# Patient Record
Sex: FEMALE | Race: BLACK OR AFRICAN AMERICAN | Employment: UNEMPLOYED | URBAN - METROPOLITAN AREA
[De-identification: names, ages, dates, MRNs, and addresses within clinical notes are randomized per-mention and may not be internally consistent; named-entity substitution may affect disease eponyms.]

---

## 2020-05-20 ENCOUNTER — HOSPITAL ENCOUNTER (OUTPATIENT)
Dept: LAB | Age: 40
Discharge: HOME | End: 2020-05-20
Payer: COMMERCIAL

## 2020-05-20 ENCOUNTER — PATIENT OUTREACH (OUTPATIENT)
Dept: CASE MANAGEMENT | Age: 40
End: 2020-05-20

## 2020-05-20 DIAGNOSIS — C90.00 MULTIPLE MYELOMA, REMISSION STATUS UNSPECIFIED (HCC): ICD-10-CM

## 2020-05-20 DIAGNOSIS — C90.00 MULTIPLE MYELOMA, REMISSION STATUS UNSPECIFIED (HCC): Primary | ICD-10-CM

## 2020-05-20 PROBLEM — E66.01 OBESITY, MORBID (HCC): Status: ACTIVE | Noted: 2020-05-20

## 2020-05-20 LAB
ALBUMIN SERPL-MCNC: 3.2 G/DL (ref 3.5–5)
ALBUMIN/GLOB SERPL: 0.8 {RATIO} (ref 1.2–3.5)
ALP SERPL-CCNC: 245 U/L (ref 50–136)
ALT SERPL-CCNC: 39 U/L (ref 12–65)
ANION GAP SERPL CALC-SCNC: 6 MMOL/L (ref 7–16)
AST SERPL-CCNC: 25 U/L (ref 15–37)
BASOPHILS # BLD: 0 K/UL (ref 0–0.2)
BASOPHILS NFR BLD: 0 % (ref 0–2)
BILIRUB SERPL-MCNC: 0.5 MG/DL (ref 0.2–1.1)
BUN SERPL-MCNC: 6 MG/DL (ref 6–23)
CALCIUM SERPL-MCNC: 9.8 MG/DL (ref 8.3–10.4)
CHLORIDE SERPL-SCNC: 108 MMOL/L (ref 98–107)
CO2 SERPL-SCNC: 28 MMOL/L (ref 21–32)
CREAT SERPL-MCNC: 0.7 MG/DL (ref 0.6–1)
DIFFERENTIAL METHOD BLD: ABNORMAL
EOSINOPHIL # BLD: 0 K/UL (ref 0–0.8)
EOSINOPHIL NFR BLD: 0 % (ref 0.5–7.8)
ERYTHROCYTE [DISTWIDTH] IN BLOOD BY AUTOMATED COUNT: 14.1 % (ref 11.9–14.6)
GLOBULIN SER CALC-MCNC: 4 G/DL (ref 2.3–3.5)
GLUCOSE SERPL-MCNC: 112 MG/DL (ref 65–100)
HCT VFR BLD AUTO: 32.8 % (ref 35.8–46.3)
HGB BLD-MCNC: 10.4 G/DL (ref 11.7–15.4)
IMM GRANULOCYTES # BLD AUTO: 0.3 K/UL (ref 0–0.5)
IMM GRANULOCYTES NFR BLD AUTO: 3 % (ref 0–5)
LDH SERPL L TO P-CCNC: 279 U/L (ref 100–190)
LYMPHOCYTES # BLD: 1.4 K/UL (ref 0.5–4.6)
LYMPHOCYTES NFR BLD: 12 % (ref 13–44)
MAGNESIUM SERPL-MCNC: 2.6 MG/DL (ref 1.8–2.4)
MCH RBC QN AUTO: 31.4 PG (ref 26.1–32.9)
MCHC RBC AUTO-ENTMCNC: 31.7 G/DL (ref 31.4–35)
MCV RBC AUTO: 99.1 FL (ref 79.6–97.8)
MONOCYTES # BLD: 1.3 K/UL (ref 0.1–1.3)
MONOCYTES NFR BLD: 11 % (ref 4–12)
NEUTS SEG # BLD: 8.9 K/UL (ref 1.7–8.2)
NEUTS SEG NFR BLD: 74 % (ref 43–78)
NRBC # BLD: 0.02 K/UL (ref 0–0.2)
PHOSPHATE SERPL-MCNC: 1.9 MG/DL (ref 2.5–4.5)
PLATELET # BLD AUTO: 243 K/UL (ref 150–450)
PMV BLD AUTO: 11 FL (ref 9.4–12.3)
POTASSIUM SERPL-SCNC: 3.4 MMOL/L (ref 3.5–5.1)
PROT SERPL-MCNC: 7.2 G/DL (ref 6.3–8.2)
RBC # BLD AUTO: 3.31 M/UL (ref 4.05–5.25)
SODIUM SERPL-SCNC: 142 MMOL/L (ref 136–145)
URATE SERPL-MCNC: 3.5 MG/DL (ref 2.6–6)
WBC # BLD AUTO: 11.9 K/UL (ref 4.3–11.1)

## 2020-05-20 PROCEDURE — 86334 IMMUNOFIX E-PHORESIS SERUM: CPT

## 2020-05-20 PROCEDURE — 84550 ASSAY OF BLOOD/URIC ACID: CPT

## 2020-05-20 PROCEDURE — 84165 PROTEIN E-PHORESIS SERUM: CPT

## 2020-05-20 PROCEDURE — 82232 ASSAY OF BETA-2 PROTEIN: CPT

## 2020-05-20 PROCEDURE — 84100 ASSAY OF PHOSPHORUS: CPT

## 2020-05-20 PROCEDURE — 36415 COLL VENOUS BLD VENIPUNCTURE: CPT

## 2020-05-20 PROCEDURE — 83883 ASSAY NEPHELOMETRY NOT SPEC: CPT

## 2020-05-20 PROCEDURE — 85025 COMPLETE CBC W/AUTO DIFF WBC: CPT

## 2020-05-20 PROCEDURE — 83615 LACTATE (LD) (LDH) ENZYME: CPT

## 2020-05-20 PROCEDURE — 80053 COMPREHEN METABOLIC PANEL: CPT

## 2020-05-20 PROCEDURE — 83735 ASSAY OF MAGNESIUM: CPT

## 2020-05-20 NOTE — PROGRESS NOTES
5/20/20:  New patient consult for multiple myeloma and stem cell transplant. Patient started with back pain earlier in the year. After evaluation, patient was noted to have lytic lesions around T6 and T7. T7 was biopsied and revealed multiple myeloma (Lambda Light chain, IgG). Patient had kyphoplasty to T7 to help with pain. Dr. Luzmaria Lopez discussed transplant overview with the patient. Dr. Luzmaria Lopez recommended completion of 4-6 cycles of RVD with Dr. Dai Morales in [place] and then start work-up for transplant. Financial coordinators notified of patient's insurance status and patient will need to be inpatient for transplant. Patient given ed pamphlet on myeloma and auto transplant. Patient given a verbal overview of transplant as well. Labs to be drawn today.   Return in 2 months

## 2020-05-21 LAB
B2 MICROGLOB SERPL-MCNC: 1.4 MG/L (ref 0.8–2.34)
KAPPA LC FREE SER-MCNC: 7.13 MG/L (ref 3.3–19.4)
KAPPA LC FREE/LAMBDA FREE SER: 0.66 {RATIO} (ref 0.26–1.65)
LAMBDA LC FREE SERPL-MCNC: 10.76 MG/L (ref 5.71–26.3)

## 2020-05-22 LAB
ALBUMIN SERPL ELPH-MCNC: 3.18 G/DL (ref 3.2–5.6)
ALBUMIN/GLOB SERPL: 0.9 {RATIO} (ref 1.2–3.5)
ALPHA1 GLOB SERPL ELPH-MCNC: 0.41 G/DL (ref 0.1–0.4)
ALPHA2 GLOB SERPL ELPH-MCNC: 1.13 G/DL (ref 0.4–1.2)
B-GLOBULIN SERPL QL ELPH: 1.34 G/DL (ref 0.6–1.3)
GAMMA GLOB MFR SERPL ELPH: 0.73 G/DL (ref 0.5–1.6)
IGA SERPL-MCNC: 56 MG/DL (ref 85–499)
IGG SERPL-MCNC: 587 MG/DL (ref 610–1616)
IGM SERPL-MCNC: 40 MG/DL (ref 35–242)
M PROTEIN SERPL ELPH-MCNC: ABNORMAL G/DL
PROT PATTERN SERPL ELPH-IMP: ABNORMAL
PROT PATTERN SPEC IFE-IMP: ABNORMAL
PROT SERPL-MCNC: 6.8 G/DL (ref 6.3–8.2)

## 2020-07-21 ENCOUNTER — PATIENT OUTREACH (OUTPATIENT)
Dept: CASE MANAGEMENT | Age: 40
End: 2020-07-21

## 2020-07-21 ENCOUNTER — HOSPITAL ENCOUNTER (OUTPATIENT)
Dept: LAB | Age: 40
Discharge: HOME | End: 2020-07-21
Payer: COMMERCIAL

## 2020-07-21 DIAGNOSIS — C90.00 MULTIPLE MYELOMA, REMISSION STATUS UNSPECIFIED (HCC): ICD-10-CM

## 2020-07-21 LAB
ALBUMIN SERPL-MCNC: 3.6 G/DL (ref 3.5–5)
ALBUMIN/GLOB SERPL: 0.9 {RATIO} (ref 1.2–3.5)
ALP SERPL-CCNC: 168 U/L (ref 50–136)
ALT SERPL-CCNC: 33 U/L (ref 12–65)
ANION GAP SERPL CALC-SCNC: 4 MMOL/L (ref 7–16)
AST SERPL-CCNC: 20 U/L (ref 15–37)
BASOPHILS # BLD: 0 K/UL (ref 0–0.2)
BASOPHILS NFR BLD: 0 % (ref 0–2)
BILIRUB SERPL-MCNC: 1.1 MG/DL (ref 0.2–1.1)
BUN SERPL-MCNC: 3 MG/DL (ref 6–23)
CALCIUM SERPL-MCNC: 9.3 MG/DL (ref 8.3–10.4)
CHLORIDE SERPL-SCNC: 108 MMOL/L (ref 98–107)
CO2 SERPL-SCNC: 28 MMOL/L (ref 21–32)
CREAT SERPL-MCNC: 0.6 MG/DL (ref 0.6–1)
DIFFERENTIAL METHOD BLD: ABNORMAL
EOSINOPHIL # BLD: 0 K/UL (ref 0–0.8)
EOSINOPHIL NFR BLD: 0 % (ref 0.5–7.8)
ERYTHROCYTE [DISTWIDTH] IN BLOOD BY AUTOMATED COUNT: 14.8 % (ref 11.9–14.6)
GLOBULIN SER CALC-MCNC: 4 G/DL (ref 2.3–3.5)
GLUCOSE SERPL-MCNC: 110 MG/DL (ref 65–100)
HCT VFR BLD AUTO: 39.6 % (ref 35.8–46.3)
HGB BLD-MCNC: 12.6 G/DL (ref 11.7–15.4)
IMM GRANULOCYTES # BLD AUTO: 0 K/UL (ref 0–0.5)
IMM GRANULOCYTES NFR BLD AUTO: 0 % (ref 0–5)
LYMPHOCYTES # BLD: 0.7 K/UL (ref 0.5–4.6)
LYMPHOCYTES NFR BLD: 8 % (ref 13–44)
MAGNESIUM SERPL-MCNC: 2.3 MG/DL (ref 1.8–2.4)
MCH RBC QN AUTO: 28.6 PG (ref 26.1–32.9)
MCHC RBC AUTO-ENTMCNC: 31.8 G/DL (ref 31.4–35)
MCV RBC AUTO: 89.8 FL (ref 79.6–97.8)
MONOCYTES # BLD: 0.1 K/UL (ref 0.1–1.3)
MONOCYTES NFR BLD: 1 % (ref 4–12)
NEUTS SEG # BLD: 7.6 K/UL (ref 1.7–8.2)
NEUTS SEG NFR BLD: 91 % (ref 43–78)
NRBC # BLD: 0 K/UL (ref 0–0.2)
PLATELET # BLD AUTO: 310 K/UL (ref 150–450)
PMV BLD AUTO: 11.3 FL (ref 9.4–12.3)
POTASSIUM SERPL-SCNC: 3.9 MMOL/L (ref 3.5–5.1)
PROT SERPL-MCNC: 7.6 G/DL (ref 6.3–8.2)
RBC # BLD AUTO: 4.41 M/UL (ref 4.05–5.25)
SODIUM SERPL-SCNC: 140 MMOL/L (ref 136–145)
WBC # BLD AUTO: 8.4 K/UL (ref 4.3–11.1)

## 2020-07-21 PROCEDURE — 80053 COMPREHEN METABOLIC PANEL: CPT

## 2020-07-21 PROCEDURE — 85025 COMPLETE CBC W/AUTO DIFF WBC: CPT

## 2020-07-21 PROCEDURE — 83735 ASSAY OF MAGNESIUM: CPT

## 2020-07-21 PROCEDURE — 36415 COLL VENOUS BLD VENIPUNCTURE: CPT

## 2020-07-21 PROCEDURE — 86334 IMMUNOFIX E-PHORESIS SERUM: CPT

## 2020-07-21 PROCEDURE — 84165 PROTEIN E-PHORESIS SERUM: CPT

## 2020-07-21 PROCEDURE — 83883 ASSAY NEPHELOMETRY NOT SPEC: CPT

## 2020-07-22 ENCOUNTER — PATIENT OUTREACH (OUTPATIENT)
Dept: CASE MANAGEMENT | Age: 40
End: 2020-07-22

## 2020-07-22 LAB
ALBUMIN SERPL ELPH-MCNC: 3.47 G/DL (ref 3.2–5.6)
ALBUMIN/GLOB SERPL: 1 {RATIO} (ref 1.2–3.5)
ALPHA1 GLOB SERPL ELPH-MCNC: 0.35 G/DL (ref 0.1–0.4)
ALPHA2 GLOB SERPL ELPH-MCNC: 1.06 G/DL (ref 0.4–1.2)
B-GLOBULIN SERPL QL ELPH: 1.25 G/DL (ref 0.6–1.3)
GAMMA GLOB MFR SERPL ELPH: 0.87 G/DL (ref 0.5–1.6)
IGA SERPL-MCNC: 43 MG/DL (ref 85–499)
IGG SERPL-MCNC: 809 MG/DL (ref 610–1616)
IGM SERPL-MCNC: 40 MG/DL (ref 35–242)
KAPPA LC FREE SER-MCNC: 9.73 MG/L (ref 3.3–19.4)
KAPPA LC FREE/LAMBDA FREE SER: 1 {RATIO} (ref 0.26–1.65)
LAMBDA LC FREE SERPL-MCNC: 9.7 MG/L (ref 5.71–26.3)
M PROTEIN SERPL ELPH-MCNC: ABNORMAL G/DL
PROT PATTERN SERPL ELPH-IMP: ABNORMAL
PROT PATTERN SPEC IFE-IMP: ABNORMAL
PROT SERPL-MCNC: 7 G/DL (ref 6.3–8.2)

## 2020-07-22 NOTE — PROGRESS NOTES
7/21/20:  Patient in for follow-up with Dr. Elliott Haynes. Patient has completed 4 cycles of RVD with Dr. Kirit Salvador in [facility]. She started cycle #5 velcade but has not started revlimid yet. Dr. Elliott Haynes reviewed labs and plan to start pre-studies for mobilization. Dr. Elliott Haynes provided overview of transplant process for the patient as well. This navigator gave patient educational pamphlet on transplant as well. Further education to be provided in the next 2 weeks. Potentially, mobilization to start around 8/14. Patient to have bone marrow biopsy and PET scan as well.

## 2020-07-28 RX ORDER — ACYCLOVIR 400 MG/1
400 TABLET ORAL 2 TIMES DAILY
COMMUNITY
End: 2020-08-13 | Stop reason: ALTCHOICE

## 2020-08-03 ENCOUNTER — PATIENT OUTREACH (OUTPATIENT)
Dept: CASE MANAGEMENT | Age: 40
End: 2020-08-03

## 2020-08-03 ENCOUNTER — HOSPITAL ENCOUNTER (OUTPATIENT)
Dept: LAB | Age: 40
Discharge: HOME | End: 2020-08-03

## 2020-08-03 ENCOUNTER — HOSPITAL ENCOUNTER (OUTPATIENT)
Dept: INFUSION THERAPY | Age: 40
Discharge: HOME | End: 2020-08-03
Payer: COMMERCIAL

## 2020-08-03 ENCOUNTER — HOSPITAL ENCOUNTER (OUTPATIENT)
Dept: CT IMAGING | Age: 40
Discharge: HOME | End: 2020-08-03
Attending: INTERNAL MEDICINE
Payer: COMMERCIAL

## 2020-08-03 VITALS
OXYGEN SATURATION: 96 % | BODY MASS INDEX: 39.01 KG/M2 | WEIGHT: 220.2 LBS | SYSTOLIC BLOOD PRESSURE: 148 MMHG | DIASTOLIC BLOOD PRESSURE: 93 MMHG | TEMPERATURE: 98.3 F | HEART RATE: 87 BPM | RESPIRATION RATE: 16 BRPM

## 2020-08-03 VITALS
RESPIRATION RATE: 16 BRPM | DIASTOLIC BLOOD PRESSURE: 77 MMHG | TEMPERATURE: 98.4 F | HEART RATE: 81 BPM | SYSTOLIC BLOOD PRESSURE: 122 MMHG | OXYGEN SATURATION: 97 %

## 2020-08-03 DIAGNOSIS — C90.00 MULTIPLE MYELOMA, REMISSION STATUS UNSPECIFIED (HCC): ICD-10-CM

## 2020-08-03 DIAGNOSIS — C90.00 MULTIPLE MYELOMA NOT HAVING ACHIEVED REMISSION (HCC): Primary | ICD-10-CM

## 2020-08-03 DIAGNOSIS — Z52.011 AUTOLOGOUS DONOR OF STEM CELLS: Primary | ICD-10-CM

## 2020-08-03 LAB
BASOPHILS # BLD: 0.1 K/UL (ref 0–0.2)
BASOPHILS NFR BLD: 1 % (ref 0–2)
BONE MARROW PREP & W,BMA: NORMAL
DIFFERENTIAL METHOD BLD: ABNORMAL
EOSINOPHIL # BLD: 0 K/UL (ref 0–0.8)
EOSINOPHIL NFR BLD: 0 % (ref 0.5–7.8)
ERYTHROCYTE [DISTWIDTH] IN BLOOD BY AUTOMATED COUNT: 16.6 % (ref 11.9–14.6)
HCT VFR BLD AUTO: 36.2 % (ref 35.8–46.3)
HGB BLD-MCNC: 11.5 G/DL (ref 11.7–15.4)
IMM GRANULOCYTES # BLD AUTO: 0 K/UL (ref 0–0.5)
IMM GRANULOCYTES NFR BLD AUTO: 0 % (ref 0–5)
LYMPHOCYTES # BLD: 1.9 K/UL (ref 0.5–4.6)
LYMPHOCYTES NFR BLD: 18 % (ref 13–44)
MCH RBC QN AUTO: 29.2 PG (ref 26.1–32.9)
MCHC RBC AUTO-ENTMCNC: 31.8 G/DL (ref 31.4–35)
MCV RBC AUTO: 91.9 FL (ref 79.6–97.8)
MONOCYTES # BLD: 0.6 K/UL (ref 0.1–1.3)
MONOCYTES NFR BLD: 5 % (ref 4–12)
NEUTS SEG # BLD: 8 K/UL (ref 1.7–8.2)
NEUTS SEG NFR BLD: 76 % (ref 43–78)
NRBC # BLD: 0 K/UL (ref 0–0.2)
PLATELET # BLD AUTO: 316 K/UL (ref 150–450)
PMV BLD AUTO: 12.3 FL (ref 9.4–12.3)
RBC # BLD AUTO: 3.94 M/UL (ref 4.05–5.2)
WBC # BLD AUTO: 10.6 K/UL (ref 4.3–11.1)

## 2020-08-03 PROCEDURE — 86787 VARICELLA-ZOSTER ANTIBODY: CPT

## 2020-08-03 PROCEDURE — 99152 MOD SED SAME PHYS/QHP 5/>YRS: CPT

## 2020-08-03 PROCEDURE — 88311 DECALCIFY TISSUE: CPT

## 2020-08-03 PROCEDURE — 74011000250 HC RX REV CODE- 250: Performed by: RADIOLOGY

## 2020-08-03 PROCEDURE — 88185 FLOWCYTOMETRY/TC ADD-ON: CPT

## 2020-08-03 PROCEDURE — 86694 HERPES SIMPLEX NES ANTBDY: CPT

## 2020-08-03 PROCEDURE — 85025 COMPLETE CBC W/AUTO DIFF WBC: CPT

## 2020-08-03 PROCEDURE — 86664 EPSTEIN-BARR NUCLEAR ANTIGEN: CPT

## 2020-08-03 PROCEDURE — 88184 FLOWCYTOMETRY/ TC 1 MARKER: CPT

## 2020-08-03 PROCEDURE — 36415 COLL VENOUS BLD VENIPUNCTURE: CPT

## 2020-08-03 PROCEDURE — 99213 OFFICE O/P EST LOW 20 MIN: CPT

## 2020-08-03 PROCEDURE — 88313 SPECIAL STAINS GROUP 2: CPT

## 2020-08-03 PROCEDURE — 74011250636 HC RX REV CODE- 250/636: Performed by: RADIOLOGY

## 2020-08-03 PROCEDURE — 86592 SYPHILIS TEST NON-TREP QUAL: CPT

## 2020-08-03 PROCEDURE — 86790 VIRUS ANTIBODY NOS: CPT

## 2020-08-03 PROCEDURE — 88305 TISSUE EXAM BY PATHOLOGIST: CPT

## 2020-08-03 PROCEDURE — 77030003666 CT BX BONE MARROW AND ASPIRATION

## 2020-08-03 RX ORDER — SODIUM CHLORIDE 9 MG/ML
150 INJECTION, SOLUTION INTRAVENOUS CONTINUOUS
Status: DISCONTINUED | OUTPATIENT
Start: 2020-08-03 | End: 2020-08-04 | Stop reason: HOSPADM

## 2020-08-03 RX ORDER — FENTANYL CITRATE 50 UG/ML
25-100 INJECTION, SOLUTION INTRAMUSCULAR; INTRAVENOUS
Status: DISCONTINUED | OUTPATIENT
Start: 2020-08-03 | End: 2020-08-03 | Stop reason: ALTCHOICE

## 2020-08-03 RX ORDER — LIDOCAINE HYDROCHLORIDE 20 MG/ML
20-200 INJECTION, SOLUTION INFILTRATION; PERINEURAL
Status: DISCONTINUED | OUTPATIENT
Start: 2020-08-03 | End: 2020-08-03 | Stop reason: ALTCHOICE

## 2020-08-03 RX ORDER — SODIUM CHLORIDE 9 MG/ML
25 INJECTION, SOLUTION INTRAVENOUS ONCE
Status: COMPLETED | OUTPATIENT
Start: 2020-08-03 | End: 2020-08-03

## 2020-08-03 RX ORDER — HYDROCODONE BITARTRATE AND ACETAMINOPHEN 7.5; 325 MG/1; MG/1
1 TABLET ORAL
Status: DISCONTINUED | OUTPATIENT
Start: 2020-08-03 | End: 2020-08-07 | Stop reason: HOSPADM

## 2020-08-03 RX ORDER — MIDAZOLAM HYDROCHLORIDE 1 MG/ML
.25-2 INJECTION, SOLUTION INTRAMUSCULAR; INTRAVENOUS
Status: DISCONTINUED | OUTPATIENT
Start: 2020-08-03 | End: 2020-08-03 | Stop reason: ALTCHOICE

## 2020-08-03 RX ORDER — DIPHENHYDRAMINE HYDROCHLORIDE 50 MG/ML
12.5-5 INJECTION, SOLUTION INTRAMUSCULAR; INTRAVENOUS ONCE
Status: COMPLETED | OUTPATIENT
Start: 2020-08-03 | End: 2020-08-03

## 2020-08-03 RX ADMIN — LIDOCAINE HYDROCHLORIDE 200 MG: 20 INJECTION, SOLUTION INFILTRATION; PERINEURAL at 14:51

## 2020-08-03 RX ADMIN — DIPHENHYDRAMINE HYDROCHLORIDE 50 MG: 50 INJECTION, SOLUTION INTRAMUSCULAR; INTRAVENOUS at 14:23

## 2020-08-03 RX ADMIN — FENTANYL CITRATE 50 MCG: 50 INJECTION, SOLUTION INTRAMUSCULAR; INTRAVENOUS at 14:54

## 2020-08-03 RX ADMIN — FENTANYL CITRATE 50 MCG: 50 INJECTION, SOLUTION INTRAMUSCULAR; INTRAVENOUS at 14:40

## 2020-08-03 RX ADMIN — SODIUM CHLORIDE 25 ML/HR: 900 INJECTION, SOLUTION INTRAVENOUS at 14:35

## 2020-08-03 RX ADMIN — MIDAZOLAM 1 MG: 1 INJECTION INTRAMUSCULAR; INTRAVENOUS at 14:40

## 2020-08-03 RX ADMIN — MIDAZOLAM 1 MG: 1 INJECTION INTRAMUSCULAR; INTRAVENOUS at 14:47

## 2020-08-03 RX ADMIN — FENTANYL CITRATE 50 MCG: 50 INJECTION, SOLUTION INTRAMUSCULAR; INTRAVENOUS at 14:47

## 2020-08-03 NOTE — PROCEDURES
Department of Interventional Radiology  [phone]        Interventional Radiology Brief Procedure Note    Patient: Kaur Sears MRN: [id number]  SSN: [id number]    YOB: 1980  Age: 35 y.o. Sex: female      Date of Procedure: 8/3/2020    Pre-Procedure Diagnosis: MM    Post-Procedure Diagnosis: SAME    Procedure(s): Image Guided Biopsy    Brief Description of Procedure: Right iliac bone marrow aspiration and core biopsy    Performed By: Anshul Godoy MD     Assistants: None    Anesthesia:Moderate Sedation    Estimated Blood Loss: Less than 10ml    Specimens:  Pathology    Implants:  None    Findings: Extremelly soft bone. Cortical fracture in the R posterior iliac bone.       Complications: None    Recommendations: 1 hour bedrest.       Follow Up: Dr Yen Diamond    Signed By: Anshul Godoy MD     August 3, 2020

## 2020-08-03 NOTE — PROGRESS NOTES
Pt arrived ambulating accompanied by mother. Apheresis consult completed. 45 minutes spent on education. Verbal and written information was given on process of stem cell collection, including potential side effects from process as well as medicines such as Mozobil and Granix. Instructed pt to take po Claritin for pain prevention. Handouts given on Claritin for pain, Central Care, and hand hygiene. Donor questionnaire was completed Any questions answered yes beyond reading material:no (no/yes). Physician was notified of any yes responses n/a. Pt was tearful during consult. All questions were answered, pt verbalized understanding.

## 2020-08-03 NOTE — DISCHARGE INSTRUCTIONS
[address]  Department of Interventional Radiology  [facility]  [phone] Office  [phone] Fax    BIOPSY DISCHARGE INSTRUCTIONS    General Instructions:     A biopsy is the removal of a small piece of tissue for microscopic examination or testing. Healthy tissue can be obtained for the purpose of tissue-type matching for transplants. Unhealthy tissues are more commonly biopsied to diagnose disease. Lung Biopsy:     A needle lung biopsy is performed when there is a mass discovered in the lung area. The most serious risk is infection, bleeding, and pneumothorax (a collapsed lung). Signs of pneumothorax include shortness of breath, rapid heart rate, and blueness of the skin. If any of these occur, call 911. Liver Biopsy: This test helps detect cancer, infections, and the cause of an enlargement of the liver or elevated liver enzymes. It also helps to diagnose a number of liver diseases. The pain associated with the procedure may be felt in the shoulder. The risks include internal bleeding, pneumothorax, and injury to the surrounding organs. Renal Biopsy: This procedure is sometimes done to evaluate a transplanted kidney. It is also used to evaluate unexplained decrease in kidney function, blood, or protein in the urine. You may see bright red blood in the urine the first 24 hours after the test. If the bleeding lasts longer, you need to call your doctor. There is a risk of infection and bleeding into the muscle, which may cause soreness. Spinal Biopsy: This test is sometimes done in conjunction with other procedures. Your back will be sore, as we are taking a small sample of bone, which is slightly more difficult to sample than tissue. General Biopsy:     A mass can grow in any area of the body, and we are taking a specimen as ordered by your doctor. The risks are the same.  They include bleeding, pain, and infection. Home Care Instructions: You may resume your regular diet and medication regimen. Do not drink alcohol, drive, or make any important legal decisions in the next 24 hours. Do not lift anything heavier than a gallon of milk until the soreness goes away. You may use over the counter acetaminophen or ibuprofen for the soreness. You may apply an ice pack to the affected area for 20-30 minutes at time for the first 24 hours. After that, you may apply a heat pack. Call If: You should call your Physician and/or the Radiology Nurse if you have any questions or concerns about the biopsy site. Call if you should have increased pain, fever, redness, drainage, or bleeding more than a small spot on the bandage. Follow-Up Instructions: Please see your ordering doctor as he/she has requested. To Reach Us: Interventional Radiology General Nurse Discharge    After general anesthesia or intravenous sedation, for 24 hours or while taking prescription Narcotics:  · Limit your activities  · Do not drive and operate hazardous machinery  · Do not make important personal or business decisions  · Do  not drink alcoholic beverages  · If you have not urinated within 8 hours after discharge, please contact your surgeon on call. * Please give a list of your current medications to your Primary Care Provider. * Please update this list whenever your medications are discontinued, doses are     changed, or new medications (including over-the-counter products) are added. * Please carry medication information at all times in case of emergency situations. These are general instructions for a healthy lifestyle:    No smoking/ No tobacco products/ Avoid exposure to second hand smoke  Surgeon General's Warning:  Quitting smoking now greatly reduces serious risk to your health.     Obesity, smoking, and sedentary lifestyle greatly increases your risk for illness  A healthy diet, regular physical exercise & weight monitoring are important for maintaining a healthy lifestyle    You may be retaining fluid if you have a history of heart failure or if you experience any of the following symptoms:  Weight gain of 3 pounds or more overnight or 5 pounds in a week, increased swelling in our hands or feet or shortness of breath while lying flat in bed. Please call your doctor as soon as you notice any of these symptoms; do not wait until your next office visit. Recognize signs and symptoms of STROKE:  F-face looks uneven    A-arms unable to move or move unevenly    S-speech slurred or non-existent    T-time-call 911 as soon as signs and symptoms begin-DO NOT go       Back to bed or wait to see if you get better-TIME IS BRAIN. If you have any questions about your procedure, please call the Interventional Radiology department at [phone]. After business hours (5pm) and weekends, call the answering service at [phone] and ask for the Radiologist on call to be paged. Si tiene Preguntas acerca del procedimiento, por favor llame al departamento de Radiología Intervencional al [phone]. Después de horas de oficina (5 pm) y los fines de [place], llamar al [place] Manjinder Kibmall al [phone] y pregunte por el Radiologo de [facility].        Patient Signature:  Date: 8/3/2020  Discharging Nurse: Eugene Fields

## 2020-08-03 NOTE — PROGRESS NOTES
TRANSFER - OUT REPORT:    Verbal report given to Zain Pinto and [address] [place] [address], RN (name) on Rolly Arroyo  being transferred to IR recovery (unit) for routine progression of care       Report consisted of patients Situation, Background, Assessment and   Recommendations(SBAR). Information from the following report(s) Procedure Summary, Intake/Output and MAR was reviewed with the receiving nurse. Opportunity for questions and clarification was provided. Conscious Sedation:   150 Mcg of Fentanyl administered  2 Mg of Versed administered    Pt tolerated procedure well.      Visit Vitals  /70   Pulse 79   Temp 98.4 °F (36.9 °C)   Resp 12   SpO2 100%   Breastfeeding No     Past Medical History:   Diagnosis Date    Chronic pain      Peripheral IV 08/03/20 Posterior;Right Hand (Active)

## 2020-08-03 NOTE — PROGRESS NOTES
8/3/20:  BMT Education: Navigator met with Ms. Stone Briggs and her mother for BMT education. Patient given BMT education notebook. All sections discussed. Patient encouraged to write down any questions or concerns. She is aware of opportunity to meet again for any final questions. Content gone over verbally and given in writing including drug education sheets. Granix side effects, and administration guidelines discussed. Patient aware to report any left side pain during mobilization for the possibility of spleen enlargement or rupture from Granix. Patient aware splenic rupture is extremely rare. Patient stated understanding. The possible use of Mozobil injections was also discussed. Patient aware that mozobil may  started on Monday evening before collection begins. Patient educated on administration of mozobil and the rationale for use during mobilization process. Patient is aware of being NPO stating midnight on Sunday for tunneled apheresis CVC placement on Monday 8/17 and stated understanding. Apheresis consult completed on 8/3/20 by Renetta Arellano RN. Patient aware of the possibility of collection being Tuesday through Friday. Mobilzation schedule given and gone over. Patient aware tunneled CVC will  be removed after transplant completed. Patient stated understanding. High dose education gone over and written information given on Melphalan. Patient aware of possible side effects from chemo including but not limited oral mucositis, diarrhea, n/v, and hair loss. Patient aware of stem cell reinfusion will occur at least 24 hours after the chemo was completed (the next day). Patient aware to prepare for daily visits at least 3 weeks during high dose and the possibility of a 4th week. Patient aware of the need for a 24 hr caregiver during high dose through recovery. A copy of treatment consents given to patient for review. Patient is aware to review consent and sign prior to high dose therapy being initiated.  Patient aware of the risks involved with BMT including but not limited to infection, bleeding, engraftment failure, and death. Questions were answered and again encouraged patient to read material and write down any questions.  Patient aware of next appointment with Dr. William Norwood on 8/13/20 for pre-study results and mobilization eval.

## 2020-08-04 ENCOUNTER — HOSPITAL ENCOUNTER (OUTPATIENT)
Dept: RESPIRATORY THERAPY | Age: 40
Discharge: HOME | End: 2020-08-04
Attending: INTERNAL MEDICINE
Payer: COMMERCIAL

## 2020-08-04 ENCOUNTER — HOSPITAL ENCOUNTER (OUTPATIENT)
Dept: GENERAL RADIOLOGY | Age: 40
Discharge: HOME | End: 2020-08-04
Attending: INTERNAL MEDICINE
Payer: COMMERCIAL

## 2020-08-04 ENCOUNTER — HOSPITAL ENCOUNTER (OUTPATIENT)
Dept: NON INVASIVE DIAGNOSTICS | Age: 40
Discharge: HOME | End: 2020-08-04
Attending: INTERNAL MEDICINE
Payer: COMMERCIAL

## 2020-08-04 ENCOUNTER — HOSPITAL ENCOUNTER (OUTPATIENT)
Dept: PET IMAGING | Age: 40
Discharge: HOME | End: 2020-08-04
Payer: COMMERCIAL

## 2020-08-04 ENCOUNTER — APPOINTMENT (OUTPATIENT)
Dept: NON INVASIVE DIAGNOSTICS | Age: 40
End: 2020-08-04
Attending: INTERNAL MEDICINE
Payer: COMMERCIAL

## 2020-08-04 DIAGNOSIS — C90.00 MULTIPLE MYELOMA, REMISSION STATUS UNSPECIFIED (HCC): ICD-10-CM

## 2020-08-04 DIAGNOSIS — Z52.011 AUTOLOGOUS DONOR OF STEM CELLS: ICD-10-CM

## 2020-08-04 LAB
ATRIAL RATE: 61 BPM
CALCULATED P AXIS, ECG09: 61 DEGREES
CALCULATED R AXIS, ECG10: -46 DEGREES
CALCULATED T AXIS, ECG11: 73 DEGREES
DIAGNOSIS, 93000: NORMAL
EBV NA IGG SER-ACNC: 580 U/ML (ref 0–17.9)
EBV VCA IGG SER-ACNC: >600 U/ML (ref 0–17.9)
EBV VCA IGM SER-ACNC: <36 U/ML (ref 0–35.9)
HSV1 IGG SER QL: NORMAL
INTERPRETATION, 169995: ABNORMAL
P-R INTERVAL, ECG05: 158 MS
Q-T INTERVAL, ECG07: 448 MS
QRS DURATION, ECG06: 96 MS
QTC CALCULATION (BEZET), ECG08: 450 MS
VENTRICULAR RATE, ECG03: 61 BPM
VZV IGG SER IA-ACNC: 1917 INDEX
VZV IGM SER IA-ACNC: <0.91 INDEX (ref 0–0.9)

## 2020-08-04 PROCEDURE — A9552 F18 FDG: HCPCS

## 2020-08-04 PROCEDURE — 74011636320 HC RX REV CODE- 636/320: Performed by: INTERNAL MEDICINE

## 2020-08-04 PROCEDURE — 93306 TTE W/DOPPLER COMPLETE: CPT

## 2020-08-04 PROCEDURE — 94010 BREATHING CAPACITY TEST: CPT

## 2020-08-04 PROCEDURE — 94726 PLETHYSMOGRAPHY LUNG VOLUMES: CPT

## 2020-08-04 PROCEDURE — 93005 ELECTROCARDIOGRAM TRACING: CPT | Performed by: INTERNAL MEDICINE

## 2020-08-04 PROCEDURE — 71046 X-RAY EXAM CHEST 2 VIEWS: CPT

## 2020-08-04 PROCEDURE — 94729 DIFFUSING CAPACITY: CPT

## 2020-08-04 RX ORDER — SODIUM CHLORIDE 0.9 % (FLUSH) 0.9 %
10 SYRINGE (ML) INJECTION
Status: COMPLETED | OUTPATIENT
Start: 2020-08-04 | End: 2020-08-04

## 2020-08-04 RX ORDER — DULOXETIN HYDROCHLORIDE 30 MG/1
30 CAPSULE, DELAYED RELEASE ORAL
COMMUNITY
End: 2020-11-02 | Stop reason: DRUGHIGH

## 2020-08-04 RX ADMIN — DIATRIZOATE MEGLUMINE AND DIATRIZOATE SODIUM 10 ML: 660; 100 LIQUID ORAL; RECTAL at 11:32

## 2020-08-04 RX ADMIN — Medication 10 ML: at 11:32

## 2020-08-05 LAB — HSV1+2 IGM SER IA-ACNC: <0.91 RATIO (ref 0–0.9)

## 2020-08-05 NOTE — PROCEDURES
[facility]  PROCEDURE NOTE    Name:  Scott Dias  MR#:  [id number]  :  1980  ACCOUNT #:  [de-identified]  DATE OF SERVICE:  2020    COMPLETE PULMONARY FUNCTION TESTS    INTERPRETATION:  The flow volume loop is normal.    Spirometry suggests restriction. Lung volumes reveal mild restriction. The diffusion capacity adjusted for the patient's hemoglobin is substantially reduced.       Kate Alexandra MD      [id number]  D:  2020 17:00  T:  2020 20:29  JOB #:  [id number]

## 2020-08-07 LAB — STEM CELL PANEL, XSTEMT: NORMAL

## 2020-08-12 NOTE — PROGRESS NOTES
Screening for COVID-19 During Preassessment    1) Do you currently have signs or symptoms of a respiratory infection, such as fever, cough, shortness of breath or sore throat?  no    2) In the last 14 days have you had contact with any of the following:   A) Someone with confirmed or presumptive diagnosis of COVID-19? NO    Or B) Someone under investigation for COVID-19? NO    Or  C) Someone who has been diagnosed with COVID-19? NO    3) In the last 14 days have you traveled or has someone in your home traveled to [place], [facility], [place], [place], [place], [place], [place], or [place]?    No

## 2020-08-13 ENCOUNTER — PATIENT OUTREACH (OUTPATIENT)
Dept: CASE MANAGEMENT | Age: 40
End: 2020-08-13

## 2020-08-13 ENCOUNTER — HOSPITAL ENCOUNTER (OUTPATIENT)
Dept: LAB | Age: 40
Discharge: HOME | End: 2020-08-13
Payer: COMMERCIAL

## 2020-08-13 DIAGNOSIS — C90.00 MULTIPLE MYELOMA, REMISSION STATUS UNSPECIFIED (HCC): ICD-10-CM

## 2020-08-13 DIAGNOSIS — Z52.011 AUTOLOGOUS DONOR OF STEM CELLS: ICD-10-CM

## 2020-08-13 DIAGNOSIS — C90.00 MULTIPLE MYELOMA, REMISSION STATUS UNSPECIFIED (HCC): Primary | ICD-10-CM

## 2020-08-13 LAB
ABO + RH BLD: NORMAL
ALBUMIN SERPL-MCNC: 3.1 G/DL (ref 3.5–5)
ALBUMIN/GLOB SERPL: 0.9 {RATIO} (ref 1.2–3.5)
ALP SERPL-CCNC: 116 U/L (ref 50–136)
ALT SERPL-CCNC: 12 U/L (ref 12–65)
ANION GAP SERPL CALC-SCNC: 7 MMOL/L (ref 7–16)
AST SERPL-CCNC: 7 U/L (ref 15–37)
BASOPHILS # BLD: 0 K/UL (ref 0–0.2)
BASOPHILS NFR BLD: 0 % (ref 0–2)
BILIRUB SERPL-MCNC: 0.7 MG/DL (ref 0.2–1.1)
BUN SERPL-MCNC: 4 MG/DL (ref 6–23)
CALCIUM SERPL-MCNC: 9 MG/DL (ref 8.3–10.4)
CHLORIDE SERPL-SCNC: 107 MMOL/L (ref 98–107)
CO2 SERPL-SCNC: 28 MMOL/L (ref 21–32)
CREAT SERPL-MCNC: 0.7 MG/DL (ref 0.6–1)
DIFFERENTIAL METHOD BLD: ABNORMAL
EOSINOPHIL # BLD: 0 K/UL (ref 0–0.8)
EOSINOPHIL NFR BLD: 1 % (ref 0.5–7.8)
ERYTHROCYTE [DISTWIDTH] IN BLOOD BY AUTOMATED COUNT: 15.5 % (ref 11.9–14.6)
GLOBULIN SER CALC-MCNC: 3.6 G/DL (ref 2.3–3.5)
GLUCOSE SERPL-MCNC: 101 MG/DL (ref 65–100)
HCG SERPL QL: NEGATIVE
HCT VFR BLD AUTO: 34.2 % (ref 35.8–46.3)
HGB BLD-MCNC: 10.9 G/DL (ref 11.7–15.4)
IMM GRANULOCYTES # BLD AUTO: 0 K/UL (ref 0–0.5)
IMM GRANULOCYTES NFR BLD AUTO: 0 % (ref 0–5)
LYMPHOCYTES # BLD: 1.9 K/UL (ref 0.5–4.6)
LYMPHOCYTES NFR BLD: 24 % (ref 13–44)
MAGNESIUM SERPL-MCNC: 1.9 MG/DL (ref 1.8–2.4)
MCH RBC QN AUTO: 28.3 PG (ref 26.1–32.9)
MCHC RBC AUTO-ENTMCNC: 31.9 G/DL (ref 31.4–35)
MCV RBC AUTO: 88.8 FL (ref 79.6–97.8)
MONOCYTES # BLD: 0.4 K/UL (ref 0.1–1.3)
MONOCYTES NFR BLD: 6 % (ref 4–12)
NEUTS SEG # BLD: 5.4 K/UL (ref 1.7–8.2)
NEUTS SEG NFR BLD: 69 % (ref 43–78)
NRBC # BLD: 0 K/UL (ref 0–0.2)
PHOSPHATE SERPL-MCNC: 2.6 MG/DL (ref 2.5–4.5)
PLATELET # BLD AUTO: 326 K/UL (ref 150–450)
PMV BLD AUTO: 9.8 FL (ref 9.4–12.3)
POTASSIUM SERPL-SCNC: 2.6 MMOL/L (ref 3.5–5.1)
PROT SERPL-MCNC: 6.7 G/DL (ref 6.3–8.2)
RBC # BLD AUTO: 3.85 M/UL (ref 4.05–5.25)
SODIUM SERPL-SCNC: 142 MMOL/L (ref 136–145)
WBC # BLD AUTO: 7.8 K/UL (ref 4.3–11.1)

## 2020-08-13 PROCEDURE — 85025 COMPLETE CBC W/AUTO DIFF WBC: CPT

## 2020-08-13 PROCEDURE — 86900 BLOOD TYPING SEROLOGIC ABO: CPT

## 2020-08-13 PROCEDURE — 86334 IMMUNOFIX E-PHORESIS SERUM: CPT

## 2020-08-13 PROCEDURE — 80053 COMPREHEN METABOLIC PANEL: CPT

## 2020-08-13 PROCEDURE — 84703 CHORIONIC GONADOTROPIN ASSAY: CPT

## 2020-08-13 PROCEDURE — 83735 ASSAY OF MAGNESIUM: CPT

## 2020-08-13 PROCEDURE — 84165 PROTEIN E-PHORESIS SERUM: CPT

## 2020-08-13 PROCEDURE — 84100 ASSAY OF PHOSPHORUS: CPT

## 2020-08-13 PROCEDURE — 36415 COLL VENOUS BLD VENIPUNCTURE: CPT

## 2020-08-13 PROCEDURE — 83021 HEMOGLOBIN CHROMOTOGRAPHY: CPT

## 2020-08-13 PROCEDURE — 83883 ASSAY NEPHELOMETRY NOT SPEC: CPT

## 2020-08-13 RX ORDER — SODIUM CHLORIDE 9 MG/ML
1000 INJECTION, SOLUTION INTRAVENOUS CONTINUOUS
Status: CANCELLED | OUTPATIENT
Start: 2020-08-20

## 2020-08-13 RX ORDER — GUAIFENESIN 100 MG/5ML
81 LIQUID (ML) ORAL
Status: CANCELLED | OUTPATIENT
Start: 2020-08-21

## 2020-08-13 RX ORDER — LORAZEPAM 2 MG/ML
1 INJECTION INTRAMUSCULAR
Status: CANCELLED | OUTPATIENT
Start: 2020-08-20

## 2020-08-13 RX ORDER — SODIUM CHLORIDE 0.9 % (FLUSH) 0.9 %
10-40 SYRINGE (ML) INJECTION AS NEEDED
Status: CANCELLED | OUTPATIENT
Start: 2020-08-17

## 2020-08-13 RX ORDER — SODIUM CHLORIDE 9 MG/ML
1000 INJECTION, SOLUTION INTRAVENOUS CONTINUOUS
Status: CANCELLED | OUTPATIENT
Start: 2020-08-19

## 2020-08-13 RX ORDER — SODIUM CHLORIDE 0.9 % (FLUSH) 0.9 %
10-40 SYRINGE (ML) INJECTION AS NEEDED
Status: CANCELLED | OUTPATIENT
Start: 2020-08-21

## 2020-08-13 RX ORDER — SODIUM CHLORIDE 0.9 % (FLUSH) 0.9 %
10-40 SYRINGE (ML) INJECTION AS NEEDED
Status: CANCELLED | OUTPATIENT
Start: 2020-08-15

## 2020-08-13 RX ORDER — SODIUM CHLORIDE 0.9 % (FLUSH) 0.9 %
10-40 SYRINGE (ML) INJECTION AS NEEDED
Status: CANCELLED | OUTPATIENT
Start: 2020-08-18

## 2020-08-13 RX ORDER — LORAZEPAM 2 MG/ML
1 INJECTION INTRAMUSCULAR
Status: CANCELLED | OUTPATIENT
Start: 2020-08-21

## 2020-08-13 RX ORDER — SODIUM CHLORIDE 9 MG/ML
1000 INJECTION, SOLUTION INTRAVENOUS CONTINUOUS
Status: CANCELLED | OUTPATIENT
Start: 2020-08-18

## 2020-08-13 RX ORDER — LORAZEPAM 2 MG/ML
1 INJECTION INTRAMUSCULAR
Status: CANCELLED | OUTPATIENT
Start: 2020-08-19

## 2020-08-13 RX ORDER — SODIUM CHLORIDE 0.9 % (FLUSH) 0.9 %
10-40 SYRINGE (ML) INJECTION AS NEEDED
Status: CANCELLED | OUTPATIENT
Start: 2020-08-16

## 2020-08-13 RX ORDER — GUAIFENESIN 100 MG/5ML
81 LIQUID (ML) ORAL
Status: CANCELLED | OUTPATIENT
Start: 2020-08-19

## 2020-08-13 RX ORDER — SODIUM CHLORIDE 0.9 % (FLUSH) 0.9 %
10-40 SYRINGE (ML) INJECTION AS NEEDED
Status: CANCELLED | OUTPATIENT
Start: 2020-08-20

## 2020-08-13 RX ORDER — SODIUM CHLORIDE 0.9 % (FLUSH) 0.9 %
10-40 SYRINGE (ML) INJECTION AS NEEDED
Status: CANCELLED | OUTPATIENT
Start: 2020-08-14

## 2020-08-13 RX ORDER — SODIUM CHLORIDE 9 MG/ML
1000 INJECTION, SOLUTION INTRAVENOUS CONTINUOUS
Status: CANCELLED | OUTPATIENT
Start: 2020-08-21

## 2020-08-13 RX ORDER — POTASSIUM CHLORIDE 14.9 MG/ML
20 INJECTION INTRAVENOUS ONCE
Status: CANCELLED
Start: 2020-08-14

## 2020-08-13 RX ORDER — GUAIFENESIN 100 MG/5ML
81 LIQUID (ML) ORAL
Status: CANCELLED | OUTPATIENT
Start: 2020-08-20

## 2020-08-13 RX ORDER — LORAZEPAM 2 MG/ML
1 INJECTION INTRAMUSCULAR
Status: CANCELLED | OUTPATIENT
Start: 2020-08-18

## 2020-08-13 RX ORDER — SODIUM CHLORIDE 0.9 % (FLUSH) 0.9 %
10-40 SYRINGE (ML) INJECTION AS NEEDED
Status: CANCELLED | OUTPATIENT
Start: 2020-08-19

## 2020-08-13 RX ORDER — GUAIFENESIN 100 MG/5ML
81 LIQUID (ML) ORAL
Status: CANCELLED | OUTPATIENT
Start: 2020-08-18

## 2020-08-13 NOTE — PROGRESS NOTES
8/13/20:  Patient here for mobilization evaluation with Dr. Sabrina Cook. Patient with c/o swollen eye lids and reports eyes are draining yellow pus like liquid. One eye lid improved with hot compress but now opposite eye lid swollen and draining. Dr. Sabrina Cook would like patient to start erythromycin eye drops to both eye lids qid x 5 days. Patient k 2.6 today. She will start potassium 20meq tonight and receive a 20meq bolus in infusion tomorrow. Patient to be referred to pulmonary for reduced DLCO and also to cardiology for tricuspid valve re-gurgitation. Patient to continue with mob tomorrow as planned. Patient will be expanded suitability because of decreased DLCO. Dr. Sabrina Cook to see the patient again prior to high dose evaluation.

## 2020-08-14 ENCOUNTER — HOSPITAL ENCOUNTER (OUTPATIENT)
Dept: INFUSION THERAPY | Age: 40
Discharge: HOME | End: 2020-08-14
Payer: COMMERCIAL

## 2020-08-14 VITALS
TEMPERATURE: 97.3 F | WEIGHT: 221.4 LBS | SYSTOLIC BLOOD PRESSURE: 122 MMHG | RESPIRATION RATE: 18 BRPM | HEART RATE: 94 BPM | OXYGEN SATURATION: 95 % | BODY MASS INDEX: 38.6 KG/M2 | DIASTOLIC BLOOD PRESSURE: 76 MMHG

## 2020-08-14 DIAGNOSIS — C90.00 MULTIPLE MYELOMA NOT HAVING ACHIEVED REMISSION (HCC): Primary | ICD-10-CM

## 2020-08-14 LAB
ALBUMIN SERPL ELPH-MCNC: 3.31 G/DL (ref 3.2–5.6)
ALBUMIN/GLOB SERPL: 1.1 {RATIO}
ALPHA1 GLOB SERPL ELPH-MCNC: 0.27 G/DL (ref 0.1–0.4)
ALPHA2 GLOB SERPL ELPH-MCNC: 0.85 G/DL (ref 0.4–1.2)
B-GLOBULIN SERPL QL ELPH: 1.1 G/DL (ref 0.6–1.3)
DEPRECATED HGB OTHER BLD-IMP: 0 %
GAMMA GLOB MFR SERPL ELPH: 0.78 G/DL (ref 0.5–1.6)
HGB A MFR BLD: 97.8 % (ref 96.4–98.8)
HGB A2 MFR BLD COLUMN CHROM: 2.2 % (ref 1.8–3.2)
HGB C MFR BLD: 0 %
HGB F MFR BLD: 0 % (ref 0–2)
HGB FRACT BLD-IMP: NORMAL
HGB S BLD QL SOLY: NEGATIVE
HGB S MFR BLD: 0 %
IGA SERPL-MCNC: 46 MG/DL (ref 85–499)
IGG SERPL-MCNC: 739 MG/DL (ref 610–1616)
IGM SERPL-MCNC: 31 MG/DL (ref 35–242)
KAPPA LC FREE SER-MCNC: 11.26 MG/L (ref 3.3–19.4)
KAPPA LC FREE/LAMBDA FREE SER: 1.12 {RATIO} (ref 0.26–1.65)
LAMBDA LC FREE SERPL-MCNC: 10.07 MG/L (ref 5.71–26.3)
M PROTEIN SERPL ELPH-MCNC: 0.27 G/DL
POTASSIUM SERPL-SCNC: 3 MMOL/L (ref 3.5–5.1)
PROT PATTERN SERPL ELPH-IMP: ABNORMAL
PROT PATTERN SPEC IFE-IMP: ABNORMAL
PROT SERPL-MCNC: 6.3 G/DL (ref 6.3–8.2)

## 2020-08-14 PROCEDURE — 74011250636 HC RX REV CODE- 250/636: Performed by: NURSE PRACTITIONER

## 2020-08-14 PROCEDURE — 96372 THER/PROPH/DIAG INJ SC/IM: CPT

## 2020-08-14 PROCEDURE — 96375 TX/PRO/DX INJ NEW DRUG ADDON: CPT

## 2020-08-14 PROCEDURE — 84132 ASSAY OF SERUM POTASSIUM: CPT

## 2020-08-14 PROCEDURE — 96366 THER/PROPH/DIAG IV INF ADDON: CPT

## 2020-08-14 PROCEDURE — 99211 OFF/OP EST MAY X REQ PHY/QHP: CPT

## 2020-08-14 PROCEDURE — 74011250636 HC RX REV CODE- 250/636: Performed by: INTERNAL MEDICINE

## 2020-08-14 PROCEDURE — 96365 THER/PROPH/DIAG IV INF INIT: CPT

## 2020-08-14 RX ORDER — ACETAMINOPHEN 325 MG/1
650 TABLET ORAL ONCE
Status: CANCELLED
Start: 2020-08-16

## 2020-08-14 RX ORDER — DIPHENHYDRAMINE HYDROCHLORIDE 50 MG/ML
25 INJECTION, SOLUTION INTRAMUSCULAR; INTRAVENOUS ONCE
Status: CANCELLED
Start: 2020-08-17

## 2020-08-14 RX ORDER — ACETAMINOPHEN 325 MG/1
650 TABLET ORAL ONCE
Status: CANCELLED
Start: 2020-08-15

## 2020-08-14 RX ORDER — DIPHENHYDRAMINE HYDROCHLORIDE 50 MG/ML
50 INJECTION, SOLUTION INTRAMUSCULAR; INTRAVENOUS AS NEEDED
Status: ACTIVE | OUTPATIENT
Start: 2020-08-14 | End: 2020-08-14

## 2020-08-14 RX ORDER — POTASSIUM CHLORIDE 14.9 MG/ML
20 INJECTION INTRAVENOUS ONCE
Status: COMPLETED | OUTPATIENT
Start: 2020-08-14 | End: 2020-08-14

## 2020-08-14 RX ORDER — ACETAMINOPHEN 325 MG/1
650 TABLET ORAL AS NEEDED
Status: ACTIVE | OUTPATIENT
Start: 2020-08-14 | End: 2020-08-14

## 2020-08-14 RX ORDER — DIPHENHYDRAMINE HYDROCHLORIDE 50 MG/ML
25 INJECTION, SOLUTION INTRAMUSCULAR; INTRAVENOUS ONCE
Status: CANCELLED
Start: 2020-08-18

## 2020-08-14 RX ORDER — ACETAMINOPHEN 325 MG/1
650 TABLET ORAL ONCE
Status: CANCELLED
Start: 2020-08-19

## 2020-08-14 RX ORDER — ACETAMINOPHEN 325 MG/1
650 TABLET ORAL ONCE
Status: CANCELLED
Start: 2020-08-20

## 2020-08-14 RX ORDER — SODIUM CHLORIDE 9 MG/ML
25 INJECTION, SOLUTION INTRAVENOUS CONTINUOUS
Status: CANCELLED | OUTPATIENT
Start: 2020-08-17

## 2020-08-14 RX ORDER — DIPHENHYDRAMINE HYDROCHLORIDE 50 MG/ML
25 INJECTION, SOLUTION INTRAMUSCULAR; INTRAVENOUS ONCE
Status: CANCELLED
Start: 2020-08-19

## 2020-08-14 RX ORDER — DIPHENHYDRAMINE HYDROCHLORIDE 50 MG/ML
25 INJECTION, SOLUTION INTRAMUSCULAR; INTRAVENOUS ONCE
Status: CANCELLED
Start: 2020-08-21

## 2020-08-14 RX ORDER — ALBUTEROL SULFATE 0.83 MG/ML
2.5 SOLUTION RESPIRATORY (INHALATION) AS NEEDED
Status: ACTIVE | OUTPATIENT
Start: 2020-08-14 | End: 2020-08-14

## 2020-08-14 RX ORDER — ONDANSETRON 2 MG/ML
8 INJECTION INTRAMUSCULAR; INTRAVENOUS AS NEEDED
Status: ACTIVE | OUTPATIENT
Start: 2020-08-14 | End: 2020-08-14

## 2020-08-14 RX ORDER — SODIUM CHLORIDE 9 MG/ML
25 INJECTION, SOLUTION INTRAVENOUS CONTINUOUS
Status: CANCELLED | OUTPATIENT
Start: 2020-08-16

## 2020-08-14 RX ORDER — DIPHENHYDRAMINE HYDROCHLORIDE 50 MG/ML
25 INJECTION, SOLUTION INTRAMUSCULAR; INTRAVENOUS ONCE
Status: CANCELLED
Start: 2020-08-15

## 2020-08-14 RX ORDER — DIPHENHYDRAMINE HYDROCHLORIDE 50 MG/ML
25 INJECTION, SOLUTION INTRAMUSCULAR; INTRAVENOUS ONCE
Status: CANCELLED
Start: 2020-08-16

## 2020-08-14 RX ORDER — ACETAMINOPHEN 325 MG/1
650 TABLET ORAL ONCE
Status: CANCELLED
Start: 2020-08-18

## 2020-08-14 RX ORDER — ACETAMINOPHEN 325 MG/1
650 TABLET ORAL ONCE
Status: CANCELLED
Start: 2020-08-17

## 2020-08-14 RX ORDER — DIPHENHYDRAMINE HYDROCHLORIDE 50 MG/ML
25 INJECTION, SOLUTION INTRAMUSCULAR; INTRAVENOUS ONCE
Status: CANCELLED
Start: 2020-08-20

## 2020-08-14 RX ORDER — SODIUM CHLORIDE 9 MG/ML
25 INJECTION, SOLUTION INTRAVENOUS CONTINUOUS
Status: CANCELLED | OUTPATIENT
Start: 2020-08-15

## 2020-08-14 RX ORDER — EPINEPHRINE 1 MG/ML
0.3 INJECTION, SOLUTION, CONCENTRATE INTRAVENOUS AS NEEDED
Status: ACTIVE | OUTPATIENT
Start: 2020-08-14 | End: 2020-08-14

## 2020-08-14 RX ORDER — HYDROCORTISONE SODIUM SUCCINATE 100 MG/2ML
100 INJECTION, POWDER, FOR SOLUTION INTRAMUSCULAR; INTRAVENOUS AS NEEDED
Status: ACTIVE | OUTPATIENT
Start: 2020-08-14 | End: 2020-08-14

## 2020-08-14 RX ORDER — ACETAMINOPHEN 325 MG/1
650 TABLET ORAL ONCE
Status: CANCELLED
Start: 2020-08-21

## 2020-08-14 RX ADMIN — FILGRASTIM-SNDZ 1080 MCG: 480 INJECTION, SOLUTION INTRAVENOUS; SUBCUTANEOUS at 09:38

## 2020-08-14 RX ADMIN — HYDROCORTISONE SODIUM SUCCINATE 100 MG: 100 INJECTION, POWDER, FOR SOLUTION INTRAMUSCULAR; INTRAVENOUS at 10:19

## 2020-08-14 RX ADMIN — MEPERIDINE HYDROCHLORIDE 25 MG: 25 INJECTION INTRAMUSCULAR; INTRAVENOUS; SUBCUTANEOUS at 10:29

## 2020-08-14 RX ADMIN — DIPHENHYDRAMINE HYDROCHLORIDE 50 MG: 50 INJECTION, SOLUTION INTRAMUSCULAR; INTRAVENOUS at 10:18

## 2020-08-14 RX ADMIN — POTASSIUM CHLORIDE 20 MEQ: 14.9 INJECTION, SOLUTION INTRAVENOUS at 09:18

## 2020-08-14 RX ADMIN — SODIUM CHLORIDE 500 ML: 900 INJECTION, SOLUTION INTRAVENOUS at 09:15

## 2020-08-14 NOTE — PROGRESS NOTES
Mobilization Day 1. Labs needed at next visit see support plan. Next Appointment scheduled 8/15 at 0900. WBC/ANC= 7.8/5. 4  Apheresis catheter placement scheduled for  8/17 @ 1100. New orders received benadryl 50mg, solumedrol 100mg, demerol 25mg. Redraw potassium level at end of infusion. Lab draw from IV. Issues; 30 min. After 1st Zarxio inj. Pt C/O 10 out of 10 pain to neck, back  and chest and feeling SOB. Potassium infusion stopped. VS stable. Pt given benadryl, solumedrol and demerol with good results. Pt completed potassium infusion, redrawn done. Pt instructed to take potassium 20PO BID and decadron 6mgPO BID start tonight. Pt arrived ambulatory with her mother. After the above resolved pt discharged to apartment via [facility] with her mother.

## 2020-08-15 ENCOUNTER — HOSPITAL ENCOUNTER (OUTPATIENT)
Dept: INFUSION THERAPY | Age: 40
Discharge: HOME | End: 2020-08-15
Payer: COMMERCIAL

## 2020-08-15 VITALS
DIASTOLIC BLOOD PRESSURE: 89 MMHG | HEART RATE: 102 BPM | RESPIRATION RATE: 18 BRPM | SYSTOLIC BLOOD PRESSURE: 145 MMHG | TEMPERATURE: 98.4 F | OXYGEN SATURATION: 98 %

## 2020-08-15 DIAGNOSIS — C90.00 MULTIPLE MYELOMA NOT HAVING ACHIEVED REMISSION (HCC): Primary | ICD-10-CM

## 2020-08-15 LAB
ANION GAP SERPL CALC-SCNC: 8 MMOL/L (ref 7–16)
BUN SERPL-MCNC: 4 MG/DL (ref 6–23)
CALCIUM SERPL-MCNC: 9.1 MG/DL (ref 8.3–10.4)
CHLORIDE SERPL-SCNC: 108 MMOL/L (ref 98–107)
CO2 SERPL-SCNC: 26 MMOL/L (ref 21–32)
CREAT SERPL-MCNC: 0.8 MG/DL (ref 0.6–1)
DIFFERENTIAL METHOD BLD: ABNORMAL
ERYTHROCYTE [DISTWIDTH] IN BLOOD BY AUTOMATED COUNT: 15.9 % (ref 11.9–14.6)
GLUCOSE SERPL-MCNC: 169 MG/DL (ref 65–100)
HCT VFR BLD AUTO: 34.3 % (ref 35.8–46.3)
HGB BLD-MCNC: 11.1 G/DL (ref 11.7–15.4)
LYMPHOCYTES # BLD: 1.4 K/UL (ref 0.5–4.6)
LYMPHOCYTES NFR BLD MANUAL: 3 % (ref 16–44)
MAGNESIUM SERPL-MCNC: 1.8 MG/DL (ref 1.8–2.4)
MCH RBC QN AUTO: 29.2 PG (ref 26.1–32.9)
MCHC RBC AUTO-ENTMCNC: 32.4 G/DL (ref 31.4–35)
MCV RBC AUTO: 90.3 FL (ref 79.6–97.8)
MONOCYTES # BLD: 1.4 K/UL (ref 0.1–1.3)
MONOCYTES NFR BLD MANUAL: 3 % (ref 3–9)
NEUTS BAND NFR BLD MANUAL: 8 % (ref 0–6)
NEUTS SEG # BLD: 43 K/UL (ref 1.7–8.2)
NEUTS SEG NFR BLD MANUAL: 86 % (ref 47–75)
NRBC # BLD: 0 K/UL (ref 0–0.2)
PLATELET # BLD AUTO: 305 K/UL (ref 150–450)
PLATELET COMMENTS,PCOM: ADEQUATE
PMV BLD AUTO: 11 FL (ref 9.4–12.3)
POTASSIUM SERPL-SCNC: 3.1 MMOL/L (ref 3.5–5.1)
RBC # BLD AUTO: 3.8 M/UL (ref 4.05–5.25)
RBC MORPH BLD: ABNORMAL
SODIUM SERPL-SCNC: 142 MMOL/L (ref 136–145)
WBC # BLD AUTO: 45.8 K/UL (ref 4.3–11.1)
WBC MORPH BLD: ABNORMAL

## 2020-08-15 PROCEDURE — 80048 BASIC METABOLIC PNL TOTAL CA: CPT

## 2020-08-15 PROCEDURE — 96372 THER/PROPH/DIAG INJ SC/IM: CPT

## 2020-08-15 PROCEDURE — 74011250637 HC RX REV CODE- 250/637: Performed by: INTERNAL MEDICINE

## 2020-08-15 PROCEDURE — 85025 COMPLETE CBC W/AUTO DIFF WBC: CPT

## 2020-08-15 PROCEDURE — 96374 THER/PROPH/DIAG INJ IV PUSH: CPT

## 2020-08-15 PROCEDURE — 74011250636 HC RX REV CODE- 250/636: Performed by: INTERNAL MEDICINE

## 2020-08-15 PROCEDURE — 83735 ASSAY OF MAGNESIUM: CPT

## 2020-08-15 RX ORDER — SODIUM CHLORIDE 0.9 % (FLUSH) 0.9 %
10-40 SYRINGE (ML) INJECTION AS NEEDED
Status: ACTIVE | OUTPATIENT
Start: 2020-08-15 | End: 2020-08-15

## 2020-08-15 RX ORDER — POTASSIUM CHLORIDE 750 MG/1
20 TABLET, EXTENDED RELEASE ORAL
Status: COMPLETED | OUTPATIENT
Start: 2020-08-15 | End: 2020-08-15

## 2020-08-15 RX ORDER — ACETAMINOPHEN 325 MG/1
650 TABLET ORAL ONCE
Status: COMPLETED | OUTPATIENT
Start: 2020-08-15 | End: 2020-08-15

## 2020-08-15 RX ORDER — DIPHENHYDRAMINE HYDROCHLORIDE 50 MG/ML
25 INJECTION, SOLUTION INTRAMUSCULAR; INTRAVENOUS ONCE
Status: COMPLETED | OUTPATIENT
Start: 2020-08-15 | End: 2020-08-15

## 2020-08-15 RX ORDER — SODIUM CHLORIDE 9 MG/ML
25 INJECTION, SOLUTION INTRAVENOUS CONTINUOUS
Status: ACTIVE | OUTPATIENT
Start: 2020-08-15 | End: 2020-08-15

## 2020-08-15 RX ADMIN — POTASSIUM CHLORIDE 20 MEQ: 750 TABLET, EXTENDED RELEASE ORAL at 10:04

## 2020-08-15 RX ADMIN — TBO-FILGRASTIM 1080 MCG: 480 INJECTION, SOLUTION SUBCUTANEOUS at 10:05

## 2020-08-15 RX ADMIN — DIPHENHYDRAMINE HYDROCHLORIDE 25 MG: 50 INJECTION, SOLUTION INTRAMUSCULAR; INTRAVENOUS at 09:24

## 2020-08-15 RX ADMIN — ACETAMINOPHEN 650 MG: 325 TABLET, FILM COATED ORAL at 09:24

## 2020-08-15 NOTE — PROGRESS NOTES
Mobilization Day 2    Labs needed at next visit see support plan. Next Appointment scheduled 8/16 at 0730  WBC/ANC= 45.8/43.0  Apheresis catheter placement scheduled for  8/17 @ 1100. Labs drawn via PIV and reviewed. Patient premedicated with IV Benadryl + PO Tylenol per order. Granix administered 30 minutes after premeds. Patient observed for 45 minutes without signs or symptoms of reaction. PIV removed. 20 KCL PO given. Patient discharged ambulatory accompanied by mother.     Eva Pena RN

## 2020-08-16 ENCOUNTER — HOSPITAL ENCOUNTER (OUTPATIENT)
Dept: INFUSION THERAPY | Age: 40
Discharge: HOME | End: 2020-08-16
Payer: COMMERCIAL

## 2020-08-16 VITALS
TEMPERATURE: 98.1 F | RESPIRATION RATE: 18 BRPM | DIASTOLIC BLOOD PRESSURE: 80 MMHG | BODY MASS INDEX: 39.51 KG/M2 | WEIGHT: 226.6 LBS | HEART RATE: 104 BPM | OXYGEN SATURATION: 95 % | SYSTOLIC BLOOD PRESSURE: 128 MMHG

## 2020-08-16 DIAGNOSIS — C90.00 MULTIPLE MYELOMA NOT HAVING ACHIEVED REMISSION (HCC): Primary | ICD-10-CM

## 2020-08-16 LAB
ANION GAP SERPL CALC-SCNC: 8 MMOL/L (ref 7–16)
APTT PPP: 23.6 SEC (ref 24.3–35.4)
BUN SERPL-MCNC: 6 MG/DL (ref 6–23)
CALCIUM SERPL-MCNC: 9.3 MG/DL (ref 8.3–10.4)
CHLORIDE SERPL-SCNC: 109 MMOL/L (ref 98–107)
CO2 SERPL-SCNC: 26 MMOL/L (ref 21–32)
CREAT SERPL-MCNC: 0.9 MG/DL (ref 0.6–1)
DIFFERENTIAL METHOD BLD: ABNORMAL
ERYTHROCYTE [DISTWIDTH] IN BLOOD BY AUTOMATED COUNT: 15.9 % (ref 11.9–14.6)
GLUCOSE SERPL-MCNC: 135 MG/DL (ref 65–100)
HCT VFR BLD AUTO: 34.7 % (ref 35.8–46.3)
HGB BLD-MCNC: 11.1 G/DL (ref 11.7–15.4)
INR PPP: 1.1
LYMPHOCYTES # BLD: 3.5 K/UL (ref 0.5–4.6)
LYMPHOCYTES NFR BLD MANUAL: 6 % (ref 16–44)
MAGNESIUM SERPL-MCNC: 1.6 MG/DL (ref 1.8–2.4)
MCH RBC QN AUTO: 28.8 PG (ref 26.1–32.9)
MCHC RBC AUTO-ENTMCNC: 32 G/DL (ref 31.4–35)
MCV RBC AUTO: 90.1 FL (ref 79.6–97.8)
METAMYELOCYTES NFR BLD MANUAL: 1 %
MONOCYTES # BLD: 4.1 K/UL (ref 0.1–1.3)
MONOCYTES NFR BLD MANUAL: 7 % (ref 3–9)
NEUTS BAND NFR BLD MANUAL: 4 % (ref 0–6)
NEUTS SEG # BLD: 51.4 K/UL (ref 1.7–8.2)
NEUTS SEG NFR BLD MANUAL: 82 % (ref 47–75)
NRBC # BLD: 0 K/UL (ref 0–0.2)
PLATELET # BLD AUTO: 276 K/UL (ref 150–450)
PLATELET COMMENTS,PCOM: ADEQUATE
PMV BLD AUTO: 9.8 FL (ref 9.4–12.3)
POTASSIUM SERPL-SCNC: 3 MMOL/L (ref 3.5–5.1)
PROTHROMBIN TIME: 15 SEC (ref 12–14.7)
RBC # BLD AUTO: 3.85 M/UL (ref 4.05–5.25)
RBC MORPH BLD: ABNORMAL
SODIUM SERPL-SCNC: 143 MMOL/L (ref 136–145)
WBC # BLD AUTO: 59 K/UL (ref 4.3–11.1)
WBC MORPH BLD: ABNORMAL

## 2020-08-16 PROCEDURE — 85730 THROMBOPLASTIN TIME PARTIAL: CPT

## 2020-08-16 PROCEDURE — 74011250636 HC RX REV CODE- 250/636: Performed by: INTERNAL MEDICINE

## 2020-08-16 PROCEDURE — 74011250636 HC RX REV CODE- 250/636: Performed by: NURSE PRACTITIONER

## 2020-08-16 PROCEDURE — 80048 BASIC METABOLIC PNL TOTAL CA: CPT

## 2020-08-16 PROCEDURE — 96372 THER/PROPH/DIAG INJ SC/IM: CPT

## 2020-08-16 PROCEDURE — 85610 PROTHROMBIN TIME: CPT

## 2020-08-16 PROCEDURE — 83735 ASSAY OF MAGNESIUM: CPT

## 2020-08-16 PROCEDURE — 85025 COMPLETE CBC W/AUTO DIFF WBC: CPT

## 2020-08-16 PROCEDURE — 96365 THER/PROPH/DIAG IV INF INIT: CPT

## 2020-08-16 PROCEDURE — 96366 THER/PROPH/DIAG IV INF ADDON: CPT

## 2020-08-16 PROCEDURE — 74011250637 HC RX REV CODE- 250/637: Performed by: INTERNAL MEDICINE

## 2020-08-16 PROCEDURE — 96375 TX/PRO/DX INJ NEW DRUG ADDON: CPT

## 2020-08-16 RX ORDER — SODIUM CHLORIDE 9 MG/ML
25 INJECTION, SOLUTION INTRAVENOUS CONTINUOUS
Status: ACTIVE | OUTPATIENT
Start: 2020-08-16 | End: 2020-08-16

## 2020-08-16 RX ORDER — SODIUM CHLORIDE 0.9 % (FLUSH) 0.9 %
10-40 SYRINGE (ML) INJECTION AS NEEDED
Status: ACTIVE | OUTPATIENT
Start: 2020-08-16 | End: 2020-08-16

## 2020-08-16 RX ORDER — DIPHENHYDRAMINE HYDROCHLORIDE 50 MG/ML
25 INJECTION, SOLUTION INTRAMUSCULAR; INTRAVENOUS ONCE
Status: COMPLETED | OUTPATIENT
Start: 2020-08-16 | End: 2020-08-16

## 2020-08-16 RX ORDER — ACETAMINOPHEN 325 MG/1
650 TABLET ORAL ONCE
Status: COMPLETED | OUTPATIENT
Start: 2020-08-16 | End: 2020-08-16

## 2020-08-16 RX ADMIN — DIPHENHYDRAMINE HYDROCHLORIDE 25 MG: 50 INJECTION, SOLUTION INTRAMUSCULAR; INTRAVENOUS at 07:49

## 2020-08-16 RX ADMIN — SODIUM CHLORIDE 25 ML/HR: 900 INJECTION, SOLUTION INTRAVENOUS at 07:44

## 2020-08-16 RX ADMIN — POTASSIUM CHLORIDE: 2 INJECTION, SOLUTION, CONCENTRATE INTRAVENOUS at 08:54

## 2020-08-16 RX ADMIN — ACETAMINOPHEN 650 MG: 325 TABLET, FILM COATED ORAL at 07:49

## 2020-08-16 RX ADMIN — TBO-FILGRASTIM 1080 MCG: 480 INJECTION, SOLUTION SUBCUTANEOUS at 08:34

## 2020-08-16 NOTE — PROGRESS NOTES
Mobilization Day #3. Labs needed at next visit see wally. Next Appointment scheduled 8/17/18 @ 0800. WBC/ANC= 59.0/51.4  Apheresis catheter placement scheduled for 8/17/20. New orders received 1 liter NS with 40 mEq KCL and 2 Gm Mg+ over 4 hours. Issues- patient was premedicated 30 minutes prior to Granix injection  Patient arrived ambulatory to infusion center. Assessment completed, labs drawn and reviewed. Premedications given 30 minutes prior to Granix injection. Patient reminded NPO after MN and no ASA/ Ibuprofen- verbalizes understanding. Patient received 1liter NS with 40 mEq KCl and 2 Gm Mg+ over 4 hours for K - 3.0 and Mg+ 1.6  PIV removed intact, site clear. Discharged ambulatory in stable condition.

## 2020-08-16 NOTE — PROGRESS NOTES
Problem: Knowledge Deficit  Goal: *Verbalizes understanding of procedures and medications  Outcome: Progressing Towards Goal

## 2020-08-17 ENCOUNTER — HOSPITAL ENCOUNTER (OUTPATIENT)
Dept: INFUSION THERAPY | Age: 40
Discharge: HOME | End: 2020-08-17
Payer: COMMERCIAL

## 2020-08-17 ENCOUNTER — HOSPITAL ENCOUNTER (OUTPATIENT)
Dept: INTERVENTIONAL RADIOLOGY/VASCULAR | Age: 40
Discharge: HOME | End: 2020-08-17
Attending: INTERNAL MEDICINE
Payer: COMMERCIAL

## 2020-08-17 VITALS
WEIGHT: 232.4 LBS | RESPIRATION RATE: 18 BRPM | DIASTOLIC BLOOD PRESSURE: 99 MMHG | SYSTOLIC BLOOD PRESSURE: 145 MMHG | TEMPERATURE: 97.3 F | OXYGEN SATURATION: 96 % | BODY MASS INDEX: 40.52 KG/M2 | HEART RATE: 86 BPM

## 2020-08-17 VITALS
OXYGEN SATURATION: 96 % | WEIGHT: 232 LBS | BODY MASS INDEX: 39.61 KG/M2 | HEIGHT: 64 IN | TEMPERATURE: 98 F | RESPIRATION RATE: 16 BRPM | HEART RATE: 63 BPM | SYSTOLIC BLOOD PRESSURE: 136 MMHG | DIASTOLIC BLOOD PRESSURE: 72 MMHG

## 2020-08-17 DIAGNOSIS — C90.00 MULTIPLE MYELOMA NOT HAVING ACHIEVED REMISSION (HCC): Primary | ICD-10-CM

## 2020-08-17 DIAGNOSIS — Z52.011 AUTOLOGOUS DONOR OF STEM CELLS: ICD-10-CM

## 2020-08-17 LAB
ALBUMIN SERPL-MCNC: 3 G/DL (ref 3.5–5)
ALBUMIN/GLOB SERPL: 0.9 {RATIO} (ref 1.2–3.5)
ALP SERPL-CCNC: 389 U/L (ref 50–136)
ALT SERPL-CCNC: 17 U/L (ref 12–65)
ANION GAP SERPL CALC-SCNC: 6 MMOL/L (ref 7–16)
AST SERPL-CCNC: 14 U/L (ref 15–37)
BASOPHILS # BLD: 0 K/UL (ref 0–0.2)
BASOPHILS NFR BLD: 0 % (ref 0–2)
BILIRUB SERPL-MCNC: 0.5 MG/DL (ref 0.2–1.1)
BUN SERPL-MCNC: 5 MG/DL (ref 6–23)
CALCIUM SERPL-MCNC: 8.7 MG/DL (ref 8.3–10.4)
CD34,XCD34T: NORMAL
CHLORIDE SERPL-SCNC: 111 MMOL/L (ref 98–107)
CO2 SERPL-SCNC: 26 MMOL/L (ref 21–32)
CREAT SERPL-MCNC: 0.8 MG/DL (ref 0.6–1)
DIFFERENTIAL METHOD BLD: ABNORMAL
EOSINOPHIL # BLD: 0 K/UL (ref 0–0.8)
EOSINOPHIL NFR BLD: 0 % (ref 0.5–7.8)
ERYTHROCYTE [DISTWIDTH] IN BLOOD BY AUTOMATED COUNT: 16.5 % (ref 11.9–14.6)
GLOBULIN SER CALC-MCNC: 3.3 G/DL (ref 2.3–3.5)
GLUCOSE SERPL-MCNC: 75 MG/DL (ref 65–100)
HCT VFR BLD AUTO: 34.5 % (ref 35.8–46.3)
HGB BLD-MCNC: 10.7 G/DL (ref 11.7–15.4)
IMM GRANULOCYTES # BLD AUTO: 5.4 K/UL (ref 0–0.5)
IMM GRANULOCYTES NFR BLD AUTO: 8 % (ref 0–5)
LYMPHOCYTES # BLD: 4.7 K/UL (ref 0.5–4.6)
LYMPHOCYTES NFR BLD: 7 % (ref 13–44)
MAGNESIUM SERPL-MCNC: 2.2 MG/DL (ref 1.8–2.4)
MCH RBC QN AUTO: 28.8 PG (ref 26.1–32.9)
MCHC RBC AUTO-ENTMCNC: 31 G/DL (ref 31.4–35)
MCV RBC AUTO: 92.7 FL (ref 79.6–97.8)
MONOCYTES # BLD: 2.7 K/UL (ref 0.1–1.3)
MONOCYTES NFR BLD: 4 % (ref 4–12)
NEUTS SEG # BLD: 54.2 K/UL (ref 1.7–8.2)
NEUTS SEG NFR BLD: 81 % (ref 43–78)
NRBC # BLD: 0.05 K/UL (ref 0–0.2)
PHOSPHATE SERPL-MCNC: 2.8 MG/DL (ref 2.5–4.5)
PLATELET # BLD AUTO: 248 K/UL (ref 150–450)
PLATELET COMMENTS,PCOM: ADEQUATE
PMV BLD AUTO: 10.2 FL (ref 9.4–12.3)
POTASSIUM SERPL-SCNC: 3.2 MMOL/L (ref 3.5–5.1)
PROT SERPL-MCNC: 6.3 G/DL (ref 6.3–8.2)
RBC # BLD AUTO: 3.72 M/UL (ref 4.05–5.25)
RBC MORPH BLD: ABNORMAL
RBC MORPH BLD: ABNORMAL
SODIUM SERPL-SCNC: 143 MMOL/L (ref 136–145)
WBC # BLD AUTO: 67 K/UL (ref 4.3–11.1)
WBC MORPH BLD: ABNORMAL

## 2020-08-17 PROCEDURE — 74011250636 HC RX REV CODE- 250/636: Performed by: INTERNAL MEDICINE

## 2020-08-17 PROCEDURE — 74011250637 HC RX REV CODE- 250/637: Performed by: INTERNAL MEDICINE

## 2020-08-17 PROCEDURE — 77030003028 HC SUT VCRL J&J -A

## 2020-08-17 PROCEDURE — C1894 INTRO/SHEATH, NON-LASER: HCPCS

## 2020-08-17 PROCEDURE — C1750 CATH, HEMODIALYSIS,LONG-TERM: HCPCS

## 2020-08-17 PROCEDURE — 96374 THER/PROPH/DIAG INJ IV PUSH: CPT

## 2020-08-17 PROCEDURE — 86367 STEM CELLS TOTAL COUNT: CPT

## 2020-08-17 PROCEDURE — 77030002916 HC SUT ETHLN J&J -A

## 2020-08-17 PROCEDURE — 77030018719 HC DRSG PTCH ANTIMIC J&J -A

## 2020-08-17 PROCEDURE — 80053 COMPREHEN METABOLIC PANEL: CPT

## 2020-08-17 PROCEDURE — 85025 COMPLETE CBC W/AUTO DIFF WBC: CPT

## 2020-08-17 PROCEDURE — 74011000250 HC RX REV CODE- 250: Performed by: RADIOLOGY

## 2020-08-17 PROCEDURE — 36558 INSERT TUNNELED CV CATH: CPT

## 2020-08-17 PROCEDURE — 83735 ASSAY OF MAGNESIUM: CPT

## 2020-08-17 PROCEDURE — 77030010507 HC ADH SKN DERMBND J&J -B

## 2020-08-17 PROCEDURE — C1769 GUIDE WIRE: HCPCS

## 2020-08-17 PROCEDURE — 84100 ASSAY OF PHOSPHORUS: CPT

## 2020-08-17 PROCEDURE — 96372 THER/PROPH/DIAG INJ SC/IM: CPT

## 2020-08-17 PROCEDURE — 74011250636 HC RX REV CODE- 250/636: Performed by: RADIOLOGY

## 2020-08-17 RX ORDER — ACETAMINOPHEN 325 MG/1
650 TABLET ORAL ONCE
Status: COMPLETED | OUTPATIENT
Start: 2020-08-17 | End: 2020-08-17

## 2020-08-17 RX ORDER — DIPHENHYDRAMINE HYDROCHLORIDE 50 MG/ML
25 INJECTION, SOLUTION INTRAMUSCULAR; INTRAVENOUS ONCE
Status: COMPLETED | OUTPATIENT
Start: 2020-08-17 | End: 2020-08-17

## 2020-08-17 RX ORDER — SODIUM CHLORIDE 9 MG/ML
25 INJECTION, SOLUTION INTRAVENOUS CONTINUOUS
Status: ACTIVE | OUTPATIENT
Start: 2020-08-17 | End: 2020-08-17

## 2020-08-17 RX ORDER — FENTANYL CITRATE 50 UG/ML
25-50 INJECTION, SOLUTION INTRAMUSCULAR; INTRAVENOUS
Status: DISCONTINUED | OUTPATIENT
Start: 2020-08-17 | End: 2020-08-21 | Stop reason: HOSPADM

## 2020-08-17 RX ORDER — MIDAZOLAM HYDROCHLORIDE 1 MG/ML
.5-2 INJECTION, SOLUTION INTRAMUSCULAR; INTRAVENOUS
Status: DISCONTINUED | OUTPATIENT
Start: 2020-08-17 | End: 2020-08-21 | Stop reason: HOSPADM

## 2020-08-17 RX ORDER — LIDOCAINE HYDROCHLORIDE 20 MG/ML
2-10 INJECTION, SOLUTION INFILTRATION; PERINEURAL ONCE
Status: COMPLETED | OUTPATIENT
Start: 2020-08-17 | End: 2020-08-17

## 2020-08-17 RX ORDER — LIDOCAINE HYDROCHLORIDE AND EPINEPHRINE 10; 10 MG/ML; UG/ML
2-100 INJECTION, SOLUTION INFILTRATION; PERINEURAL ONCE
Status: COMPLETED | OUTPATIENT
Start: 2020-08-17 | End: 2020-08-17

## 2020-08-17 RX ORDER — HEPARIN SODIUM 1000 [USP'U]/ML
10000 INJECTION, SOLUTION INTRAVENOUS; SUBCUTANEOUS ONCE
Status: COMPLETED | OUTPATIENT
Start: 2020-08-17 | End: 2020-08-17

## 2020-08-17 RX ADMIN — LIDOCAINE HYDROCHLORIDE,EPINEPHRINE BITARTRATE 200 MG: 10; .01 INJECTION, SOLUTION INFILTRATION; PERINEURAL at 12:00

## 2020-08-17 RX ADMIN — MIDAZOLAM 1 MG: 1 INJECTION INTRAMUSCULAR; INTRAVENOUS at 11:32

## 2020-08-17 RX ADMIN — MIDAZOLAM 1 MG: 1 INJECTION INTRAMUSCULAR; INTRAVENOUS at 11:17

## 2020-08-17 RX ADMIN — FENTANYL CITRATE 50 MCG: 50 INJECTION INTRAMUSCULAR; INTRAVENOUS at 11:17

## 2020-08-17 RX ADMIN — FENTANYL CITRATE 50 MCG: 50 INJECTION INTRAMUSCULAR; INTRAVENOUS at 11:27

## 2020-08-17 RX ADMIN — HEPARIN SODIUM 3200 UNITS: 1000 INJECTION, SOLUTION INTRAVENOUS; SUBCUTANEOUS at 11:00

## 2020-08-17 RX ADMIN — TBO-FILGRASTIM 1080 MCG: 480 INJECTION, SOLUTION SUBCUTANEOUS at 09:33

## 2020-08-17 RX ADMIN — LIDOCAINE HYDROCHLORIDE 100 MG: 20 INJECTION, SOLUTION INFILTRATION; PERINEURAL at 11:00

## 2020-08-17 RX ADMIN — SODIUM CHLORIDE 25 ML/HR: 9 INJECTION, SOLUTION INTRAVENOUS at 08:55

## 2020-08-17 RX ADMIN — ACETAMINOPHEN 650 MG: 325 TABLET, FILM COATED ORAL at 09:00

## 2020-08-17 RX ADMIN — FENTANYL CITRATE 50 MCG: 50 INJECTION INTRAMUSCULAR; INTRAVENOUS at 11:32

## 2020-08-17 RX ADMIN — DIPHENHYDRAMINE HYDROCHLORIDE 25 MG: 50 INJECTION, SOLUTION INTRAMUSCULAR; INTRAVENOUS at 09:00

## 2020-08-17 RX ADMIN — FENTANYL CITRATE 50 MCG: 50 INJECTION INTRAMUSCULAR; INTRAVENOUS at 11:20

## 2020-08-17 RX ADMIN — MIDAZOLAM 1 MG: 1 INJECTION INTRAMUSCULAR; INTRAVENOUS at 11:20

## 2020-08-17 RX ADMIN — MIDAZOLAM 1 MG: 1 INJECTION INTRAMUSCULAR; INTRAVENOUS at 11:27

## 2020-08-17 NOTE — PROGRESS NOTES
The patient was counseled at length about the risks of fina Covid-19 during their perioperative period and any recovery window from their procedure. The patient was made aware that fina Covid-19  may worsen their prognosis for recovering from their procedure and lend to a higher morbidity and/or mortality risk. All material risks, benefits, and reasonable alternatives including postponing the procedure were discussed. The patient does  wish to proceed with the procedure at this time.

## 2020-08-17 NOTE — PROGRESS NOTES
Recovery period without difficulty. Pt alert and oriented and denies pain. Dressing is clean, dry, and intact. Reviewed discharge instructions with patient and mom, both verbalized understanding. [facility]. Vital signs and Nadia score completed.

## 2020-08-17 NOTE — PROGRESS NOTES
Mobilization Day #4. Labs needed at next visit see wally. Next Appointment scheduled 8/17/18 @ 1800. WBC/ANC= 67/54.2  Apheresis catheter placement scheduled for 8/17/20. New orders received, none  Issues- patient was premedicated 30 minutes prior to Granix injection  Patient arrived ambulatory to infusion center. Assessment completed, labs drawn and reviewed. Premedications given 30 minutes prior to Granix injection. Discharged ambulatory in stable condition.

## 2020-08-17 NOTE — PROCEDURES
Department of Interventional Radiology  [phone]        Interventional Radiology Brief Procedure Note    Patient: Kaur Sears MRN: [id number]  SSN: [id number]    YOB: 1980  Age: 35 y.o.   Sex: female      Date of Procedure: 8/17/2020    Pre-Procedure Diagnosis: stem cell donor    Post-Procedure Diagnosis: SAME    Procedure(s): Tunneled Central Venous Catheter    Brief Description of Procedure: as above    Performed By: Yg Huff MD     Assistants: None    Anesthesia:Moderate Sedation    Estimated Blood Loss: Less than 10ml    Specimens:  None    Implants:  Tunnelled Apheresis Catheter    Findings: patent right IJ    Complications: None    Recommendations: ok to use     Follow Up: as needed    Signed By: Yg Huff MD     August 17, 2020

## 2020-08-17 NOTE — DISCHARGE INSTRUCTIONS
[address]  Department of Interventional Radiology  [facility]  [phone] Office  [phone] Fax    Tunneled or Non Tunneled Catheter Discharge Instructions    General Information:   A catheter, either tunneled (permanent) or non-tunneled (temporary) catheter was inserted into your neck today for the purpose of Cancer treatment (apheresis) or dialysis. Your catheter will be used for the length of your apheresis, or until you get a fistula placed in surgery for dialysis. After this time, your catheter may be removed. You may return to our department for the catheter removal.  A non-tunneled catheter will exit at the neck. There will be three ports, two of which will be used for dialysis or apheresis. The one smaller port in the middle can be used like a regular IV. It ideally is used only for about two weeks. A tunneled catheter will exit lower down on the chest wall, and can be used for a longer period of time. There is no IV port on these. The tunneled catheter is used only for dialysis. In case of emergencies only can drugs be given through these catheters and only with written permission from your doctor. Home Care Instructions: You can resume your regular diet. Do not drink alcohol, drive, or make any important legal decisions in the next 24 hours. Watch the site carefully for signs of infection, like fever, drainage, redness, and/or swelling. Your catheter should be \"packed\" with heparin after each use or at least once a week if it is not being used. This \"packing\" should only be done by nurses experienced with caring for this type of catheter. You may shower in 24 hours, but you need to cover the catheter with plastic wrap and tape to keep it dry while you are showering. Keep the site clean, dry, and protected. Do not immerse yourself in water like in the case of tub baths or swimming as long as you have the catheter. Call If:   You should call your Physician and/or the Radiology Nurse if you have any signs of infection, shortness of breath, or if the dressing should come off or become moist.  You will be instructed on where to go for a new dressing. You should not have to change the dressings yourself, as that will be done by the nurses who access the catheter. Follow-Up Instructions:  Please see your ordering doctor as he/she has requested. To Reach Us: If you have any questions about your procedure, please call the Interventional Radiology department at [phone]. After business hours (5pm) and weekends, call the answering service at [phone] and ask for the Radiologist on call to be paged. Si tiene Preguntas acerca del procedimiento, por favor llame al departamento de Radiología Intervencional al [phone]. Después de horas de oficina (5 pm) y los fines de [place], llamar al [place] al [phone] y pregunte por el Radiologo de [facility]. Interventional Radiology General Nurse Discharge    After general anesthesia or intravenous sedation, for 24 hours or while taking prescription Narcotics:  · Limit your activities  · Do not drive and operate hazardous machinery  · Do not make important personal or business decisions  · Do  not drink alcoholic beverages  · If you have not urinated within 8 hours after discharge, please contact your surgeon on call. * Please give a list of your current medications to your Primary Care Provider. * Please update this list whenever your medications are discontinued, doses are     changed, or new medications (including over-the-counter products) are added. * Please carry medication information at all times in case of emergency situations.     These are general instructions for a healthy lifestyle:    No smoking/ No tobacco products/ Avoid exposure to second hand smoke  Surgeon General's Warning:  Quitting smoking now greatly reduces serious risk to your health. Obesity, smoking, and sedentary lifestyle greatly increases your risk for illness  A healthy diet, regular physical exercise & weight monitoring are important for maintaining a healthy lifestyle    You may be retaining fluid if you have a history of heart failure or if you experience any of the following symptoms:  Weight gain of 3 pounds or more overnight or 5 pounds in a week, increased swelling in our hands or feet or shortness of breath while lying flat in bed. Please call your doctor as soon as you notice any of these symptoms; do not wait until your next office visit. Recognize signs and symptoms of STROKE:  F-face looks uneven    A-arms unable to move or move unevenly    S-speech slurred or non-existent    T-time-call 911 as soon as signs and symptoms begin-DO NOT go       Back to bed or wait to see if you get better-TIME IS BRAIN.       Patient Signature:  Date: 8/17/2020  Discharging Nurse: Lianna Doyle

## 2020-08-17 NOTE — H&P
Department of Interventional Radiology  [phone]    History and Physical    Patient:  Shaila Sparks MRN:  [id number]  SSN:  [id number]    YOB: 1980  Age:  35 y.o. Sex:  female      Primary Care Provider:  Sanjeev Zelaya NP  Referring Physician:  Dara Dc MD    Subjective:     Chief Complaint: multiple myeloma    History of the Present Illness: The patient is a 35 y.o. female who presents for placement of tunneled apheresis catheter. NPO. Past Medical History:   Diagnosis Date    Cancer [facility])     multiple myeloma    Chronic pain      Past Surgical History:   Procedure Laterality Date    HX BACK SURGERY  04/2020    HX TUBAL LIGATION  08/2007        Review of Systems:    Pertinent items are noted in the History of Present Illness. Prior to Admission medications    Medication Sig Start Date End Date Taking? Authorizing Provider   potassium chloride (K-DUR, KLOR-CON) 20 mEq tablet Take 1 Tab by mouth daily. 8/14/20  Yes Dara Dc MD   dexAMETHasone (DECADRON) 6 mg tablet Take 1 Tab by mouth two (2) times a day. Pt to take 6 mg in the am and 6 mg in the pm. Start tonight. 8/14/20  Yes Dara Dc MD   erythromycin (ILOTYCIN) ophthalmic ointment Apply 1 cm to both eye lids 4 times a day for 5 days 8/13/20  Yes Dara Dc MD   DULoxetine (Cymbalta) 30 mg capsule Take 30 mg by mouth nightly. Yes Provider, Historical   HYDROmorphone (Dilaudid) 2 mg tablet Take  by mouth every four (4) hours as needed for Pain. Yes Provider, Historical   morphine sulfate (MORPHINE, BULK,) by Does Not Apply route. Yes Provider, Historical   acetaminophen (Tylenol Extra Strength) 500 mg tablet Take  by mouth every six (6) hours as needed for Pain.    Yes Provider, Historical        No Known Allergies    Family History   Problem Relation Age of Onset    Hypertension Mother    Elliott Noon Arthritis-osteo Mother     Diabetes Father     No Known Problems Sister     No Known Problems Brother  Diabetes Sister      Social History     Tobacco Use    Smoking status: Former Smoker     Packs/day: 0.50     Years: 26.00     Pack years: 13.00     Last attempt to quit: 2020     Years since quittin.3    Smokeless tobacco: Never Used   Substance Use Topics    Alcohol use: Not Currently        Objective:       Physical Examination:    Vitals:    20 1041   BP: 141/78   Pulse: 63   Resp: 16   Temp: 98 °F (36.7 °C)   SpO2: 98%   Weight: 105.2 kg (232 lb)   Height: 5' 3.5\" (1.613 m)       Pain Assessment  Pain Intensity 1: 5 (20 1039)  Pain Location 1: Hip     Patient Stated Pain Goal: 5      HEART: regular rate and rhythm  LUNG: clear to auscultation bilaterally  ABDOMEN: normal findings: soft, non-tender  EXTREMITIES: normal strength, tone, and muscle mass    Laboratory:     Lab Results   Component Value Date/Time    Sodium 143 2020 08:55 AM    Sodium 143 2020 07:45 AM    Potassium 3.2 (L) 2020 08:55 AM    Potassium 3.0 (L) 2020 07:45 AM    Chloride 111 (H) 2020 08:55 AM    Chloride 109 (H) 2020 07:45 AM    CO2 26 2020 08:55 AM    CO2 26 2020 07:45 AM    Anion gap 6 (L) 2020 08:55 AM    Anion gap 8 2020 07:45 AM    Glucose 75 2020 08:55 AM    Glucose 135 (H) 2020 07:45 AM    BUN 5 (L) 2020 08:55 AM    BUN 6 2020 07:45 AM    Creatinine 0.80 2020 08:55 AM    Creatinine 0.90 2020 07:45 AM    GFR est AA >60 2020 08:55 AM    GFR est AA >60 2020 07:45 AM    GFR est non-AA >60 2020 08:55 AM    GFR est non-AA >60 2020 07:45 AM    Calcium 8.7 2020 08:55 AM    Calcium 9.3 2020 07:45 AM    Magnesium 2.2 2020 08:55 AM    Magnesium 1.6 (L) 2020 07:45 AM    Phosphorus 2.8 2020 08:55 AM    Phosphorus 2.6 2020 01:53 PM    Albumin 3.0 (L) 2020 08:55 AM    Albumin 3.1 (L) 2020 01:53 PM    Protein, total 6.3 2020 08:55 AM    Protein, total 6.3 08/13/2020 01:53 PM    Protein, total 6.7 08/13/2020 01:53 PM    Globulin 3.3 08/17/2020 08:55 AM    Globulin 3.6 (H) 08/13/2020 01:53 PM    A-G Ratio 0.9 (L) 08/17/2020 08:55 AM    A-G Ratio 1.1 08/13/2020 01:53 PM    A-G Ratio 0.9 (L) 08/13/2020 01:53 PM    ALT (SGPT) 17 08/17/2020 08:55 AM    ALT (SGPT) 12 08/13/2020 01:53 PM     Lab Results   Component Value Date/Time    WBC 67.0 (HH) 08/17/2020 08:55 AM    WBC 59.0 (HH) 08/16/2020 07:45 AM    HGB 10.7 (L) 08/17/2020 08:55 AM    HGB 11.1 (L) 08/16/2020 07:45 AM    HCT 34.5 (L) 08/17/2020 08:55 AM    HCT 34.7 (L) 08/16/2020 07:45 AM    PLATELET 420 85/01/4648 08:55 AM    PLATELET 726 64/88/5534 07:45 AM     Lab Results   Component Value Date/Time    aPTT 23.6 (L) 08/16/2020 07:45 AM    Prothrombin time 15.0 (H) 08/16/2020 07:45 AM    INR 1.1 08/16/2020 07:45 AM       Assessment:     Multiple myeloma        Plan:     Planned Procedure: Tunneled pheresis catheter placement    Risks, benefits, and alternatives reviewed with patient and she agrees to proceed with the procedure.       Signed By: Kasia Rayo PA-C     August 17, 2020

## 2020-08-17 NOTE — ROUTINE PROCESS
Peripheral CD 34 count 40  Predictive CD 34 @ 20 liters is 2.2 x 106/kg  Predictive CD 34 @ 30 liters is 3.3 x 106/kg  Results received from [facility] Rachel Carlisle at 1140  Mozobil required, NO  Corrected numbers called at 12:30 Wilian Quinn @ blood connection.

## 2020-08-18 ENCOUNTER — HOSPITAL ENCOUNTER (OUTPATIENT)
Dept: INFUSION THERAPY | Age: 40
Discharge: HOME | End: 2020-08-18
Payer: COMMERCIAL

## 2020-08-18 VITALS
TEMPERATURE: 98.1 F | OXYGEN SATURATION: 99 % | RESPIRATION RATE: 18 BRPM | HEART RATE: 68 BPM | DIASTOLIC BLOOD PRESSURE: 86 MMHG | SYSTOLIC BLOOD PRESSURE: 141 MMHG

## 2020-08-18 DIAGNOSIS — C90.00 MULTIPLE MYELOMA NOT HAVING ACHIEVED REMISSION (HCC): Primary | ICD-10-CM

## 2020-08-18 LAB
ABO + RH BLD: NORMAL
ANION GAP SERPL CALC-SCNC: 9 MMOL/L (ref 7–16)
BASOPHILS # BLD: 0 K/UL (ref 0–0.2)
BASOPHILS # BLD: 0 K/UL (ref 0–0.2)
BASOPHILS NFR BLD: 0 % (ref 0–2)
BASOPHILS NFR BLD: 0 % (ref 0–2)
BUN SERPL-MCNC: 8 MG/DL (ref 6–23)
CALCIUM SERPL-MCNC: 8.6 MG/DL (ref 8.3–10.4)
CHLORIDE SERPL-SCNC: 110 MMOL/L (ref 98–107)
CO2 SERPL-SCNC: 25 MMOL/L (ref 21–32)
CREAT SERPL-MCNC: 0.8 MG/DL (ref 0.6–1)
DIFFERENTIAL METHOD BLD: ABNORMAL
DIFFERENTIAL METHOD BLD: ABNORMAL
EOSINOPHIL # BLD: 0 K/UL (ref 0–0.8)
EOSINOPHIL # BLD: 0 K/UL (ref 0–0.8)
EOSINOPHIL NFR BLD: 0 % (ref 0.5–7.8)
EOSINOPHIL NFR BLD: 0 % (ref 0.5–7.8)
ERYTHROCYTE [DISTWIDTH] IN BLOOD BY AUTOMATED COUNT: 16 % (ref 11.9–14.6)
ERYTHROCYTE [DISTWIDTH] IN BLOOD BY AUTOMATED COUNT: 16.2 % (ref 11.9–14.6)
GLUCOSE SERPL-MCNC: 127 MG/DL (ref 65–100)
HCT VFR BLD AUTO: 33.1 % (ref 35.8–46.3)
HCT VFR BLD AUTO: 33.9 % (ref 35.8–46.3)
HGB BLD-MCNC: 10.6 G/DL (ref 11.7–15.4)
HGB BLD-MCNC: 10.8 G/DL (ref 11.7–15.4)
IMM GRANULOCYTES # BLD AUTO: 12.9 K/UL (ref 0–0.5)
IMM GRANULOCYTES # BLD AUTO: 9.9 K/UL (ref 0–0.5)
IMM GRANULOCYTES NFR BLD AUTO: 14 % (ref 0–5)
IMM GRANULOCYTES NFR BLD AUTO: 18 % (ref 0–5)
LYMPHOCYTES # BLD: 1.4 K/UL (ref 0.5–4.6)
LYMPHOCYTES # BLD: 2.2 K/UL (ref 0.5–4.6)
LYMPHOCYTES NFR BLD: 2 % (ref 13–44)
LYMPHOCYTES NFR BLD: 3 % (ref 13–44)
MAGNESIUM SERPL-MCNC: 2 MG/DL (ref 1.8–2.4)
MCH RBC QN AUTO: 28.8 PG (ref 26.1–32.9)
MCH RBC QN AUTO: 28.9 PG (ref 26.1–32.9)
MCHC RBC AUTO-ENTMCNC: 31.9 G/DL (ref 31.4–35)
MCHC RBC AUTO-ENTMCNC: 32 G/DL (ref 31.4–35)
MCV RBC AUTO: 89.9 FL (ref 79.6–97.8)
MCV RBC AUTO: 90.6 FL (ref 79.6–97.8)
MONOCYTES # BLD: 2.1 K/UL (ref 0.1–1.3)
MONOCYTES # BLD: 2.9 K/UL (ref 0.1–1.3)
MONOCYTES NFR BLD: 3 % (ref 4–12)
MONOCYTES NFR BLD: 4 % (ref 4–12)
NEUTS SEG # BLD: 53.8 K/UL (ref 1.7–8.2)
NEUTS SEG # BLD: 57.2 K/UL (ref 1.7–8.2)
NEUTS SEG NFR BLD: 75 % (ref 43–78)
NEUTS SEG NFR BLD: 81 % (ref 43–78)
NRBC # BLD: 0.05 K/UL (ref 0–0.2)
NRBC # BLD: 0.09 K/UL (ref 0–0.2)
PLATELET # BLD AUTO: 106 K/UL (ref 150–450)
PLATELET # BLD AUTO: 227 K/UL (ref 150–450)
PLATELET COMMENTS,PCOM: ADEQUATE
PLATELET COMMENTS,PCOM: SLIGHT
PMV BLD AUTO: 10.5 FL (ref 9.4–12.3)
PMV BLD AUTO: 9.4 FL (ref 9.4–12.3)
POTASSIUM SERPL-SCNC: 3.4 MMOL/L (ref 3.5–5.1)
RBC # BLD AUTO: 3.68 M/UL (ref 4.05–5.25)
RBC # BLD AUTO: 3.74 M/UL (ref 4.05–5.25)
RBC MORPH BLD: ABNORMAL
SODIUM SERPL-SCNC: 144 MMOL/L (ref 136–145)
WBC # BLD AUTO: 70.6 K/UL (ref 4.3–11.1)
WBC # BLD AUTO: 71.8 K/UL (ref 4.3–11.1)
WBC MORPH BLD: ABNORMAL
WBC MORPH BLD: ABNORMAL

## 2020-08-18 PROCEDURE — 80048 BASIC METABOLIC PNL TOTAL CA: CPT

## 2020-08-18 PROCEDURE — 74011000258 HC RX REV CODE- 258: Performed by: INTERNAL MEDICINE

## 2020-08-18 PROCEDURE — 74011250636 HC RX REV CODE- 250/636: Performed by: INTERNAL MEDICINE

## 2020-08-18 PROCEDURE — 83735 ASSAY OF MAGNESIUM: CPT

## 2020-08-18 PROCEDURE — 38206 HARVEST AUTO STEM CELLS: CPT

## 2020-08-18 PROCEDURE — 96365 THER/PROPH/DIAG IV INF INIT: CPT

## 2020-08-18 PROCEDURE — 74011250637 HC RX REV CODE- 250/637: Performed by: INTERNAL MEDICINE

## 2020-08-18 PROCEDURE — 74011250636 HC RX REV CODE- 250/636: Performed by: NURSE PRACTITIONER

## 2020-08-18 PROCEDURE — 96375 TX/PRO/DX INJ NEW DRUG ADDON: CPT

## 2020-08-18 PROCEDURE — 96366 THER/PROPH/DIAG IV INF ADDON: CPT

## 2020-08-18 PROCEDURE — 85025 COMPLETE CBC W/AUTO DIFF WBC: CPT

## 2020-08-18 PROCEDURE — 86900 BLOOD TYPING SEROLOGIC ABO: CPT

## 2020-08-18 PROCEDURE — 86367 STEM CELLS TOTAL COUNT: CPT

## 2020-08-18 RX ORDER — SODIUM CHLORIDE 0.9 % (FLUSH) 0.9 %
10-40 SYRINGE (ML) INJECTION AS NEEDED
Status: ACTIVE | OUTPATIENT
Start: 2020-08-18 | End: 2020-08-18

## 2020-08-18 RX ORDER — MORPHINE SULFATE 60 MG/1
60 TABLET, FILM COATED, EXTENDED RELEASE ORAL EVERY 12 HOURS
Status: ON HOLD | COMMUNITY
End: 2020-10-06 | Stop reason: SDUPTHER

## 2020-08-18 RX ORDER — ACETAMINOPHEN 325 MG/1
650 TABLET ORAL ONCE
Status: COMPLETED | OUTPATIENT
Start: 2020-08-18 | End: 2020-08-18

## 2020-08-18 RX ORDER — MORPHINE SULFATE 2 MG/ML
1 INJECTION, SOLUTION INTRAMUSCULAR; INTRAVENOUS ONCE
Status: DISCONTINUED | OUTPATIENT
Start: 2020-08-18 | End: 2020-08-18

## 2020-08-18 RX ORDER — SODIUM CHLORIDE 9 MG/ML
1000 INJECTION, SOLUTION INTRAVENOUS CONTINUOUS
Status: ACTIVE | OUTPATIENT
Start: 2020-08-18 | End: 2020-08-18

## 2020-08-18 RX ORDER — DIPHENHYDRAMINE HYDROCHLORIDE 50 MG/ML
25 INJECTION, SOLUTION INTRAMUSCULAR; INTRAVENOUS ONCE
Status: COMPLETED | OUTPATIENT
Start: 2020-08-18 | End: 2020-08-18

## 2020-08-18 RX ORDER — LORAZEPAM 2 MG/ML
1 INJECTION INTRAMUSCULAR
Status: DISPENSED | OUTPATIENT
Start: 2020-08-18 | End: 2020-08-18

## 2020-08-18 RX ORDER — POTASSIUM CHLORIDE 29.8 MG/ML
40 INJECTION INTRAVENOUS ONCE
Status: COMPLETED | OUTPATIENT
Start: 2020-08-18 | End: 2020-08-18

## 2020-08-18 RX ORDER — GUAIFENESIN 100 MG/5ML
81 LIQUID (ML) ORAL
Status: DISPENSED | OUTPATIENT
Start: 2020-08-18 | End: 2020-08-18

## 2020-08-18 RX ORDER — MORPHINE SULFATE 2 MG/ML
2 INJECTION, SOLUTION INTRAMUSCULAR; INTRAVENOUS ONCE
Status: COMPLETED | OUTPATIENT
Start: 2020-08-18 | End: 2020-08-18

## 2020-08-18 RX ADMIN — Medication 20 ML: at 07:50

## 2020-08-18 RX ADMIN — CALCIUM GLUCONATE 6 G: 98 INJECTION, SOLUTION INTRAVENOUS at 09:50

## 2020-08-18 RX ADMIN — ACETAMINOPHEN 650 MG: 325 TABLET, FILM COATED ORAL at 07:49

## 2020-08-18 RX ADMIN — ASPIRIN 81 MG 81 MG: 81 TABLET ORAL at 10:10

## 2020-08-18 RX ADMIN — DIPHENHYDRAMINE HYDROCHLORIDE 25 MG: 50 INJECTION, SOLUTION INTRAMUSCULAR; INTRAVENOUS at 07:49

## 2020-08-18 RX ADMIN — Medication 20 ML: at 09:42

## 2020-08-18 RX ADMIN — TBO-FILGRASTIM 1080 MCG: 480 INJECTION, SOLUTION SUBCUTANEOUS at 08:37

## 2020-08-18 RX ADMIN — CALCIUM GLUCONATE: 98 INJECTION, SOLUTION INTRAVENOUS at 16:05

## 2020-08-18 RX ADMIN — MORPHINE SULFATE 2 MG: 2 INJECTION, SOLUTION INTRAMUSCULAR; INTRAVENOUS at 10:52

## 2020-08-18 RX ADMIN — POTASSIUM CHLORIDE 40 MEQ: 400 INJECTION, SOLUTION INTRAVENOUS at 09:49

## 2020-08-18 RX ADMIN — Medication 20 ML: at 07:21

## 2020-08-18 RX ADMIN — SODIUM CHLORIDE 1000 ML: 900 INJECTION, SOLUTION INTRAVENOUS at 09:50

## 2020-08-18 RX ADMIN — Medication 20 ML: at 16:20

## 2020-08-18 RX ADMIN — LORAZEPAM 1 MG: 2 INJECTION INTRAMUSCULAR; INTRAVENOUS at 13:41

## 2020-08-18 NOTE — PROGRESS NOTES
Apheresis Day: 1  Dx: Multiple Myeloma  Pre-collection/mobilization form was reviewed prior to initiating collection. Granix was given per policy. Mozobil used: no (yes/no)  Type of catheter (temp or tunneled): Tunneled  Calcium 6 g used during procedure. CD34: 20  Predictive for 20 liters: 1.2 x 106/kg    30 liters: 1.8 x 106/kg  Pre WBC 71.8 (critical value does not fall outside of expected limits as documented in the medical protocol)  Pre-hgb: 10.8  /  Plt: 227  Post WBC 70.6  Post-hgb: 10.6  /  Plt: 106  Blood/electrolyte replacements: 40MEQ Potassium  Whole blood processed: 20830  Collection results received at 1812. Patient collected 2.02 x 106/kg for cumulative 2.02 x 106/kg. Collection goal: 6x 106/kg   Hematocrit in collection bag 3.0  Pt seen by: Betty Brown NP  Issues: Medicated with Morphine for C/O back pain post Granix with relief noted.  Clumping noted and AC ratio adjusted and 81mg Aspirin given   Next procedure date: 8/19/20  Patient lightheaded after procedure and medications, discharged via [facility]  Patient notified of todays Collections results by Hollie Ortiz RN       Target CD34+/Kg 6           Kg recipient 105.4   CD34+ pre-count 20   WB liters to process 87.4

## 2020-08-19 ENCOUNTER — HOSPITAL ENCOUNTER (OUTPATIENT)
Dept: INFUSION THERAPY | Age: 40
Discharge: HOME | End: 2020-08-19
Payer: COMMERCIAL

## 2020-08-19 VITALS
OXYGEN SATURATION: 95 % | WEIGHT: 234.6 LBS | HEART RATE: 62 BPM | RESPIRATION RATE: 18 BRPM | SYSTOLIC BLOOD PRESSURE: 134 MMHG | DIASTOLIC BLOOD PRESSURE: 66 MMHG | BODY MASS INDEX: 40.91 KG/M2 | TEMPERATURE: 99.1 F

## 2020-08-19 DIAGNOSIS — C90.00 MULTIPLE MYELOMA NOT HAVING ACHIEVED REMISSION (HCC): ICD-10-CM

## 2020-08-19 DIAGNOSIS — C90.00 MULTIPLE MYELOMA, REMISSION STATUS UNSPECIFIED (HCC): ICD-10-CM

## 2020-08-19 DIAGNOSIS — M89.8X9 BONE PAIN DUE TO G-CSF: ICD-10-CM

## 2020-08-19 DIAGNOSIS — C90.00 MULTIPLE MYELOMA NOT HAVING ACHIEVED REMISSION (HCC): Primary | ICD-10-CM

## 2020-08-19 DIAGNOSIS — R53.83 FATIGUE DUE TO TREATMENT: ICD-10-CM

## 2020-08-19 LAB
ALBUMIN SERPL-MCNC: 2.8 G/DL (ref 3.5–5)
ALBUMIN/GLOB SERPL: 1 {RATIO} (ref 1.2–3.5)
ALP SERPL-CCNC: 208 U/L (ref 50–136)
ALT SERPL-CCNC: 52 U/L (ref 12–65)
ANION GAP SERPL CALC-SCNC: 5 MMOL/L (ref 7–16)
AST SERPL-CCNC: 70 U/L (ref 15–37)
BASOPHILS # BLD: 0 K/UL (ref 0–0.2)
BASOPHILS # BLD: 0 K/UL (ref 0–0.2)
BASOPHILS NFR BLD: 0 % (ref 0–2)
BASOPHILS NFR BLD: 0 % (ref 0–2)
BILIRUB SERPL-MCNC: 0.3 MG/DL (ref 0.2–1.1)
BUN SERPL-MCNC: 10 MG/DL (ref 6–23)
CALCIUM SERPL-MCNC: 8.5 MG/DL (ref 8.3–10.4)
CD34,XCD34T: NORMAL
CD34,XCD34T: NORMAL
CHLORIDE SERPL-SCNC: 105 MMOL/L (ref 98–107)
CO2 SERPL-SCNC: 33 MMOL/L (ref 21–32)
CREAT SERPL-MCNC: 0.8 MG/DL (ref 0.6–1)
DIFFERENTIAL METHOD BLD: ABNORMAL
DIFFERENTIAL METHOD BLD: ABNORMAL
EOSINOPHIL # BLD: 0 K/UL (ref 0–0.8)
EOSINOPHIL # BLD: 0 K/UL (ref 0–0.8)
EOSINOPHIL NFR BLD: 0 % (ref 0.5–7.8)
EOSINOPHIL NFR BLD: 0 % (ref 0.5–7.8)
ERYTHROCYTE [DISTWIDTH] IN BLOOD BY AUTOMATED COUNT: 15.9 % (ref 11.9–14.6)
ERYTHROCYTE [DISTWIDTH] IN BLOOD BY AUTOMATED COUNT: 16 % (ref 11.9–14.6)
GLOBULIN SER CALC-MCNC: 2.8 G/DL (ref 2.3–3.5)
GLUCOSE SERPL-MCNC: 89 MG/DL (ref 65–100)
HCT VFR BLD AUTO: 31.3 % (ref 35.8–46.3)
HCT VFR BLD AUTO: 32.4 % (ref 35.8–46.3)
HGB BLD-MCNC: 10.1 G/DL (ref 11.7–15.4)
HGB BLD-MCNC: 10.3 G/DL (ref 11.7–15.4)
IMM GRANULOCYTES # BLD AUTO: 4.2 K/UL (ref 0–0.5)
IMM GRANULOCYTES # BLD AUTO: 4.4 K/UL (ref 0–0.5)
IMM GRANULOCYTES NFR BLD AUTO: 7 % (ref 0–5)
IMM GRANULOCYTES NFR BLD AUTO: 7 % (ref 0–5)
LYMPHOCYTES # BLD: 1.3 K/UL (ref 0.5–4.6)
LYMPHOCYTES # BLD: 3 K/UL (ref 0.5–4.6)
LYMPHOCYTES NFR BLD: 2 % (ref 13–44)
LYMPHOCYTES NFR BLD: 5 % (ref 13–44)
MAGNESIUM SERPL-MCNC: 1.8 MG/DL (ref 1.8–2.4)
MCH RBC QN AUTO: 28.9 PG (ref 26.1–32.9)
MCH RBC QN AUTO: 28.9 PG (ref 26.1–32.9)
MCHC RBC AUTO-ENTMCNC: 31.8 G/DL (ref 31.4–35)
MCHC RBC AUTO-ENTMCNC: 32.3 G/DL (ref 31.4–35)
MCV RBC AUTO: 89.7 FL (ref 79.6–97.8)
MCV RBC AUTO: 90.8 FL (ref 79.6–97.8)
MONOCYTES # BLD: 1.3 K/UL (ref 0.1–1.3)
MONOCYTES # BLD: 3 K/UL (ref 0.1–1.3)
MONOCYTES NFR BLD: 2 % (ref 4–12)
MONOCYTES NFR BLD: 5 % (ref 4–12)
NEUTS SEG # BLD: 49.6 K/UL (ref 1.7–8.2)
NEUTS SEG # BLD: 55.6 K/UL (ref 1.7–8.2)
NEUTS SEG NFR BLD: 83 % (ref 43–78)
NEUTS SEG NFR BLD: 89 % (ref 43–78)
NRBC # BLD: 0.06 K/UL (ref 0–0.2)
NRBC # BLD: 0.17 K/UL (ref 0–0.2)
PHOSPHATE SERPL-MCNC: 2.8 MG/DL (ref 2.5–4.5)
PLATELET # BLD AUTO: 41 K/UL (ref 150–450)
PLATELET # BLD AUTO: 98 K/UL (ref 150–450)
PLATELET COMMENTS,PCOM: ABNORMAL
PLATELET COMMENTS,PCOM: SLIGHT
PMV BLD AUTO: 9 FL (ref 9.4–12.3)
PMV BLD AUTO: 9.5 FL (ref 9.4–12.3)
POTASSIUM SERPL-SCNC: 3.1 MMOL/L (ref 3.5–5.1)
POTASSIUM SERPL-SCNC: 3.3 MMOL/L (ref 3.5–5.1)
PROT SERPL-MCNC: 5.6 G/DL (ref 6.3–8.2)
RBC # BLD AUTO: 3.49 M/UL (ref 4.05–5.25)
RBC # BLD AUTO: 3.57 M/UL (ref 4.05–5.25)
RBC MORPH BLD: ABNORMAL
SODIUM SERPL-SCNC: 143 MMOL/L (ref 136–145)
WBC # BLD AUTO: 59.8 K/UL (ref 4.3–11.1)
WBC # BLD AUTO: 62.6 K/UL (ref 4.3–11.1)
WBC MORPH BLD: ABNORMAL
WBC MORPH BLD: ABNORMAL

## 2020-08-19 PROCEDURE — 74011000258 HC RX REV CODE- 258: Performed by: INTERNAL MEDICINE

## 2020-08-19 PROCEDURE — 84100 ASSAY OF PHOSPHORUS: CPT

## 2020-08-19 PROCEDURE — 96375 TX/PRO/DX INJ NEW DRUG ADDON: CPT

## 2020-08-19 PROCEDURE — 83735 ASSAY OF MAGNESIUM: CPT

## 2020-08-19 PROCEDURE — 74011250636 HC RX REV CODE- 250/636: Performed by: INTERNAL MEDICINE

## 2020-08-19 PROCEDURE — 99214 OFFICE O/P EST MOD 30 MIN: CPT | Performed by: NURSE PRACTITIONER

## 2020-08-19 PROCEDURE — 80053 COMPREHEN METABOLIC PANEL: CPT

## 2020-08-19 PROCEDURE — 96365 THER/PROPH/DIAG IV INF INIT: CPT

## 2020-08-19 PROCEDURE — 84132 ASSAY OF SERUM POTASSIUM: CPT

## 2020-08-19 PROCEDURE — 74011250637 HC RX REV CODE- 250/637: Performed by: INTERNAL MEDICINE

## 2020-08-19 PROCEDURE — 96366 THER/PROPH/DIAG IV INF ADDON: CPT

## 2020-08-19 PROCEDURE — 38206 HARVEST AUTO STEM CELLS: CPT

## 2020-08-19 PROCEDURE — 86367 STEM CELLS TOTAL COUNT: CPT

## 2020-08-19 PROCEDURE — 85025 COMPLETE CBC W/AUTO DIFF WBC: CPT

## 2020-08-19 PROCEDURE — 96372 THER/PROPH/DIAG INJ SC/IM: CPT

## 2020-08-19 RX ORDER — ACETAMINOPHEN 325 MG/1
650 TABLET ORAL AS NEEDED
Status: DISCONTINUED | OUTPATIENT
Start: 2020-08-19 | End: 2020-08-20 | Stop reason: HOSPADM

## 2020-08-19 RX ORDER — LORAZEPAM 2 MG/ML
1 INJECTION INTRAMUSCULAR
Status: DISPENSED | OUTPATIENT
Start: 2020-08-19 | End: 2020-08-19

## 2020-08-19 RX ORDER — ACYCLOVIR 400 MG/1
400 TABLET ORAL 2 TIMES DAILY
COMMUNITY
End: 2020-10-19 | Stop reason: SDUPTHER

## 2020-08-19 RX ORDER — GUAIFENESIN 100 MG/5ML
81 LIQUID (ML) ORAL
Status: DISPENSED | OUTPATIENT
Start: 2020-08-19 | End: 2020-08-19

## 2020-08-19 RX ORDER — POTASSIUM CHLORIDE 29.8 MG/ML
40 INJECTION INTRAVENOUS ONCE
Status: COMPLETED | OUTPATIENT
Start: 2020-08-19 | End: 2020-08-19

## 2020-08-19 RX ORDER — ONDANSETRON 2 MG/ML
8 INJECTION INTRAMUSCULAR; INTRAVENOUS AS NEEDED
Status: DISCONTINUED | OUTPATIENT
Start: 2020-08-19 | End: 2020-08-20 | Stop reason: HOSPADM

## 2020-08-19 RX ORDER — ACETAMINOPHEN 325 MG/1
650 TABLET ORAL ONCE
Status: COMPLETED | OUTPATIENT
Start: 2020-08-19 | End: 2020-08-19

## 2020-08-19 RX ORDER — EPINEPHRINE 1 MG/ML
0.3 INJECTION, SOLUTION, CONCENTRATE INTRAVENOUS AS NEEDED
Status: DISCONTINUED | OUTPATIENT
Start: 2020-08-19 | End: 2020-08-20 | Stop reason: HOSPADM

## 2020-08-19 RX ORDER — MORPHINE SULFATE 2 MG/ML
2 INJECTION, SOLUTION INTRAMUSCULAR; INTRAVENOUS
Status: DISCONTINUED | OUTPATIENT
Start: 2020-08-19 | End: 2020-08-20 | Stop reason: HOSPADM

## 2020-08-19 RX ORDER — POTASSIUM CHLORIDE 14.9 MG/ML
20 INJECTION INTRAVENOUS ONCE
Status: COMPLETED | OUTPATIENT
Start: 2020-08-19 | End: 2020-08-19

## 2020-08-19 RX ORDER — SODIUM CHLORIDE 9 MG/ML
1000 INJECTION, SOLUTION INTRAVENOUS CONTINUOUS
Status: ACTIVE | OUTPATIENT
Start: 2020-08-19 | End: 2020-08-19

## 2020-08-19 RX ORDER — ALBUTEROL SULFATE 0.83 MG/ML
2.5 SOLUTION RESPIRATORY (INHALATION) AS NEEDED
Status: DISCONTINUED | OUTPATIENT
Start: 2020-08-19 | End: 2020-08-20 | Stop reason: HOSPADM

## 2020-08-19 RX ORDER — SODIUM CHLORIDE 0.9 % (FLUSH) 0.9 %
10-40 SYRINGE (ML) INJECTION AS NEEDED
Status: ACTIVE | OUTPATIENT
Start: 2020-08-19 | End: 2020-08-19

## 2020-08-19 RX ORDER — HYDROCORTISONE SODIUM SUCCINATE 100 MG/2ML
100 INJECTION, POWDER, FOR SOLUTION INTRAMUSCULAR; INTRAVENOUS AS NEEDED
Status: DISCONTINUED | OUTPATIENT
Start: 2020-08-19 | End: 2020-08-20 | Stop reason: HOSPADM

## 2020-08-19 RX ORDER — DIPHENHYDRAMINE HYDROCHLORIDE 50 MG/ML
50 INJECTION, SOLUTION INTRAMUSCULAR; INTRAVENOUS AS NEEDED
Status: DISCONTINUED | OUTPATIENT
Start: 2020-08-19 | End: 2020-08-20 | Stop reason: HOSPADM

## 2020-08-19 RX ORDER — DIPHENHYDRAMINE HYDROCHLORIDE 50 MG/ML
25 INJECTION, SOLUTION INTRAMUSCULAR; INTRAVENOUS ONCE
Status: COMPLETED | OUTPATIENT
Start: 2020-08-19 | End: 2020-08-19

## 2020-08-19 RX ADMIN — Medication 30 ML: at 07:30

## 2020-08-19 RX ADMIN — TBO-FILGRASTIM 1080 MCG: 480 INJECTION, SOLUTION SUBCUTANEOUS at 08:14

## 2020-08-19 RX ADMIN — CALCIUM GLUCONATE 6 G: 98 INJECTION, SOLUTION INTRAVENOUS at 09:19

## 2020-08-19 RX ADMIN — PLERIXAFOR 24 MG: 24 SOLUTION SUBCUTANEOUS at 18:10

## 2020-08-19 RX ADMIN — POTASSIUM CHLORIDE 20 MEQ: 14.9 INJECTION, SOLUTION INTRAVENOUS at 15:08

## 2020-08-19 RX ADMIN — POTASSIUM CHLORIDE 40 MEQ: 400 INJECTION, SOLUTION INTRAVENOUS at 09:25

## 2020-08-19 RX ADMIN — ACETAMINOPHEN 650 MG: 325 TABLET, FILM COATED ORAL at 07:31

## 2020-08-19 RX ADMIN — LORAZEPAM 1 MG: 2 INJECTION INTRAMUSCULAR; INTRAVENOUS at 12:03

## 2020-08-19 RX ADMIN — DIPHENHYDRAMINE HYDROCHLORIDE 25 MG: 50 INJECTION, SOLUTION INTRAMUSCULAR; INTRAVENOUS at 07:31

## 2020-08-19 RX ADMIN — SODIUM CHLORIDE 1000 ML: 900 INJECTION, SOLUTION INTRAVENOUS at 09:19

## 2020-08-19 RX ADMIN — CALCIUM GLUCONATE: 98 INJECTION, SOLUTION INTRAVENOUS at 14:27

## 2020-08-19 NOTE — PROGRESS NOTES
Apheresis Day: 2  Dx: Multiple Myeloma  Pre-collection/mobilization form was reviewed prior to initiating collection. Granix was given per policy. Mozobil used: no (yes/no)  Type of catheter (temp or tunneled): tunneled  Calcium 6 g used during procedure. CD34: 21  Predictive for 20 liters: 1.18 x 106/kg    30 liters: 1.77 x 106/kg  Pre WBC 59.8  (critical value does not fall outside of expected limits as documented in the medical protocol)  Pre-hgb: 10.1  /  Plt: 98  Post WBC 62.6  Post-hgb: 10.3  /  Plt: 41  Blood/electrolyte replacements: KCl 40 meq IV for K+ 3.1  Whole blood processed: 91801  Collection results received. Patient collected 1.43 x 106/kg for cumulative 3.45 x 106/kg.   Collection goal: 6x 106/kg   Hematocrit in collection bag 2  Pt seen by: Casandra Doyle NP  Issues: pt with generalized tingling, Calcium rate increased  Next procedure date: 8/20      Target CD34+/Kg 4           Kg recipient 106   CD34+ pre-count 21   WB liters to process 56.1

## 2020-08-19 NOTE — PROGRESS NOTES
Arrived to the [facility]. Mozobil completed. Patient tolerated well. Any issues or concerns during appointment: none. Patient aware of next infusion appointment on 8/20 @ 0715  Discharged ambulatory.     Albertina Fernandes RN

## 2020-08-19 NOTE — PROGRESS NOTES
Data Source: Patient, [facility] record. 8/19/2020    3:34 PM    Araceli Guillen [id number]    35 y.o. Patient Encounter: [address] Visit    Heme Diagnosis:  MM  Heme History (Copied from prior):   35 female, , smoker, history of hypertension, more recent diagnosis of IgG lambda multiple myeloma, under care of Dr. Davidson Doctor, now kindly referred to us for consideration of autologous stem cell transplant. Hematologic history is as follows:    - 03/20: Seen in ED with back pain. Imaging included CTA chest showing T7 compression fracture possibly pathologic, along with subtle abnormal lytic lesions of the spine, ribs, sternum and scapula. Referred to [facility] oncology. - 4/3/2020 T7 lesion biopsy confirming lambda light chain restricted plasma cell dyscrasia. Lab work revealed IgG 3000, lambda LC 6.5 with kappa lambda ratio of 0.02, SPEP with TORIBIO showing M spike of 2.6 g per DL. Beta-2 microglobulin 2.8, albumin 3.8, slightly elevated calcium at 10.8, mild anemia with hemoglobin in at 10 range. PET scan 4/16/2020 showed diffuse bone metastasis. Bone marrow biopsy 4/17/2020 showing 30-50% cellularity with lambda light chain restricted plasma cell neoplasm (invoving 40%). Cytogenetics complex (hyperdiploid karyotype) with +3, +5, 7, 9, 10, 15, 18, 19 and 21 as well as t(10;21). FISH revealing gain of chromosome 9, 15 (standard risk)   - 4/9/2020 kyphoplasty T7 for pain. - 4/21/2020: Started cycle 1 with Velcade/Dex with plans to add Revlimid once available through mail. Now presents for follow-up. Appears to be tolerating therapy well. Remains on prophylactic medications including acyclovir, as well as low-dose rivaroxaban at 10 mg daily (as patient on Revlimid.). Patient is felt to have ISS stage I disease with high risk cytogenetics.  We discussed various options moving forward including continuation of RVD, maintenance therapy, allogeneic (briefly), as well as autologous stem cell transplant. Patient appears interested in autologous stem cell transplant, and would be a good candidate once in good remission. Interval History:  08/19/2020: She is here today for day 2 of collection of her stem cells in preparation for upcoming ASCT - status post mobilization. She collected 2.02 x 106/kg for cumulative 2.02 x 106/kg. Collection goal: 6x 106/kg. She has tolerated injections well overall. She has come aching all over but this is tolerable. No nausea or vomiting. No diarrhea. No fevers, chills or other infectious symptoms. Fatigued but manageable. NCCN Distress Score:  3 most recent PHQ Screens 8/13/2020   PHQ Not Done -   Little interest or pleasure in doing things Not at all   Feeling down, depressed, irritable, or hopeless Not at all   Total Score PHQ 2 0         REVIEW OF SYSTEMS:  As mentioned above, all other systems were reviewed in full and are negative. Past Medical History:   Diagnosis Date    Cancer [facility])     multiple myeloma    Chronic pain        Past Surgical History:   Procedure Laterality Date    HX BACK SURGERY  04/2020    HX TUBAL LIGATION  08/2007    IR INSERT TUNL CVC W/O PORT OVER 5 YR  8/17/2020       Current Outpatient Medications   Medication Sig Dispense Refill    morphine CR (MS CONTIN) 60 mg CR tablet Take 60 mg by mouth every twelve (12) hours.  potassium chloride (K-DUR, KLOR-CON) 20 mEq tablet Take 1 Tab by mouth daily. 30 Tab 2    dexAMETHasone (DECADRON) 6 mg tablet Take 1 Tab by mouth two (2) times a day. Pt to take 6 mg in the am and 6 mg in the pm. Start tonight. 12 Tab 0    erythromycin (ILOTYCIN) ophthalmic ointment Apply 1 cm to both eye lids 4 times a day for 5 days 1 Tube 1    DULoxetine (Cymbalta) 30 mg capsule Take 30 mg by mouth nightly.  HYDROmorphone (Dilaudid) 2 mg tablet Take  by mouth every four (4) hours as needed for Pain.  morphine sulfate (MORPHINE, BULK,) by Does Not Apply route.       acetaminophen (Tylenol Extra Strength) 500 mg tablet Take  by mouth every six (6) hours as needed for Pain.        Current Facility-Administered Medications   Medication Dose Route Frequency Provider Last Rate Last Dose    0.9% sodium chloride infusion 1,000 mL  1,000 mL IntraVENous CONTINUOUS Nohemi Sibley MD 25 mL/hr at 2019 1,000 mL at 20    calcium gluconate 6 g in 0.9% sodium chloride 250 mL  6 g IntraVENous Santo Cabrera MD 55.8 mL/hr at 20 6 g at 20    potassium chloride 40 mEq IVPB  40 mEq IntraVENous Santo Cabrera MD 25 mL/hr at 20 09 40 mEq at 20 3377     Facility-Administered Medications Ordered in Other Encounters   Medication Dose Route Frequency Provider Last Rate Last Dose    midazolam (VERSED) injection 0.5-2 mg  0.5-2 mg IntraVENous Multiple Isabelle Calle MD   1 mg at 20 1132    fentaNYL citrate (PF) injection 25-50 mcg  25-50 mcg IntraVENous Multiple Isabelle Calle MD   50 mcg at 20 1132       Social History     Socioeconomic History    Marital status: SINGLE     Spouse name: Not on file    Number of children: Not on file    Years of education: Not on file    Highest education level: Not on file   Tobacco Use    Smoking status: Former Smoker     Packs/day: 0.50     Years: 26.00     Pack years: 13.00     Last attempt to quit: 2020     Years since quittin.3    Smokeless tobacco: Never Used   Substance and Sexual Activity    Alcohol use: Not Currently    Drug use: Never    Sexual activity: Not Currently     Partners: Male     Birth control/protection: None   Other Topics Concern       Family History   Problem Relation Age of Onset    Hypertension Mother     Arthritis-osteo Mother     Diabetes Father     No Known Problems Sister     No Known Problems Brother     Diabetes Sister        Allergies   Allergen Reactions    Zarxio [Filgrastim-Sndz] Shortness of Breath     \"pounding heart\" per pt PHYSICAL EXAMINATION:  General Appearance: Healthy appearing patient in no acute distress. Vitals reviewed. Visit Vitals  /74 (BP 1 Location: Left arm, BP Patient Position: At rest)   Pulse (!) 46   Temp 98.3 °F (36.8 °C)   Resp 18   Wt 234 lb 9.6 oz (106.4 kg)   SpO2 96%   BMI 40.91 kg/m²     HEENT: No oral or pharyngeal masses, ulceration or thrush noted, no sinus tenderness. Neck is supple with no thyromegaly or JVD noted. Lungs/Thorax: Clear to auscultation, no accessory muscles of respiration being used. Heart: Regular rate and rhythm, normal S1, S2, no appreciable murmurs, rubs, gallops. Abdomen: Soft, nontender, bowel sounds present, no appreciable hepatosplenomegaly, no palpable masses. Extremeties: Good pulses bilaterally, no peripheral edema. Skin: Normal skin tone with no rash, petechiae, ecchymosis noted. Musculoskeletal: No pain on palpation over bony prominence, no edema, no evidence of gout, no joint or bony deformity. Neurologic: Grossly intact. LABS/IMAGING:    Lab Results   Component Value Date/Time    WBC 59.8 (HH) 08/19/2020 07:18 AM    HGB 10.1 (L) 08/19/2020 07:18 AM    HCT 31.3 (L) 08/19/2020 07:18 AM    PLATELET 98 (L) 93/28/4479 07:18 AM    MCV 89.7 08/19/2020 07:18 AM       Lab Results   Component Value Date/Time    Sodium 143 08/19/2020 07:18 AM    Potassium 3.1 (L) 08/19/2020 07:18 AM    Chloride 105 08/19/2020 07:18 AM    CO2 33 (H) 08/19/2020 07:18 AM    Anion gap 5 (L) 08/19/2020 07:18 AM    Glucose 89 08/19/2020 07:18 AM    BUN 10 08/19/2020 07:18 AM    Creatinine 0.80 08/19/2020 07:18 AM    GFR est AA >60 08/19/2020 07:18 AM    GFR est non-AA >60 08/19/2020 07:18 AM    Calcium 8.5 08/19/2020 07:18 AM    Alk.  phosphatase 208 (H) 08/19/2020 07:18 AM    Protein, total 5.6 (L) 08/19/2020 07:18 AM    Albumin 2.8 (L) 08/19/2020 07:18 AM    Globulin 2.8 08/19/2020 07:18 AM    A-G Ratio 1.0 (L) 08/19/2020 07:18 AM    ALT (SGPT) 52 08/19/2020 07:18 AM Above results reviewed with patient. ASSESSMENT:  35 female, , smoker, history of hypertension, more recent diagnosis of IgG lambda multiple myeloma, under care of Dr. Susan Mancilla, now kindly referred to us for consideration of autologous stem cell transplant. Hematologic history is as follows:    - 03/20: Seen in ED with back pain. Imaging included CTA chest showing T7 compression fracture possibly pathologic, along with subtle abnormal lytic lesions of the spine, ribs, sternum and scapula. Referred to [facility] oncology. - 4/3/2020 T7 lesion biopsy confirming lambda light chain restricted plasma cell dyscrasia. Lab work revealed IgG 3000, lambda , SPEP with TORIBIO showing M spike of 2.6 g per DL. Beta-2 microglobulin 2.8, albumin 3.8, slightly elevated calcium at 10.8, mild anemia with hemoglobin in at 10 range. PET scan 4/16/2020 showed diffuse bone metastasis. Bone marrow biopsy 4/17/2020 showing 30-50% cellularity with lambda light chain restricted plasma cell neoplasm (invoving 40%). Cytogenetics complex (hyperdiploid karyotype) with +3, +5, 7, 9, 10, 15, 18, 19 and 21 as well as t(10;21). FISH revealing gain of chromosome 9, 15 (standard risk)   - 4/9/2020 kyphoplasty T7 for pain. - 4/21/2020: Started cycle 1 with Velcade/Dex with plans to add Revlimid once available through mail. Now presents for follow-up. Appears to be tolerating therapy well. Remains on prophylactic medications including acyclovir, as well as low-dose rivaroxaban at 10 mg daily (as patient on Revlimid.). Patient is felt to have ISS stage I disease with high risk cytogenetics. We discussed various options moving forward including continuation of RVD, maintenance therapy, allogeneic (briefly), as well as autologous stem cell transplant. Patient appears interested in autologous stem cell transplant, and would be a good candidate once in good remission.   Will work-up and manage as below:    7/21/2020: Reports doing well. Recently completed cycle 4 RVD. Labs thereafter with [facility] now normal range at 17 (previously as high as 645). Discussed lab results consistent with remission, will look into mobilization and collection in the next few weeks. Will reach out to primary team Dr. Susan Mancilla, for now continue Velcade/Dex. Navigation following will start pre-mold studies, as well as PET scan, skeletal survey and BM biopsy. Various risks, and side effects of high dose chemo w/ BEAM/ASCT discussed in detail including nausea, vomiting, diarrhea, life threatening infections, bleeding, anemia, end organ damage, sepsis, and potential death. Also discussed graft failure risk and mortality from procedure ( <5%). Delayed long term effects such as MDS, leukemia and secondary malignancies also discussed. Patient willing to proceed. 8/13/2020: Last bortezomib/Dex was on 7/24/2020. Some neuropathy soles of her feet with walking today. Denies any falls. Recent labs reviewed: LC normal, S TORIBIO CR, PET scan without evidence of significant active disease. BM biopsy with 30 to 40% cellularity without evidence of residual disease, flow cytometry negative, cytogenetics normal, off note FISH -ve. Premorbid studies reviewed: Infectious panel unremarkable, PFTs with low DLCO at 48%, will get pulmonology clearance prior to SCT. 2D echo with mild to moderate tricuspid regurg: May benefit from seeing cardiology down the line also. Notes some left eye conjunctivitis, last week her right eye had same: Will treat with erythromycin eye ointment, and refer to ophthalmology. Okay to proceed with mobilization, she will get tunneled catheter. Goal will be CD34 plus at 6 mil/kg. RTC prior to SCT.    08/18/2020: She is here today for collection of her stem cells in preparation for upcoming ASCT - status post mobilization. She has tolerated injections well overall.  She has had some bilateral hip pain and sternal pain that was tolerable. Minimal nausea. No diarrhea. No fevers, chills or other infectious symptoms. Labs reviewed. 08/19/2020: She is here today for day 2 of collection of her stem cells in preparation for upcoming ASCT - status post mobilization. She collected 2.02 x 106/kg for cumulative 2.02 x 106/kg. Collection goal: 6x 106/kg. She has tolerated injections well overall. She has come aching all over but this is tolerable. No nausea or vomiting. No diarrhea. No fevers, chills or other infectious symptoms. Fatigued but manageable. Labs reviewed. 1. IgG lambda multiple myeloma, ISS stage I, standard risk disease, now on RVD. PLAN:  - As above. S/p RVD x 4. Labs w [facility]. For now continue Bortezomib/Dex alone. Will start pre-mold studies in next few weeks including new baseline PET scan, skeletal survey and BM biopsy.   - Conditioning regimen will be Melphalan 200 mg/m2.   - Mobilization with granix/Mozobil. Goal CD34+ will be 6 mil/kg. Proceed with collection day 2.   - Bone directed therapy and prophylactic medications per primary hematology service. - Nausea: Zofran PRN. - Claritin for G-CSF related bone pain. - Encouraged frequent activity throughout the day and rest as needed to combat fatigue.   - Continue good oral nutrition and hydration.   - Call with any fevers, uncontrolled side effects from treatment or any other worrisome/concerning symptoms.        RTC daily until collected goal.     Thank you Dr Rosalie Kearns for allowing us to paticipate in care of this pleasant patient. Please call w/ any quesions    Total time 25 min 50% in direct consultation about the patient's diagnosis and management. All questions answered.       Santina Primrose, NP  [address] Hematology Oncology  [address] [address]  Office : [phone]  Fax : [phone]

## 2020-08-20 ENCOUNTER — HOSPITAL ENCOUNTER (OUTPATIENT)
Dept: INFUSION THERAPY | Age: 40
Discharge: HOME | End: 2020-08-20
Payer: COMMERCIAL

## 2020-08-20 VITALS
BODY MASS INDEX: 42.47 KG/M2 | TEMPERATURE: 98.3 F | DIASTOLIC BLOOD PRESSURE: 59 MMHG | HEART RATE: 58 BPM | WEIGHT: 243.6 LBS | SYSTOLIC BLOOD PRESSURE: 111 MMHG | OXYGEN SATURATION: 99 % | RESPIRATION RATE: 18 BRPM

## 2020-08-20 DIAGNOSIS — C90.00 MULTIPLE MYELOMA NOT HAVING ACHIEVED REMISSION (HCC): Primary | ICD-10-CM

## 2020-08-20 LAB
ANION GAP SERPL CALC-SCNC: 6 MMOL/L (ref 7–16)
BASOPHILS # BLD: 0 K/UL (ref 0–0.2)
BASOPHILS # BLD: 0 K/UL (ref 0–0.2)
BASOPHILS NFR BLD: 0 % (ref 0–2)
BASOPHILS NFR BLD: 0 % (ref 0–2)
BUN SERPL-MCNC: 10 MG/DL (ref 6–23)
CALCIUM SERPL-MCNC: 8.7 MG/DL (ref 8.3–10.4)
CD34,XCD34T: NORMAL
CHLORIDE SERPL-SCNC: 103 MMOL/L (ref 98–107)
CO2 SERPL-SCNC: 33 MMOL/L (ref 21–32)
CREAT SERPL-MCNC: 0.7 MG/DL (ref 0.6–1)
DIFFERENTIAL METHOD BLD: ABNORMAL
DIFFERENTIAL METHOD BLD: ABNORMAL
EOSINOPHIL # BLD: 0 K/UL (ref 0–0.8)
EOSINOPHIL # BLD: 0 K/UL (ref 0–0.8)
EOSINOPHIL NFR BLD: 0 % (ref 0.5–7.8)
EOSINOPHIL NFR BLD: 0 % (ref 0.5–7.8)
ERYTHROCYTE [DISTWIDTH] IN BLOOD BY AUTOMATED COUNT: 16.1 % (ref 11.9–14.6)
ERYTHROCYTE [DISTWIDTH] IN BLOOD BY AUTOMATED COUNT: 16.2 % (ref 11.9–14.6)
FLOW CYTOMETRY, FBTC1: NORMAL
GLUCOSE SERPL-MCNC: 114 MG/DL (ref 65–100)
HCT VFR BLD AUTO: 30.9 % (ref 35.8–46.3)
HCT VFR BLD AUTO: 32.2 % (ref 35.8–46.3)
HGB BLD-MCNC: 10 G/DL (ref 11.7–15.4)
HGB BLD-MCNC: 9.8 G/DL (ref 11.7–15.4)
IMM GRANULOCYTES # BLD AUTO: 6.1 K/UL (ref 0–0.5)
IMM GRANULOCYTES # BLD AUTO: 8.7 K/UL (ref 0–0.5)
IMM GRANULOCYTES NFR BLD AUTO: 10 % (ref 0–5)
IMM GRANULOCYTES NFR BLD AUTO: 8 % (ref 0–5)
LYMPHOCYTES # BLD: 1.5 K/UL (ref 0.5–4.6)
LYMPHOCYTES # BLD: 3.5 K/UL (ref 0.5–4.6)
LYMPHOCYTES NFR BLD: 2 % (ref 13–44)
LYMPHOCYTES NFR BLD: 4 % (ref 13–44)
MCH RBC QN AUTO: 28.2 PG (ref 26.1–32.9)
MCH RBC QN AUTO: 28.7 PG (ref 26.1–32.9)
MCHC RBC AUTO-ENTMCNC: 31.1 G/DL (ref 31.4–35)
MCHC RBC AUTO-ENTMCNC: 31.7 G/DL (ref 31.4–35)
MCV RBC AUTO: 90.4 FL (ref 79.6–97.8)
MCV RBC AUTO: 90.7 FL (ref 79.6–97.8)
MONOCYTES # BLD: 2.3 K/UL (ref 0.1–1.3)
MONOCYTES # BLD: 3.5 K/UL (ref 0.1–1.3)
MONOCYTES NFR BLD: 3 % (ref 4–12)
MONOCYTES NFR BLD: 4 % (ref 4–12)
NEUTS SEG # BLD: 66.1 K/UL (ref 1.7–8.2)
NEUTS SEG # BLD: 71.5 K/UL (ref 1.7–8.2)
NEUTS SEG NFR BLD: 82 % (ref 43–78)
NEUTS SEG NFR BLD: 87 % (ref 43–78)
NRBC # BLD: 0.03 K/UL (ref 0–0.2)
NRBC # BLD: 0.04 K/UL (ref 0–0.2)
PLATELET # BLD AUTO: 22 K/UL (ref 150–450)
PLATELET # BLD AUTO: 43 K/UL (ref 150–450)
PLATELET COMMENTS,PCOM: ABNORMAL
PLATELET COMMENTS,PCOM: ABNORMAL
PMV BLD AUTO: 8.9 FL (ref 9.4–12.3)
PMV BLD AUTO: 9 FL (ref 9.4–12.3)
POTASSIUM SERPL-SCNC: 3.8 MMOL/L (ref 3.5–5.1)
RBC # BLD AUTO: 3.42 M/UL (ref 4.05–5.25)
RBC # BLD AUTO: 3.55 M/UL (ref 4.05–5.25)
RBC MORPH BLD: ABNORMAL
SODIUM SERPL-SCNC: 142 MMOL/L (ref 136–145)
SPECIMEN SOURCE: NORMAL
TEST ORDERED:: NORMAL
WBC # BLD AUTO: 76 K/UL (ref 4.3–11.1)
WBC # BLD AUTO: 87.2 K/UL (ref 4.3–11.1)
WBC MORPH BLD: ABNORMAL
WBC MORPH BLD: ABNORMAL

## 2020-08-20 PROCEDURE — 86367 STEM CELLS TOTAL COUNT: CPT

## 2020-08-20 PROCEDURE — 38206 HARVEST AUTO STEM CELLS: CPT

## 2020-08-20 PROCEDURE — 74011250636 HC RX REV CODE- 250/636: Performed by: INTERNAL MEDICINE

## 2020-08-20 PROCEDURE — 99214 OFFICE O/P EST MOD 30 MIN: CPT | Performed by: NURSE PRACTITIONER

## 2020-08-20 PROCEDURE — 96375 TX/PRO/DX INJ NEW DRUG ADDON: CPT

## 2020-08-20 PROCEDURE — 74011250636 HC RX REV CODE- 250/636: Performed by: NURSE PRACTITIONER

## 2020-08-20 PROCEDURE — 80048 BASIC METABOLIC PNL TOTAL CA: CPT

## 2020-08-20 PROCEDURE — 96374 THER/PROPH/DIAG INJ IV PUSH: CPT

## 2020-08-20 PROCEDURE — 74011250637 HC RX REV CODE- 250/637: Performed by: INTERNAL MEDICINE

## 2020-08-20 PROCEDURE — 85025 COMPLETE CBC W/AUTO DIFF WBC: CPT

## 2020-08-20 RX ORDER — FUROSEMIDE 10 MG/ML
20 INJECTION INTRAMUSCULAR; INTRAVENOUS ONCE
Status: COMPLETED | OUTPATIENT
Start: 2020-08-20 | End: 2020-08-20

## 2020-08-20 RX ORDER — SODIUM CHLORIDE 9 MG/ML
1000 INJECTION, SOLUTION INTRAVENOUS CONTINUOUS
Status: ACTIVE | OUTPATIENT
Start: 2020-08-20 | End: 2020-08-20

## 2020-08-20 RX ORDER — DIPHENHYDRAMINE HYDROCHLORIDE 50 MG/ML
25 INJECTION, SOLUTION INTRAMUSCULAR; INTRAVENOUS ONCE
Status: COMPLETED | OUTPATIENT
Start: 2020-08-20 | End: 2020-08-20

## 2020-08-20 RX ORDER — ACETAMINOPHEN 325 MG/1
650 TABLET ORAL ONCE
Status: COMPLETED | OUTPATIENT
Start: 2020-08-20 | End: 2020-08-20

## 2020-08-20 RX ADMIN — SODIUM CHLORIDE 1000 ML: 900 INJECTION, SOLUTION INTRAVENOUS at 09:28

## 2020-08-20 RX ADMIN — CALCIUM GLUCONATE 6 G: 98 INJECTION, SOLUTION INTRAVENOUS at 09:19

## 2020-08-20 RX ADMIN — FUROSEMIDE 20 MG: 10 INJECTION, SOLUTION INTRAMUSCULAR; INTRAVENOUS at 08:05

## 2020-08-20 RX ADMIN — ACETAMINOPHEN 650 MG: 325 TABLET, FILM COATED ORAL at 07:47

## 2020-08-20 RX ADMIN — TBO-FILGRASTIM 1080 MCG: 480 INJECTION, SOLUTION SUBCUTANEOUS at 08:16

## 2020-08-20 RX ADMIN — DIPHENHYDRAMINE HYDROCHLORIDE 25 MG: 50 INJECTION, SOLUTION INTRAMUSCULAR; INTRAVENOUS at 07:47

## 2020-08-20 NOTE — PROGRESS NOTES
Data Source: Patient, [facility] record. 8/20/2020    8:00 AM    Stevie Garcia [id number]    35 y.o. Patient Encounter: [address] Visit    Heme Diagnosis:  MM  Heme History (Copied from prior):   35 female, , smoker, history of hypertension, more recent diagnosis of IgG lambda multiple myeloma, under care of Dr. Cate Nickerson, now kindly referred to us for consideration of autologous stem cell transplant. Hematologic history is as follows:    - 03/20: Seen in ED with back pain. Imaging included CTA chest showing T7 compression fracture possibly pathologic, along with subtle abnormal lytic lesions of the spine, ribs, sternum and scapula. Referred to [facility] oncology. - 4/3/2020 T7 lesion biopsy confirming lambda light chain restricted plasma cell dyscrasia. Lab work revealed IgG 3000, lambda LC 6.5 with kappa lambda ratio of 0.02, SPEP with TORIBIO showing M spike of 2.6 g per DL. Beta-2 microglobulin 2.8, albumin 3.8, slightly elevated calcium at 10.8, mild anemia with hemoglobin in at 10 range. PET scan 4/16/2020 showed diffuse bone metastasis. Bone marrow biopsy 4/17/2020 showing 30-50% cellularity with lambda light chain restricted plasma cell neoplasm (invoving 40%). Cytogenetics complex (hyperdiploid karyotype) with +3, +5, 7, 9, 10, 15, 18, 19 and 21 as well as t(10;21). FISH revealing gain of chromosome 9, 15 (standard risk)   - 4/9/2020 kyphoplasty T7 for pain. - 4/21/2020: Started cycle 1 with Velcade/Dex with plans to add Revlimid once available through mail. Now presents for follow-up. Appears to be tolerating therapy well. Remains on prophylactic medications including acyclovir, as well as low-dose rivaroxaban at 10 mg daily (as patient on Revlimid.). Patient is felt to have ISS stage I disease with high risk cytogenetics.  We discussed various options moving forward including continuation of RVD, maintenance therapy, allogeneic (briefly), as well as autologous stem cell transplant. Patient appears interested in autologous stem cell transplant, and would be a good candidate once in good remission. Interval History:  8/20/2020:  She is here today for day 3 of collection of her stem cells in preparation for her upcoming ASCT. She had Mozobil yesterday. Yesterday, she collected 1.43 x 106/kg for cumulative 3.45 x 106/kg. She has been feeling well overall. She has mild pains here and there but overall tolerable. Her energy level is low but managing. Her appetite has been pretty good. She denies GI or bowel complaints. She denies shortness of breath. She denies any fevers, chills, or other infectious symptoms. NCCN Distress Score:  3 most recent PHQ Screens 8/13/2020   PHQ Not Done -   Little interest or pleasure in doing things Not at all   Feeling down, depressed, irritable, or hopeless Not at all   Total Score PHQ 2 0         REVIEW OF SYSTEMS:  As mentioned above, all other systems were reviewed in full and are negative. Past Medical History:   Diagnosis Date    Cancer [facility])     multiple myeloma    Chronic pain        Past Surgical History:   Procedure Laterality Date    HX BACK SURGERY  04/2020    HX TUBAL LIGATION  08/2007    IR INSERT TUNL CVC W/O PORT OVER 5 YR  8/17/2020       Current Outpatient Medications   Medication Sig Dispense Refill    acyclovir (ZOVIRAX) 400 mg tablet Take 400 mg by mouth two (2) times a day.  morphine CR (MS CONTIN) 60 mg CR tablet Take 60 mg by mouth every twelve (12) hours.  potassium chloride (K-DUR, KLOR-CON) 20 mEq tablet Take 1 Tab by mouth daily. 30 Tab 2    dexAMETHasone (DECADRON) 6 mg tablet Take 1 Tab by mouth two (2) times a day. Pt to take 6 mg in the am and 6 mg in the pm. Start tonight. 12 Tab 0    erythromycin (ILOTYCIN) ophthalmic ointment Apply 1 cm to both eye lids 4 times a day for 5 days 1 Tube 1    DULoxetine (Cymbalta) 30 mg capsule Take 30 mg by mouth nightly.  HYDROmorphone (Dilaudid) 2 mg tablet Take  by mouth every four (4) hours as needed for Pain.  acetaminophen (Tylenol Extra Strength) 500 mg tablet Take  by mouth every six (6) hours as needed for Pain.        Current Facility-Administered Medications   Medication Dose Route Frequency Provider Last Rate Last Dose    0.9% sodium chloride infusion 1,000 mL  1,000 mL IntraVENous CONTINUOUS Drea Bradshaw MD        calcium gluconate 6 g in 0.9% sodium chloride 250 mL  6 g IntraVENous ONCE Drea Bradshaw MD        tbo-filgrastim [facility]) 1,080 mcg  1,080 mcg SubCUTAneous ONCE Drea Bradshaw MD         Facility-Administered Medications Ordered in Other Encounters   Medication Dose Route Frequency Provider Last Rate Last Dose    morphine injection 2 mg  2 mg IntraVENous ONCE PRN Bernice Chavis NP        midazolam (VERSED) injection 0.5-2 mg  0.5-2 mg IntraVENous Multiple Christine Carpenter MD   1 mg at 20 1132    fentaNYL citrate (PF) injection 25-50 mcg  25-50 mcg IntraVENous Multiple Christine Carpenter MD   50 mcg at 20 1132       Social History     Socioeconomic History    Marital status: SINGLE     Spouse name: Not on file    Number of children: Not on file    Years of education: Not on file    Highest education level: Not on file   Tobacco Use    Smoking status: Former Smoker     Packs/day: 0.50     Years: 26.00     Pack years: 13.00     Last attempt to quit: 2020     Years since quittin.3    Smokeless tobacco: Never Used   Substance and Sexual Activity    Alcohol use: Not Currently    Drug use: Never    Sexual activity: Not Currently     Partners: Male     Birth control/protection: None   Other Topics Concern       Family History   Problem Relation Age of Onset    Hypertension Mother     Arthritis-osteo Mother     Diabetes Father     No Known Problems Sister     No Known Problems Brother     Diabetes Sister        Allergies   Allergen Reactions    Zarxio [Filgrastim-Sndz] Shortness of Breath     \"pounding heart\" per pt       PHYSICAL EXAMINATION:  General Appearance: Healthy appearing patient in no acute distress. Vitals reviewed. Visit Vitals  /77 (BP 1 Location: Right arm, BP Patient Position: Sitting)   Pulse 64   Temp 99.1 °F (37.3 °C)   Resp 18   Wt 243 lb 9.6 oz (110.5 kg)   SpO2 94%   BMI 42.47 kg/m²     HEENT: No oral or pharyngeal masses, ulceration or thrush noted, no sinus tenderness. Neck is supple with no thyromegaly or JVD noted. Lungs/Thorax: Clear to auscultation, no accessory muscles of respiration being used. Heart: Regular rate and rhythm, normal S1, S2, no appreciable murmurs, rubs, gallops. Abdomen: Soft, nontender, bowel sounds present, no appreciable hepatosplenomegaly, no palpable masses. Extremeties: Good pulses bilaterally, +trace BLE edema. Skin: Normal skin tone with no rash, petechiae, ecchymosis noted. Musculoskeletal: No pain on palpation over bony prominence, no evidence of gout, no joint or bony deformity. Neurologic: Grossly intact. LABS/IMAGING:    Lab Results   Component Value Date/Time    WBC 87.2 (HH) 08/20/2020 07:39 AM    HGB 10.0 (L) 08/20/2020 07:39 AM    HCT 32.2 (L) 08/20/2020 07:39 AM    PLATELET 43 (L) 16/81/3347 07:39 AM    MCV 90.7 08/20/2020 07:39 AM       Lab Results   Component Value Date/Time    Sodium 142 08/20/2020 07:39 AM    Potassium 3.8 08/20/2020 07:39 AM    Chloride 103 08/20/2020 07:39 AM    CO2 33 (H) 08/20/2020 07:39 AM    Anion gap 6 (L) 08/20/2020 07:39 AM    Glucose 114 (H) 08/20/2020 07:39 AM    BUN 10 08/20/2020 07:39 AM    Creatinine 0.70 08/20/2020 07:39 AM    GFR est AA >60 08/20/2020 07:39 AM    GFR est non-AA >60 08/20/2020 07:39 AM    Calcium 8.7 08/20/2020 07:39 AM    Alk.  phosphatase 208 (H) 08/19/2020 07:18 AM    Protein, total 5.6 (L) 08/19/2020 07:18 AM    Albumin 2.8 (L) 08/19/2020 07:18 AM    Globulin 2.8 08/19/2020 07:18 AM    A-G Ratio 1.0 (L) 08/19/2020 07:18 AM    ALT (SGPT) 52 08/19/2020 07:18 AM             Above results reviewed with patient. ASSESSMENT:  35 female, , smoker, history of hypertension, more recent diagnosis of IgG lambda multiple myeloma, under care of Dr. Taylor Crews, now kindly referred to us for consideration of autologous stem cell transplant. Hematologic history is as follows:    - 03/20: Seen in ED with back pain. Imaging included CTA chest showing T7 compression fracture possibly pathologic, along with subtle abnormal lytic lesions of the spine, ribs, sternum and scapula. Referred to [facility] oncology. - 4/3/2020 T7 lesion biopsy confirming lambda light chain restricted plasma cell dyscrasia. Lab work revealed IgG 3000, lambda , SPEP with TORIBIO showing M spike of 2.6 g per DL. Beta-2 microglobulin 2.8, albumin 3.8, slightly elevated calcium at 10.8, mild anemia with hemoglobin in at 10 range. PET scan 4/16/2020 showed diffuse bone metastasis. Bone marrow biopsy 4/17/2020 showing 30-50% cellularity with lambda light chain restricted plasma cell neoplasm (invoving 40%). Cytogenetics complex (hyperdiploid karyotype) with +3, +5, 7, 9, 10, 15, 18, 19 and 21 as well as t(10;21). FISH revealing gain of chromosome 9, 15 (standard risk)   - 4/9/2020 kyphoplasty T7 for pain. - 4/21/2020: Started cycle 1 with Velcade/Dex with plans to add Revlimid once available through mail. Now presents for follow-up. Appears to be tolerating therapy well. Remains on prophylactic medications including acyclovir, as well as low-dose rivaroxaban at 10 mg daily (as patient on Revlimid.). Patient is felt to have ISS stage I disease with high risk cytogenetics. We discussed various options moving forward including continuation of RVD, maintenance therapy, allogeneic (briefly), as well as autologous stem cell transplant. Patient appears interested in autologous stem cell transplant, and would be a good candidate once in good remission.   Will work-up and manage as below:    7/21/2020: Reports doing well. Recently completed cycle 4 RVD. Labs thereafter with [facility] now normal range at 17 (previously as high as 645). Discussed lab results consistent with remission, will look into mobilization and collection in the next few weeks. Will reach out to primary team Dr. Mee Simpson, for now continue Velcade/Dex. Navigation following will start pre-mold studies, as well as PET scan, skeletal survey and BM biopsy. Various risks, and side effects of high dose chemo w/ BEAM/ASCT discussed in detail including nausea, vomiting, diarrhea, life threatening infections, bleeding, anemia, end organ damage, sepsis, and potential death. Also discussed graft failure risk and mortality from procedure ( <5%). Delayed long term effects such as MDS, leukemia and secondary malignancies also discussed. Patient willing to proceed. 8/13/2020: Last bortezomib/Dex was on 7/24/2020. Some neuropathy soles of her feet with walking today. Denies any falls. Recent labs reviewed: LC normal, S TORIBIO CR, PET scan without evidence of significant active disease. BM biopsy with 30 to 40% cellularity without evidence of residual disease, flow cytometry negative, cytogenetics normal, off note FISH -ve. Premorbid studies reviewed: Infectious panel unremarkable, PFTs with low DLCO at 48%, will get pulmonology clearance prior to SCT. 2D echo with mild to moderate tricuspid regurg: May benefit from seeing cardiology down the line also. Notes some left eye conjunctivitis, last week her right eye had same: Will treat with erythromycin eye ointment, and refer to ophthalmology. Okay to proceed with mobilization, she will get tunneled catheter. Goal will be CD34 plus at 6 mil/kg. RTC prior to SCT.    08/18/2020: She is here today for collection of her stem cells in preparation for upcoming ASCT - status post mobilization. She has tolerated injections well overall.  She has had some bilateral hip pain and sternal pain that was tolerable. Minimal nausea. No diarrhea. No fevers, chills or other infectious symptoms. Labs reviewed. 08/19/2020: She is here today for day 2 of collection of her stem cells in preparation for upcoming ASCT - status post mobilization. She collected 2.02 x 106/kg for cumulative 2.02 x 106/kg. Collection goal: 6x 106/kg. She has tolerated injections well overall. She has come aching all over but this is tolerable. No nausea or vomiting. No diarrhea. No fevers, chills or other infectious symptoms. Fatigued but manageable. Labs reviewed. 8/20/2020:  She is here today for day 3 of collection of her stem cells in preparation for her upcoming ASCT. She had Mozobil yesterday. Yesterday, she collected 1.43 x 106/kg for cumulative 3.45 x 106/kg. She has been feeling well overall. She has mild pains here and there but overall tolerable. Her energy level is low but managing. Her appetite has been pretty good. She denies GI or bowel complaints. She denies shortness of breath. She denies any fevers, chills, or other infectious symptoms. Continue with D3 of collection today. She will receive 20 mg of Lasix x 1 today for weight gain and trace BL edema. Labs reviewed. 1. IgG lambda multiple myeloma, ISS stage I, standard risk disease, now on RVD. PLAN:  - As above. S/p RVD x 4. Labs w [facility]. For now continue Bortezomib/Dex alone. Will start pre-mold studies in next few weeks including new baseline PET scan, skeletal survey and BM biopsy.   - Conditioning regimen will be Melphalan 200 mg/m2.   - Mobilization with granix/Mozobil. Goal CD34+ will be 6 mil/kg. Proceed with collection day 2.   - Bone directed therapy and prophylactic medications per primary hematology service. - Nausea: Zofran PRN. - Claritin for G-CSF related bone pain. - Encouraged frequent activity throughout the day and rest as needed to combat fatigue.   - Continue good oral nutrition and hydration. - Call with any fevers, uncontrolled side effects from treatment or any other worrisome/concerning symptoms. RTC daily until collected goal.     Thank you Dr Sakshi Milner for allowing us to paticipate in care of this pleasant patient. Please call w/ any quesions    Total time 25 min 50% in direct consultation about the patient's diagnosis and management. All questions answered.       Segundo Staton NP  [address] Hematology Oncology  [address] [place] [address]  Office : [phone]  Fax : [phone]

## 2020-08-20 NOTE — PROGRESS NOTES
Apheresis Day: 3  Dx: Myeloma  Pre-collection/mobilization form was reviewed prior to initiating collection. Granix was given per policy. Mozobil used: yes (yes/no)  Type of catheter (temp or tunneled): tunneled  Calcium 6 g used during procedure. CD34: 50  Predictive for 20 liters: 2.8 x 106/kg    30 liters: 4.2 x 106/kg  Pre WBC 87.2  (critical value does not fall outside of expected limits as documented in the medical protocol)  Pre-hgb: 10  /  Plt: 43  Post WBC 76  Post-hgb: 9.8  /  Plt: 22  Blood/electrolyte replacements: none  Whole blood processed: 20,000 ml  Collection results received at 1645. Patient collected 3.63 x 106/kg for cumulative 7.08 x 106/kg.   Collection goal: 6x 106/kg   Hematocrit in collection bag 2  Pt seen by: Ashtyn Reddy NP  Issues: pt with 10 kg wt gain since 8/14, lasix given per order from NP        Target CD34+/Kg 2.55           Kg recipient 110   CD34+ pre-count 50   WB liters to process 16.7

## 2020-08-21 ENCOUNTER — HOSPITAL ENCOUNTER (OUTPATIENT)
Dept: INFUSION THERAPY | Age: 40
End: 2020-08-21

## 2020-08-29 ENCOUNTER — HOSPITAL ENCOUNTER (OUTPATIENT)
Dept: INFUSION THERAPY | Age: 40
Discharge: HOME | End: 2020-08-29
Payer: COMMERCIAL

## 2020-08-29 VITALS
SYSTOLIC BLOOD PRESSURE: 124 MMHG | TEMPERATURE: 97.2 F | HEART RATE: 94 BPM | DIASTOLIC BLOOD PRESSURE: 90 MMHG | RESPIRATION RATE: 18 BRPM | OXYGEN SATURATION: 99 %

## 2020-08-29 DIAGNOSIS — C90.00 MULTIPLE MYELOMA, REMISSION STATUS UNSPECIFIED (HCC): ICD-10-CM

## 2020-08-29 LAB
ANION GAP SERPL CALC-SCNC: 4 MMOL/L (ref 7–16)
BUN SERPL-MCNC: 10 MG/DL (ref 6–23)
CALCIUM SERPL-MCNC: 9.2 MG/DL (ref 8.3–10.4)
CHLORIDE SERPL-SCNC: 108 MMOL/L (ref 98–107)
CO2 SERPL-SCNC: 27 MMOL/L (ref 21–32)
CREAT SERPL-MCNC: 0.6 MG/DL (ref 0.6–1)
GLUCOSE SERPL-MCNC: 81 MG/DL (ref 65–100)
POTASSIUM SERPL-SCNC: 4 MMOL/L (ref 3.5–5.1)
SODIUM SERPL-SCNC: 139 MMOL/L (ref 136–145)

## 2020-08-29 PROCEDURE — 80048 BASIC METABOLIC PNL TOTAL CA: CPT

## 2020-08-29 PROCEDURE — 36591 DRAW BLOOD OFF VENOUS DEVICE: CPT

## 2020-08-29 RX ORDER — SODIUM CHLORIDE 0.9 % (FLUSH) 0.9 %
10 SYRINGE (ML) INJECTION AS NEEDED
Status: DISCONTINUED | OUTPATIENT
Start: 2020-08-29 | End: 2020-08-31 | Stop reason: HOSPADM

## 2020-08-29 RX ADMIN — Medication 10 ML: at 10:14

## 2020-08-29 RX ADMIN — Medication 10 ML: at 10:16

## 2020-08-29 RX ADMIN — Medication 10 ML: at 10:15

## 2020-08-29 NOTE — PROGRESS NOTES
Arrived to the [facility]. BMP drawn, apheresis catheter dressing change completed. Provided education on site maintenance, signs/symptoms of infection. Patient instructed to report any side affects to ordering provider. Patient tolerated well. Any issues or concerns during appointment: none. Patient to follow up with MD regarding next infusion appointment. Discharged ambulatory to home.

## 2020-09-04 ENCOUNTER — HOSPITAL ENCOUNTER (OUTPATIENT)
Dept: INFUSION THERAPY | Age: 40
Discharge: HOME | End: 2020-09-04
Payer: COMMERCIAL

## 2020-09-04 VITALS
HEART RATE: 86 BPM | DIASTOLIC BLOOD PRESSURE: 99 MMHG | TEMPERATURE: 97.9 F | RESPIRATION RATE: 16 BRPM | SYSTOLIC BLOOD PRESSURE: 146 MMHG | OXYGEN SATURATION: 99 %

## 2020-09-04 DIAGNOSIS — C90.00 MULTIPLE MYELOMA NOT HAVING ACHIEVED REMISSION (HCC): ICD-10-CM

## 2020-09-04 DIAGNOSIS — C90.00 MULTIPLE MYELOMA, REMISSION STATUS UNSPECIFIED (HCC): ICD-10-CM

## 2020-09-04 PROCEDURE — 96523 IRRIG DRUG DELIVERY DEVICE: CPT

## 2020-09-04 RX ORDER — SODIUM CHLORIDE 0.9 % (FLUSH) 0.9 %
10 SYRINGE (ML) INJECTION AS NEEDED
Status: DISCONTINUED | OUTPATIENT
Start: 2020-09-04 | End: 2020-09-06 | Stop reason: HOSPADM

## 2020-09-04 RX ADMIN — Medication 10 ML: at 17:04

## 2020-09-04 RX ADMIN — Medication 10 ML: at 17:05

## 2020-09-04 NOTE — PROGRESS NOTES
Pt arrived to OP infusion center ambulatory. DL apheresis cath dressing and tego caps changed per policy. Suture removed per order. Pt given 24 hour urine jug and verbalized understanding to start Monday. Pt will return to clinic on 9/8 to see Dr. Isabel Martin for HD eval.  Discharged to home. No distress noted, no complaints.

## 2020-09-08 ENCOUNTER — PATIENT OUTREACH (OUTPATIENT)
Dept: CASE MANAGEMENT | Age: 40
End: 2020-09-08

## 2020-09-08 ENCOUNTER — HOSPITAL ENCOUNTER (OUTPATIENT)
Dept: LAB | Age: 40
Discharge: HOME | End: 2020-09-08
Payer: COMMERCIAL

## 2020-09-08 DIAGNOSIS — C90.00 MULTIPLE MYELOMA, REMISSION STATUS UNSPECIFIED (HCC): ICD-10-CM

## 2020-09-08 LAB
ALBUMIN SERPL-MCNC: 3 G/DL (ref 3.5–5)
ALBUMIN/GLOB SERPL: 0.8 {RATIO} (ref 1.2–3.5)
ALP SERPL-CCNC: 116 U/L (ref 50–136)
ALT SERPL-CCNC: 12 U/L (ref 12–65)
ANION GAP SERPL CALC-SCNC: 2 MMOL/L (ref 7–16)
APPEARANCE UR: ABNORMAL
AST SERPL-CCNC: 7 U/L (ref 15–37)
B2 MICROGLOB SERPL-MCNC: 1.9 MG/L (ref 0.8–2.34)
BACTERIA URNS QL MICRO: ABNORMAL /HPF
BASOPHILS # BLD: 0 K/UL (ref 0–0.2)
BASOPHILS NFR BLD: 0 % (ref 0–2)
BILIRUB SERPL-MCNC: 0.6 MG/DL (ref 0.2–1.1)
BILIRUB UR QL: NEGATIVE
BUN SERPL-MCNC: 4 MG/DL (ref 6–23)
CALCIUM SERPL-MCNC: 9 MG/DL (ref 8.3–10.4)
CASTS URNS QL MICRO: 0 /LPF
CHLORIDE SERPL-SCNC: 110 MMOL/L (ref 98–107)
CO2 SERPL-SCNC: 31 MMOL/L (ref 21–32)
COLOR UR: YELLOW
CREAT SERPL-MCNC: 0.7 MG/DL (ref 0.6–1)
CRYSTALS URNS QL MICRO: 0 /LPF
DIFFERENTIAL METHOD BLD: ABNORMAL
EOSINOPHIL # BLD: 0.1 K/UL (ref 0–0.8)
EOSINOPHIL NFR BLD: 2 % (ref 0.5–7.8)
EPI CELLS #/AREA URNS HPF: ABNORMAL /HPF
ERYTHROCYTE [DISTWIDTH] IN BLOOD BY AUTOMATED COUNT: 15.6 % (ref 11.9–14.6)
FLUAV AG NPH QL IA: NEGATIVE
FLUBV AG NPH QL IA: NEGATIVE
GGT SERPL-CCNC: 19 U/L (ref 5–55)
GLOBULIN SER CALC-MCNC: 3.6 G/DL (ref 2.3–3.5)
GLUCOSE SERPL-MCNC: 87 MG/DL (ref 65–100)
GLUCOSE UR STRIP.AUTO-MCNC: NEGATIVE MG/DL
HCG SERPL QL: NEGATIVE
HCT VFR BLD AUTO: 31.8 % (ref 35.8–46.3)
HGB BLD-MCNC: 10 G/DL (ref 11.7–15.4)
HGB UR QL STRIP: ABNORMAL
IMM GRANULOCYTES # BLD AUTO: 0 K/UL (ref 0–0.5)
IMM GRANULOCYTES NFR BLD AUTO: 0 % (ref 0–5)
KETONES UR QL STRIP.AUTO: NEGATIVE MG/DL
LDH SERPL L TO P-CCNC: 218 U/L (ref 100–190)
LEUKOCYTE ESTERASE UR QL STRIP.AUTO: ABNORMAL
LYMPHOCYTES # BLD: 0.8 K/UL (ref 0.5–4.6)
LYMPHOCYTES NFR BLD: 13 % (ref 13–44)
MAGNESIUM SERPL-MCNC: 1.8 MG/DL (ref 1.8–2.4)
MCH RBC QN AUTO: 28.7 PG (ref 26.1–32.9)
MCHC RBC AUTO-ENTMCNC: 31.4 G/DL (ref 31.4–35)
MCV RBC AUTO: 91.1 FL (ref 79.6–97.8)
MONOCYTES # BLD: 0.4 K/UL (ref 0.1–1.3)
MONOCYTES NFR BLD: 7 % (ref 4–12)
MUCOUS THREADS URNS QL MICRO: 0 /LPF
NEUTS SEG # BLD: 4.6 K/UL (ref 1.7–8.2)
NEUTS SEG NFR BLD: 78 % (ref 43–78)
NITRITE UR QL STRIP.AUTO: NEGATIVE
NRBC # BLD: 0 K/UL (ref 0–0.2)
PH UR STRIP: 5.5 [PH] (ref 5–9)
PHOSPHATE SERPL-MCNC: 3.1 MG/DL (ref 2.5–4.5)
PLATELET # BLD AUTO: 179 K/UL (ref 150–450)
PMV BLD AUTO: 9 FL (ref 9.4–12.3)
POTASSIUM SERPL-SCNC: 3.4 MMOL/L (ref 3.5–5.1)
PROT SERPL-MCNC: 6.6 G/DL (ref 6.3–8.2)
PROT UR STRIP-MCNC: NEGATIVE MG/DL
RBC # BLD AUTO: 3.49 M/UL (ref 4.05–5.25)
RBC #/AREA URNS HPF: ABNORMAL /HPF
SODIUM SERPL-SCNC: 143 MMOL/L (ref 136–145)
SP GR UR REFRACTOMETRY: 1.02 (ref 1–1.02)
SPECIMEN SOURCE: NORMAL
UROBILINOGEN UR QL STRIP.AUTO: 0.2 EU/DL (ref 0.2–1)
WBC # BLD AUTO: 5.8 K/UL (ref 4.3–11.1)
WBC URNS QL MICRO: ABNORMAL /HPF

## 2020-09-08 PROCEDURE — 81003 URINALYSIS AUTO W/O SCOPE: CPT

## 2020-09-08 PROCEDURE — 81015 MICROSCOPIC EXAM OF URINE: CPT

## 2020-09-08 PROCEDURE — 87635 SARS-COV-2 COVID-19 AMP PRB: CPT

## 2020-09-08 PROCEDURE — 80053 COMPREHEN METABOLIC PANEL: CPT

## 2020-09-08 PROCEDURE — 82977 ASSAY OF GGT: CPT

## 2020-09-08 PROCEDURE — 83735 ASSAY OF MAGNESIUM: CPT

## 2020-09-08 PROCEDURE — 84703 CHORIONIC GONADOTROPIN ASSAY: CPT

## 2020-09-08 PROCEDURE — 87804 INFLUENZA ASSAY W/OPTIC: CPT

## 2020-09-08 PROCEDURE — 83615 LACTATE (LD) (LDH) ENZYME: CPT

## 2020-09-08 PROCEDURE — 84156 ASSAY OF PROTEIN URINE: CPT

## 2020-09-08 PROCEDURE — 82232 ASSAY OF BETA-2 PROTEIN: CPT

## 2020-09-08 PROCEDURE — 36415 COLL VENOUS BLD VENIPUNCTURE: CPT

## 2020-09-08 PROCEDURE — 84100 ASSAY OF PHOSPHORUS: CPT

## 2020-09-08 PROCEDURE — 86335 IMMUNFIX E-PHORSIS/URINE/CSF: CPT

## 2020-09-08 PROCEDURE — 85025 COMPLETE CBC W/AUTO DIFF WBC: CPT

## 2020-09-08 NOTE — PROGRESS NOTES
9/8/20 - Patient here for follow up with Dr. Sanz Fail and High Dose Eval.  Patient noted to have a UTI so transplant will be delayed a week. Patient will also see opthamology urgently for possible infection noted to eye lids. Patient will start Cipro 500 mg BID for UTI while we await cultures. Patient will return next week for possible admission for transplant.

## 2020-09-09 LAB
COVID-19 RAPID TEST, COVR: NOT DETECTED
SARS COV-2, XPGCVT: NEGATIVE
SOURCE, COVRS: NORMAL

## 2020-09-10 LAB
ALBUMIN UR ELPH-MCNC: 4.5 MG/DL
ALBUMIN/GLOB SERPL: 0.4 {RATIO}
ALPHA1 GLOB 24H UR ELPH-MCNC: 0.6 MG/DL
ALPHA2 GLOB SERPL ELPH-MCNC: 2.6 MG/DL
B-GLOBULIN UR QL ELPH: 5.2 MG/DL
COLLECT DURATION TIME UR: 24 HR
GAMMA GLOB MFR UR ELPH: 2.1 MG/DL
M PROTEIN UR-MCNC: NORMAL MG/DL
PROT 24H UR-MRATE: 90 MG/24HR
PROT PATTERN SPEC IFE-IMP: NORMAL
PROT PATTERN UR ELPH-IMP: NORMAL
PROT UR-MCNC: 15 MG/DL
SPECIMEN VOL ?TM UR: 600 ML

## 2020-09-15 ENCOUNTER — HOSPITAL ENCOUNTER (OUTPATIENT)
Dept: LAB | Age: 40
Discharge: HOME | End: 2020-09-15
Payer: COMMERCIAL

## 2020-09-15 DIAGNOSIS — C90.00 MULTIPLE MYELOMA, REMISSION STATUS UNSPECIFIED (HCC): ICD-10-CM

## 2020-09-15 LAB
ALBUMIN SERPL-MCNC: 3.1 G/DL (ref 3.5–5)
ALBUMIN/GLOB SERPL: 0.9 {RATIO} (ref 1.2–3.5)
ALP SERPL-CCNC: 125 U/L (ref 50–136)
ALT SERPL-CCNC: 11 U/L (ref 12–65)
ANION GAP SERPL CALC-SCNC: 6 MMOL/L (ref 7–16)
APPEARANCE UR: ABNORMAL
AST SERPL-CCNC: 11 U/L (ref 15–37)
BACTERIA URNS QL MICRO: ABNORMAL /HPF
BASOPHILS # BLD: 0 K/UL (ref 0–0.2)
BASOPHILS NFR BLD: 0 % (ref 0–2)
BILIRUB SERPL-MCNC: 0.6 MG/DL (ref 0.2–1.1)
BILIRUB UR QL: NEGATIVE
BUN SERPL-MCNC: 4 MG/DL (ref 6–23)
CALCIUM SERPL-MCNC: 8.9 MG/DL (ref 8.3–10.4)
CASTS URNS QL MICRO: 0 /LPF
CHLORIDE SERPL-SCNC: 108 MMOL/L (ref 98–107)
CO2 SERPL-SCNC: 29 MMOL/L (ref 21–32)
COLOR UR: YELLOW
CREAT SERPL-MCNC: 0.6 MG/DL (ref 0.6–1)
CRYSTALS URNS QL MICRO: 0 /LPF
DIFFERENTIAL METHOD BLD: ABNORMAL
EOSINOPHIL # BLD: 0.1 K/UL (ref 0–0.8)
EOSINOPHIL NFR BLD: 1 % (ref 0.5–7.8)
EPI CELLS #/AREA URNS HPF: ABNORMAL /HPF
ERYTHROCYTE [DISTWIDTH] IN BLOOD BY AUTOMATED COUNT: 15.9 % (ref 11.9–14.6)
GLOBULIN SER CALC-MCNC: 3.6 G/DL (ref 2.3–3.5)
GLUCOSE SERPL-MCNC: 89 MG/DL (ref 65–100)
GLUCOSE UR STRIP.AUTO-MCNC: NEGATIVE MG/DL
HCT VFR BLD AUTO: 32.3 % (ref 35.8–46.3)
HGB BLD-MCNC: 10.2 G/DL (ref 11.7–15.4)
HGB UR QL STRIP: ABNORMAL
IMM GRANULOCYTES # BLD AUTO: 0 K/UL (ref 0–0.5)
IMM GRANULOCYTES NFR BLD AUTO: 0 % (ref 0–5)
KETONES UR QL STRIP.AUTO: NEGATIVE MG/DL
LEUKOCYTE ESTERASE UR QL STRIP.AUTO: ABNORMAL
LYMPHOCYTES # BLD: 0.8 K/UL (ref 0.5–4.6)
LYMPHOCYTES NFR BLD: 10 % (ref 13–44)
MAGNESIUM SERPL-MCNC: 2 MG/DL (ref 1.8–2.4)
MCH RBC QN AUTO: 28.4 PG (ref 26.1–32.9)
MCHC RBC AUTO-ENTMCNC: 31.6 G/DL (ref 31.4–35)
MCV RBC AUTO: 90 FL (ref 79.6–97.8)
MONOCYTES # BLD: 0.4 K/UL (ref 0.1–1.3)
MONOCYTES NFR BLD: 5 % (ref 4–12)
MUCOUS THREADS URNS QL MICRO: ABNORMAL /LPF
NEUTS SEG # BLD: 6.7 K/UL (ref 1.7–8.2)
NEUTS SEG NFR BLD: 84 % (ref 43–78)
NITRITE UR QL STRIP.AUTO: NEGATIVE
NRBC # BLD: 0 K/UL (ref 0–0.2)
PH UR STRIP: 5.5 [PH] (ref 5–9)
PLATELET # BLD AUTO: 213 K/UL (ref 150–450)
PMV BLD AUTO: 9 FL (ref 9.4–12.3)
POTASSIUM SERPL-SCNC: 3 MMOL/L (ref 3.5–5.1)
PROT SERPL-MCNC: 6.7 G/DL (ref 6.3–8.2)
PROT UR STRIP-MCNC: NEGATIVE MG/DL
RBC # BLD AUTO: 3.59 M/UL (ref 4.05–5.25)
RBC #/AREA URNS HPF: ABNORMAL /HPF
SODIUM SERPL-SCNC: 143 MMOL/L (ref 136–145)
SP GR UR REFRACTOMETRY: 1.02 (ref 1–1.02)
UROBILINOGEN UR QL STRIP.AUTO: 0.2 EU/DL (ref 0.2–1)
WBC # BLD AUTO: 8 K/UL (ref 4.3–11.1)
WBC URNS QL MICRO: ABNORMAL /HPF

## 2020-09-15 PROCEDURE — 85025 COMPLETE CBC W/AUTO DIFF WBC: CPT

## 2020-09-15 PROCEDURE — 83883 ASSAY NEPHELOMETRY NOT SPEC: CPT

## 2020-09-15 PROCEDURE — 81003 URINALYSIS AUTO W/O SCOPE: CPT

## 2020-09-15 PROCEDURE — 81015 MICROSCOPIC EXAM OF URINE: CPT

## 2020-09-15 PROCEDURE — 36415 COLL VENOUS BLD VENIPUNCTURE: CPT

## 2020-09-15 PROCEDURE — 80053 COMPREHEN METABOLIC PANEL: CPT

## 2020-09-15 PROCEDURE — 84165 PROTEIN E-PHORESIS SERUM: CPT

## 2020-09-15 PROCEDURE — 87086 URINE CULTURE/COLONY COUNT: CPT

## 2020-09-15 PROCEDURE — 83735 ASSAY OF MAGNESIUM: CPT

## 2020-09-15 PROCEDURE — 86334 IMMUNOFIX E-PHORESIS SERUM: CPT

## 2020-09-16 LAB
KAPPA LC FREE SER-MCNC: 12.37 MG/L (ref 3.3–19.4)
KAPPA LC FREE/LAMBDA FREE SER: 1.05 {RATIO} (ref 0.26–1.65)
LAMBDA LC FREE SERPL-MCNC: 11.74 MG/L (ref 5.71–26.3)

## 2020-09-17 LAB
ALBUMIN SERPL ELPH-MCNC: 3.3 G/DL (ref 3.2–5.6)
ALBUMIN/GLOB SERPL: 1.1 {RATIO}
ALPHA1 GLOB SERPL ELPH-MCNC: 0.29 G/DL (ref 0.1–0.4)
ALPHA2 GLOB SERPL ELPH-MCNC: 0.83 G/DL (ref 0.4–1.2)
B-GLOBULIN SERPL QL ELPH: 1.01 G/DL (ref 0.6–1.3)
GAMMA GLOB MFR SERPL ELPH: 0.87 G/DL (ref 0.5–1.6)
IGA SERPL-MCNC: 68 MG/DL (ref 85–499)
IGG SERPL-MCNC: 800 MG/DL (ref 610–1616)
IGM SERPL-MCNC: 38 MG/DL (ref 35–242)
M PROTEIN SERPL ELPH-MCNC: ABNORMAL G/DL
PROT PATTERN SERPL ELPH-IMP: ABNORMAL
PROT PATTERN SPEC IFE-IMP: ABNORMAL
PROT SERPL-MCNC: 6.3 G/DL (ref 6.3–8.2)

## 2020-09-18 LAB
BACTERIA SPEC CULT: NORMAL
BACTERIA SPEC CULT: NORMAL
SERVICE CMNT-IMP: NORMAL

## 2020-09-21 ENCOUNTER — HOSPITAL ENCOUNTER (OUTPATIENT)
Dept: LAB | Age: 40
Discharge: HOME | DRG: 003 | End: 2020-09-21
Payer: MEDICAID

## 2020-09-21 ENCOUNTER — PATIENT OUTREACH (OUTPATIENT)
Dept: CASE MANAGEMENT | Age: 40
End: 2020-09-21

## 2020-09-21 ENCOUNTER — HOSPITAL ENCOUNTER (INPATIENT)
Age: 40
LOS: 15 days | Discharge: HOME | DRG: 003 | End: 2020-10-06
Attending: INTERNAL MEDICINE | Admitting: INTERNAL MEDICINE
Payer: MEDICAID

## 2020-09-21 DIAGNOSIS — R79.89 ELEVATED LFTS: ICD-10-CM

## 2020-09-21 DIAGNOSIS — C90.00 MULTIPLE MYELOMA, REMISSION STATUS UNSPECIFIED (HCC): Primary | ICD-10-CM

## 2020-09-21 DIAGNOSIS — G89.3 CANCER ASSOCIATED PAIN: ICD-10-CM

## 2020-09-21 DIAGNOSIS — Z94.81 AUTOLOGOUS BONE MARROW TRANSPLANTATION STATUS (HCC): ICD-10-CM

## 2020-09-21 DIAGNOSIS — Z76.82 STEM CELL TRANSPLANT CANDIDATE: ICD-10-CM

## 2020-09-21 DIAGNOSIS — C90.00 MULTIPLE MYELOMA, REMISSION STATUS UNSPECIFIED (HCC): ICD-10-CM

## 2020-09-21 DIAGNOSIS — D84.9 IMMUNOCOMPROMISED (HCC): ICD-10-CM

## 2020-09-21 DIAGNOSIS — C90.00 MULTIPLE MYELOMA NOT HAVING ACHIEVED REMISSION (HCC): ICD-10-CM

## 2020-09-21 DIAGNOSIS — A04.72 C. DIFFICILE COLITIS: ICD-10-CM

## 2020-09-21 LAB
ALBUMIN SERPL-MCNC: 3.2 G/DL (ref 3.5–5)
ALBUMIN/GLOB SERPL: 0.9 {RATIO} (ref 1.2–3.5)
ALP SERPL-CCNC: 154 U/L (ref 50–136)
ALT SERPL-CCNC: 15 U/L (ref 12–65)
ANION GAP SERPL CALC-SCNC: 6 MMOL/L (ref 7–16)
APPEARANCE UR: CLEAR
AST SERPL-CCNC: 16 U/L (ref 15–37)
BACTERIA URNS QL MICRO: ABNORMAL /HPF
BASOPHILS # BLD: 0 K/UL (ref 0–0.2)
BASOPHILS NFR BLD: 0 % (ref 0–2)
BILIRUB SERPL-MCNC: 0.6 MG/DL (ref 0.2–1.1)
BILIRUB UR QL: NEGATIVE
BUN SERPL-MCNC: 4 MG/DL (ref 6–23)
CALCIUM SERPL-MCNC: 9.3 MG/DL (ref 8.3–10.4)
CASTS URNS QL MICRO: 0 /LPF
CHLORIDE SERPL-SCNC: 105 MMOL/L (ref 98–107)
CO2 SERPL-SCNC: 30 MMOL/L (ref 21–32)
COLOR UR: YELLOW
CREAT SERPL-MCNC: 0.6 MG/DL (ref 0.6–1)
CRYSTALS URNS QL MICRO: 0 /LPF
DIFFERENTIAL METHOD BLD: ABNORMAL
EOSINOPHIL # BLD: 0 K/UL (ref 0–0.8)
EOSINOPHIL NFR BLD: 0 % (ref 0.5–7.8)
EPI CELLS #/AREA URNS HPF: ABNORMAL /HPF
ERYTHROCYTE [DISTWIDTH] IN BLOOD BY AUTOMATED COUNT: 15.7 % (ref 11.9–14.6)
FLUAV AG NPH QL IA: NEGATIVE
FLUBV AG NPH QL IA: NEGATIVE
GLOBULIN SER CALC-MCNC: 3.7 G/DL (ref 2.3–3.5)
GLUCOSE SERPL-MCNC: 92 MG/DL (ref 65–100)
GLUCOSE UR STRIP.AUTO-MCNC: NEGATIVE MG/DL
HCG SERPL QL: NEGATIVE
HCT VFR BLD AUTO: 34 % (ref 35.8–46.3)
HGB BLD-MCNC: 10.7 G/DL (ref 11.7–15.4)
HGB UR QL STRIP: NEGATIVE
IMM GRANULOCYTES # BLD AUTO: 0 K/UL (ref 0–0.5)
IMM GRANULOCYTES NFR BLD AUTO: 0 % (ref 0–5)
KETONES UR QL STRIP.AUTO: NEGATIVE MG/DL
LEUKOCYTE ESTERASE UR QL STRIP.AUTO: ABNORMAL
LYMPHOCYTES # BLD: 0.8 K/UL (ref 0.5–4.6)
LYMPHOCYTES NFR BLD: 11 % (ref 13–44)
MAGNESIUM SERPL-MCNC: 1.7 MG/DL (ref 1.8–2.4)
MCH RBC QN AUTO: 28.8 PG (ref 26.1–32.9)
MCHC RBC AUTO-ENTMCNC: 31.5 G/DL (ref 31.4–35)
MCV RBC AUTO: 91.4 FL (ref 79.6–97.8)
MONOCYTES # BLD: 0.3 K/UL (ref 0.1–1.3)
MONOCYTES NFR BLD: 5 % (ref 4–12)
MUCOUS THREADS URNS QL MICRO: 0 /LPF
NEUTS SEG # BLD: 5.8 K/UL (ref 1.7–8.2)
NEUTS SEG NFR BLD: 83 % (ref 43–78)
NITRITE UR QL STRIP.AUTO: NEGATIVE
NRBC # BLD: 0 K/UL (ref 0–0.2)
PH UR STRIP: 6 [PH] (ref 5–9)
PLATELET # BLD AUTO: 251 K/UL (ref 150–450)
PMV BLD AUTO: 9.3 FL (ref 9.4–12.3)
POTASSIUM SERPL-SCNC: 3 MMOL/L (ref 3.5–5.1)
PROT SERPL-MCNC: 6.9 G/DL (ref 6.3–8.2)
PROT UR STRIP-MCNC: NEGATIVE MG/DL
RBC # BLD AUTO: 3.72 M/UL (ref 4.05–5.25)
RBC #/AREA URNS HPF: ABNORMAL /HPF
SODIUM SERPL-SCNC: 141 MMOL/L (ref 136–145)
SP GR UR REFRACTOMETRY: 1.02 (ref 1–1.02)
SPECIMEN SOURCE: NORMAL
UA: UC IF INDICATED,UAUC: ABNORMAL
UROBILINOGEN UR QL STRIP.AUTO: 0.2 EU/DL (ref 0.2–1)
WBC # BLD AUTO: 7 K/UL (ref 4.3–11.1)
WBC URNS QL MICRO: ABNORMAL /HPF

## 2020-09-21 PROCEDURE — 87086 URINE CULTURE/COLONY COUNT: CPT

## 2020-09-21 PROCEDURE — 83735 ASSAY OF MAGNESIUM: CPT

## 2020-09-21 PROCEDURE — 36415 COLL VENOUS BLD VENIPUNCTURE: CPT

## 2020-09-21 PROCEDURE — 84703 CHORIONIC GONADOTROPIN ASSAY: CPT

## 2020-09-21 PROCEDURE — 74011250637 HC RX REV CODE- 250/637: Performed by: NURSE PRACTITIONER

## 2020-09-21 PROCEDURE — 85025 COMPLETE CBC W/AUTO DIFF WBC: CPT

## 2020-09-21 PROCEDURE — 87804 INFLUENZA ASSAY W/OPTIC: CPT

## 2020-09-21 PROCEDURE — 65270000029 HC RM PRIVATE

## 2020-09-21 PROCEDURE — 80053 COMPREHEN METABOLIC PANEL: CPT

## 2020-09-21 PROCEDURE — 2709999900 HC NON-CHARGEABLE SUPPLY

## 2020-09-21 PROCEDURE — 81001 URINALYSIS AUTO W/SCOPE: CPT

## 2020-09-21 PROCEDURE — 87635 SARS-COV-2 COVID-19 AMP PRB: CPT

## 2020-09-21 PROCEDURE — 77030018836 HC SOL IRR NACL ICUM -A

## 2020-09-21 PROCEDURE — 99223 1ST HOSP IP/OBS HIGH 75: CPT | Performed by: INTERNAL MEDICINE

## 2020-09-21 PROCEDURE — 74011250636 HC RX REV CODE- 250/636: Performed by: NURSE PRACTITIONER

## 2020-09-21 RX ORDER — SODIUM CHLORIDE 0.9 % (FLUSH) 0.9 %
10 SYRINGE (ML) INJECTION AS NEEDED
Status: DISCONTINUED | OUTPATIENT
Start: 2020-09-21 | End: 2020-10-06 | Stop reason: HOSPADM

## 2020-09-21 RX ORDER — ACETAMINOPHEN 500 MG
500 TABLET ORAL
Status: DISCONTINUED | OUTPATIENT
Start: 2020-09-21 | End: 2020-10-06 | Stop reason: HOSPADM

## 2020-09-21 RX ORDER — SODIUM CHLORIDE 9 MG/ML
75 INJECTION, SOLUTION INTRAVENOUS CONTINUOUS
Status: DISCONTINUED | OUTPATIENT
Start: 2020-09-21 | End: 2020-10-06 | Stop reason: HOSPADM

## 2020-09-21 RX ORDER — ENOXAPARIN SODIUM 100 MG/ML
40 INJECTION SUBCUTANEOUS EVERY 12 HOURS
Status: DISCONTINUED | OUTPATIENT
Start: 2020-09-21 | End: 2020-10-06 | Stop reason: HOSPADM

## 2020-09-21 RX ORDER — MORPHINE SULFATE 30 MG/1
60 TABLET, FILM COATED, EXTENDED RELEASE ORAL EVERY 12 HOURS
Status: DISCONTINUED | OUTPATIENT
Start: 2020-09-21 | End: 2020-10-06 | Stop reason: HOSPADM

## 2020-09-21 RX ORDER — ACYCLOVIR 800 MG/1
400 TABLET ORAL 2 TIMES DAILY
Status: DISCONTINUED | OUTPATIENT
Start: 2020-09-21 | End: 2020-10-06 | Stop reason: HOSPADM

## 2020-09-21 RX ORDER — DULOXETIN HYDROCHLORIDE 30 MG/1
30 CAPSULE, DELAYED RELEASE ORAL
Status: DISCONTINUED | OUTPATIENT
Start: 2020-09-21 | End: 2020-10-06 | Stop reason: HOSPADM

## 2020-09-21 RX ORDER — HYDROMORPHONE HYDROCHLORIDE 2 MG/1
2 TABLET ORAL
Status: DISCONTINUED | OUTPATIENT
Start: 2020-09-21 | End: 2020-10-06 | Stop reason: HOSPADM

## 2020-09-21 RX ADMIN — DULOXETINE 30 MG: 30 CAPSULE, DELAYED RELEASE ORAL at 21:01

## 2020-09-21 RX ADMIN — ACYCLOVIR 400 MG: 800 TABLET ORAL at 12:43

## 2020-09-21 RX ADMIN — Medication 10 ML: at 21:03

## 2020-09-21 RX ADMIN — HYDROMORPHONE HYDROCHLORIDE 2 MG: 2 TABLET ORAL at 12:42

## 2020-09-21 RX ADMIN — ACYCLOVIR 400 MG: 800 TABLET ORAL at 18:58

## 2020-09-21 RX ADMIN — ENOXAPARIN SODIUM 40 MG: 40 INJECTION SUBCUTANEOUS at 21:00

## 2020-09-21 RX ADMIN — MORPHINE SULFATE 60 MG: 30 TABLET, FILM COATED, EXTENDED RELEASE ORAL at 21:00

## 2020-09-21 RX ADMIN — SODIUM CHLORIDE 75 ML/HR: 900 INJECTION, SOLUTION INTRAVENOUS at 14:05

## 2020-09-21 NOTE — H&P
Inpatient Hematology / Oncology History and Physical    Reason for Asmission:  Multiple myeloma ([facility] [F77.85]; Stem cell transplant candidate [Z76.82]    History of Present Illness:  Ms. Marina Sullivan is a 38 y.o. female admitted on 9/21/2020 with a primary diagnosis of There were no encounter diagnoses. .      35 female, , smoker, history of hypertension, upper eyelids stye (felt noninfectious by optho), more recent diagnosis of IgG lambda multiple myeloma, under care of Dr. Silverio Witt, now kindly referred to us for consideration of autologous stem cell transplant. She is s/p RVD x4 w SIFE CR. Seen today in office 9/21/2020: Cultures from last urine specimen with mixed skin zully indicated of of contamination. Repeat UA unremarkable. Completely asymptomatic today. Finishes nitrofurantoin course today. Denies any fevers or chills, dysuria or hematuria. Extensive ROS unremarkable. Recent labs including myeloma proteins unremarkable. [facility] staying low. No M spike however TORIBIO with IgG lambda band identified. Patient should be able to proceed with HDT/ACT as inpatient. She will be hospitalized through count recovery. Review of Systems:  As mentioned above. All other systems reviewed in full and are unremarkable.        Allergies   Allergen Reactions    Zarxio [Filgrastim-Sndz] Shortness of Breath     \"pounding heart\" per pt     Past Medical History:   Diagnosis Date    Cancer ([facility]     multiple myeloma    Chronic pain      Past Surgical History:   Procedure Laterality Date    HX BACK SURGERY  04/2020    HX TUBAL LIGATION  08/2007    IR INSERT TUNL CVC W/O PORT OVER 5 YR  8/17/2020     Family History   Problem Relation Age of Onset    Hypertension Mother     Arthritis-osteo Mother     Diabetes Father     No Known Problems Sister     No Known Problems Brother     Diabetes Sister      Social History     Socioeconomic History    Marital status: SINGLE     Spouse name: Not on file    Number of children: Not on file    Years of education: Not on file    Highest education level: Not on file   Occupational History    Not on file   Social Needs    Financial resource strain: Not on file    Food insecurity     Worry: Not on file     Inability: Not on file    Transportation needs     Medical: Not on file     Non-medical: Not on file   Tobacco Use    Smoking status: Former Smoker     Packs/day: 0.50     Years: 26.00     Pack years: 13.00     Last attempt to quit: 2020     Years since quittin.4    Smokeless tobacco: Never Used   Substance and Sexual Activity    Alcohol use: Not Currently    Drug use: Never    Sexual activity: Not Currently     Partners: Male     Birth control/protection: None   Lifestyle    Physical activity     Days per week: Not on file     Minutes per session: Not on file    Stress: Not on file   Relationships    Social connections     Talks on phone: Not on file     Gets together: Not on file     Attends Nondenominational service: Not on file     Active member of club or organization: Not on file     Attends meetings of clubs or organizations: Not on file     Relationship status: Not on file    Intimate partner violence     Fear of current or ex partner: Not on file     Emotionally abused: Not on file     Physically abused: Not on file     Forced sexual activity: Not on file   Other Topics Concern     Service Not Asked    Blood Transfusions Not Asked    Caffeine Concern Not Asked    Occupational Exposure Not Asked   Roberto Vinicius Hazards Not Asked    Sleep Concern Not Asked    Stress Concern Not Asked    Weight Concern Not Asked    Special Diet Not Asked    Back Care Not Asked    Exercise Not Asked    Bike Helmet Not Asked    [address] Not Asked    Self-Exams Not Asked   Social History Narrative    Not on file     Current Facility-Administered Medications   Medication Dose Route Frequency Provider Last Rate Last Dose    acetaminophen (TYLENOL) tablet 500 mg  500 mg Oral Q6H PRN Mary Fabian, NP        acyclovir (ZOVIRAX) tablet 400 mg  400 mg Oral BID Evalaxmi Beeing, NP   400 mg at 20 1243    DULoxetine (CYMBALTA) capsule 30 mg  30 mg Oral QHS Mary Fabian, NP        HYDROmorphone (DILAUDID) tablet 2 mg  2 mg Oral Q4H PRN Evalaxmi Beeing, NP   2 mg at 20 1242    morphine CR (MS CONTIN) tablet 60 mg  60 mg Oral Q12H Mary Fabian, NP        0.9% sodium chloride infusion  75 mL/hr IntraVENous CONTINUOUS Mary Beeing, NP 75 mL/hr at 20 1405 75 mL/hr at 20 1405    enoxaparin (LOVENOX) injection 40 mg  40 mg SubCUTAneous Q12H Mary Fabian, NP           OBJECTIVE:  Patient Vitals for the past 8 hrs:   BP Temp Pulse Resp SpO2 Height Weight   20 1204      5' 2.99\" (1.6 m) 227 lb 9.6 oz (103.2 kg)   20 1202 (!) 142/93 98.2 °F (36.8 °C) 85 18 100 %       Temp (24hrs), Av.2 °F (36.8 °C), Min:98.2 °F (36.8 °C), Max:98.2 °F (36.8 °C)     0701 -  1900  In: 240 [P.O.:240]  Out: 200 [Urine:200]    Physical Exam:  Constitutional: Well developed, well nourished female in no acute distress, sitting comfortably in the hospital bed. HEENT: Normocephalic and atraumatic. Oropharynx is clear, mucous membranes are moist.  Pupils are equal, round, and reactive to light. Extraocular muscles are intact. Sclerae anicteric. Neck supple without JVD. No thyromegaly present. Lymph node   No palpable submandibular, cervical, supraclavicular, axillary or inguinal lymph nodes. Skin Warm and dry. No bruising and no rash noted. No erythema. No pallor. Respiratory Lungs are clear to auscultation bilaterally without wheezes, rales or rhonchi, normal air exchange without accessory muscle use. CVS Normal rate, regular rhythm and normal S1 and S2. No murmurs, gallops, or rubs. Abdomen Soft, nontender and nondistended, normoactive bowel sounds. No palpable mass. No hepatosplenomegaly.    Neuro Grossly nonfocal with no obvious sensory or motor deficits. MSK Normal range of motion in general.  No edema and no tenderness. Psych Appropriate mood and affect. Labs:    Recent Results (from the past 24 hour(s))   HCG QL SERUM    Collection Time: 09/21/20  8:35 AM   Result Value Ref Range    HCG, Ql. Negative NEG     CBC WITH AUTOMATED DIFF    Collection Time: 09/21/20  8:35 AM   Result Value Ref Range    WBC 7.0 4.3 - 11.1 K/uL    RBC 3.72 (L) 4.05 - 5.25 M/uL    HGB 10.7 (L) 11.7 - 15.4 g/dL    HCT 34.0 (L) 35.8 - 46.3 %    MCV 91.4 79.6 - 97.8 FL    MCH 28.8 26.1 - 32.9 PG    MCHC 31.5 31.4 - 35.0 g/dL    RDW 15.7 (H) 11.9 - 14.6 %    PLATELET 360 114 - 301 K/uL    MPV 9.3 (L) 9.4 - 12.3 FL    ABSOLUTE NRBC 0.00 0.0 - 0.2 K/uL    DF AUTOMATED      NEUTROPHILS 83 (H) 43 - 78 %    LYMPHOCYTES 11 (L) 13 - 44 %    MONOCYTES 5 4.0 - 12.0 %    EOSINOPHILS 0 (L) 0.5 - 7.8 %    BASOPHILS 0 0.0 - 2.0 %    IMMATURE GRANULOCYTES 0 0.0 - 5.0 %    ABS. NEUTROPHILS 5.8 1.7 - 8.2 K/UL    ABS. LYMPHOCYTES 0.8 0.5 - 4.6 K/UL    ABS. MONOCYTES 0.3 0.1 - 1.3 K/UL    ABS. EOSINOPHILS 0.0 0.0 - 0.8 K/UL    ABS. BASOPHILS 0.0 0.0 - 0.2 K/UL    ABS. IMM. GRANS. 0.0 0.0 - 0.5 K/UL   METABOLIC PANEL, COMPREHENSIVE    Collection Time: 09/21/20  8:35 AM   Result Value Ref Range    Sodium 141 136 - 145 mmol/L    Potassium 3.0 (L) 3.5 - 5.1 mmol/L    Chloride 105 98 - 107 mmol/L    CO2 30 21 - 32 mmol/L    Anion gap 6 (L) 7 - 16 mmol/L    Glucose 92 65 - 100 mg/dL    BUN 4 (L) 6 - 23 MG/DL    Creatinine 0.60 0.6 - 1.0 MG/DL    GFR est AA >60 >60 ml/min/1.73m2    GFR est non-AA >60 >60 ml/min/1.73m2    Calcium 9.3 8.3 - 10.4 MG/DL    Bilirubin, total 0.6 0.2 - 1.1 MG/DL    ALT (SGPT) 15 12 - 65 U/L    AST (SGOT) 16 15 - 37 U/L    Alk.  phosphatase 154 (H) 50 - 136 U/L    Protein, total 6.9 6.3 - 8.2 g/dL    Albumin 3.2 (L) 3.5 - 5.0 g/dL    Globulin 3.7 (H) 2.3 - 3.5 g/dL    A-G Ratio 0.9 (L) 1.2 - 3.5     MAGNESIUM    Collection Time: 09/21/20  8:35 AM   Result Value Ref Range    Magnesium 1.7 (L) 1.8 - 2.4 mg/dL   URINALYSIS W/ REFLEX CULTURE    Collection Time: 09/21/20  8:36 AM    Specimen: Urine   Result Value Ref Range    Color YELLOW      Appearance CLEAR      Specific gravity 1.020 1.001 - 1.023      pH (UA) 6.0 5.0 - 9.0      Protein Negative NEG mg/dL    Glucose Negative NEG mg/dL    Ketone Negative NEG mg/dL    Bilirubin Negative NEG      Blood Negative NEG      Urobilinogen 0.2 0.2 - 1.0 EU/dL    Nitrites Negative NEG      Leukocyte Esterase SMALL (A) NEG      WBC 5-10 0 /hpf    RBC 0-3 0 /hpf    Bacteria TRACE 0 /hpf    UA:UC IF INDICATED CULTURE NOT INDICATED BY UA RESULT      Epithelial cells 0-3 0 /hpf    Casts 0 0 /lpf    Crystals, urine 0 0 /LPF    Mucus 0 0 /lpf   INFLUENZA A & B AG (RAPID TEST)    Collection Time: 09/21/20  9:35 AM   Result Value Ref Range    Influenza A Ag Negative NEG      Influenza B Ag Negative NEG      Source Nasopharyngeal     SARS-COV-2    Collection Time: 09/21/20  9:35 AM   Result Value Ref Range    Specimen source Nasopharyngeal      COVID-19 rapid test Not detected NOTD      SARS CoV-2 PENDING        Imaging:  No images are attached to the encounter. ASSESSMENT:  Problem List  Date Reviewed: 8/21/2020          Codes Class Noted    Stem cell transplant candidate ICD-10-CM: Z76.82  ICD-9-CM: V49.83  9/21/2020        Multiple myeloma ([facility] ICD-10-CM: C90.00  ICD-9-CM: 203.00  8/13/2020        Multiple myeloma not having achieved remission [facility]) ICD-10-CM: C90.00  ICD-9-CM: 203.00  7/21/2020        Obesity, morbid ([facility] ICD-10-CM: E66.01  ICD-9-CM: 278.01  5/20/2020            35 female, , smoker, history of hypertension, upper eyelids stye (felt noninfectious by optho), more recent diagnosis of IgG lambda multiple myeloma, under care of Dr. Darin Edwards, now kindly referred to us for consideration of autologous stem cell transplant. She is s/p RVD x4 w SIFE CR.  Seen today in office 9/21/2020: Cultures from last urine specimen with mixed skin zully indicated of of contamination. Repeat UA unremarkable. Completely asymptomatic today. Finishes nitrofurantoin course today. Denies any fevers or chills, dysuria or hematuria. Extensive ROS unremarkable. Recent labs including myeloma proteins unremarkable. [facility] staying low. No M spike however TORIBIO with IgG lambda band identified. Patient should be able to proceed with HDT/ACT as inpatient. She will be hospitalized through count recovery. 1. IgG lambda multiple myeloma, ISS stage I, standard risk disease, now on RVD. PLAN:  - As above. Admit to hematology service. Gentle hydration. [id number]/ASCT starting tomorrow. Conditioning regimen will be Melphalan 200 mg/m2. Anti-emetics and anti-diarrheals as needed. - D+1 Abx prophylaxis w Acyclovir, Diflucan, Levaquin and Augmentin  - Granix daily starting D+6.  - Mobilization was w/ granix/Mozobil. Collected CD34+ at 7.08 mil/kg. Will get half back  - S/p RVD x 4. Labs w SIFE CR. Then Bortezomib/Dex alone. - Bone directed therapy and prophylactic medications per primary hematology service.     F/u in office within week of discharge. Lab studies and imaging studies (CT) were personally reviewed. Pertinent old records were reviewed. Thank you for allowing us to participate in the care of Ms. Luis Whitley.               Keo Morrell MD  [facility]  [id number] [place] [address]  Office : [phone]  Fax : [phone]

## 2020-09-21 NOTE — PROGRESS NOTES
9/21/20 - Patient here for follow up and HD Eval.  Urine clear today so we will proceed with HD chemo and receive her cells.      -We plan to start your high dose chemotherapy tomorrow 9/22. You will get 1 day(s) of chemo and your transplant will be on the following day. -You will begin taking your oral antibiotics on Day +1 after transplant.  (levaquin, augmentin, diflucan and acyclovir)   You will begin getting Granix injections on Day +6.    -Make sure you are doing mouth care (rinsing at least 4-6 times a day as well as brushing with soft bristle brush at least twice a day). It is also very important to make sure you take a shower or bath every single day.

## 2020-09-21 NOTE — PROGRESS NOTES
09/21/20 1203   Dual Skin Pressure Injury Assessment   Dual Skin Pressure Injury Assessment WDL   Second Care Provider (Based on [address] [place] [address])   Sheila Skaggs RN)

## 2020-09-21 NOTE — PATIENT INSTRUCTIONS
-We plan to start your high dose chemotherapy tomorrow. You will get 1 day(s) of chemo and your transplant will be on the following day. -You will come daily for labs, IV Fluids, etc daily until your white blood cells and platelets engraft. You will begin getting Granix injections on Day +6.   
-Make sure you are doing mouth care (rinsing at least 4-6 times a day as well as brushing with soft bristle brush at least twice a day). It is also very important to make sure you take a shower or bath every single day. Your Chemotherapy Plan Diagnosis: Multiple Myeloma Goal of Therapy: __ Palliative      _X_Curative Name of Chemotherapy medications: Melphalan Number of Cycles Planned: 1                  Length of Cycle:  1 day Chemotherapy plan is subject to change at your provider's discretion A copy of this plan has been discussed and given to you by PHAM Coronado

## 2020-09-22 LAB
ALBUMIN SERPL-MCNC: 2.6 G/DL (ref 3.5–5)
ALBUMIN/GLOB SERPL: 0.8 {RATIO} (ref 1.2–3.5)
ALP SERPL-CCNC: 129 U/L (ref 50–130)
ALT SERPL-CCNC: 18 U/L (ref 12–65)
ANION GAP SERPL CALC-SCNC: 3 MMOL/L (ref 7–16)
APPEARANCE UR: ABNORMAL
AST SERPL-CCNC: 33 U/L (ref 15–37)
BACTERIA URNS QL MICRO: 0 /HPF
BASOPHILS # BLD: 0 K/UL (ref 0–0.2)
BASOPHILS NFR BLD: 0 % (ref 0–2)
BILIRUB SERPL-MCNC: 0.4 MG/DL (ref 0.2–1.1)
BILIRUB UR QL: NEGATIVE
BUN SERPL-MCNC: 5 MG/DL (ref 6–23)
CALCIUM SERPL-MCNC: 8.6 MG/DL (ref 8.3–10.4)
CASTS URNS QL MICRO: ABNORMAL /LPF
CHLORIDE SERPL-SCNC: 108 MMOL/L (ref 98–107)
CO2 SERPL-SCNC: 32 MMOL/L (ref 21–32)
COLOR UR: YELLOW
COVID-19 RAPID TEST, COVR: NOT DETECTED
CREAT SERPL-MCNC: 0.47 MG/DL (ref 0.6–1)
DIFFERENTIAL METHOD BLD: ABNORMAL
EOSINOPHIL # BLD: 0 K/UL (ref 0–0.8)
EOSINOPHIL NFR BLD: 1 % (ref 0.5–7.8)
EPI CELLS #/AREA URNS HPF: ABNORMAL /HPF
ERYTHROCYTE [DISTWIDTH] IN BLOOD BY AUTOMATED COUNT: 16 % (ref 11.9–14.6)
GLOBULIN SER CALC-MCNC: 3.1 G/DL (ref 2.3–3.5)
GLUCOSE SERPL-MCNC: 84 MG/DL (ref 65–100)
GLUCOSE UR STRIP.AUTO-MCNC: NEGATIVE MG/DL
HCT VFR BLD AUTO: 28 % (ref 35.8–46.3)
HGB BLD-MCNC: 8.9 G/DL (ref 11.7–15.4)
HGB UR QL STRIP: NEGATIVE
IMM GRANULOCYTES # BLD AUTO: 0 K/UL (ref 0–0.5)
IMM GRANULOCYTES NFR BLD AUTO: 0 % (ref 0–5)
KETONES UR QL STRIP.AUTO: NEGATIVE MG/DL
LEUKOCYTE ESTERASE UR QL STRIP.AUTO: ABNORMAL
LYMPHOCYTES # BLD: 0.9 K/UL (ref 0.5–4.6)
LYMPHOCYTES NFR BLD: 18 % (ref 13–44)
MAGNESIUM SERPL-MCNC: 2 MG/DL (ref 1.8–2.4)
MCH RBC QN AUTO: 29.1 PG (ref 26.1–32.9)
MCHC RBC AUTO-ENTMCNC: 31.8 G/DL (ref 31.4–35)
MCV RBC AUTO: 91.5 FL (ref 79.6–97.8)
MONOCYTES # BLD: 0.4 K/UL (ref 0.1–1.3)
MONOCYTES NFR BLD: 7 % (ref 4–12)
NEUTS SEG # BLD: 4 K/UL (ref 1.7–8.2)
NEUTS SEG NFR BLD: 74 % (ref 43–78)
NITRITE UR QL STRIP.AUTO: NEGATIVE
NRBC # BLD: 0 K/UL (ref 0–0.2)
PH UR STRIP: 6 [PH] (ref 5–9)
PLATELET # BLD AUTO: 215 K/UL (ref 150–450)
PMV BLD AUTO: 9.6 FL (ref 9.4–12.3)
POTASSIUM SERPL-SCNC: 3.6 MMOL/L (ref 3.5–5.1)
PROT SERPL-MCNC: 5.7 G/DL (ref 6.3–8.2)
PROT UR STRIP-MCNC: NEGATIVE MG/DL
RBC # BLD AUTO: 3.06 M/UL (ref 4.05–5.2)
RBC #/AREA URNS HPF: ABNORMAL /HPF
SARS COV-2, XPGCVT: NEGATIVE
SODIUM SERPL-SCNC: 143 MMOL/L (ref 136–145)
SOURCE, COVRS: NORMAL
SP GR UR REFRACTOMETRY: 1.01 (ref 1–1.02)
UROBILINOGEN UR QL STRIP.AUTO: 0.2 EU/DL (ref 0.2–1)
WBC # BLD AUTO: 5.4 K/UL (ref 4.3–11.1)
WBC URNS QL MICRO: ABNORMAL /HPF

## 2020-09-22 PROCEDURE — 85025 COMPLETE CBC W/AUTO DIFF WBC: CPT

## 2020-09-22 PROCEDURE — 74011000250 HC RX REV CODE- 250: Performed by: INTERNAL MEDICINE

## 2020-09-22 PROCEDURE — 74011250637 HC RX REV CODE- 250/637: Performed by: NURSE PRACTITIONER

## 2020-09-22 PROCEDURE — 65270000029 HC RM PRIVATE

## 2020-09-22 PROCEDURE — 83735 ASSAY OF MAGNESIUM: CPT

## 2020-09-22 PROCEDURE — 36591 DRAW BLOOD OFF VENOUS DEVICE: CPT

## 2020-09-22 PROCEDURE — 74011250636 HC RX REV CODE- 250/636: Performed by: INTERNAL MEDICINE

## 2020-09-22 PROCEDURE — 74011250636 HC RX REV CODE- 250/636: Performed by: NURSE PRACTITIONER

## 2020-09-22 PROCEDURE — 3E04305 INTRODUCTION OF OTHER ANTINEOPLASTIC INTO CENTRAL VEIN, PERCUTANEOUS APPROACH: ICD-10-PCS | Performed by: INTERNAL MEDICINE

## 2020-09-22 PROCEDURE — 80053 COMPREHEN METABOLIC PANEL: CPT

## 2020-09-22 PROCEDURE — 74011000258 HC RX REV CODE- 258: Performed by: INTERNAL MEDICINE

## 2020-09-22 PROCEDURE — 2709999900 HC NON-CHARGEABLE SUPPLY

## 2020-09-22 PROCEDURE — 81001 URINALYSIS AUTO W/SCOPE: CPT

## 2020-09-22 PROCEDURE — 99232 SBSQ HOSP IP/OBS MODERATE 35: CPT | Performed by: INTERNAL MEDICINE

## 2020-09-22 RX ORDER — ALBUTEROL SULFATE 0.83 MG/ML
2.5 SOLUTION RESPIRATORY (INHALATION) AS NEEDED
Status: CANCELLED
Start: 2020-09-22

## 2020-09-22 RX ORDER — ONDANSETRON 2 MG/ML
8 INJECTION INTRAMUSCULAR; INTRAVENOUS
Status: DISCONTINUED | OUTPATIENT
Start: 2020-09-22 | End: 2020-10-06 | Stop reason: HOSPADM

## 2020-09-22 RX ORDER — HYDROCORTISONE SODIUM SUCCINATE 100 MG/2ML
100 INJECTION, POWDER, FOR SOLUTION INTRAMUSCULAR; INTRAVENOUS AS NEEDED
Status: ACTIVE | OUTPATIENT
Start: 2020-09-22 | End: 2020-09-22

## 2020-09-22 RX ORDER — SODIUM CHLORIDE 0.9 % (FLUSH) 0.9 %
10 SYRINGE (ML) INJECTION AS NEEDED
Status: CANCELLED | OUTPATIENT
Start: 2020-09-22

## 2020-09-22 RX ORDER — DIPHENHYDRAMINE HYDROCHLORIDE 50 MG/ML
50 INJECTION, SOLUTION INTRAMUSCULAR; INTRAVENOUS AS NEEDED
Status: CANCELLED
Start: 2020-09-22

## 2020-09-22 RX ORDER — DIPHENHYDRAMINE HYDROCHLORIDE 50 MG/ML
25 INJECTION, SOLUTION INTRAMUSCULAR; INTRAVENOUS AS NEEDED
Status: ACTIVE | OUTPATIENT
Start: 2020-09-22 | End: 2020-09-22

## 2020-09-22 RX ORDER — DEXAMETHASONE SODIUM PHOSPHATE 100 MG/10ML
10 INJECTION INTRAMUSCULAR; INTRAVENOUS ONCE
Status: COMPLETED | OUTPATIENT
Start: 2020-09-22 | End: 2020-09-22

## 2020-09-22 RX ORDER — HEPARIN 100 UNIT/ML
300-500 SYRINGE INTRAVENOUS AS NEEDED
Status: CANCELLED
Start: 2020-09-22

## 2020-09-22 RX ORDER — SODIUM CHLORIDE 9 MG/ML
25 INJECTION, SOLUTION INTRAVENOUS CONTINUOUS
Status: CANCELLED | OUTPATIENT
Start: 2020-09-22

## 2020-09-22 RX ORDER — DEXTROSE MONOHYDRATE AND SODIUM CHLORIDE 5; .45 G/100ML; G/100ML
1000 INJECTION, SOLUTION INTRAVENOUS
Status: CANCELLED | OUTPATIENT
Start: 2020-09-22

## 2020-09-22 RX ORDER — EPINEPHRINE 1 MG/ML
0.3 INJECTION, SOLUTION, CONCENTRATE INTRAVENOUS AS NEEDED
Status: ACTIVE | OUTPATIENT
Start: 2020-09-22 | End: 2020-09-22

## 2020-09-22 RX ORDER — DEXTROSE, SODIUM CHLORIDE, AND POTASSIUM CHLORIDE 5; .45; .15 G/100ML; G/100ML; G/100ML
1000 INJECTION INTRAVENOUS
Status: CANCELLED | OUTPATIENT
Start: 2020-09-22

## 2020-09-22 RX ORDER — ACETAMINOPHEN 325 MG/1
650 TABLET ORAL AS NEEDED
Status: CANCELLED
Start: 2020-09-22

## 2020-09-22 RX ORDER — ONDANSETRON 2 MG/ML
8 INJECTION INTRAMUSCULAR; INTRAVENOUS AS NEEDED
Status: ACTIVE | OUTPATIENT
Start: 2020-09-22 | End: 2020-09-23

## 2020-09-22 RX ORDER — GABAPENTIN 100 MG/1
200 CAPSULE ORAL 3 TIMES DAILY
Status: DISCONTINUED | OUTPATIENT
Start: 2020-09-22 | End: 2020-09-25

## 2020-09-22 RX ORDER — ONDANSETRON 2 MG/ML
8 INJECTION INTRAMUSCULAR; INTRAVENOUS ONCE
Status: COMPLETED | OUTPATIENT
Start: 2020-09-22 | End: 2020-09-22

## 2020-09-22 RX ORDER — SODIUM CHLORIDE 9 MG/ML
10 INJECTION INTRAMUSCULAR; INTRAVENOUS; SUBCUTANEOUS AS NEEDED
Status: CANCELLED | OUTPATIENT
Start: 2020-09-22

## 2020-09-22 RX ORDER — LORAZEPAM 1 MG/1
1 TABLET ORAL
Status: DISCONTINUED | OUTPATIENT
Start: 2020-09-22 | End: 2020-10-06 | Stop reason: HOSPADM

## 2020-09-22 RX ADMIN — DEXAMETHASONE SODIUM PHOSPHATE 10 MG: 10 INJECTION INTRAMUSCULAR; INTRAVENOUS at 10:02

## 2020-09-22 RX ADMIN — ENOXAPARIN SODIUM 40 MG: 40 INJECTION SUBCUTANEOUS at 21:00

## 2020-09-22 RX ADMIN — SODIUM CHLORIDE 75 ML/HR: 900 INJECTION, SOLUTION INTRAVENOUS at 19:50

## 2020-09-22 RX ADMIN — PROCHLORPERAZINE EDISYLATE 10 MG: 5 INJECTION INTRAMUSCULAR; INTRAVENOUS at 15:26

## 2020-09-22 RX ADMIN — GABAPENTIN 200 MG: 100 CAPSULE ORAL at 21:00

## 2020-09-22 RX ADMIN — GABAPENTIN 200 MG: 100 CAPSULE ORAL at 15:26

## 2020-09-22 RX ADMIN — MORPHINE SULFATE 60 MG: 30 TABLET, FILM COATED, EXTENDED RELEASE ORAL at 09:17

## 2020-09-22 RX ADMIN — MELPHALAN HYDROCHLORIDE 169 MG: KIT at 10:34

## 2020-09-22 RX ADMIN — HYDROMORPHONE HYDROCHLORIDE 2 MG: 2 TABLET ORAL at 01:46

## 2020-09-22 RX ADMIN — ACYCLOVIR 400 MG: 800 TABLET ORAL at 09:19

## 2020-09-22 RX ADMIN — SODIUM CHLORIDE 75 ML/HR: 900 INJECTION, SOLUTION INTRAVENOUS at 04:48

## 2020-09-22 RX ADMIN — GABAPENTIN 200 MG: 100 CAPSULE ORAL at 10:01

## 2020-09-22 RX ADMIN — ACYCLOVIR 400 MG: 800 TABLET ORAL at 17:32

## 2020-09-22 RX ADMIN — DULOXETINE 30 MG: 30 CAPSULE, DELAYED RELEASE ORAL at 21:00

## 2020-09-22 RX ADMIN — HYDROMORPHONE HYDROCHLORIDE 2 MG: 2 TABLET ORAL at 17:37

## 2020-09-22 RX ADMIN — ENOXAPARIN SODIUM 40 MG: 40 INJECTION SUBCUTANEOUS at 09:18

## 2020-09-22 RX ADMIN — ONDANSETRON 8 MG: 2 INJECTION INTRAMUSCULAR; INTRAVENOUS at 10:02

## 2020-09-22 RX ADMIN — Medication 10 ML: at 21:00

## 2020-09-22 RX ADMIN — MORPHINE SULFATE 60 MG: 30 TABLET, FILM COATED, EXTENDED RELEASE ORAL at 21:00

## 2020-09-22 RX ADMIN — SODIUM CHLORIDE 150 MG: 900 INJECTION, SOLUTION INTRAVENOUS at 09:37

## 2020-09-22 NOTE — PROGRESS NOTES
Comprehensive Nutrition Assessment    Type and Reason for Visit: Initial, Consult(Dr. Burrell)    Nutrition Recommendations/Plan: Follow pt in next 2 weeks following ASCT scheduled for tomorrow and if po intake declines will consider interventions. Nutrition Assessment:  Per oncology note, pt with a medical history of \"smoker, history of hypertension, upper eyelids stye (felt noninfectious by optho), more recent diagnosis of IgG lambda multiple myeloma, under care of Dr. Prateek Ho, now kindly referred to us for consideration of autologous stem cell transplant. She is s/p RVD x4 w SIFE CR. Seen today in office 9/21/2020: Cultures from last urine specimen with mixed skin zully indicated of of contamination. Repeat UA unremarkable. Completely asymptomatic today. Finishes nitrofurantoin course today. Denies any fevers or chills, dysuria or hematuria. Extensive ROS unremarkable. Recent labs including myeloma proteins unremarkable. [address] [facility] staying low. No M spike however TORIBIO with IgG lambda band identified. Patient should be able to proceed with HDT/ACT as inpatient. She will be hospitalized through count recovery. \" Pt received Melphalan today and willproceed with HDT/ACT tomorrow. Spoke with Miguel Lopez RN regarding plan to assess pt at baseline and follow pt for any developement of side effects that impact po intake. Spoke with pt who reports she is \"doing good\" with meals presently and notes grits and eggs for breakfast. Ate all the eggs and nearly all of rest of tray. Discussed with pt that RD is avaialble if pt develops any symptoms that affect her meal intake following treatment. Advised of supplement options.  RD to add a supplement to tray and encouraged pt may drink if she cannot consume all of a meal.  Pertinent Medications: NS @ 75ml/hr; hydrocortisone, and  Decadron (completed)    Estimated Daily Nutrient Needs:  Energy (kcal):  8463-2834  Protein (g):  63-78(1.2-1.5)         Wounds:    None       Current Nutrition Therapies:   REGULAR DIET  ENSURE HIGH PROTEIN   Breakfast tray    Anthropometric Measures:  · Height:  5' 2.99\" (160 cm)  · Current Body Wt:  103.7 kg (228 lb 9.6 oz)    · Ideal Body Wt:  115 lbs:  198.8 %     · BMI Category:  Obese class 3 (BMI 40.0 or greater)       Nutrition Diagnosis:   · Increased nutrient needs related to potential side effects of stem cell transplant as evidenced by other increased kcal and protein needs recommended following treatment. Nutrition Interventions:   Food and/or Nutrient Delivery: Continue current diet, Start oral nutrition supplement  Coordination of Nutrition Care: Spoke with Cris NATH regarding plan for baseline assessment and plans to follow pt. Goals:  Pt will consume 75% or greater of estimated nutritonal needs in 7 days. Nutrition Monitoring and Evaluation:   Food/Nutrient Intake Outcomes: Food and nutrient intake, Supplement intake    Discharge Planning:     Too soon to determine     Electronically signed by Tiffany Smith, [address] [place] [address] on 9/22/2020 at 2:40 PM    Contact: [phone]

## 2020-09-22 NOTE — PROGRESS NOTES
Chemo      Patient getting Melphalan at current. Pre-medicaions of Emend, Decadron and Zofran given prior. Blood return checked, line with great blood return and flushed without resistance. Chemo checked at bedside with Jaquelyn Mcardle. Patient started cryo with ice and is now eating ice cream. Chemo completed at 1100. Patient tolerated well with out any signs or symptoms of reaction.

## 2020-09-22 NOTE — PROGRESS NOTES
[facility] Hematology & Oncology        Inpatient Hematology / Oncology Progress Note      Admission Date: 2020 12:02 PM  Reason for Admission/Hospital Course: Multiple myeloma ([facility] [C90.00]  Stem cell transplant candidate [Z76.82]      24 Hour Events:  Melphalan today  Labs stable  Feeling well  Neuropathy hands and feet-restart robbie      ROS:  Constitutional:  negative for fever, chills, weakness, malaise, fatigue. CV: negative for chest pain, palpitations, edema. Respiratory: negative for dyspnea, cough, wheezing. GI: negative for nausea, abdominal pain, diarrhea. 10 point review of systems is otherwise negative with the exception of the elements mentioned above in the HPI. Allergies   Allergen Reactions    Zarxio [Filgrastim-Sndz] Shortness of Breath     \"pounding heart\" per pt       OBJECTIVE:  Patient Vitals for the past 8 hrs:   BP Temp Pulse Resp SpO2   20 0757 (!) 141/94       20 0756 (!) 147/103 98.1 °F (36.7 °C) 74 16 100 %   20 0313 125/87 98.3 °F (36.8 °C) 83 16 100 %     Temp (24hrs), Av.1 °F (36.7 °C), Min:97.6 °F (36.4 °C), Max:98.3 °F (36.8 °C)    No intake/output data recorded. Physical Exam:  Constitutional: Well developed, well nourished female in no acute distress, sitting comfortably in the hospital bed. HEENT: Normocephalic and atraumatic. Oropharynx is clear, mucous membranes are moist.  Pupils are equal, round, and reactive to light. Extraocular muscles are intact. Sclerae anicteric. Skin Warm and dry. No bruising and no rash noted. No erythema. No pallor. Respiratory Lungs are clear to auscultation bilaterally without wheezes, rales or rhonchi, normal air exchange without accessory muscle use. CVS Normal rate, regular rhythm and normal S1 and S2. No murmurs, gallops, or rubs. Abdomen Soft, nontender and nondistended, normoactive bowel sounds. No palpable mass. No hepatosplenomegaly.    Neuro Grossly nonfocal with no obvious sensory or motor deficits. Neuropathy feet and hands   MSK Normal range of motion in general.  No edema and no tenderness. Psych Appropriate mood and affect.         Labs:      Recent Labs     09/22/20  0240 09/21/20  0835   WBC 5.4 7.0   RBC 3.06* 3.72*   HGB 8.9* 10.7*   HCT 28.0* 34.0*   MCV 91.5 91.4   MCH 29.1 28.8   MCHC 31.8 31.5   RDW 16.0* 15.7*    251   GRANS 74 83*   LYMPH 18 11*   MONOS 7 5   EOS 1 0*   BASOS 0 0   IG 0 0   DF AUTOMATED AUTOMATED   ANEU 4.0 5.8   ABL 0.9 0.8   ABM 0.4 0.3   PRABHJOT 0.0 0.0   ABB 0.0 0.0   AIG 0.0 0.0        Recent Labs     09/22/20  0240 09/21/20  0835    141   K 3.6 3.0*   * 105   CO2 32 30   AGAP 3* 6*   GLU 84 92   BUN 5* 4*   CREA 0.47* 0.60   GFRAA >60 >60   GFRNA >60 >60   CA 8.6 9.3    154*   TP 5.7* 6.9   ALB 2.6* 3.2*   GLOB 3.1 3.7*   AGRAT 0.8* 0.9*   MG 2.0 1.7*         Imaging:    Medications:  Current Facility-Administered Medications   Medication Dose Route Frequency    fosaprepitant (EMEND) 150 mg in 0.9% sodium chloride 150 mL IVPB  150 mg IntraVENous ONCE    ondansetron (ZOFRAN) injection 8 mg  8 mg IntraVENous ONCE    dexamethasone (DECADRON) 10 mg/mL injection 10 mg  10 mg IntraVENous ONCE    diphenhydrAMINE (BENADRYL) injection 25 mg  25 mg IntraVENous PRN    famotidine (PF) (PEPCID) 20 mg in 0.9% sodium chloride 10 mL injection  20 mg IntraVENous PRN    meperidine (DEMEROL) injection 25 mg  25 mg IntraVENous PRN    EPINEPHrine HCl (PF) (ADRENALIN) 1 mg/mL (1 mL) injection 0.3 mg  0.3 mg SubCUTAneous PRN    hydrocortisone Sod Succ (PF) (SOLU-CORTEF) injection 100 mg  100 mg IntraVENous PRN    melphalan HCl (ALKERAN) 338 mg in 0.9% sodium chloride 100 mL chemo infusion  200 mg/m2 (Treatment Plan Adjusted) IntraVENous ONCE    acetaminophen (TYLENOL) tablet 500 mg  500 mg Oral Q6H PRN    acyclovir (ZOVIRAX) tablet 400 mg  400 mg Oral BID    DULoxetine (CYMBALTA) capsule 30 mg  30 mg Oral QHS    HYDROmorphone (DILAUDID) tablet 2 mg  2 mg Oral Q4H PRN    morphine CR (MS CONTIN) tablet 60 mg  60 mg Oral Q12H    0.9% sodium chloride infusion  75 mL/hr IntraVENous CONTINUOUS    enoxaparin (LOVENOX) injection 40 mg  40 mg SubCUTAneous Q12H    central line flush (saline) syringe 10 mL  10 mL InterCATHeter PRN         ASSESSMENT:    Problem List  Date Reviewed: 8/21/2020          Codes Class Noted    Stem cell transplant candidate ICD-10-CM: Z76.82  ICD-9-CM: V49.83  9/21/2020        Multiple myeloma ([facility] ICD-10-CM: C90.00  ICD-9-CM: 203.00  8/13/2020        Multiple myeloma not having achieved remission ([facility] ICD-10-CM: C90.00  ICD-9-CM: 203.00  7/21/2020        Obesity, morbid ([facility] ICD-10-CM: E66.01  ICD-9-CM: 278.01  5/20/2020            Ms. Luis Whitley is a 38 y.o. female admitted on 9/21/2020 with a primary diagnosis of There were no encounter diagnoses. .       35 female, , smoker, history of hypertension, upper eyelids stye (felt noninfectious by optho), more recent diagnosis of IgG lambda multiple myeloma, under care of Dr. Maria Elena Ba, now kindly referred to us for consideration of autologous stem cell transplant. She is s/p RVD x4 w SIFE CR. Seen today in office 9/21/2020: Cultures from last urine specimen with mixed skin zully indicated of of contamination. Repeat UA unremarkable. Completely asymptomatic today. Finishes nitrofurantoin course today. Denies any fevers or chills, dysuria or hematuria. Extensive ROS unremarkable. Recent labs including myeloma proteins unremarkable. [facility] staying low. No M spike however TORIBIO with IgG lambda band identified. Patient should be able to proceed with HDT/ACT as inpatient. She will be hospitalized through count recovery.       PLAN:  -Multiple myeloma   As above. Admit to hematology service. Gentle hydration. ARb701/ASCT starting tomorrow. Conditioning regimen will be Melphalan 200 mg/m2. Anti-emetics and anti-diarrheals as needed.    - D+1 Abx prophylaxis w Acyclovir, Diflucan, Levaquin and Augmentin  - Granix daily starting D+6.  - Mobilization was w/ granix/Mozobil. Collected CD34+ at 7.08 mil/kg. Will get half back  - S/p RVD x 4. Labs w SIFE CR. Then Bortezomib/Dex alone. - Bone directed therapy and prophylactic medications per primary hematology service. 9/22 Melphalan today    Peripheral neuropathy hands and feet  9/22 Restart robbie    Shannan SOPs  Supportive care      Goals and plan of care reviewed with the patient. All questions answered to the best of our ability.             Flaco Garcia NP   [facility] Hematology & Oncology  [address] [address]  Office : [phone]  Fax : [phone]

## 2020-09-22 NOTE — PROGRESS NOTES
Diagnosis: Multiple Myeloma  Transplant Date: will be given 9/23  Day: -1  Chemo regimen used: will get Melphalan 9/22. Labs not resulted that need follow-up: covid test  BMT assessments and toxicities completed. 9/21  WBC/ANC= 5.4/4.0  Prophylactic medications : will start D+1  Granix start on D+6  Toxicities greater than 2 : none  Patient seen by MD/NP while inpatient & until engraftment. New orders received: none  Issues: neuropathy pain      END OF SHIFT NOTE:    Intake/Output  09/21 1901 - 09/22 0700  In: Savanna Hopping [P.O.:1100; I.V.:755]  Out: 1100 [Urine:1100]   Voiding: YES  Catheter: NO  Drain:              Stool:  0 occurrences. Emesis:  0 occurrences. VITAL SIGNS  Patient Vitals for the past 12 hrs:   Temp Pulse Resp BP SpO2   09/22/20 0313 98.3 °F (36.8 °C) 83 16 125/87 100 %   09/21/20 2305 98.3 °F (36.8 °C) 92 16 (!) 134/96 100 %   09/21/20 1913 97.6 °F (36.4 °C) 86 17 137/77 100 %       Pain Assessment  Pain 1  Pain Scale 1: Numeric (0 - 10) (09/22/20 0245)  Pain Intensity 1: 0 (09/22/20 0245)  Patient Stated Pain Goal: 0 (09/22/20 0245)  Pain Reassessment 1: Yes (09/22/20 0245)  Pain Onset 1: pta (09/22/20 0146)  Pain Location 1: Leg;Back (09/22/20 0146)  Pain Orientation 1: Left;Right; Lower (09/22/20 0146)  Pain Description 1: Aching; Sharp (09/22/20 0146)  Pain Intervention(s) 1: Medication (see MAR); Environmental changes (09/22/20 0146)    Ambulating  Yes    Additional Information:   No new complaints;plan for Melphalan today;teachings reinforced. Shift report given to oncoming nurse Yissel Peoples at the bedside.     Cody Blandon RN

## 2020-09-22 NOTE — PROGRESS NOTES
Transplant Date 9/23/2020  Auto High dose date 9/22/2020  Issues requiring new orders Nausea new order for Compazine, Zofran, Ativan  Oral care performed twice  Shift report given to  Hugo Plummer

## 2020-09-22 NOTE — PROGRESS NOTES
Problem: Falls - Risk of  Goal: *Absence of Falls  Description: Document Nicolle Sawyer Fall Risk and appropriate interventions in the flowsheet.   Outcome: Progressing Towards Goal  Note: Fall Risk Interventions:            Medication Interventions: Teach patient to arise slowly, Patient to call before getting OOB                   Problem: Patient Education: Go to Patient Education Activity  Goal: Patient/Family Education  Outcome: Progressing Towards Goal     Problem: Pain  Goal: *Control of Pain  Outcome: Progressing Towards Goal     Problem: Patient Education: Go to Patient Education Activity  Goal: Patient/Family Education  Outcome: Progressing Towards Goal

## 2020-09-22 NOTE — PROGRESS NOTES
Care Management Interventions  Transition of Care Consult (CM Consult): Discharge Planning  Current Support Network: Own Home  Confirm Follow Up Transport: Family  The Patient and/or Patient Representative was Provided with a Choice of Provider and Agrees with the Discharge Plan?: Yes  Discharge Location  Discharge Placement: Home      Chart reviewed. Pt with dx of MM is here for stem cell transplant. Per oncology notes pt has started initial treatment & will be here till count recovery after the transplant. SW will continue to follow.

## 2020-09-23 LAB
ALBUMIN SERPL-MCNC: 2.8 G/DL (ref 3.5–5)
ALBUMIN/GLOB SERPL: 0.8 {RATIO} (ref 1.2–3.5)
ALP SERPL-CCNC: 202 U/L (ref 50–136)
ALT SERPL-CCNC: 75 U/L (ref 12–65)
ANION GAP SERPL CALC-SCNC: 5 MMOL/L (ref 7–16)
AST SERPL-CCNC: 102 U/L (ref 15–37)
BACTERIA SPEC CULT: NORMAL
BASOPHILS # BLD: 0 K/UL (ref 0–0.2)
BASOPHILS NFR BLD: 0 % (ref 0–2)
BILIRUB SERPL-MCNC: 0.6 MG/DL (ref 0.2–1.1)
BUN SERPL-MCNC: 4 MG/DL (ref 6–23)
CALCIUM SERPL-MCNC: 8.5 MG/DL (ref 8.3–10.4)
CHLORIDE SERPL-SCNC: 108 MMOL/L (ref 98–107)
CO2 SERPL-SCNC: 28 MMOL/L (ref 21–32)
CREAT SERPL-MCNC: 0.51 MG/DL (ref 0.6–1)
DIFFERENTIAL METHOD BLD: ABNORMAL
EOSINOPHIL # BLD: 0 K/UL (ref 0–0.8)
EOSINOPHIL NFR BLD: 0 % (ref 0.5–7.8)
ERYTHROCYTE [DISTWIDTH] IN BLOOD BY AUTOMATED COUNT: 15.8 % (ref 11.9–14.6)
GGT SERPL-CCNC: 49 U/L (ref 5–55)
GLOBULIN SER CALC-MCNC: 3.6 G/DL (ref 2.3–3.5)
GLUCOSE SERPL-MCNC: 122 MG/DL (ref 65–100)
HCT VFR BLD AUTO: 30.7 % (ref 35.8–46.3)
HGB BLD-MCNC: 9.4 G/DL (ref 11.7–15.4)
IMM GRANULOCYTES # BLD AUTO: 0 K/UL (ref 0–0.5)
IMM GRANULOCYTES NFR BLD AUTO: 1 % (ref 0–5)
LDH SERPL L TO P-CCNC: 346 U/L (ref 100–190)
LYMPHOCYTES # BLD: 0.4 K/UL (ref 0.5–4.6)
LYMPHOCYTES NFR BLD: 6 % (ref 13–44)
MAGNESIUM SERPL-MCNC: 1.8 MG/DL (ref 1.8–2.4)
MCH RBC QN AUTO: 28.1 PG (ref 26.1–32.9)
MCHC RBC AUTO-ENTMCNC: 30.6 G/DL (ref 31.4–35)
MCV RBC AUTO: 91.6 FL (ref 79.6–97.8)
MONOCYTES # BLD: 0.1 K/UL (ref 0.1–1.3)
MONOCYTES NFR BLD: 2 % (ref 4–12)
NEUTS SEG # BLD: 5.1 K/UL (ref 1.7–8.2)
NEUTS SEG NFR BLD: 91 % (ref 43–78)
NRBC # BLD: 0 K/UL (ref 0–0.2)
PLATELET # BLD AUTO: 248 K/UL (ref 150–450)
PMV BLD AUTO: 10 FL (ref 9.4–12.3)
POTASSIUM SERPL-SCNC: 4 MMOL/L (ref 3.5–5.1)
PROT SERPL-MCNC: 6.4 G/DL (ref 6.3–8.2)
RBC # BLD AUTO: 3.35 M/UL (ref 4.05–5.2)
SERVICE CMNT-IMP: NORMAL
SODIUM SERPL-SCNC: 141 MMOL/L (ref 136–145)
WBC # BLD AUTO: 5.6 K/UL (ref 4.3–11.1)

## 2020-09-23 PROCEDURE — 74011250636 HC RX REV CODE- 250/636: Performed by: INTERNAL MEDICINE

## 2020-09-23 PROCEDURE — 65270000029 HC RM PRIVATE

## 2020-09-23 PROCEDURE — 36591 DRAW BLOOD OFF VENOUS DEVICE: CPT

## 2020-09-23 PROCEDURE — 74011250637 HC RX REV CODE- 250/637: Performed by: INTERNAL MEDICINE

## 2020-09-23 PROCEDURE — 82977 ASSAY OF GGT: CPT

## 2020-09-23 PROCEDURE — 74011250636 HC RX REV CODE- 250/636: Performed by: NURSE PRACTITIONER

## 2020-09-23 PROCEDURE — 83735 ASSAY OF MAGNESIUM: CPT

## 2020-09-23 PROCEDURE — 74011000250 HC RX REV CODE- 250: Performed by: INTERNAL MEDICINE

## 2020-09-23 PROCEDURE — 99232 SBSQ HOSP IP/OBS MODERATE 35: CPT | Performed by: INTERNAL MEDICINE

## 2020-09-23 PROCEDURE — 74011250637 HC RX REV CODE- 250/637: Performed by: NURSE PRACTITIONER

## 2020-09-23 PROCEDURE — 30233Y0 TRANSFUSION OF AUTOLOGOUS HEMATOPOIETIC STEM CELLS INTO PERIPHERAL VEIN, PERCUTANEOUS APPROACH: ICD-10-PCS | Performed by: INTERNAL MEDICINE

## 2020-09-23 PROCEDURE — 2709999900 HC NON-CHARGEABLE SUPPLY

## 2020-09-23 PROCEDURE — 38241 TRANSPLT AUTOL HCT/DONOR: CPT

## 2020-09-23 PROCEDURE — 85025 COMPLETE CBC W/AUTO DIFF WBC: CPT

## 2020-09-23 PROCEDURE — 80053 COMPREHEN METABOLIC PANEL: CPT

## 2020-09-23 PROCEDURE — 83615 LACTATE (LD) (LDH) ENZYME: CPT

## 2020-09-23 RX ORDER — ONDANSETRON 2 MG/ML
8 INJECTION INTRAMUSCULAR; INTRAVENOUS
Status: ACTIVE | OUTPATIENT
Start: 2020-09-23 | End: 2020-09-24

## 2020-09-23 RX ORDER — LORAZEPAM 2 MG/ML
0.5 INJECTION INTRAMUSCULAR ONCE
Status: COMPLETED | OUTPATIENT
Start: 2020-09-23 | End: 2020-09-23

## 2020-09-23 RX ORDER — DIPHENHYDRAMINE HYDROCHLORIDE 50 MG/ML
25 INJECTION, SOLUTION INTRAMUSCULAR; INTRAVENOUS ONCE
Status: COMPLETED | OUTPATIENT
Start: 2020-09-23 | End: 2020-09-23

## 2020-09-23 RX ORDER — ACETAMINOPHEN 325 MG/1
650 TABLET ORAL ONCE
Status: COMPLETED | OUTPATIENT
Start: 2020-09-23 | End: 2020-09-23

## 2020-09-23 RX ADMIN — PROCHLORPERAZINE EDISYLATE 10 MG: 5 INJECTION INTRAMUSCULAR; INTRAVENOUS at 21:04

## 2020-09-23 RX ADMIN — GABAPENTIN 200 MG: 100 CAPSULE ORAL at 17:34

## 2020-09-23 RX ADMIN — ACYCLOVIR 400 MG: 800 TABLET ORAL at 08:13

## 2020-09-23 RX ADMIN — LORAZEPAM 0.5 MG: 2 INJECTION INTRAMUSCULAR; INTRAVENOUS at 11:10

## 2020-09-23 RX ADMIN — ACYCLOVIR 400 MG: 800 TABLET ORAL at 17:34

## 2020-09-23 RX ADMIN — ACETAMINOPHEN 650 MG: 325 TABLET, FILM COATED ORAL at 11:10

## 2020-09-23 RX ADMIN — DULOXETINE 30 MG: 30 CAPSULE, DELAYED RELEASE ORAL at 20:56

## 2020-09-23 RX ADMIN — DIPHENHYDRAMINE HYDROCHLORIDE 25 MG: 50 INJECTION, SOLUTION INTRAMUSCULAR; INTRAVENOUS at 11:10

## 2020-09-23 RX ADMIN — ENOXAPARIN SODIUM 40 MG: 40 INJECTION SUBCUTANEOUS at 08:14

## 2020-09-23 RX ADMIN — GABAPENTIN 200 MG: 100 CAPSULE ORAL at 08:13

## 2020-09-23 RX ADMIN — MORPHINE SULFATE 60 MG: 30 TABLET, FILM COATED, EXTENDED RELEASE ORAL at 20:56

## 2020-09-23 RX ADMIN — MORPHINE SULFATE 60 MG: 30 TABLET, FILM COATED, EXTENDED RELEASE ORAL at 08:13

## 2020-09-23 RX ADMIN — GABAPENTIN 200 MG: 100 CAPSULE ORAL at 20:56

## 2020-09-23 RX ADMIN — ENOXAPARIN SODIUM 40 MG: 40 INJECTION SUBCUTANEOUS at 20:56

## 2020-09-23 NOTE — PROGRESS NOTES
[facility] Hematology & Oncology        Inpatient Hematology / Oncology Progress Note      Admission Date: 2020 12:02 PM  Reason for Admission/Hospital Course: Multiple myeloma ([facility] [address] [C90.00]  Stem cell transplant candidate [Z76.82]      24 Hour Events:  ASCT Day 0  Had some nausea with melphalan  VSS and labs stable      ROS:  Constitutional:  negative for fever, chills, weakness, malaise, fatigue. CV: negative for chest pain, palpitations, edema. Respiratory: negative for dyspnea, cough, wheezing. GI: negative for nausea, abdominal pain, diarrhea. 10 point review of systems is otherwise negative with the exception of the elements mentioned above in the HPI. Allergies   Allergen Reactions    Zarxio [Filgrastim-Sndz] Shortness of Breath     \"pounding heart\" per pt       OBJECTIVE:  Patient Vitals for the past 8 hrs:   BP Temp Pulse Resp SpO2   20 0815 134/88 97.5 °F (36.4 °C) 81 16 100 %   20 0535 127/85       20 0227 (!) 155/99 98.1 °F (36.7 °C) 97 16 96 %     Temp (24hrs), Av.7 °F (36.5 °C), Min:97 °F (36.1 °C), Max:98.1 °F (36.7 °C)    No intake/output data recorded. Physical Exam:  Constitutional: Well developed, well nourished female in no acute distress, sitting comfortably in the hospital bed. HEENT: Normocephalic and atraumatic. Oropharynx is clear, mucous membranes are moist.  Pupils are equal, round, and reactive to light. Extraocular muscles are intact. Sclerae anicteric. Skin Warm and dry. No bruising and no rash noted. No erythema. No pallor. Respiratory Lungs are clear to auscultation bilaterally without wheezes, rales or rhonchi, normal air exchange without accessory muscle use. CVS Normal rate, regular rhythm and normal S1 and S2. No murmurs, gallops, or rubs. Abdomen Soft, nontender and nondistended, normoactive bowel sounds. No palpable mass. No hepatosplenomegaly.    Neuro Grossly nonfocal with no obvious sensory or motor deficits. Neuropathy feet and hands   MSK Normal range of motion in general.  No edema and no tenderness. Psych Appropriate mood and affect.         Labs:      Recent Labs     09/23/20 0215 09/22/20  0240 09/21/20  0835   WBC 5.6 5.4 7.0   RBC 3.35* 3.06* 3.72*   HGB 9.4* 8.9* 10.7*   HCT 30.7* 28.0* 34.0*   MCV 91.6 91.5 91.4   MCH 28.1 29.1 28.8   MCHC 30.6* 31.8 31.5   RDW 15.8* 16.0* 15.7*    215 251   GRANS 91* 74 83*   LYMPH 6* 18 11*   MONOS 2* 7 5   EOS 0* 1 0*   BASOS 0 0 0   IG 1 0 0   DF AUTOMATED AUTOMATED AUTOMATED   ANEU 5.1 4.0 5.8   ABL 0.4* 0.9 0.8   ABM 0.1 0.4 0.3   PRABHJOT 0.0 0.0 0.0   ABB 0.0 0.0 0.0   AIG 0.0 0.0 0.0        Recent Labs     09/23/20 0215 09/22/20  0240 09/21/20  0835    143 141   K 4.0 3.6 3.0*   * 108* 105   CO2 28 32 30   AGAP 5* 3* 6*   * 84 92   BUN 4* 5* 4*   CREA 0.51* 0.47* 0.60   GFRAA >60 >60 >60   GFRNA >60 >60 >60   CA 8.5 8.6 9.3   * 129 154*   TP 6.4 5.7* 6.9   ALB 2.8* 2.6* 3.2*   GLOB 3.6* 3.1 3.7*   AGRAT 0.8* 0.8* 0.9*   MG 1.8 2.0 1.7*         Imaging:    Medications:  Current Facility-Administered Medications   Medication Dose Route Frequency    gabapentin (NEURONTIN) capsule 200 mg  200 mg Oral TID    LORazepam (ATIVAN) tablet 1 mg  1 mg Oral Q8H PRN    ondansetron (ZOFRAN) injection 8 mg  8 mg IntraVENous Q8H PRN    prochlorperazine (COMPAZINE) with saline injection 10 mg  10 mg IntraVENous Q4H PRN    acetaminophen (TYLENOL) tablet 500 mg  500 mg Oral Q6H PRN    acyclovir (ZOVIRAX) tablet 400 mg  400 mg Oral BID    DULoxetine (CYMBALTA) capsule 30 mg  30 mg Oral QHS    HYDROmorphone (DILAUDID) tablet 2 mg  2 mg Oral Q4H PRN    morphine CR (MS CONTIN) tablet 60 mg  60 mg Oral Q12H    0.9% sodium chloride infusion  75 mL/hr IntraVENous CONTINUOUS    enoxaparin (LOVENOX) injection 40 mg  40 mg SubCUTAneous Q12H    central line flush (saline) syringe 10 mL  10 mL InterCATHeter PRN         ASSESSMENT:    Problem List Date Reviewed: 8/21/2020          Codes Class Noted    Stem cell transplant candidate ICD-10-CM: Z76.82  ICD-9-CM: V49.83  9/21/2020        Multiple myeloma ([facility] ICD-10-CM: C90.00  ICD-9-CM: 203.00  8/13/2020        Multiple myeloma not having achieved remission ([facility] ICD-10-CM: C90.00  ICD-9-CM: 203.00  7/21/2020        Obesity, morbid ([facility] ICD-10-CM: E66.01  ICD-9-CM: 278.01  5/20/2020            Ms. Raghu Fernández is a 38 y.o. female admitted on 9/21/2020 with a primary diagnosis of There were no encounter diagnoses. .       35 female, , smoker, history of hypertension, upper eyelids stye (felt noninfectious by optho), more recent diagnosis of IgG lambda multiple myeloma, under care of Dr. Jose Peter, now kindly referred to us for consideration of autologous stem cell transplant. She is s/p RVD x4 w SIFE CR. Seen today in office 9/21/2020: Cultures from last urine specimen with mixed skin zully indicated of of contamination. Repeat UA unremarkable. Completely asymptomatic today. Finishes nitrofurantoin course today. Denies any fevers or chills, dysuria or hematuria. Extensive ROS unremarkable. Recent labs including myeloma proteins unremarkable. [address] [facility] staying low. No M spike however TORIBIO with IgG lambda band identified. Patient should be able to proceed with HDT/ACT as inpatient. She will be hospitalized through count recovery.       PLAN:  -Multiple myeloma   As above. Admit to hematology service. Gentle hydration. MAa095/ASCT starting tomorrow. Conditioning regimen will be Melphalan 200 mg/m2. Anti-emetics and anti-diarrheals as needed. - D+1 Abx prophylaxis w Acyclovir, Diflucan, Levaquin and Augmentin  - Granix daily starting D+6.  - Mobilization was w/ granix/Mozobil. Collected CD34+ at 7.08 mil/kg. Will get half back  - S/p RVD x 4. Labs w SIFE CR. Then Bortezomib/Dex alone. - Bone directed therapy and prophylactic medications per primary hematology service.     9/22 Melphalan today  9/23 ASCT Day 0    Peripheral neuropathy hands and feet  9/22 Restart robbie    Shannan SOPs  Supportive care      Goals and plan of care reviewed with the patient. All questions answered to the best of our ability.             Leobardo Medellin NP   [facility] Hematology & Oncology  [address] [place] [address]  Office : [phone]  Fax : [phone]

## 2020-09-23 NOTE — PROGRESS NOTES
Problem: Falls - Risk of  Goal: *Absence of Falls  Description: Document Carmine Mullins Fall Risk and appropriate interventions in the flowsheet.   Outcome: Progressing Towards Goal  Note: Fall Risk Interventions:            Medication Interventions: Teach patient to arise slowly         History of Falls Interventions: Room close to nurse's station         Problem: Patient Education: Go to Patient Education Activity  Goal: Patient/Family Education  Outcome: Resolved/Met     Problem: Pain  Goal: *Control of Pain  Outcome: Progressing Towards Goal     Problem: Patient Education: Go to Patient Education Activity  Goal: Patient/Family Education  Outcome: Resolved/Met     Problem: Chemotherapy, Day 1  Goal: Off Pathway (Use only if patient is Off Pathway)  Outcome: Resolved/Met  Goal: Activity/Safety  Outcome: Resolved/Met  Goal: Consults, if ordered  Outcome: Resolved/Met  Goal: Diagnostic Test/Procedures  Outcome: Resolved/Met  Goal: Nutrition/Diet  Outcome: Resolved/Met  Goal: Discharge Planning  Outcome: Resolved/Met  Goal: Medications  Outcome: Resolved/Met  Goal: Respiratory  Outcome: Resolved/Met  Goal: Treatments/Interventions/Procedures  Outcome: Resolved/Met  Goal: Psychosocial  Outcome: Resolved/Met  Goal: *Optimal pain control at patient's stated goal  Outcome: Resolved/Met  Goal: *Hemodynamically stable  Outcome: Resolved/Met  Goal: *Adequate oxygenation  Outcome: Resolved/Met  Goal: *Chemotherapy regimen is initiated  Outcome: Resolved/Met  Goal: *Patient and family verbalize understanding of plan of care  Outcome: Resolved/Met     Problem: Nausea/Vomiting (Adult)  Goal: *Absence of nausea/vomiting  Outcome: Progressing Towards Goal     Problem: Patient Education: Go to Patient Education Activity  Goal: Patient/Family Education  Outcome: Resolved/Met

## 2020-09-23 NOTE — PROGRESS NOTES
Diagnosis: Multiple Myeloma  Transplant Date: will be given 9/23  Day: 0  Chemo regimen used:  Melphalan 9/22. Labs not resulted that need follow-up: none  BMT assessments and toxicities completed. 9/22  WBC/ANC= 55.6/5.1  Prophylactic medications : will start D+1  Granix start on D+6  Toxicities greater than 2 : none  Patient seen by MD/NP while inpatient & until engraftment. New orders received: none  Issues: none      END OF SHIFT NOTE:    Intake/Output  09/22 1901 - 09/23 0700  In: 2288 [P. O.:600; I.V.:1688]  Out: 1500 [Urine:1500]   Voiding: YES  Catheter: NO  Drain:              Stool:  0 occurrences. Emesis:  0 occurrences. VITAL SIGNS  Patient Vitals for the past 12 hrs:   Temp Pulse Resp BP SpO2   09/23/20 0227 98.1 °F (36.7 °C) 97 16 (!) 155/99 96 %   09/23/20 0005 97.6 °F (36.4 °C) 90 15 118/81 99 %   09/22/20 1959 97.9 °F (36.6 °C) 87 17 125/85 100 %       Pain Assessment  Pain 1  Pain Scale 1: Numeric (0 - 10) (09/23/20 0135)  Pain Intensity 1: 0 (09/23/20 0135)  Patient Stated Pain Goal: 0 (09/23/20 0135)  Pain Reassessment 1: Yes (09/22/20 0245)  Pain Onset 1: pta (09/22/20 0146)  Pain Location 1: Back (09/22/20 1742)  Pain Orientation 1: Lower (09/22/20 1742)  Pain Description 1: Aching (09/22/20 1742)  Pain Intervention(s) 1: Medication (see MAR) (09/22/20 1742)    Ambulating  Yes    Additional Information:   Stated nausea resolved;slept fairly well overnight. Compliant w/ mouth care. Neuropathy pain better controlled w/ Neurontin. For stem cell tx today. Pt made aware of POC. Shift report given to oncoming nursePemaRN  at the bedside.     Josie Hernandez RN

## 2020-09-24 LAB
ALBUMIN SERPL-MCNC: 2.7 G/DL (ref 3.5–5)
ALBUMIN/GLOB SERPL: 0.8 {RATIO} (ref 1.2–3.5)
ALP SERPL-CCNC: 166 U/L (ref 50–136)
ALT SERPL-CCNC: 42 U/L (ref 12–65)
ANION GAP SERPL CALC-SCNC: 5 MMOL/L (ref 7–16)
AST SERPL-CCNC: 37 U/L (ref 15–37)
BASOPHILS # BLD: 0 K/UL (ref 0–0.2)
BASOPHILS NFR BLD: 0 % (ref 0–2)
BILIRUB SERPL-MCNC: 0.6 MG/DL (ref 0.2–1.1)
BUN SERPL-MCNC: 6 MG/DL (ref 6–23)
CALCIUM SERPL-MCNC: 8 MG/DL (ref 8.3–10.4)
CHLORIDE SERPL-SCNC: 110 MMOL/L (ref 98–107)
CO2 SERPL-SCNC: 30 MMOL/L (ref 21–32)
CREAT SERPL-MCNC: 0.54 MG/DL (ref 0.6–1)
DIFFERENTIAL METHOD BLD: ABNORMAL
EOSINOPHIL # BLD: 0 K/UL (ref 0–0.8)
EOSINOPHIL NFR BLD: 0 % (ref 0.5–7.8)
ERYTHROCYTE [DISTWIDTH] IN BLOOD BY AUTOMATED COUNT: 16.3 % (ref 11.9–14.6)
GLOBULIN SER CALC-MCNC: 3.4 G/DL (ref 2.3–3.5)
GLUCOSE SERPL-MCNC: 98 MG/DL (ref 65–100)
HCT VFR BLD AUTO: 28.1 % (ref 35.8–46.3)
HGB BLD-MCNC: 8.9 G/DL (ref 11.7–15.4)
IMM GRANULOCYTES # BLD AUTO: 0 K/UL (ref 0–0.5)
IMM GRANULOCYTES NFR BLD AUTO: 0 % (ref 0–5)
LYMPHOCYTES # BLD: 0.4 K/UL (ref 0.5–4.6)
LYMPHOCYTES NFR BLD: 5 % (ref 13–44)
MAGNESIUM SERPL-MCNC: 2 MG/DL (ref 1.8–2.4)
MCH RBC QN AUTO: 29.2 PG (ref 26.1–32.9)
MCHC RBC AUTO-ENTMCNC: 31.7 G/DL (ref 31.4–35)
MCV RBC AUTO: 92.1 FL (ref 79.6–97.8)
MONOCYTES # BLD: 0.2 K/UL (ref 0.1–1.3)
MONOCYTES NFR BLD: 2 % (ref 4–12)
NEUTS SEG # BLD: 8.8 K/UL (ref 1.7–8.2)
NEUTS SEG NFR BLD: 93 % (ref 43–78)
NRBC # BLD: 0 K/UL (ref 0–0.2)
PLATELET # BLD AUTO: 221 K/UL (ref 150–450)
PMV BLD AUTO: 9.7 FL (ref 9.4–12.3)
POTASSIUM SERPL-SCNC: 3.6 MMOL/L (ref 3.5–5.1)
PROT SERPL-MCNC: 6.1 G/DL (ref 6.3–8.2)
RBC # BLD AUTO: 3.05 M/UL (ref 4.05–5.2)
SODIUM SERPL-SCNC: 145 MMOL/L (ref 136–145)
WBC # BLD AUTO: 9.5 K/UL (ref 4.3–11.1)

## 2020-09-24 PROCEDURE — 74011250636 HC RX REV CODE- 250/636: Performed by: INTERNAL MEDICINE

## 2020-09-24 PROCEDURE — 65270000029 HC RM PRIVATE

## 2020-09-24 PROCEDURE — 83735 ASSAY OF MAGNESIUM: CPT

## 2020-09-24 PROCEDURE — 74011250636 HC RX REV CODE- 250/636: Performed by: NURSE PRACTITIONER

## 2020-09-24 PROCEDURE — 80053 COMPREHEN METABOLIC PANEL: CPT

## 2020-09-24 PROCEDURE — 74011250637 HC RX REV CODE- 250/637: Performed by: NURSE PRACTITIONER

## 2020-09-24 PROCEDURE — 2709999900 HC NON-CHARGEABLE SUPPLY

## 2020-09-24 PROCEDURE — 99231 SBSQ HOSP IP/OBS SF/LOW 25: CPT | Performed by: INTERNAL MEDICINE

## 2020-09-24 PROCEDURE — 85025 COMPLETE CBC W/AUTO DIFF WBC: CPT

## 2020-09-24 PROCEDURE — 74011250637 HC RX REV CODE- 250/637: Performed by: INTERNAL MEDICINE

## 2020-09-24 RX ADMIN — DULOXETINE 30 MG: 30 CAPSULE, DELAYED RELEASE ORAL at 20:25

## 2020-09-24 RX ADMIN — ENOXAPARIN SODIUM 40 MG: 40 INJECTION SUBCUTANEOUS at 08:41

## 2020-09-24 RX ADMIN — GABAPENTIN 200 MG: 100 CAPSULE ORAL at 20:25

## 2020-09-24 RX ADMIN — GABAPENTIN 200 MG: 100 CAPSULE ORAL at 15:38

## 2020-09-24 RX ADMIN — ACYCLOVIR 400 MG: 800 TABLET ORAL at 18:03

## 2020-09-24 RX ADMIN — MORPHINE SULFATE 60 MG: 30 TABLET, FILM COATED, EXTENDED RELEASE ORAL at 08:39

## 2020-09-24 RX ADMIN — SODIUM CHLORIDE 75 ML/HR: 900 INJECTION, SOLUTION INTRAVENOUS at 15:45

## 2020-09-24 RX ADMIN — LORAZEPAM 1 MG: 1 TABLET ORAL at 18:09

## 2020-09-24 RX ADMIN — HYDROMORPHONE HYDROCHLORIDE 2 MG: 2 TABLET ORAL at 08:40

## 2020-09-24 RX ADMIN — MORPHINE SULFATE 60 MG: 30 TABLET, FILM COATED, EXTENDED RELEASE ORAL at 20:25

## 2020-09-24 RX ADMIN — ENOXAPARIN SODIUM 40 MG: 40 INJECTION SUBCUTANEOUS at 20:26

## 2020-09-24 RX ADMIN — GABAPENTIN 200 MG: 100 CAPSULE ORAL at 08:39

## 2020-09-24 RX ADMIN — ACYCLOVIR 400 MG: 800 TABLET ORAL at 08:40

## 2020-09-24 NOTE — PROGRESS NOTES
Diagnosis: Multiple Myeloma  Transplant Date: 9/23  Day: D+1  Chemo regimen used:  Melphalan 9/22. Labs not resulted that need follow-up: none  BMT assessments and toxicities completed. 9/23  WBC/ANC=9.5/8.8  Prophylactic medications : will start D+1  Granix start on D+6  Toxicities greater than 2 : none  Patient seen by MD/NP while inpatient & until engraftment. New orders received: none  Issues: nausea no vomiting, compazine given once, pt reports relief    Bedside report will be given to oncoming RN.

## 2020-09-24 NOTE — PROGRESS NOTES
[facility] Hematology & Oncology        Inpatient Hematology / Oncology Progress Note      Admission Date: 2020 12:02 PM  Reason for Admission/Hospital Course: Multiple myeloma ([facility] [address] [C90.00]  Stem cell transplant candidate [Z76.82]      24 Hour Events:  ASCT Day +1  VSS and labs stable  In good spirits      ROS:  Constitutional:  negative for fever, chills, weakness, malaise, fatigue. CV: negative for chest pain, palpitations, edema. Respiratory: negative for dyspnea, cough, wheezing. GI: negative for nausea, abdominal pain, diarrhea. 10 point review of systems is otherwise negative with the exception of the elements mentioned above in the HPI. Allergies   Allergen Reactions    Zarxio [Filgrastim-Sndz] Shortness of Breath     \"pounding heart\" per pt       OBJECTIVE:  Patient Vitals for the past 8 hrs:   BP Temp Pulse Resp SpO2   20 0848 133/88 97.7 °F (36.5 °C) (!) 108 16 100 %   20 0842     100 %   20 0353 (!) 148/94 98.1 °F (36.7 °C) (!) 102 17 97 %     Temp (24hrs), Av °F (36.7 °C), Min:97.7 °F (36.5 °C), Max:98.4 °F (36.9 °C)    No intake/output data recorded. Physical Exam:  Constitutional: Well developed, well nourished female in no acute distress, sitting comfortably in the hospital bed. HEENT: Normocephalic and atraumatic. Oropharynx is clear, mucous membranes are moist.  Pupils are equal, round, and reactive to light. Extraocular muscles are intact. Sclerae anicteric. Skin Warm and dry. No bruising and no rash noted. No erythema. No pallor. Respiratory Lungs are clear to auscultation bilaterally without wheezes, rales or rhonchi, normal air exchange without accessory muscle use. CVS Normal rate, regular rhythm and normal S1 and S2. No murmurs, gallops, or rubs. Abdomen Soft, nontender and nondistended, normoactive bowel sounds. No palpable mass. No hepatosplenomegaly. Neuro Grossly nonfocal with no obvious sensory or motor deficits. Neuropathy feet and hands   MSK Normal range of motion in general.  No edema and no tenderness. Psych Appropriate mood and affect.         Labs:      Recent Labs     09/24/20  0404 09/23/20  0215 09/22/20  0240   WBC 9.5 5.6 5.4   RBC 3.05* 3.35* 3.06*   HGB 8.9* 9.4* 8.9*   HCT 28.1* 30.7* 28.0*   MCV 92.1 91.6 91.5   MCH 29.2 28.1 29.1   MCHC 31.7 30.6* 31.8   RDW 16.3* 15.8* 16.0*    248 215   GRANS 93* 91* 74   LYMPH 5* 6* 18   MONOS 2* 2* 7   EOS 0* 0* 1   BASOS 0 0 0   IG 0 1 0   DF AUTOMATED AUTOMATED AUTOMATED   ANEU 8.8* 5.1 4.0   ABL 0.4* 0.4* 0.9   ABM 0.2 0.1 0.4   PRABHJOT 0.0 0.0 0.0   ABB 0.0 0.0 0.0   AIG 0.0 0.0 0.0        Recent Labs     09/24/20  0404 09/23/20  0215 09/22/20  0240    141 143   K 3.6 4.0 3.6   * 108* 108*   CO2 30 28 32   AGAP 5* 5* 3*   GLU 98 122* 84   BUN 6 4* 5*   CREA 0.54* 0.51* 0.47*   GFRAA >60 >60 >60   GFRNA >60 >60 >60   CA 8.0* 8.5 8.6   * 202* 129   TP 6.1* 6.4 5.7*   ALB 2.7* 2.8* 2.6*   GLOB 3.4 3.6* 3.1   AGRAT 0.8* 0.8* 0.8*   MG 2.0 1.8 2.0         Imaging:    Medications:  Current Facility-Administered Medications   Medication Dose Route Frequency    ondansetron (ZOFRAN) injection 8 mg  8 mg IntraVENous ONCE PRN    gabapentin (NEURONTIN) capsule 200 mg  200 mg Oral TID    LORazepam (ATIVAN) tablet 1 mg  1 mg Oral Q8H PRN    ondansetron (ZOFRAN) injection 8 mg  8 mg IntraVENous Q8H PRN    prochlorperazine (COMPAZINE) with saline injection 10 mg  10 mg IntraVENous Q4H PRN    acetaminophen (TYLENOL) tablet 500 mg  500 mg Oral Q6H PRN    acyclovir (ZOVIRAX) tablet 400 mg  400 mg Oral BID    DULoxetine (CYMBALTA) capsule 30 mg  30 mg Oral QHS    HYDROmorphone (DILAUDID) tablet 2 mg  2 mg Oral Q4H PRN    morphine CR (MS CONTIN) tablet 60 mg  60 mg Oral Q12H    0.9% sodium chloride infusion  75 mL/hr IntraVENous CONTINUOUS    enoxaparin (LOVENOX) injection 40 mg  40 mg SubCUTAneous Q12H    central line flush (saline) syringe 10 mL  10 mL InterCATHeter PRN         ASSESSMENT:    Problem List  Date Reviewed: 8/21/2020          Codes Class Noted    Stem cell transplant candidate ICD-10-CM: Z76.82  ICD-9-CM: V49.83  9/21/2020        Multiple myeloma ([facility] ICD-10-CM: C90.00  ICD-9-CM: 203.00  8/13/2020        Multiple myeloma not having achieved remission ([facility] ICD-10-CM: C90.00  ICD-9-CM: 203.00  7/21/2020        Obesity, morbid ([facility] ICD-10-CM: E66.01  ICD-9-CM: 278.01  5/20/2020            Ms. Darrin Sidhu is a 38 y.o. female admitted on 9/21/2020 with a primary diagnosis of There were no encounter diagnoses. .       35 female, , smoker, history of hypertension, upper eyelids stye (felt noninfectious by optho), more recent diagnosis of IgG lambda multiple myeloma, under care of Dr. Reid Shane, now kindly referred to us for consideration of autologous stem cell transplant. She is s/p RVD x4 w SIFE CR. Seen today in office 9/21/2020: Cultures from last urine specimen with mixed skin zully indicated of of contamination. Repeat UA unremarkable. Completely asymptomatic today. Finishes nitrofurantoin course today. Denies any fevers or chills, dysuria or hematuria. Extensive ROS unremarkable. Recent labs including myeloma proteins unremarkable. [address] [facility] staying low. No M spike however TORIBIO with IgG lambda band identified. Patient should be able to proceed with HDT/ACT as inpatient. She will be hospitalized through count recovery.       PLAN:  -Multiple myeloma   As above. Admit to hematology service. Gentle hydration. QTj111/ASCT starting tomorrow. Conditioning regimen will be Melphalan 200 mg/m2. Anti-emetics and anti-diarrheals as needed. - D+1 Abx prophylaxis w Acyclovir, Diflucan, Levaquin and Augmentin  - Granix daily starting D+6.  - Mobilization was w/ granix/Mozobil. Collected CD34+ at 7.08 mil/kg. Will get half back  - S/p RVD x 4. Labs w SIFE CR. Then Bortezomib/Dex alone.    - Bone directed therapy and prophylactic medications per primary hematology service. 9/22 Melphalan today  9/23 ASCT Day 0    Peripheral neuropathy hands and feet  9/22 Restart robbie    Shannan SOPs  Supportive care      Goals and plan of care reviewed with the patient. All questions answered to the best of our ability.             [facility]  [address] [address]  Office : [phone]  Fax : [phone]

## 2020-09-24 NOTE — PROGRESS NOTES
Problem: Falls - Risk of  Goal: *Absence of Falls  Description: Document Tacos Going Fall Risk and appropriate interventions in the flowsheet.   Outcome: Progressing Towards Goal  Note: Fall Risk Interventions:            Medication Interventions: Evaluate medications/consider consulting pharmacy, Teach patient to arise slowly         History of Falls Interventions: Consult care management for discharge planning, Evaluate medications/consider consulting pharmacy         Problem: Pain  Goal: *Control of Pain  Outcome: Progressing Towards Goal     Problem: Nausea/Vomiting (Adult)  Goal: *Absence of nausea/vomiting  Outcome: Progressing Towards Goal

## 2020-09-24 NOTE — PROGRESS NOTES
Problem: Falls - Risk of  Goal: *Absence of Falls  Description: Document Yessi Rm Fall Risk and appropriate interventions in the flowsheet.   Outcome: Progressing Towards Goal  Note: Fall Risk Interventions:            Medication Interventions: Evaluate medications/consider consulting pharmacy, Teach patient to arise slowly         History of Falls Interventions: Consult care management for discharge planning, Evaluate medications/consider consulting pharmacy

## 2020-09-24 NOTE — PROGRESS NOTES
Transplant Date 9/23/2020  Auto High dose date 9/22/2020  Labs not resulted that need follow-up : none    BMT assessments and toxicities completed this shift   WBC/ANC= 9.5/8.8    Toxicities greater than :2 none   Patient seen by MD/NP daily until engraftment. Issues requiring new orders: none this shift  Oral care performed three times this shift  PRN pain medication given once thus far.      Shift report will be given to Samuel Meeks

## 2020-09-25 LAB
ALBUMIN SERPL-MCNC: 2.8 G/DL (ref 3.5–5)
ALBUMIN/GLOB SERPL: 0.8 {RATIO} (ref 1.2–3.5)
ALP SERPL-CCNC: 207 U/L (ref 50–136)
ALT SERPL-CCNC: 166 U/L (ref 12–65)
ANION GAP SERPL CALC-SCNC: 6 MMOL/L (ref 7–16)
AST SERPL-CCNC: 414 U/L (ref 15–37)
BASOPHILS # BLD: 0 K/UL (ref 0–0.2)
BASOPHILS NFR BLD: 0 % (ref 0–2)
BILIRUB SERPL-MCNC: 1.1 MG/DL (ref 0.2–1.1)
BUN SERPL-MCNC: 4 MG/DL (ref 6–23)
CALCIUM SERPL-MCNC: 8.1 MG/DL (ref 8.3–10.4)
CHLORIDE SERPL-SCNC: 108 MMOL/L (ref 98–107)
CO2 SERPL-SCNC: 30 MMOL/L (ref 21–32)
CREAT SERPL-MCNC: 0.48 MG/DL (ref 0.6–1)
DIFFERENTIAL METHOD BLD: ABNORMAL
EOSINOPHIL # BLD: 0 K/UL (ref 0–0.8)
EOSINOPHIL NFR BLD: 0 % (ref 0.5–7.8)
ERYTHROCYTE [DISTWIDTH] IN BLOOD BY AUTOMATED COUNT: 16.2 % (ref 11.9–14.6)
GGT SERPL-CCNC: 155 U/L (ref 5–55)
GLOBULIN SER CALC-MCNC: 3.3 G/DL (ref 2.3–3.5)
GLUCOSE SERPL-MCNC: 84 MG/DL (ref 65–100)
HCT VFR BLD AUTO: 28.3 % (ref 35.8–46.3)
HGB BLD-MCNC: 8.9 G/DL (ref 11.7–15.4)
IMM GRANULOCYTES # BLD AUTO: 0 K/UL (ref 0–0.5)
IMM GRANULOCYTES NFR BLD AUTO: 0 % (ref 0–5)
LDH SERPL L TO P-CCNC: 708 U/L (ref 100–190)
LYMPHOCYTES # BLD: 0.2 K/UL (ref 0.5–4.6)
LYMPHOCYTES NFR BLD: 4 % (ref 13–44)
MAGNESIUM SERPL-MCNC: 2 MG/DL (ref 1.8–2.4)
MCH RBC QN AUTO: 28.9 PG (ref 26.1–32.9)
MCHC RBC AUTO-ENTMCNC: 31.4 G/DL (ref 31.4–35)
MCV RBC AUTO: 91.9 FL (ref 79.6–97.8)
MONOCYTES # BLD: 0.1 K/UL (ref 0.1–1.3)
MONOCYTES NFR BLD: 1 % (ref 4–12)
NEUTS SEG # BLD: 4.8 K/UL (ref 1.7–8.2)
NEUTS SEG NFR BLD: 95 % (ref 43–78)
NRBC # BLD: 0 K/UL (ref 0–0.2)
PLATELET # BLD AUTO: 207 K/UL (ref 150–450)
PLATELET COMMENTS,PCOM: ADEQUATE
PMV BLD AUTO: 10.4 FL (ref 9.4–12.3)
POTASSIUM SERPL-SCNC: 3.1 MMOL/L (ref 3.5–5.1)
PROT SERPL-MCNC: 6.1 G/DL (ref 6.3–8.2)
RBC # BLD AUTO: 3.08 M/UL (ref 4.05–5.2)
RBC MORPH BLD: ABNORMAL
SODIUM SERPL-SCNC: 144 MMOL/L (ref 136–145)
WBC # BLD AUTO: 5.1 K/UL (ref 4.3–11.1)
WBC MORPH BLD: ABNORMAL

## 2020-09-25 PROCEDURE — 65270000029 HC RM PRIVATE

## 2020-09-25 PROCEDURE — 74011250637 HC RX REV CODE- 250/637: Performed by: NURSE PRACTITIONER

## 2020-09-25 PROCEDURE — 85025 COMPLETE CBC W/AUTO DIFF WBC: CPT

## 2020-09-25 PROCEDURE — 82977 ASSAY OF GGT: CPT

## 2020-09-25 PROCEDURE — 2709999900 HC NON-CHARGEABLE SUPPLY

## 2020-09-25 PROCEDURE — 99232 SBSQ HOSP IP/OBS MODERATE 35: CPT | Performed by: INTERNAL MEDICINE

## 2020-09-25 PROCEDURE — 80053 COMPREHEN METABOLIC PANEL: CPT

## 2020-09-25 PROCEDURE — 83735 ASSAY OF MAGNESIUM: CPT

## 2020-09-25 PROCEDURE — 74011000250 HC RX REV CODE- 250: Performed by: NURSE PRACTITIONER

## 2020-09-25 PROCEDURE — 83615 LACTATE (LD) (LDH) ENZYME: CPT

## 2020-09-25 PROCEDURE — 74011250636 HC RX REV CODE- 250/636: Performed by: NURSE PRACTITIONER

## 2020-09-25 PROCEDURE — 74011250636 HC RX REV CODE- 250/636: Performed by: INTERNAL MEDICINE

## 2020-09-25 RX ORDER — GABAPENTIN 100 MG/1
100 CAPSULE ORAL 3 TIMES DAILY
Status: DISCONTINUED | OUTPATIENT
Start: 2020-09-25 | End: 2020-10-06 | Stop reason: HOSPADM

## 2020-09-25 RX ORDER — POTASSIUM CHLORIDE 29.8 MG/ML
40 INJECTION INTRAVENOUS ONCE
Status: COMPLETED | OUTPATIENT
Start: 2020-09-25 | End: 2020-09-25

## 2020-09-25 RX ORDER — LOPERAMIDE HYDROCHLORIDE 2 MG/1
2 CAPSULE ORAL
Status: DISCONTINUED | OUTPATIENT
Start: 2020-09-25 | End: 2020-10-06 | Stop reason: HOSPADM

## 2020-09-25 RX ADMIN — GABAPENTIN 100 MG: 100 CAPSULE ORAL at 18:52

## 2020-09-25 RX ADMIN — MORPHINE SULFATE 60 MG: 30 TABLET, FILM COATED, EXTENDED RELEASE ORAL at 08:47

## 2020-09-25 RX ADMIN — NYSTATIN 5 ML: 100000 SUSPENSION ORAL at 22:36

## 2020-09-25 RX ADMIN — SODIUM CHLORIDE 75 ML/HR: 900 INJECTION, SOLUTION INTRAVENOUS at 23:02

## 2020-09-25 RX ADMIN — GABAPENTIN 100 MG: 100 CAPSULE ORAL at 20:04

## 2020-09-25 RX ADMIN — GABAPENTIN 200 MG: 100 CAPSULE ORAL at 08:47

## 2020-09-25 RX ADMIN — ACYCLOVIR 400 MG: 800 TABLET ORAL at 18:52

## 2020-09-25 RX ADMIN — ONDANSETRON 8 MG: 2 INJECTION INTRAMUSCULAR; INTRAVENOUS at 03:10

## 2020-09-25 RX ADMIN — NYSTATIN 5 ML: 100000 SUSPENSION ORAL at 18:52

## 2020-09-25 RX ADMIN — HYDROMORPHONE HYDROCHLORIDE 2 MG: 2 TABLET ORAL at 11:05

## 2020-09-25 RX ADMIN — ENOXAPARIN SODIUM 40 MG: 40 INJECTION SUBCUTANEOUS at 20:04

## 2020-09-25 RX ADMIN — NYSTATIN 5 ML: 100000 SUSPENSION ORAL at 11:07

## 2020-09-25 RX ADMIN — POTASSIUM CHLORIDE 40 MEQ: 400 INJECTION, SOLUTION INTRAVENOUS at 05:39

## 2020-09-25 RX ADMIN — LOPERAMIDE HYDROCHLORIDE 2 MG: 2 CAPSULE ORAL at 12:01

## 2020-09-25 RX ADMIN — ENOXAPARIN SODIUM 40 MG: 40 INJECTION SUBCUTANEOUS at 08:47

## 2020-09-25 RX ADMIN — MORPHINE SULFATE 60 MG: 30 TABLET, FILM COATED, EXTENDED RELEASE ORAL at 20:03

## 2020-09-25 RX ADMIN — DULOXETINE 30 MG: 30 CAPSULE, DELAYED RELEASE ORAL at 20:04

## 2020-09-25 RX ADMIN — ACYCLOVIR 400 MG: 800 TABLET ORAL at 08:47

## 2020-09-25 NOTE — PROGRESS NOTES
Bedside report received from Maria D Alcaraz RN. Pt resting quietly. No visible s/sx of distress. Will continue to monitor.

## 2020-09-25 NOTE — PROGRESS NOTES
Problem: Falls - Risk of  Goal: *Absence of Falls  Description: Document Connie Marcelo Fall Risk and appropriate interventions in the flowsheet.   Outcome: Progressing Towards Goal  Note: Fall Risk Interventions:            Medication Interventions: Teach patient to arise slowly, Patient to call before getting OOB         History of Falls Interventions: Consult care management for discharge planning, Evaluate medications/consider consulting pharmacy

## 2020-09-25 NOTE — PROGRESS NOTES
[facility] Hematology & Oncology        Inpatient Hematology / Oncology Progress Note      Admission Date: 2020 12:02 PM  Reason for Admission/Hospital Course: Multiple myeloma ([facility] [address] [C90.00]  Stem cell transplant candidate [Z76.82]      24 Hour Events:  ASCT Day +2  VSS and labs stable  Complains of dizziness and hand tremors-decrease dose of robbie  Also having some nausea  LFTs increased-monitor    ROS:  Constitutional:  negative for fever, chills, weakness, malaise, fatigue. CV: negative for chest pain, palpitations, edema. Respiratory: negative for dyspnea, cough, wheezing. GI: negative for abdominal pain, diarrhea. +intermittent nausea    10 point review of systems is otherwise negative with the exception of the elements mentioned above in the HPI. Allergies   Allergen Reactions    Zarxio [Filgrastim-Sndz] Shortness of Breath     \"pounding heart\" per pt       OBJECTIVE:  Patient Vitals for the past 8 hrs:   BP Temp Pulse Resp SpO2   20 0825 (!) 142/104 97.5 °F (36.4 °C) 100 18 100 %   20 0254 (!) 150/97 98.2 °F (36.8 °C) (!) 115 18 99 %     Temp (24hrs), Av.1 °F (36.7 °C), Min:97.5 °F (36.4 °C), Max:98.3 °F (36.8 °C)     0701 -  1900  In: -   Out: 1000 [Urine:1000]    Physical Exam:  Constitutional: Well developed, well nourished female in no acute distress, sitting comfortably in the hospital bed. HEENT: Normocephalic and atraumatic. Oropharynx is clear, mucous membranes are moist.  Pupils are equal, round, and reactive to light. Extraocular muscles are intact. Sclerae anicteric. Skin Warm and dry. No bruising and no rash noted. No erythema. No pallor. Respiratory Lungs are clear to auscultation bilaterally without wheezes, rales or rhonchi, normal air exchange without accessory muscle use. CVS Normal rate, regular rhythm and normal S1 and S2. No murmurs, gallops, or rubs. Abdomen Soft, nontender and nondistended, normoactive bowel sounds.   No palpable mass. No hepatosplenomegaly. Neuro Grossly nonfocal with no obvious sensory or motor deficits. Neuropathy feet and hands improved. Hand tremors/dizziness   MSK Normal range of motion in general.  No edema and no tenderness. Psych Appropriate mood and affect.         Labs:      Recent Labs     09/25/20 0310 09/24/20  0404 09/23/20  0215   WBC 5.1 9.5 5.6   RBC 3.08* 3.05* 3.35*   HGB 8.9* 8.9* 9.4*   HCT 28.3* 28.1* 30.7*   MCV 91.9 92.1 91.6   MCH 28.9 29.2 28.1   MCHC 31.4 31.7 30.6*   RDW 16.2* 16.3* 15.8*    221 248   GRANS 95* 93* 91*   LYMPH 4* 5* 6*   MONOS 1* 2* 2*   EOS 0* 0* 0*   BASOS 0 0 0   IG 0 0 1   DF AUTOMATED AUTOMATED AUTOMATED   ANEU 4.8 8.8* 5.1   ABL 0.2* 0.4* 0.4*   ABM 0.1 0.2 0.1   PRABHJOT 0.0 0.0 0.0   ABB 0.0 0.0 0.0   AIG 0.0 0.0 0.0        Recent Labs     09/25/20 0310 09/24/20 0404 09/23/20  0215    145 141   K 3.1* 3.6 4.0   * 110* 108*   CO2 30 30 28   AGAP 6* 5* 5*   GLU 84 98 122*   BUN 4* 6 4*   CREA 0.48* 0.54* 0.51*   GFRAA >60 >60 >60   GFRNA >60 >60 >60   CA 8.1* 8.0* 8.5   * 166* 202*   TP 6.1* 6.1* 6.4   ALB 2.8* 2.7* 2.8*   GLOB 3.3 3.4 3.6*   AGRAT 0.8* 0.8* 0.8*   MG 2.0 2.0 1.8         Imaging:    Medications:  Current Facility-Administered Medications   Medication Dose Route Frequency    potassium chloride 40 mEq IVPB  40 mEq IntraVENous ONCE    gabapentin (NEURONTIN) capsule 100 mg  100 mg Oral TID    LORazepam (ATIVAN) tablet 1 mg  1 mg Oral Q8H PRN    ondansetron (ZOFRAN) injection 8 mg  8 mg IntraVENous Q8H PRN    prochlorperazine (COMPAZINE) with saline injection 10 mg  10 mg IntraVENous Q4H PRN    acetaminophen (TYLENOL) tablet 500 mg  500 mg Oral Q6H PRN    acyclovir (ZOVIRAX) tablet 400 mg  400 mg Oral BID    DULoxetine (CYMBALTA) capsule 30 mg  30 mg Oral QHS    HYDROmorphone (DILAUDID) tablet 2 mg  2 mg Oral Q4H PRN    morphine CR (MS CONTIN) tablet 60 mg  60 mg Oral Q12H    0.9% sodium chloride infusion  75 mL/hr IntraVENous CONTINUOUS    enoxaparin (LOVENOX) injection 40 mg  40 mg SubCUTAneous Q12H    central line flush (saline) syringe 10 mL  10 mL InterCATHeter PRN         ASSESSMENT:    Problem List  Date Reviewed: 8/21/2020          Codes Class Noted    Stem cell transplant candidate ICD-10-CM: Z76.82  ICD-9-CM: V49.83  9/21/2020        Multiple myeloma ([facility] [address] ICD-10-CM: C90.00  ICD-9-CM: 203.00  8/13/2020        Multiple myeloma not having achieved remission ([facility] [address] ICD-10-CM: C90.00  ICD-9-CM: 203.00  7/21/2020        Obesity, morbid ([facility] [address] ICD-10-CM: E66.01  ICD-9-CM: 278.01  5/20/2020            Ms. Kelley Brown is a 38 y.o. female admitted on 9/21/2020 with a primary diagnosis of There were no encounter diagnoses. .       35 female, , smoker, history of hypertension, upper eyelids stye (felt noninfectious by optho), more recent diagnosis of IgG lambda multiple myeloma, under care of Dr. Tavo Noe, now kindly referred to us for consideration of autologous stem cell transplant. She is s/p RVD x4 w JENN RYEES. Seen today in office 9/21/2020: Cultures from last urine specimen with mixed skin zully indicated of of contamination. Repeat UA unremarkable. Completely asymptomatic today. Finishes nitrofurantoin course today. Denies any fevers or chills, dysuria or hematuria. Extensive ROS unremarkable. Recent labs including myeloma proteins unremarkable. [facility] staying low. No M spike however TORIBIO with IgG lambda band identified. Patient should be able to proceed with HDT/ACT as inpatient. She will be hospitalized through count recovery.       PLAN:  -Multiple myeloma   As above. Admit to hematology service. Gentle hydration. UEb717/ASCT starting tomorrow. Conditioning regimen will be Melphalan 200 mg/m2. Anti-emetics and anti-diarrheals as needed. - D+1 Abx prophylaxis w Acyclovir, Diflucan, Levaquin and Augmentin  - Granix daily starting D+6.  - Mobilization was w/ granix/Mozobil. Collected CD34+ at 7.08 mil/kg. Will get half back  - S/p RVD x 4. Labs w SIFE CR. Then Bortezomib/Dex alone. - Bone directed therapy and prophylactic medications per primary hematology service. 9/22 Melphalan today  9/23 ASCT Day 0    Peripheral neuropathy hands and feet  9/22 Restart robbie  9/25 having some dizziness and hand tremors. Decrease to 100 mg tid    Nausea  -antiemetics    Elevated LFTs  9/25 monitor overnight. Consider US if continues to increase. Shannan SOPs  Supportive care    Goals and plan of care reviewed with the patient. All questions answered to the best of our ability.             Rolf Best NP   [facility] Hematology & Oncology  [id number] [place] [address] Avenue  Office : [phone]  Fax : [phone]

## 2020-09-25 NOTE — PROGRESS NOTES
Diagnosis: MM  Transplant Date: 9/23/20  Day: (+/-) +3. Chemo regimen used: Melphalan  Labs needed at next visit: [address] not resulted that need follow-up: IP. Next Appointment scheduled: IP.  BMT assessments and toxicities completed. WBC/ANC= 5.1/4.8. Prophylactic medications started none and stopped none. Granix to start on D+6. Toxicities greater than 2 :AST. Patient seen by MD/NP while IP or until engraftment. New orders received: magic mouthwash, immodium. Issues:diarrhea, mild esophagitis, nausea     09/25/20 0830   Toxicity Grading Criteria   Hemorrhage (GI) 0   Infection 0   Fever in the Absence of Neutropenia (ANC greater than or equal to 1.0 0   Febrile Neutropenia (ANC less than 1.0) 0   Rigors/Chills 0   Mucositis Oral 0   Esophagitis 1   Dry Mouth 0   Dysphagia 1   Nausea 1   Vomiting 0   Diarrhea 1   Constipation 0   Bilirubin increased 0   VOD- Weight Gain 0   ALT (SGPT) 2   Alkaline Phosphatase increased 1   AST (SGOT) 3   GGT increased 2   Weight Loss 0   Weight Gain 0   Dehydration 0   Urine Protein- Proteinuria Not able to assess   Urine Blood - Hematuria Not able to assess   Creatinine increased 0   Bladder Spasms 0   Cystitis non-infective   (ANDI)   Urinary Frequency 0   Urinary Retention 0   Cardiac Arrhythmia Not able to assess   Conduction Abnormality/AV Heart Block   (ANDI)   Palpitations 0   Prolonged QTc    (ANDI)   Supraventricular and Isaias Arrhythmia   (ANDI)   Vasovagal Episode 0   Ventricular Arrhythmia   (ANDI)   Hypotension 0   Hypertension 1   Neuropathy / Sensory Baseline; History of   Headache 0   Vision 0   Dizziness 0   Syncope 0   Anxiety 0   Agitation 0   Depression 0   Fatigue 1   Insomnia 0   Pain Baseline; History of   Dyspnea 0   Cough 0   Hiccups 0   Alopecia 0   Nail Changes 0   Allergic Reaction 0   Rash 0   Urticaria (Hives, Welts, Wheals) 0     END OF SHIFT NOTE:    Intake/Output  09/25 0701 - 09/25 1900  In: -   Out: 1600 [Urine:1600]   Voiding: YES  Catheter: NO  Drain:              Stool:  4 occurrences. Stool Assessment  Stool Color: Brown (09/25/20 1108)  Stool Appearance: Loose (09/25/20 1108)  Stool Amount: Medium (09/25/20 1108)  Stool Source/Status: Rectum (09/25/20 1108)    Emesis:  0 occurrences. VITAL SIGNS  Patient Vitals for the past 12 hrs:   Temp Pulse Resp BP SpO2   09/25/20 1606 98.7 °F (37.1 °C) 100 18 (!) 142/97 97 %   09/25/20 1145 98.5 °F (36.9 °C) 98 18 (!) 140/90 98 %   09/25/20 0825 97.5 °F (36.4 °C) 100 18 (!) 142/104 100 %       Pain Assessment  Pain 1  Pain Scale 1: Numeric (0 - 10) (09/25/20 1410)  Pain Intensity 1: 0 (09/25/20 1410)  Patient Stated Pain Goal: 0 (09/25/20 1410)  Pain Reassessment 1: Patient resting w/respiratory rate greater than 10 (09/25/20 1145)  Pain Onset 1: pta (09/22/20 0146)  Pain Location 1: Leg (09/25/20 1106)  Pain Orientation 1: Lower (09/25/20 1106)  Pain Description 1: Aching (09/25/20 1106)  Pain Intervention(s) 1: Medication (see MAR) (09/25/20 1106)    Ambulating  Yes    Additional Information: imodium started for diarrhea, magic mouthwash started for mild esophagitis    Shift report given to oncoming nurse at the bedside.     Raz Talley

## 2020-09-25 NOTE — PROGRESS NOTES
Diagnosis: Multiple Myeloma  Transplant Date: 9/23  Day: D+2  Chemo regimen used: [facility] 9/22. Labs not resulted that need follow-up: none  BMT assessments and toxicities completed. 9/23  WBC/ANC=5.1/4.8  Prophylactic medications : will start D+1  Granix start on D+6  Toxicities greater than 2 : none  Patient seen by MD/NP while inpatient & until engraftment. New orders received: K+ 3.1- 40 meq IV bolus infusing  Issues: nausea this morning no emesis, IV zofran given, cold wash cloth placed over pt's eyes for comfort, resting quietly, no visible s/sx of distress. Pt did express concerns that nausea seems to start after drinking water over ice  (not bottled). Pt advised to ask for bottled water only     Bedside report will be given to oncoming RN.

## 2020-09-25 NOTE — PROGRESS NOTES
Problem: Falls - Risk of  Goal: *Absence of Falls  Description: Document Michelle Tolentinoasin Fall Risk and appropriate interventions in the flowsheet.   Outcome: Progressing Towards Goal  Note: Fall Risk Interventions:            Medication Interventions: Teach patient to arise slowly, Patient to call before getting OOB         History of Falls Interventions: Consult care management for discharge planning, Evaluate medications/consider consulting pharmacy         Problem: Pain  Goal: *Control of Pain  Outcome: Progressing Towards Goal     Problem: Nausea/Vomiting (Adult)  Goal: *Absence of nausea/vomiting  Outcome: Progressing Towards Goal

## 2020-09-25 NOTE — PROGRESS NOTES
1910 Bedside report received from [place], [address] [place] [address]. Pt resting quietly, NAD.     0109 Report given to PHAM Mott. Pt resting quietly. VSS, no fevers.

## 2020-09-26 LAB
ALBUMIN SERPL-MCNC: 2.4 G/DL (ref 3.5–5)
ALBUMIN/GLOB SERPL: 0.8 {RATIO} (ref 1.2–3.5)
ALP SERPL-CCNC: 276 U/L (ref 50–136)
ALT SERPL-CCNC: 176 U/L (ref 12–65)
ANION GAP SERPL CALC-SCNC: 4 MMOL/L (ref 7–16)
AST SERPL-CCNC: 170 U/L (ref 15–37)
BASOPHILS # BLD: 0 K/UL (ref 0–0.2)
BASOPHILS NFR BLD: 0 % (ref 0–2)
BILIRUB SERPL-MCNC: 0.9 MG/DL (ref 0.2–1.1)
BUN SERPL-MCNC: 3 MG/DL (ref 6–23)
CALCIUM SERPL-MCNC: 7.9 MG/DL (ref 8.3–10.4)
CHLORIDE SERPL-SCNC: 108 MMOL/L (ref 98–107)
CO2 SERPL-SCNC: 32 MMOL/L (ref 21–32)
CREAT SERPL-MCNC: 0.42 MG/DL (ref 0.6–1)
DIFFERENTIAL METHOD BLD: ABNORMAL
EOSINOPHIL # BLD: 0 K/UL (ref 0–0.8)
EOSINOPHIL NFR BLD: 0 % (ref 0.5–7.8)
ERYTHROCYTE [DISTWIDTH] IN BLOOD BY AUTOMATED COUNT: 15.8 % (ref 11.9–14.6)
GLOBULIN SER CALC-MCNC: 3.1 G/DL (ref 2.3–3.5)
GLUCOSE SERPL-MCNC: 81 MG/DL (ref 65–100)
HCT VFR BLD AUTO: 25.8 % (ref 35.8–46.3)
HGB BLD-MCNC: 8 G/DL (ref 11.7–15.4)
IMM GRANULOCYTES # BLD AUTO: 0 K/UL (ref 0–0.5)
IMM GRANULOCYTES NFR BLD AUTO: 0 % (ref 0–5)
LYMPHOCYTES # BLD: 0.1 K/UL (ref 0.5–4.6)
LYMPHOCYTES NFR BLD: 4 % (ref 13–44)
MAGNESIUM SERPL-MCNC: 2 MG/DL (ref 1.8–2.4)
MCH RBC QN AUTO: 28.7 PG (ref 26.1–32.9)
MCHC RBC AUTO-ENTMCNC: 31 G/DL (ref 31.4–35)
MCV RBC AUTO: 92.5 FL (ref 79.6–97.8)
MONOCYTES # BLD: 0 K/UL (ref 0.1–1.3)
MONOCYTES NFR BLD: 1 % (ref 4–12)
NEUTS SEG # BLD: 2.8 K/UL (ref 1.7–8.2)
NEUTS SEG NFR BLD: 94 % (ref 43–78)
NRBC # BLD: 0 K/UL (ref 0–0.2)
PLATELET # BLD AUTO: 178 K/UL (ref 150–450)
PMV BLD AUTO: 10.1 FL (ref 9.4–12.3)
POTASSIUM SERPL-SCNC: 3.4 MMOL/L (ref 3.5–5.1)
PROT SERPL-MCNC: 5.5 G/DL (ref 6.3–8.2)
RBC # BLD AUTO: 2.79 M/UL (ref 4.05–5.2)
SODIUM SERPL-SCNC: 144 MMOL/L (ref 136–145)
WBC # BLD AUTO: 3 K/UL (ref 4.3–11.1)

## 2020-09-26 PROCEDURE — 99232 SBSQ HOSP IP/OBS MODERATE 35: CPT | Performed by: INTERNAL MEDICINE

## 2020-09-26 PROCEDURE — 65270000029 HC RM PRIVATE

## 2020-09-26 PROCEDURE — 74011250636 HC RX REV CODE- 250/636: Performed by: NURSE PRACTITIONER

## 2020-09-26 PROCEDURE — 74011250637 HC RX REV CODE- 250/637: Performed by: NURSE PRACTITIONER

## 2020-09-26 PROCEDURE — 2709999900 HC NON-CHARGEABLE SUPPLY

## 2020-09-26 PROCEDURE — 80053 COMPREHEN METABOLIC PANEL: CPT

## 2020-09-26 PROCEDURE — 83735 ASSAY OF MAGNESIUM: CPT

## 2020-09-26 PROCEDURE — 74011250636 HC RX REV CODE- 250/636: Performed by: INTERNAL MEDICINE

## 2020-09-26 PROCEDURE — 74011000250 HC RX REV CODE- 250: Performed by: NURSE PRACTITIONER

## 2020-09-26 PROCEDURE — 85025 COMPLETE CBC W/AUTO DIFF WBC: CPT

## 2020-09-26 RX ORDER — LEVOFLOXACIN 500 MG/1
500 TABLET, FILM COATED ORAL EVERY 24 HOURS
Status: DISCONTINUED | OUTPATIENT
Start: 2020-09-26 | End: 2020-10-06 | Stop reason: HOSPADM

## 2020-09-26 RX ORDER — FLUCONAZOLE 100 MG/1
400 TABLET ORAL DAILY
Status: DISCONTINUED | OUTPATIENT
Start: 2020-09-26 | End: 2020-10-06 | Stop reason: HOSPADM

## 2020-09-26 RX ORDER — AMOXICILLIN AND CLAVULANATE POTASSIUM 875; 125 MG/1; MG/1
1 TABLET, FILM COATED ORAL 2 TIMES DAILY WITH MEALS
Status: DISCONTINUED | OUTPATIENT
Start: 2020-09-26 | End: 2020-09-28

## 2020-09-26 RX ADMIN — LOPERAMIDE HYDROCHLORIDE 2 MG: 2 CAPSULE ORAL at 19:33

## 2020-09-26 RX ADMIN — MORPHINE SULFATE 60 MG: 30 TABLET, FILM COATED, EXTENDED RELEASE ORAL at 21:27

## 2020-09-26 RX ADMIN — ACYCLOVIR 400 MG: 800 TABLET ORAL at 18:03

## 2020-09-26 RX ADMIN — SODIUM CHLORIDE 75 ML/HR: 900 INJECTION, SOLUTION INTRAVENOUS at 21:28

## 2020-09-26 RX ADMIN — Medication 10 ML: at 21:27

## 2020-09-26 RX ADMIN — ACYCLOVIR 400 MG: 800 TABLET ORAL at 08:33

## 2020-09-26 RX ADMIN — GABAPENTIN 100 MG: 100 CAPSULE ORAL at 08:33

## 2020-09-26 RX ADMIN — ENOXAPARIN SODIUM 40 MG: 40 INJECTION SUBCUTANEOUS at 21:28

## 2020-09-26 RX ADMIN — NYSTATIN 5 ML: 100000 SUSPENSION ORAL at 16:40

## 2020-09-26 RX ADMIN — FLUCONAZOLE 400 MG: 100 TABLET ORAL at 09:57

## 2020-09-26 RX ADMIN — GABAPENTIN 100 MG: 100 CAPSULE ORAL at 16:40

## 2020-09-26 RX ADMIN — DULOXETINE 30 MG: 30 CAPSULE, DELAYED RELEASE ORAL at 21:27

## 2020-09-26 RX ADMIN — ONDANSETRON 8 MG: 2 INJECTION INTRAMUSCULAR; INTRAVENOUS at 13:18

## 2020-09-26 RX ADMIN — ENOXAPARIN SODIUM 40 MG: 40 INJECTION SUBCUTANEOUS at 08:34

## 2020-09-26 RX ADMIN — NYSTATIN 5 ML: 100000 SUSPENSION ORAL at 08:34

## 2020-09-26 RX ADMIN — AMOXICILLIN AND CLAVULANATE POTASSIUM 1 TABLET: 875; 125 TABLET, FILM COATED ORAL at 16:40

## 2020-09-26 RX ADMIN — GABAPENTIN 100 MG: 100 CAPSULE ORAL at 21:27

## 2020-09-26 RX ADMIN — LEVOFLOXACIN 500 MG: 500 TABLET, FILM COATED ORAL at 09:57

## 2020-09-26 RX ADMIN — AMOXICILLIN AND CLAVULANATE POTASSIUM 1 TABLET: 875; 125 TABLET, FILM COATED ORAL at 09:57

## 2020-09-26 RX ADMIN — SODIUM CHLORIDE 75 ML/HR: 900 INJECTION, SOLUTION INTRAVENOUS at 13:18

## 2020-09-26 RX ADMIN — MORPHINE SULFATE 60 MG: 30 TABLET, FILM COATED, EXTENDED RELEASE ORAL at 08:33

## 2020-09-26 NOTE — PROGRESS NOTES
Report received from [place]. Pt resting quietly in bed. No needs at present. Notified Lore Barron NP that pt does not have anti-infective prophylaxis ordered.

## 2020-09-26 NOTE — PROGRESS NOTES
Problem: Falls - Risk of  Goal: *Absence of Falls  Description: Document Kanchan Liu Fall Risk and appropriate interventions in the flowsheet.   Outcome: Progressing Towards Goal  Note: Fall Risk Interventions:            Medication Interventions: Evaluate medications/consider consulting pharmacy, Teach patient to arise slowly         History of Falls Interventions: Consult care management for discharge planning, Evaluate medications/consider consulting pharmacy

## 2020-09-26 NOTE — PROGRESS NOTES
[facility] Hematology & Oncology        Inpatient Hematology / Oncology Progress Note      Admission Date: 2020 12:02 PM  Reason for Admission/Hospital Course: Multiple myeloma ([address] [C90.00]  Stem cell transplant candidate [Z76.82]      24 Hour Events:  ASCT Day +3  VSS and labs stable, afebrile  Nausea improved  LFTs improved    ROS:  Constitutional:  negative for fever, chills, weakness, malaise, fatigue. CV: negative for chest pain, palpitations, edema. Respiratory: negative for dyspnea, cough, wheezing. GI: negative for abdominal pain, diarrhea. +intermittent nausea    10 point review of systems is otherwise negative with the exception of the elements mentioned above in the HPI. Allergies   Allergen Reactions    Zarxio [Filgrastim-Sndz] Shortness of Breath     \"pounding heart\" per pt       OBJECTIVE:  Patient Vitals for the past 8 hrs:   BP Temp Pulse Resp SpO2 Weight   20 0847      229 lb 3.2 oz (104 kg)   20 0758 121/71 98.3 °F (36.8 °C) 71 16 98 %    20 0312 135/79 98.4 °F (36.9 °C) 97 16 97 %      Temp (24hrs), Av.5 °F (36.9 °C), Min:98.3 °F (36.8 °C), Max:98.7 °F (37.1 °C)     0701 -  1900  In: 5978 [P.O.:360; I.V.:791]  Out: 1200 [Urine:900]    Physical Exam:  Constitutional: Well developed, well nourished female in no acute distress, sitting comfortably in the hospital bed. HEENT: Normocephalic and atraumatic. Oropharynx is clear, mucous membranes are moist.  Pupils are equal, round, and reactive to light. Extraocular muscles are intact. Sclerae anicteric. Skin Warm and dry. No bruising and no rash noted. No erythema. No pallor. Breast - nipple retraction on right; no rashes/lesions/masses/skin changes   Respiratory Lungs are clear to auscultation bilaterally without wheezes, rales or rhonchi, normal air exchange without accessory muscle use. CVS Normal rate, regular rhythm and normal S1 and S2. No murmurs, gallops, or rubs.    Abdomen Soft, nontender and nondistended, normoactive bowel sounds. No palpable mass. No hepatosplenomegaly. Neuro Grossly nonfocal with no obvious sensory or motor deficits. Neuropathy feet and hands improved. Hand tremors/dizziness   MSK +1 BLE edema. Normal range of motion in general.  No edema and no tenderness. Psych Appropriate mood and affect.         Labs:      Recent Labs     09/26/20 0332 09/25/20  0310 09/24/20  0404   WBC 3.0* 5.1 9.5   RBC 2.79* 3.08* 3.05*   HGB 8.0* 8.9* 8.9*   HCT 25.8* 28.3* 28.1*   MCV 92.5 91.9 92.1   MCH 28.7 28.9 29.2   MCHC 31.0* 31.4 31.7   RDW 15.8* 16.2* 16.3*    207 221   GRANS 94* 95* 93*   LYMPH 4* 4* 5*   MONOS 1* 1* 2*   EOS 0* 0* 0*   BASOS 0 0 0   IG 0 0 0   DF AUTOMATED AUTOMATED AUTOMATED   ANEU 2.8 4.8 8.8*   ABL 0.1* 0.2* 0.4*   ABM 0.0* 0.1 0.2   PRABHOJT 0.0 0.0 0.0   ABB 0.0 0.0 0.0   AIG 0.0 0.0 0.0        Recent Labs     09/26/20 0332 09/25/20 0310 09/24/20  0404    144 145   K 3.4* 3.1* 3.6   * 108* 110*   CO2 32 30 30   AGAP 4* 6* 5*   GLU 81 84 98   BUN 3* 4* 6   CREA 0.42* 0.48* 0.54*   GFRAA >60 >60 >60   GFRNA >60 >60 >60   CA 7.9* 8.1* 8.0*   * 207* 166*   TP 5.5* 6.1* 6.1*   ALB 2.4* 2.8* 2.7*   GLOB 3.1 3.3 3.4   AGRAT 0.8* 0.8* 0.8*   MG 2.0 2.0 2.0         Imaging:    Medications:  Current Facility-Administered Medications   Medication Dose Route Frequency    fluconazole (DIFLUCAN) tablet 400 mg  400 mg Oral DAILY    levoFLOXacin (LEVAQUIN) tablet 500 mg  500 mg Oral Q24H    amoxicillin-clavulanate (AUGMENTIN) 875-125 mg per tablet 1 Tab  1 Tab Oral BID WITH MEALS    gabapentin (NEURONTIN) capsule 100 mg  100 mg Oral TID    magic mouthwash (VEENA) oral suspension 5 mL  5 mL Oral AC&HS    loperamide (IMODIUM) capsule 2 mg  2 mg Oral Q4H PRN    LORazepam (ATIVAN) tablet 1 mg  1 mg Oral Q8H PRN    ondansetron (ZOFRAN) injection 8 mg  8 mg IntraVENous Q8H PRN    prochlorperazine (COMPAZINE) with saline injection 10 mg  10 mg IntraVENous Q4H PRN    acetaminophen (TYLENOL) tablet 500 mg  500 mg Oral Q6H PRN    acyclovir (ZOVIRAX) tablet 400 mg  400 mg Oral BID    DULoxetine (CYMBALTA) capsule 30 mg  30 mg Oral QHS    HYDROmorphone (DILAUDID) tablet 2 mg  2 mg Oral Q4H PRN    morphine CR (MS CONTIN) tablet 60 mg  60 mg Oral Q12H    0.9% sodium chloride infusion  75 mL/hr IntraVENous CONTINUOUS    enoxaparin (LOVENOX) injection 40 mg  40 mg SubCUTAneous Q12H    central line flush (saline) syringe 10 mL  10 mL InterCATHeter PRN         ASSESSMENT:    Problem List  Date Reviewed: 8/21/2020          Codes Class Noted    Stem cell transplant candidate ICD-10-CM: Z76.82  ICD-9-CM: V49.83  9/21/2020        Multiple myeloma ([facility] ICD-10-CM: C90.00  ICD-9-CM: 203.00  8/13/2020        Multiple myeloma not having achieved remission ([facility] ICD-10-CM: C90.00  ICD-9-CM: 203.00  7/21/2020        Obesity, morbid ([facility] ICD-10-CM: E66.01  ICD-9-CM: 278.01  5/20/2020            Ms. Ro Celeste is a 38 y.o. female admitted on 9/21/2020 with a primary diagnosis of There were no encounter diagnoses. .       35 female, , smoker, history of hypertension, upper eyelids stye (felt noninfectious by optho), more recent diagnosis of IgG lambda multiple myeloma, under care of Dr. Jeet Frank, now kindly referred to us for consideration of autologous stem cell transplant. She is s/p RVD x4 w SIFE CR. Seen today in office 9/21/2020: Cultures from last urine specimen with mixed skin zully indicated of of contamination. Repeat UA unremarkable. Completely asymptomatic today. Finishes nitrofurantoin course today. Denies any fevers or chills, dysuria or hematuria. Extensive ROS unremarkable. Recent labs including myeloma proteins unremarkable. [address] staying low. No M spike however TORIBIO with IgG lambda band identified. Patient should be able to proceed with HDT/ACT as inpatient.   She will be hospitalized through count recovery.       PLAN:  -Multiple myeloma As above. Admit to hematology service. Gentle hydration. [id number]/ASCT starting tomorrow. Conditioning regimen will be Melphalan 200 mg/m2. Anti-emetics and anti-diarrheals as needed. - D+1 Abx prophylaxis w Acyclovir, Diflucan, Levaquin and Augmentin  - Granix daily starting D+6.  - Mobilization was w/ granix/Mozobil. Collected CD34+ at 7.08 mil/kg. Will get half back  - S/p RVD x 4. Labs w SIFE CR. Then Bortezomib/Dex alone. - Bone directed therapy and prophylactic medications per primary hematology service. 9/22 Melphalan today  9/23 ASCT Day 0  9/26 D+3. Doing well. Labs and counts are stable. WBC 3.0, Hgb 8.0, plts 178k. Peripheral neuropathy hands and feet  9/22 Restart robbie  9/25 having some dizziness and hand tremors. Decrease to 100 mg tid  9/26 Continued neuropathy in BLE but stable. No complaints of dizziness/tremors today. Nausea  -antiemetics  9/26 Improving with medication    Elevated LFTs  9/25 monitor overnight. Consider US if continues to increase. 9/26 LFTs improved. Continue to monitor. , , Alkph 276. Breast tenderness  9/26 No abnormality on exam with the exception of right nipple retraction which developed overnight. Will continue to monitor. Shannan SOPs  Supportive care    Goals and plan of care reviewed with the patient. All questions answered to the best of our ability.             Starla Helton [facility] Hematology & Oncology  [address] [address]  Office : [phone]  Fax : [phone]

## 2020-09-26 NOTE — PROGRESS NOTES
Diagnosis: Multiple Myeloma  Transplant Date: 9/23  Day: D+3  Chemo regimen used: [facility] 9/22. Labs not resulted that need follow-up: none  BMT assessments and toxicities completed. 9/23  WBC/ANC+3.0/2.8  Prophylactic medications : will start D+1  Granix start on D+6  Toxicities greater than 2 : none  Patient seen by MD/NP while inpatient & until engraftment. New orders received: none  Issues:   nausea continues but better;diarrhea cont but pt refusing antidiarrheal expressing concern that HTN, nausea, dizziness, and diarrhea r/t meds    2030  Pt c/o breast tenderness and fullness, upon assessing inverted nipples bilaterally noted, pt reports new since SCT. Pt reassured we will continue to monitor and this will be addressed in the a.m.    0350 pt now c/o shobha breast soreness       Bedside report will be given to oncoming RN.

## 2020-09-26 NOTE — PROGRESS NOTES
Diagnosis: MM Transplant Date: 9/23/20 Day: (+/-) +3. Chemo regimen used: Melphalan Labs needed at next visit: Chaz not resulted that need follow-up: IP. Next Appointment scheduled: IP. BMT assessments and toxicities completed. WBC/ANC= 5.1/4.8. Prophylactic medications started none and stopped none. Granix to start on D+6. Toxicities greater than 2 :AST. Patient seen by MD/NP while IP or until engraftment. New orders received: magic mouthwash, immodium. Issues:diarrhea, mild esophagitis, nausea

## 2020-09-27 LAB
ALBUMIN SERPL-MCNC: 2.4 G/DL (ref 3.5–5)
ALBUMIN/GLOB SERPL: 0.7 {RATIO} (ref 1.2–3.5)
ALP SERPL-CCNC: 252 U/L (ref 50–136)
ALT SERPL-CCNC: 109 U/L (ref 12–65)
ANION GAP SERPL CALC-SCNC: 4 MMOL/L (ref 7–16)
AST SERPL-CCNC: 56 U/L (ref 15–37)
BASOPHILS # BLD: 0 K/UL (ref 0–0.2)
BASOPHILS NFR BLD: 0 % (ref 0–2)
BILIRUB SERPL-MCNC: 0.4 MG/DL (ref 0.2–1.1)
BUN SERPL-MCNC: 2 MG/DL (ref 6–23)
CALCIUM SERPL-MCNC: 8.3 MG/DL (ref 8.3–10.4)
CHLORIDE SERPL-SCNC: 107 MMOL/L (ref 98–107)
CO2 SERPL-SCNC: 30 MMOL/L (ref 21–32)
CREAT SERPL-MCNC: 0.42 MG/DL (ref 0.6–1)
DIFFERENTIAL METHOD BLD: ABNORMAL
EOSINOPHIL # BLD: 0 K/UL (ref 0–0.8)
EOSINOPHIL NFR BLD: 1 % (ref 0.5–7.8)
ERYTHROCYTE [DISTWIDTH] IN BLOOD BY AUTOMATED COUNT: 15.3 % (ref 11.9–14.6)
GLOBULIN SER CALC-MCNC: 3.3 G/DL (ref 2.3–3.5)
GLUCOSE SERPL-MCNC: 83 MG/DL (ref 65–100)
HCT VFR BLD AUTO: 25.7 % (ref 35.8–46.3)
HGB BLD-MCNC: 8.2 G/DL (ref 11.7–15.4)
IMM GRANULOCYTES # BLD AUTO: 0 K/UL (ref 0–0.5)
IMM GRANULOCYTES NFR BLD AUTO: 0 % (ref 0–5)
LYMPHOCYTES # BLD: 0.1 K/UL (ref 0.5–4.6)
LYMPHOCYTES NFR BLD: 6 % (ref 13–44)
MAGNESIUM SERPL-MCNC: 2 MG/DL (ref 1.8–2.4)
MCH RBC QN AUTO: 29.1 PG (ref 26.1–32.9)
MCHC RBC AUTO-ENTMCNC: 31.9 G/DL (ref 31.4–35)
MCV RBC AUTO: 91.1 FL (ref 79.6–97.8)
MONOCYTES # BLD: 0 K/UL (ref 0.1–1.3)
MONOCYTES NFR BLD: 0 % (ref 4–12)
NEUTS SEG # BLD: 2.1 K/UL (ref 1.7–8.2)
NEUTS SEG NFR BLD: 92 % (ref 43–78)
NRBC # BLD: 0 K/UL (ref 0–0.2)
PLATELET # BLD AUTO: 172 K/UL (ref 150–450)
PMV BLD AUTO: 10.2 FL (ref 9.4–12.3)
POTASSIUM SERPL-SCNC: 3.6 MMOL/L (ref 3.5–5.1)
PROT SERPL-MCNC: 5.7 G/DL (ref 6.3–8.2)
RBC # BLD AUTO: 2.82 M/UL (ref 4.05–5.2)
SODIUM SERPL-SCNC: 141 MMOL/L (ref 136–145)
WBC # BLD AUTO: 2.2 K/UL (ref 4.3–11.1)

## 2020-09-27 PROCEDURE — 65270000029 HC RM PRIVATE

## 2020-09-27 PROCEDURE — 80053 COMPREHEN METABOLIC PANEL: CPT

## 2020-09-27 PROCEDURE — 36591 DRAW BLOOD OFF VENOUS DEVICE: CPT

## 2020-09-27 PROCEDURE — 99232 SBSQ HOSP IP/OBS MODERATE 35: CPT | Performed by: INTERNAL MEDICINE

## 2020-09-27 PROCEDURE — 74011250637 HC RX REV CODE- 250/637: Performed by: NURSE PRACTITIONER

## 2020-09-27 PROCEDURE — 74011250636 HC RX REV CODE- 250/636: Performed by: NURSE PRACTITIONER

## 2020-09-27 PROCEDURE — 85025 COMPLETE CBC W/AUTO DIFF WBC: CPT

## 2020-09-27 PROCEDURE — 0107U C DIFF TOX AG DETCJ IA STOOL: CPT

## 2020-09-27 PROCEDURE — 83735 ASSAY OF MAGNESIUM: CPT

## 2020-09-27 PROCEDURE — 74011250636 HC RX REV CODE- 250/636: Performed by: INTERNAL MEDICINE

## 2020-09-27 RX ADMIN — MORPHINE SULFATE 60 MG: 30 TABLET, FILM COATED, EXTENDED RELEASE ORAL at 20:52

## 2020-09-27 RX ADMIN — GABAPENTIN 100 MG: 100 CAPSULE ORAL at 16:28

## 2020-09-27 RX ADMIN — LEVOFLOXACIN 500 MG: 500 TABLET, FILM COATED ORAL at 09:22

## 2020-09-27 RX ADMIN — DULOXETINE 30 MG: 30 CAPSULE, DELAYED RELEASE ORAL at 20:54

## 2020-09-27 RX ADMIN — MORPHINE SULFATE 60 MG: 30 TABLET, FILM COATED, EXTENDED RELEASE ORAL at 07:50

## 2020-09-27 RX ADMIN — ACYCLOVIR 400 MG: 800 TABLET ORAL at 07:50

## 2020-09-27 RX ADMIN — ONDANSETRON 8 MG: 2 INJECTION INTRAMUSCULAR; INTRAVENOUS at 14:21

## 2020-09-27 RX ADMIN — GABAPENTIN 100 MG: 100 CAPSULE ORAL at 07:50

## 2020-09-27 RX ADMIN — FLUCONAZOLE 400 MG: 100 TABLET ORAL at 09:22

## 2020-09-27 RX ADMIN — ENOXAPARIN SODIUM 40 MG: 40 INJECTION SUBCUTANEOUS at 20:52

## 2020-09-27 RX ADMIN — ACYCLOVIR 400 MG: 800 TABLET ORAL at 16:28

## 2020-09-27 RX ADMIN — GABAPENTIN 100 MG: 100 CAPSULE ORAL at 20:54

## 2020-09-27 RX ADMIN — AMOXICILLIN AND CLAVULANATE POTASSIUM 1 TABLET: 875; 125 TABLET, FILM COATED ORAL at 07:49

## 2020-09-27 RX ADMIN — ENOXAPARIN SODIUM 40 MG: 40 INJECTION SUBCUTANEOUS at 07:50

## 2020-09-27 RX ADMIN — SODIUM CHLORIDE 75 ML/HR: 900 INJECTION, SOLUTION INTRAVENOUS at 12:00

## 2020-09-27 RX ADMIN — AMOXICILLIN AND CLAVULANATE POTASSIUM 1 TABLET: 875; 125 TABLET, FILM COATED ORAL at 16:28

## 2020-09-27 NOTE — PROGRESS NOTES
Chart screened by SW for new discharge needs, none identified at this time. SW continuing to follow. Please see most recent note from oncology. \"I personally evaluated the patient with Griselda Dove N.P.,  and agree with the assessment, findings and plan as documented. Appears well, heart regular without murmur, lungs clear, abdomen benign. AutoSCT D+4. Nausea improved, some diarrhea now, Imodium PRN if CDT negative or cannot be run due to specimen consistency. Breast pain improved. Mild increase in LFTs improved. CBC declining as expected, WBC 2.2 ANC 2.1, platelets normal, Hgb low but stable. Continue transplant SOPs, inpatient care.  \"    Leah De Leon, [address] [facility]    [address]    * [email or web address]    ( [phone]

## 2020-09-27 NOTE — PROGRESS NOTES
Diagnosis: Multiple Myeloma  Transplant Date: 9/23/20  Day: +4  Chemo regimen used:  Melphalan 9/22. Labs not resulted that need follow-up: none  BMT assessments and toxicities completed. 9/27/20  WBC/ANC= 2.2/2.1   Prophylactic medications : acyclovir, diflucan, levaquin, augmentin  Granix start on D+6  Toxicities greater than 2 : none  Patient seen by MD/NP while inpatient & until engraftment. New orders received: send stool to r/o c diff  Issues: loose BM's, nausea, poor appetite  Pt had 1 large loose BM this shift. Still need to collect stool to send for c diff testing. Zofran given x 1. Pt eating 25% or less of meals. Mouth care completed x 3. END OF SHIFT NOTE:    Intake/Output    Voiding: YES  Catheter: NO  Drain:              Stool:  1 occurrences. Stool Assessment  Stool Color: Brown (09/27/20 0818)  Stool Appearance: Loose (09/27/20 0818)  Stool Amount: Medium (09/27/20 0818)  Stool Source/Status: Rectum (09/26/20 2256)    Emesis:  1 occurrences. VITAL SIGNS  Patient Vitals for the past 12 hrs:   Temp Pulse Resp BP SpO2   09/27/20 1503 97.8 °F (36.6 °C) 77 18 (!) 146/90 100 %   09/27/20 1145 98.3 °F (36.8 °C) 70 18 (!) 146/91 100 %   09/27/20 0730 98.4 °F (36.9 °C) 75 18 133/79 99 %       Pain Assessment  Pain 1  Pain Scale 1: Numeric (0 - 10) (09/27/20 0728)  Pain Intensity 1: 0 (09/27/20 0728)  Patient Stated Pain Goal: 0 (09/27/20 0728)  Pain Reassessment 1: Yes (09/26/20 1500)  Pain Onset 1: pta (09/22/20 0146)  Pain Location 1: Leg (09/25/20 1106)  Pain Orientation 1: Lower (09/25/20 1106)  Pain Description 1: Aching (09/25/20 1106)  Pain Intervention(s) 1: Medication (see MAR) (09/25/20 1106)    Ambulating  Yes        Shift report given to oncoming nurse at the bedside.     Shine Streeter RN

## 2020-09-27 NOTE — PROGRESS NOTES
Diagnosis: Multiple Myeloma  Transplant Date: 9/23/20  Day: +4  Chemo regimen used:  Melphalan 9/22. Labs not resulted that need follow-up: none  BMT assessments and toxicities completed. 9/26/20  WBC/ANC= 2.2/2.1  Prophylactic medications : just got started D+3  Granix start on D+6  Toxicities greater than 2 : none  Patient seen by MD/NP while inpatient & until engraftment. New orders received: none  Issues: diarrhea      END OF SHIFT NOTE:    Intake/Output  09/26 1901 - 09/27 0700  In: 1136 [P.O.:480; I.V.:882]  Out: 2950 [Urine:2550]   Voiding: YES  Catheter: NO  Drain:              Stool:  2 occurrences. Stool Assessment  Stool Color: Brown (09/26/20 2256)  Stool Appearance: Loose (09/26/20 2256)  Stool Amount: Medium (09/26/20 1925)  Stool Source/Status: Rectum (09/26/20 2256)    Emesis:  0 occurrences. VITAL SIGNS  Patient Vitals for the past 12 hrs:   Temp Pulse Resp BP SpO2   09/27/20 0255 98.3 °F (36.8 °C) 77 18 137/80 98 %   09/26/20 2256 98.1 °F (36.7 °C) 79 16 136/79 98 %   09/26/20 1925 98.1 °F (36.7 °C) 71 16 125/71 98 %       Pain Assessment  Pain 1  Pain Scale 1: Numeric (0 - 10) (09/27/20 0255)  Pain Intensity 1: 0 (09/27/20 0255)  Patient Stated Pain Goal: 0 (09/27/20 0255)  Pain Reassessment 1: Yes (09/26/20 1500)  Pain Onset 1: pta (09/22/20 0146)  Pain Location 1: Leg (09/25/20 1106)  Pain Orientation 1: Lower (09/25/20 1106)  Pain Description 1: Aching (09/25/20 1106)  Pain Intervention(s) 1: Medication (see MAR) (09/25/20 1106)    Ambulating  Yes    Additional Information:   Afebrile;no bleeding. x2 loose BM; prn Immodium given. Mouth care instructed;refused MMW but compliant w/ saline rinses. Shift report given to oncoming nurse PHAM Garcia at the bedside.     Bashir Plaza RN

## 2020-09-27 NOTE — PROGRESS NOTES
[facility] Hematology & Oncology        Inpatient Hematology / Oncology Progress Note      Admission Date: 2020 12:02 PM  Reason for Admission/Hospital Course: Multiple myeloma ([address] [C90.00]  Stem cell transplant candidate [Z76.82]      24 Hour Events:  ASCT Day +4  VSS and labs stable, afebrile  Nausea improved, mild diarrhea  LFTs improved    ROS:  Constitutional:  negative for fever, chills, weakness, malaise, fatigue. CV: negative for chest pain, palpitations, edema. Respiratory: negative for dyspnea, cough, wheezing. GI: negative for abdominal pain, diarrhea. +intermittent nausea    10 point review of systems is otherwise negative with the exception of the elements mentioned above in the HPI. Allergies   Allergen Reactions    Zarxio [Filgrastim-Sndz] Shortness of Breath     \"pounding heart\" per pt       OBJECTIVE:  Patient Vitals for the past 8 hrs:   BP Temp Pulse Resp SpO2   20 0730 133/79 98.4 °F (36.9 °C) 75 18 99 %   20 0255 137/80 98.3 °F (36.8 °C) 77 18 98 %     Temp (24hrs), Av.2 °F (36.8 °C), Min:97.7 °F (36.5 °C), Max:98.4 °F (36.9 °C)     0701 -  1900  In: -   Out: 750 [Urine:500]    Physical Exam:  Constitutional: Well developed, well nourished female in no acute distress, sitting comfortably in the hospital bed. HEENT: Normocephalic and atraumatic. Oropharynx is clear, mucous membranes are moist.  Pupils are equal, round, and reactive to light. Extraocular muscles are intact. Sclerae anicteric. Skin Warm and dry. No bruising and no rash noted. No erythema. No pallor. Breast - nipple retraction on right; no rashes/lesions/masses/skin changes (not examined today)   Respiratory Lungs are clear to auscultation bilaterally without wheezes, rales or rhonchi, normal air exchange without accessory muscle use. CVS Normal rate, regular rhythm and normal S1 and S2. No murmurs, gallops, or rubs.    Abdomen Soft, nontender and nondistended, normoactive bowel sounds. No palpable mass. No hepatosplenomegaly. Neuro Grossly nonfocal with no obvious sensory or motor deficits. Neuropathy feet and hands improved. Hand tremors/dizziness   MSK +1 BLE edema. Normal range of motion in general.  No edema and no tenderness. Psych Appropriate mood and affect.         Labs:      Recent Labs     09/27/20 0247 09/26/20 0332 09/25/20  0310   WBC 2.2* 3.0* 5.1   RBC 2.82* 2.79* 3.08*   HGB 8.2* 8.0* 8.9*   HCT 25.7* 25.8* 28.3*   MCV 91.1 92.5 91.9   MCH 29.1 28.7 28.9   MCHC 31.9 31.0* 31.4   RDW 15.3* 15.8* 16.2*    178 207   GRANS 92* 94* 95*   LYMPH 6* 4* 4*   MONOS 0* 1* 1*   EOS 1 0* 0*   BASOS 0 0 0   IG 0 0 0   DF AUTOMATED AUTOMATED AUTOMATED   ANEU 2.1 2.8 4.8   ABL 0.1* 0.1* 0.2*   ABM 0.0* 0.0* 0.1   PRABHJOT 0.0 0.0 0.0   ABB 0.0 0.0 0.0   AIG 0.0 0.0 0.0        Recent Labs     09/27/20 0247 09/26/20 0332 09/25/20 0310    144 144   K 3.6 3.4* 3.1*    108* 108*   CO2 30 32 30   AGAP 4* 4* 6*   GLU 83 81 84   BUN 2* 3* 4*   CREA 0.42* 0.42* 0.48*   GFRAA >60 >60 >60   GFRNA >60 >60 >60   CA 8.3 7.9* 8.1*   * 276* 207*   TP 5.7* 5.5* 6.1*   ALB 2.4* 2.4* 2.8*   GLOB 3.3 3.1 3.3   AGRAT 0.7* 0.8* 0.8*   MG 2.0 2.0 2.0         Imaging:    Medications:  Current Facility-Administered Medications   Medication Dose Route Frequency    fluconazole (DIFLUCAN) tablet 400 mg  400 mg Oral DAILY    levoFLOXacin (LEVAQUIN) tablet 500 mg  500 mg Oral Q24H    amoxicillin-clavulanate (AUGMENTIN) 875-125 mg per tablet 1 Tab  1 Tab Oral BID WITH MEALS    gabapentin (NEURONTIN) capsule 100 mg  100 mg Oral TID    magic mouthwash (VEENA) oral suspension 5 mL  5 mL Oral AC&HS    loperamide (IMODIUM) capsule 2 mg  2 mg Oral Q4H PRN    LORazepam (ATIVAN) tablet 1 mg  1 mg Oral Q8H PRN    ondansetron (ZOFRAN) injection 8 mg  8 mg IntraVENous Q8H PRN    prochlorperazine (COMPAZINE) with saline injection 10 mg  10 mg IntraVENous Q4H PRN    acetaminophen (TYLENOL) tablet 500 mg  500 mg Oral Q6H PRN    acyclovir (ZOVIRAX) tablet 400 mg  400 mg Oral BID    DULoxetine (CYMBALTA) capsule 30 mg  30 mg Oral QHS    HYDROmorphone (DILAUDID) tablet 2 mg  2 mg Oral Q4H PRN    morphine CR (MS CONTIN) tablet 60 mg  60 mg Oral Q12H    0.9% sodium chloride infusion  75 mL/hr IntraVENous CONTINUOUS    enoxaparin (LOVENOX) injection 40 mg  40 mg SubCUTAneous Q12H    central line flush (saline) syringe 10 mL  10 mL InterCATHeter PRN         ASSESSMENT:    Problem List  Date Reviewed: 8/21/2020          Codes Class Noted    Stem cell transplant candidate ICD-10-CM: Z76.82  ICD-9-CM: V49.83  9/21/2020        Multiple myeloma ([facility] ICD-10-CM: C90.00  ICD-9-CM: 203.00  8/13/2020        Multiple myeloma not having achieved remission ([facility] ICD-10-CM: C90.00  ICD-9-CM: 203.00  7/21/2020        Obesity, morbid ([facility] ICD-10-CM: E66.01  ICD-9-CM: 278.01  5/20/2020            Ms. Gemma Ochoa is a 38 y.o. female admitted on 9/21/2020 with a primary diagnosis of There were no encounter diagnoses. .       35 female, , smoker, history of hypertension, upper eyelids stye (felt noninfectious by optho), more recent diagnosis of IgG lambda multiple myeloma, under care of Dr. Kamila Hyde, now kindly referred to us for consideration of autologous stem cell transplant. She is s/p RVD x4 w SIFE CR. Seen today in office 9/21/2020: Cultures from last urine specimen with mixed skin zully indicated of of contamination. Repeat UA unremarkable. Completely asymptomatic today. Finishes nitrofurantoin course today. Denies any fevers or chills, dysuria or hematuria. Extensive ROS unremarkable. Recent labs including myeloma proteins unremarkable. [facility] staying low. No M spike however TORIBIO with IgG lambda band identified. Patient should be able to proceed with HDT/ACT as inpatient. She will be hospitalized through count recovery.       PLAN:  -Multiple myeloma   As above.  Admit to hematology service. Gentle hydration. OBr381/ASCT starting tomorrow. Conditioning regimen will be Melphalan 200 mg/m2. Anti-emetics and anti-diarrheals as needed. - D+1 Abx prophylaxis w Acyclovir, Diflucan, Levaquin and Augmentin  - Granix daily starting D+6.  - Mobilization was w/ granix/Mozobil. Collected CD34+ at 7.08 mil/kg. Will get half back  - S/p RVD x 4. Labs w SIFE CR. Then Bortezomib/Dex alone. - Bone directed therapy and prophylactic medications per primary hematology service. 9/22 Melphalan today  9/23 ASCT Day 0  9/26 D+3. Doing well. Labs and counts are stable. WBC 3.0, Hgb 8.0, plts 178k. 9/27 D+4. WBC 2.2, Hgb 8.2, plts 172k. Peripheral neuropathy hands and feet  9/22 Restart robbie  9/25 having some dizziness and hand tremors. Decrease to 100 mg tid  9/26 Continued neuropathy in BLE but stable. No complaints of dizziness/tremors today. Nausea  -antiemetics  9/26 Improving with medication    Elevated LFTs  9/25 monitor overnight. Consider US if continues to increase. 9/26 LFTs improved. Continue to monitor. , , Alkph 276.   9/27 Improving. , AST 56, Alk phos 252. Breast tenderness  9/26 No abnormality on exam with the exception of right nipple retraction which developed overnight. Will continue to monitor. 9/27 Reports subjective improvement today. Diarrhea/loose stools  9/27 2 loose stools documented overnight; imodium PRN       Shannan SOPs  Supportive care    Goals and plan of care reviewed with the patient. All questions answered to the best of our ability.             SAMI Kerr. [address] Hematology & Oncology  [address] [facility]  [address] [facility]  Office : [phone]  Fax : [phone]

## 2020-09-28 LAB
ALBUMIN SERPL-MCNC: 2.5 G/DL (ref 3.5–5)
ALBUMIN/GLOB SERPL: 0.8 {RATIO} (ref 1.2–3.5)
ALP SERPL-CCNC: 216 U/L (ref 50–136)
ALT SERPL-CCNC: 72 U/L (ref 12–65)
ANION GAP SERPL CALC-SCNC: 6 MMOL/L (ref 7–16)
AST SERPL-CCNC: 26 U/L (ref 15–37)
BASOPHILS # BLD: 0 K/UL (ref 0–0.2)
BASOPHILS NFR BLD: 0 % (ref 0–2)
BILIRUB SERPL-MCNC: 0.3 MG/DL (ref 0.2–1.1)
BUN SERPL-MCNC: 1 MG/DL (ref 6–23)
C DIFF GDH STL QL: ABNORMAL
C DIFF TOX A+B STL QL IA: ABNORMAL
CALCIUM SERPL-MCNC: 8.2 MG/DL (ref 8.3–10.4)
CHLORIDE SERPL-SCNC: 108 MMOL/L (ref 98–107)
CLINICAL CONSIDERATION: ABNORMAL
CO2 SERPL-SCNC: 30 MMOL/L (ref 21–32)
CREAT SERPL-MCNC: 0.4 MG/DL (ref 0.6–1)
DIFFERENTIAL METHOD BLD: ABNORMAL
EOSINOPHIL # BLD: 0 K/UL (ref 0–0.8)
EOSINOPHIL NFR BLD: 1 % (ref 0.5–7.8)
ERYTHROCYTE [DISTWIDTH] IN BLOOD BY AUTOMATED COUNT: 15.1 % (ref 11.9–14.6)
GGT SERPL-CCNC: 135 U/L (ref 5–55)
GLOBULIN SER CALC-MCNC: 3 G/DL (ref 2.3–3.5)
GLUCOSE SERPL-MCNC: 86 MG/DL (ref 65–100)
HCT VFR BLD AUTO: 25 % (ref 35.8–46.3)
HGB BLD-MCNC: 8 G/DL (ref 11.7–15.4)
IMM GRANULOCYTES # BLD AUTO: 0 K/UL (ref 0–0.5)
IMM GRANULOCYTES NFR BLD AUTO: 1 % (ref 0–5)
INTERPRETATION: ABNORMAL
LDH SERPL L TO P-CCNC: 229 U/L (ref 100–190)
LYMPHOCYTES # BLD: 0.1 K/UL (ref 0.5–4.6)
LYMPHOCYTES NFR BLD: 11 % (ref 13–44)
MAGNESIUM SERPL-MCNC: 1.9 MG/DL (ref 1.8–2.4)
MCH RBC QN AUTO: 28.8 PG (ref 26.1–32.9)
MCHC RBC AUTO-ENTMCNC: 32 G/DL (ref 31.4–35)
MCV RBC AUTO: 89.9 FL (ref 79.6–97.8)
MONOCYTES # BLD: 0 K/UL (ref 0.1–1.3)
MONOCYTES NFR BLD: 2 % (ref 4–12)
NEUTS SEG # BLD: 1 K/UL (ref 1.7–8.2)
NEUTS SEG NFR BLD: 86 % (ref 43–78)
NRBC # BLD: 0 K/UL (ref 0–0.2)
PCR REFLEX: ABNORMAL
PLATELET # BLD AUTO: 155 K/UL (ref 150–450)
PMV BLD AUTO: 9.7 FL (ref 9.4–12.3)
POTASSIUM SERPL-SCNC: 3.3 MMOL/L (ref 3.5–5.1)
PROT SERPL-MCNC: 5.5 G/DL (ref 6.3–8.2)
RBC # BLD AUTO: 2.78 M/UL (ref 4.05–5.2)
SODIUM SERPL-SCNC: 144 MMOL/L (ref 136–145)
WBC # BLD AUTO: 1.2 K/UL (ref 4.3–11.1)

## 2020-09-28 PROCEDURE — 83735 ASSAY OF MAGNESIUM: CPT

## 2020-09-28 PROCEDURE — 74011250636 HC RX REV CODE- 250/636: Performed by: INTERNAL MEDICINE

## 2020-09-28 PROCEDURE — 2709999900 HC NON-CHARGEABLE SUPPLY

## 2020-09-28 PROCEDURE — 85025 COMPLETE CBC W/AUTO DIFF WBC: CPT

## 2020-09-28 PROCEDURE — 82977 ASSAY OF GGT: CPT

## 2020-09-28 PROCEDURE — 80053 COMPREHEN METABOLIC PANEL: CPT

## 2020-09-28 PROCEDURE — 74011250636 HC RX REV CODE- 250/636: Performed by: NURSE PRACTITIONER

## 2020-09-28 PROCEDURE — 99233 SBSQ HOSP IP/OBS HIGH 50: CPT | Performed by: INTERNAL MEDICINE

## 2020-09-28 PROCEDURE — 74011250637 HC RX REV CODE- 250/637: Performed by: NURSE PRACTITIONER

## 2020-09-28 PROCEDURE — 65270000029 HC RM PRIVATE

## 2020-09-28 PROCEDURE — 74011000250 HC RX REV CODE- 250: Performed by: NURSE PRACTITIONER

## 2020-09-28 PROCEDURE — 36591 DRAW BLOOD OFF VENOUS DEVICE: CPT

## 2020-09-28 PROCEDURE — 83615 LACTATE (LD) (LDH) ENZYME: CPT

## 2020-09-28 RX ORDER — VANCOMYCIN HYDROCHLORIDE 125 MG/1
125 CAPSULE ORAL 4 TIMES DAILY
Status: DISCONTINUED | OUTPATIENT
Start: 2020-09-28 | End: 2020-09-28 | Stop reason: SDUPTHER

## 2020-09-28 RX ORDER — POTASSIUM CHLORIDE 29.8 MG/ML
40 INJECTION INTRAVENOUS ONCE
Status: COMPLETED | OUTPATIENT
Start: 2020-09-28 | End: 2020-09-28

## 2020-09-28 RX ADMIN — DULOXETINE 30 MG: 30 CAPSULE, DELAYED RELEASE ORAL at 21:10

## 2020-09-28 RX ADMIN — SODIUM CHLORIDE 75 ML/HR: 900 INJECTION, SOLUTION INTRAVENOUS at 00:59

## 2020-09-28 RX ADMIN — FLUCONAZOLE 400 MG: 100 TABLET ORAL at 08:46

## 2020-09-28 RX ADMIN — GABAPENTIN 100 MG: 100 CAPSULE ORAL at 08:46

## 2020-09-28 RX ADMIN — MORPHINE SULFATE 60 MG: 30 TABLET, FILM COATED, EXTENDED RELEASE ORAL at 21:04

## 2020-09-28 RX ADMIN — VANCOMYCIN HYDROCHLORIDE 125 MG: 5 INJECTION, POWDER, LYOPHILIZED, FOR SOLUTION INTRAVENOUS at 21:10

## 2020-09-28 RX ADMIN — POTASSIUM CHLORIDE 40 MEQ: 400 INJECTION, SOLUTION INTRAVENOUS at 06:23

## 2020-09-28 RX ADMIN — AMOXICILLIN AND CLAVULANATE POTASSIUM 1 TABLET: 875; 125 TABLET, FILM COATED ORAL at 08:47

## 2020-09-28 RX ADMIN — ENOXAPARIN SODIUM 40 MG: 40 INJECTION SUBCUTANEOUS at 08:49

## 2020-09-28 RX ADMIN — ENOXAPARIN SODIUM 40 MG: 40 INJECTION SUBCUTANEOUS at 21:04

## 2020-09-28 RX ADMIN — LEVOFLOXACIN 500 MG: 500 TABLET, FILM COATED ORAL at 11:01

## 2020-09-28 RX ADMIN — GABAPENTIN 100 MG: 100 CAPSULE ORAL at 21:04

## 2020-09-28 RX ADMIN — MORPHINE SULFATE 60 MG: 30 TABLET, FILM COATED, EXTENDED RELEASE ORAL at 08:47

## 2020-09-28 RX ADMIN — GABAPENTIN 100 MG: 100 CAPSULE ORAL at 16:11

## 2020-09-28 RX ADMIN — ACYCLOVIR 400 MG: 800 TABLET ORAL at 08:48

## 2020-09-28 RX ADMIN — SODIUM CHLORIDE 75 ML/HR: 900 INJECTION, SOLUTION INTRAVENOUS at 18:16

## 2020-09-28 RX ADMIN — AMOXICILLIN AND CLAVULANATE POTASSIUM 1 TABLET: 875; 125 TABLET, FILM COATED ORAL at 16:11

## 2020-09-28 RX ADMIN — ACYCLOVIR 400 MG: 800 TABLET ORAL at 18:10

## 2020-09-28 RX ADMIN — Medication 10 ML: at 21:05

## 2020-09-28 RX ADMIN — VANCOMYCIN HYDROCHLORIDE 125 MG: 5 INJECTION, POWDER, LYOPHILIZED, FOR SOLUTION INTRAVENOUS at 16:11

## 2020-09-28 NOTE — PROGRESS NOTES
Problem: Falls - Risk of  Goal: *Absence of Falls  Description: Document Yi Izquierdo Fall Risk and appropriate interventions in the flowsheet.   Outcome: Progressing Towards Goal  Note: Fall Risk Interventions:            Medication Interventions: Evaluate medications/consider consulting pharmacy    Elimination Interventions: Call light in reach    History of Falls Interventions: Evaluate medications/consider consulting pharmacy         Problem: Pain  Goal: *Control of Pain  Outcome: Progressing Towards Goal     Problem: Nausea/Vomiting (Adult)  Goal: *Absence of nausea/vomiting  Outcome: Progressing Towards Goal     Problem: BMT-Respiratory: Discharge Outcomes  Goal: *Demonstrates ability to perform prescribed activity without shortness of breath or discomfort  Outcome: Progressing Towards Goal  Goal: *Verbalizes understanding and describes prescribed diet  Outcome: Progressing Towards Goal  Goal: *Describes follow-up/return visits to physicians  Outcome: Progressing Towards Goal  Goal: *Describes home care/support arrangements established based on need  Outcome: Progressing Towards Goal  Goal: *Describes available resources and support systems  Outcome: Progressing Towards Goal  Goal: *Anxiety reduced or absent  Outcome: Progressing Towards Goal  Goal: *Understands and describes signs and symptoms to report to providers(Stroke Metric)  Outcome: Progressing Towards Goal  Goal: *Hemodynamically stable  Outcome: Progressing Towards Goal  Goal: *Effective airway maintenance  Outcome: Progressing Towards Goal  Goal: *Tolerating diet  Outcome: Progressing Towards Goal  Goal: *Verbalizes understanding and describes medication purposes and frequencies  Outcome: Progressing Towards Goal     Problem: Chemotherapy, Day 3 to Discharge  Goal: Off Pathway (Use only if patient is Off Pathway)  Outcome: Progressing Towards Goal  Goal: Activity/Safety  Outcome: Progressing Towards Goal  Goal: Consults, if ordered  Outcome: Progressing Towards Goal  Goal: Diagnostic Test/Procedures  Outcome: Progressing Towards Goal  Goal: Nutrition/Diet  Outcome: Progressing Towards Goal  Goal: Discharge Planning  Outcome: Progressing Towards Goal  Goal: Medications  Outcome: Progressing Towards Goal  Goal: Respiratory  Outcome: Progressing Towards Goal  Goal: Treatments/Interventions/Procedures  Outcome: Progressing Towards Goal  Goal: Psychosocial  Outcome: Progressing Towards Goal  Goal: *Optimal pain control at patient's stated goal  Outcome: Progressing Towards Goal  Goal: *Hemodynamically stable  Outcome: Progressing Towards Goal  Goal: *Adequate oxygenation  Outcome: Progressing Towards Goal     Problem: Diarrhea (Adult and Pediatrics)  Goal: *Absence of diarrhea  Outcome: Progressing Towards Goal  Goal: *PALLIATIVE CARE:  Absence of diarrhea  Outcome: Progressing Towards Goal     Problem: Patient Education: Go to Patient Education Activity  Goal: Patient/Family Education  Outcome: Progressing Towards Goal

## 2020-09-28 NOTE — PROGRESS NOTES
Care Management Interventions  Palliative Care Criteria Met (RRAT>21 & CHF Dx)?: No(Risk13% Dx Multiple Myeloma )  Transition of Care Consult (CM Consult): Discharge Planning  Discharge Durable Medical Equipment: No  Physical Therapy Consult: No  Occupational Therapy Consult: No  Speech Therapy Consult: No  Current Support Network: Own Home  Confirm Follow Up Transport: Family  The Patient and/or Patient Representative was Provided with a Choice of Provider and Agrees with the Discharge Plan?: Yes  Discharge Location  Discharge Placement: Home with family assistance  Patient 37 yo female admitted with Multiple Myeloma for treatment. She is to remain inpatient until treatment completed and counts are okay. Prior to admission patient lives with her children ages 15, 13 and 23 and has 22 yo daughter. She states her care givers will be Tony Bundy mother) [phone] or her 22 yo daughter. She requires no DME and has transportation. She has Medicaid and obtains medications at [facility] [phone]. She lives in [place], [place]. Current d/c plan is home with family when medically stable. CM following.

## 2020-09-28 NOTE — PROGRESS NOTES
Multiple Myeloma  Transplant Date 9/23/2020  Auto High dose date 9/22/2020 Melphalan  BMT assessments and toxicities completed 9/28/2020  WBC/ANC= 1.2/1.0  Prophylactic medications started Started D+3, ACV/Augmentin/ Levaquin/Diflucan  Toxicities greater than 2 none  Patient seen by MD/NP  Daily until engraftment. Issues requiring new orders Positive Cdif started oral vanc  Oral care performed 3 times this shift  No PRN medications given this shift.   Shift report to be given at bedside to oncoming PHAM Boateng

## 2020-09-28 NOTE — PROGRESS NOTES
Diagnosis: Multiple Myeloma  Transplant Date: 9/23/20  Day: +5  Chemo regimen used:  Melphalan 9/22. Labs not resulted that need follow-up: C-Diff pending  BMT assessments and toxicities completed. 9/27/20  WBC/ANC= 1.2/1.0  Prophylactic medications : Started D+3, ACV/Augmentin/ Levaquin/Diflucan  Granix to start on D+6  Toxicities greater than 2 : none  Patient seen by MD/NP while inpatient & until engraftment. New orders received: KCL bolus per SOP  Issues: diarrhea, stool for C-Diff pending    END OF SHIFT NOTE: 7p ~ 7a    Intake/Output  24 I&O = -58cc  Voiding: YES      Stool:  2 occurrences. Stool Assessment  Stool Color: Brown;Green (09/28/20 0732)  Stool Appearance: Loose;Mucous (09/28/20 0732)  Stool Amount: Small (09/28/20 0732)  Stool Source/Status: Rectum (09/28/20 0732)    Emesis:  0 occurrences. VITAL SIGNS  Patient Vitals for the past 12 hrs:   Temp Pulse Resp BP SpO2   09/28/20 0727 98.1 °F (36.7 °C) 94 19 (!) 146/101 99 %   09/28/20 0430 98 °F (36.7 °C) 86 18 (!) 144/85 98 %   09/27/20 2358 98.3 °F (36.8 °C) 83 14 132/81 97 %       Pain Assessment  Pain 1  Pain Scale 1: Numeric (0 - 10) (09/28/20 0430)  Pain Intensity 1: 0 (09/28/20 0430)  Patient Stated Pain Goal: 0 (09/28/20 0430)  Pain Reassessment 1: Yes (09/26/20 1500)  Pain Onset 1: pta (09/22/20 0146)  Pain Location 1: Leg (09/25/20 1106)  Pain Orientation 1: Lower (09/25/20 1106)  Pain Description 1: Aching (09/25/20 1106)  Pain Intervention(s) 1: Medication (see MAR) (09/25/20 1106)    Ambulating  Yes    Additional Information: Stool specimen sent for C-Diff per MD order, results pending. Afebrile. Voices no c/o. Continuing with current POC. Shift report given to Yinka Lees RN at the bedside.     Kalani Vieyra RN

## 2020-09-28 NOTE — PROGRESS NOTES
[facility] Hematology & Oncology        Inpatient Hematology / Oncology Progress Note      Admission Date: 2020 12:02 PM  Reason for Admission/Hospital Course: Multiple myeloma ([address] [C90.00]  Stem cell transplant candidate [Z76.82]      24 Hour Events:  ASCT Day +5  VSS and labs stable, afebrile  Nausea improved  LFTs improved    ROS:  Constitutional:  negative for fever, chills, weakness, malaise, fatigue. CV: negative for chest pain, palpitations, edema. Respiratory: negative for dyspnea, cough, wheezing. GI: negative for abdominal pain, diarrhea. +intermittent nausea    10 point review of systems is otherwise negative with the exception of the elements mentioned above in the HPI. Allergies   Allergen Reactions    Zarxio [Filgrastim-Sndz] Shortness of Breath     \"pounding heart\" per pt       OBJECTIVE:  Patient Vitals for the past 8 hrs:   BP Temp Pulse Resp SpO2 Weight   20 1057 139/82 98.1 °F (36.7 °C) 67 16 97 %    20 0826      221 lb 12.8 oz (100.6 kg)   20 0727 (!) 146/101 98.1 °F (36.7 °C) 94 19 99 %    20 0430 (!) 144/85 98 °F (36.7 °C) 86 18 98 %      Temp (24hrs), Av.1 °F (36.7 °C), Min:97.8 °F (36.6 °C), Max:98.3 °F (36.8 °C)    701 -  1900  In: 18 [P.O.:342]  Out: 1600 [Urine:1100]    Physical Exam:  Constitutional: Well developed, well nourished female in no acute distress, sitting comfortably in the hospital bed. HEENT: Normocephalic and atraumatic. Oropharynx is clear, mucous membranes are moist.  Pupils are equal, round, and reactive to light. Extraocular muscles are intact. Sclerae anicteric. Skin Warm and dry. No bruising and no rash noted. No erythema. No pallor. Breast - nipple retraction on right; no rashes/lesions/masses/skin changes   Respiratory Lungs are clear to auscultation bilaterally without wheezes, rales or rhonchi, normal air exchange without accessory muscle use.     CVS Normal rate, regular rhythm and normal S1 and S2. No murmurs, gallops, or rubs. Abdomen Soft, nontender and nondistended, normoactive bowel sounds. No palpable mass. No hepatosplenomegaly. Neuro Grossly nonfocal with no obvious sensory or motor deficits. Neuropathy feet and hands improved. Hand tremors/dizziness   MSK +1 BLE edema. Normal range of motion in general.  No edema and no tenderness. Psych Appropriate mood and affect.         Labs:      Recent Labs     09/28/20 0419 09/27/20 0247 09/26/20 0332   WBC 1.2* 2.2* 3.0*   RBC 2.78* 2.82* 2.79*   HGB 8.0* 8.2* 8.0*   HCT 25.0* 25.7* 25.8*   MCV 89.9 91.1 92.5   MCH 28.8 29.1 28.7   MCHC 32.0 31.9 31.0*   RDW 15.1* 15.3* 15.8*    172 178   GRANS 86* 92* 94*   LYMPH 11* 6* 4*   MONOS 2* 0* 1*   EOS 1 1 0*   BASOS 0 0 0   IG 1 0 0   DF AUTOMATED AUTOMATED AUTOMATED   ANEU 1.0* 2.1 2.8   ABL 0.1* 0.1* 0.1*   ABM 0.0* 0.0* 0.0*   PRABHJOT 0.0 0.0 0.0   ABB 0.0 0.0 0.0   AIG 0.0 0.0 0.0        Recent Labs     09/28/20 0419 09/27/20 0247 09/26/20 0332    141 144   K 3.3* 3.6 3.4*   * 107 108*   CO2 30 30 32   AGAP 6* 4* 4*   GLU 86 83 81   BUN 1* 2* 3*   CREA 0.40* 0.42* 0.42*   GFRAA >60 >60 >60   GFRNA >60 >60 >60   CA 8.2* 8.3 7.9*   * 252* 276*   TP 5.5* 5.7* 5.5*   ALB 2.5* 2.4* 2.4*   GLOB 3.0 3.3 3.1   AGRAT 0.8* 0.7* 0.8*   MG 1.9 2.0 2.0         Imaging:    Medications:  Current Facility-Administered Medications   Medication Dose Route Frequency    fluconazole (DIFLUCAN) tablet 400 mg  400 mg Oral DAILY    levoFLOXacin (LEVAQUIN) tablet 500 mg  500 mg Oral Q24H    amoxicillin-clavulanate (AUGMENTIN) 875-125 mg per tablet 1 Tab  1 Tab Oral BID WITH MEALS    gabapentin (NEURONTIN) capsule 100 mg  100 mg Oral TID    magic mouthwash (VEENA) oral suspension 5 mL  5 mL Oral AC&HS    loperamide (IMODIUM) capsule 2 mg  2 mg Oral Q4H PRN    LORazepam (ATIVAN) tablet 1 mg  1 mg Oral Q8H PRN    ondansetron (ZOFRAN) injection 8 mg  8 mg IntraVENous Q8H PRN    prochlorperazine (COMPAZINE) with saline injection 10 mg  10 mg IntraVENous Q4H PRN    acetaminophen (TYLENOL) tablet 500 mg  500 mg Oral Q6H PRN    acyclovir (ZOVIRAX) tablet 400 mg  400 mg Oral BID    DULoxetine (CYMBALTA) capsule 30 mg  30 mg Oral QHS    HYDROmorphone (DILAUDID) tablet 2 mg  2 mg Oral Q4H PRN    morphine CR (MS CONTIN) tablet 60 mg  60 mg Oral Q12H    0.9% sodium chloride infusion  75 mL/hr IntraVENous CONTINUOUS    enoxaparin (LOVENOX) injection 40 mg  40 mg SubCUTAneous Q12H    central line flush (saline) syringe 10 mL  10 mL InterCATHeter PRN         ASSESSMENT:    Problem List  Date Reviewed: 8/21/2020          Codes Class Noted    Stem cell transplant candidate ICD-10-CM: Z76.82  ICD-9-CM: V49.83  9/21/2020        Multiple myeloma ([facility] ICD-10-CM: C90.00  ICD-9-CM: 203.00  8/13/2020        Multiple myeloma not having achieved remission ([facility] ICD-10-CM: C90.00  ICD-9-CM: 203.00  7/21/2020        Obesity, morbid ([facility] ICD-10-CM: E66.01  ICD-9-CM: 278.01  5/20/2020            Ms. Cece Edge is a 38 y.o. female admitted on 9/21/2020 with a primary diagnosis of There were no encounter diagnoses. .       35 female, , smoker, history of hypertension, upper eyelids stye (felt noninfectious by optho), more recent diagnosis of IgG lambda multiple myeloma, under care of Dr. Taylor Crews, now kindly referred to us for consideration of autologous stem cell transplant. She is s/p RVD x4 w SIFE CR. Seen today in office 9/21/2020: Cultures from last urine specimen with mixed skin zully indicated of of contamination. Repeat UA unremarkable. Completely asymptomatic today. Finishes nitrofurantoin course today. Denies any fevers or chills, dysuria or hematuria. Extensive ROS unremarkable. Recent labs including myeloma proteins unremarkable. [address] staying low. No M spike however TORIBIO with IgG lambda band identified. Patient should be able to proceed with HDT/ACT as inpatient.   She will be hospitalized through count recovery.       PLAN:  -Multiple myeloma   As above. Admit to hematology service. Gentle hydration. YRu524/ASCT starting tomorrow. Conditioning regimen will be Melphalan 200 mg/m2. Anti-emetics and anti-diarrheals as needed. - D+1 Abx prophylaxis w Acyclovir, Diflucan, Levaquin and Augmentin  - Granix daily starting D+6.  - Mobilization was w/ granix/Mozobil. Collected CD34+ at 7.08 mil/kg. Will get half back  - S/p RVD x 4. Labs w SIFE CR. Then Bortezomib/Dex alone. - Bone directed therapy and prophylactic medications per primary hematology service. 9/22 Melphalan today  9/23 ASCT Day 0  9/26 D+3. Doing well. Labs and counts are stable. WBC 3.0, Hgb 8.0, plts 178k. 9/28 D+5. Counts dropping as expected    Peripheral neuropathy hands and feet  9/22 Restart robbie  9/25 having some dizziness and hand tremors. Decrease to 100 mg tid  9/26 Continued neuropathy in BLE but stable. No complaints of dizziness/tremors today. Nausea  -antiemetics  9/26 Improving with medication    Elevated LFTs  9/25 monitor overnight. Consider US if continues to increase. 9/26 LFTs improved. Continue to monitor. , , Alkph 276.   9/28 continues to trend down    Breast tenderness  9/26 No abnormality on exam with the exception of right nipple retraction which developed overnight. Will continue to monitor. Shannan SOPs  Supportive care    Goals and plan of care reviewed with the patient. All questions answered to the best of our ability. Nomi Casanova NP   [address] [place] [address] Hematology & Oncology  [id number] [place] [address]  Office : [phone]  Fax : [phone]         Attending Addendum:  I have personally performed a face to face diagnostic evaluation on this patient.  I have reviewed and agree with the care plan as documented above by Nomi Casanova N.FISH.  My findings are as follows: Patient appears lethargic, heart rate regular without murmurs, abdomen is non-tender, bowel sounds are positive. 35 female history of hypertension, upper eyelids stye, IgG lambda multiple myeloma, standard risk, status post RVD, now undergoing Margarita 200/ASCT D+5. Tolerating therapy reasonably well. Continue with prophylactic antibiotics including acyclovir, Diflucan, and levaquin. She will start Granix daily on day 6. Transfusion support as needed. Counts dropping as to be expected. LFTs being monitored, improving. Loose stools: C. difficile positive, now on p.o. vancomycin. Will stop prophylactic po augmentin.              Total time 35 min 50% in direct consultation about the patient's diagnosis and management          Jeanine Rojas MD  [facility] Hematology/Oncology  [id number] [place] [address]  Office : [phone]  Fax : [phone]

## 2020-09-29 LAB
ALBUMIN SERPL-MCNC: 2.6 G/DL (ref 3.5–5)
ALBUMIN/GLOB SERPL: 0.8 {RATIO} (ref 1.2–3.5)
ALP SERPL-CCNC: 203 U/L (ref 50–136)
ALT SERPL-CCNC: 55 U/L (ref 12–65)
ANION GAP SERPL CALC-SCNC: 6 MMOL/L (ref 7–16)
AST SERPL-CCNC: 26 U/L (ref 15–37)
BASOPHILS # BLD: 0 K/UL (ref 0–0.2)
BASOPHILS NFR BLD: 0 % (ref 0–2)
BILIRUB SERPL-MCNC: 0.3 MG/DL (ref 0.2–1.1)
BUN SERPL-MCNC: 2 MG/DL (ref 6–23)
CALCIUM SERPL-MCNC: 8.2 MG/DL (ref 8.3–10.4)
CHLORIDE SERPL-SCNC: 108 MMOL/L (ref 98–107)
CO2 SERPL-SCNC: 28 MMOL/L (ref 21–32)
CREAT SERPL-MCNC: 0.39 MG/DL (ref 0.6–1)
DIFFERENTIAL METHOD BLD: ABNORMAL
EOSINOPHIL # BLD: 0 K/UL (ref 0–0.8)
EOSINOPHIL NFR BLD: 0 % (ref 0.5–7.8)
ERYTHROCYTE [DISTWIDTH] IN BLOOD BY AUTOMATED COUNT: 15 % (ref 11.9–14.6)
GLOBULIN SER CALC-MCNC: 3.1 G/DL (ref 2.3–3.5)
GLUCOSE SERPL-MCNC: 86 MG/DL (ref 65–100)
HCT VFR BLD AUTO: 26 % (ref 35.8–46.3)
HGB BLD-MCNC: 8.3 G/DL (ref 11.7–15.4)
IMM GRANULOCYTES # BLD AUTO: 0 K/UL (ref 0–0.5)
IMM GRANULOCYTES NFR BLD AUTO: 2 % (ref 0–5)
LYMPHOCYTES # BLD: 0.1 K/UL (ref 0.5–4.6)
LYMPHOCYTES NFR BLD: 19 % (ref 13–44)
MAGNESIUM SERPL-MCNC: 1.9 MG/DL (ref 1.8–2.4)
MCH RBC QN AUTO: 29 PG (ref 26.1–32.9)
MCHC RBC AUTO-ENTMCNC: 31.9 G/DL (ref 31.4–35)
MCV RBC AUTO: 90.9 FL (ref 79.6–97.8)
MONOCYTES # BLD: 0 K/UL (ref 0.1–1.3)
MONOCYTES NFR BLD: 2 % (ref 4–12)
NEUTS SEG # BLD: 0.5 K/UL (ref 1.7–8.2)
NEUTS SEG NFR BLD: 77 % (ref 43–78)
NRBC # BLD: 0 K/UL (ref 0–0.2)
PLATELET # BLD AUTO: 136 K/UL (ref 150–450)
PLATELET COMMENTS,PCOM: ABNORMAL
PMV BLD AUTO: 10 FL (ref 9.4–12.3)
POTASSIUM SERPL-SCNC: 3.5 MMOL/L (ref 3.5–5.1)
PROT SERPL-MCNC: 5.7 G/DL (ref 6.3–8.2)
RBC # BLD AUTO: 2.86 M/UL (ref 4.05–5.2)
RBC MORPH BLD: ABNORMAL
SODIUM SERPL-SCNC: 142 MMOL/L (ref 136–145)
WBC # BLD AUTO: 0.6 K/UL (ref 4.3–11.1)
WBC MORPH BLD: ABNORMAL

## 2020-09-29 PROCEDURE — 74011250636 HC RX REV CODE- 250/636: Performed by: NURSE PRACTITIONER

## 2020-09-29 PROCEDURE — 74011250637 HC RX REV CODE- 250/637: Performed by: INTERNAL MEDICINE

## 2020-09-29 PROCEDURE — 74011250637 HC RX REV CODE- 250/637: Performed by: NURSE PRACTITIONER

## 2020-09-29 PROCEDURE — 99233 SBSQ HOSP IP/OBS HIGH 50: CPT | Performed by: INTERNAL MEDICINE

## 2020-09-29 PROCEDURE — 80053 COMPREHEN METABOLIC PANEL: CPT

## 2020-09-29 PROCEDURE — C1751 CATH, INF, PER/CENT/MIDLINE: HCPCS

## 2020-09-29 PROCEDURE — 74011000250 HC RX REV CODE- 250: Performed by: NURSE PRACTITIONER

## 2020-09-29 PROCEDURE — 36591 DRAW BLOOD OFF VENOUS DEVICE: CPT

## 2020-09-29 PROCEDURE — 83735 ASSAY OF MAGNESIUM: CPT

## 2020-09-29 PROCEDURE — 65270000029 HC RM PRIVATE

## 2020-09-29 PROCEDURE — 85025 COMPLETE CBC W/AUTO DIFF WBC: CPT

## 2020-09-29 PROCEDURE — 2709999900 HC NON-CHARGEABLE SUPPLY

## 2020-09-29 RX ORDER — DEXTROSE MONOHYDRATE AND SODIUM CHLORIDE 5; .45 G/100ML; G/100ML
1000 INJECTION, SOLUTION INTRAVENOUS
Status: CANCELLED | OUTPATIENT
Start: 2020-09-29

## 2020-09-29 RX ORDER — ONDANSETRON 2 MG/ML
8 INJECTION INTRAMUSCULAR; INTRAVENOUS
Status: CANCELLED | OUTPATIENT
Start: 2020-09-29

## 2020-09-29 RX ORDER — DEXTROSE, SODIUM CHLORIDE, AND POTASSIUM CHLORIDE 5; .45; .15 G/100ML; G/100ML; G/100ML
1000 INJECTION INTRAVENOUS
Status: CANCELLED | OUTPATIENT
Start: 2020-09-29

## 2020-09-29 RX ORDER — LORATADINE 10 MG/1
10 TABLET ORAL DAILY
Status: DISCONTINUED | OUTPATIENT
Start: 2020-09-29 | End: 2020-10-06 | Stop reason: HOSPADM

## 2020-09-29 RX ADMIN — VANCOMYCIN HYDROCHLORIDE 125 MG: 5 INJECTION, POWDER, LYOPHILIZED, FOR SOLUTION INTRAVENOUS at 14:14

## 2020-09-29 RX ADMIN — MORPHINE SULFATE 60 MG: 30 TABLET, FILM COATED, EXTENDED RELEASE ORAL at 07:56

## 2020-09-29 RX ADMIN — VANCOMYCIN HYDROCHLORIDE 125 MG: 5 INJECTION, POWDER, LYOPHILIZED, FOR SOLUTION INTRAVENOUS at 22:05

## 2020-09-29 RX ADMIN — LEVOFLOXACIN 500 MG: 500 TABLET, FILM COATED ORAL at 11:06

## 2020-09-29 RX ADMIN — GABAPENTIN 100 MG: 100 CAPSULE ORAL at 21:58

## 2020-09-29 RX ADMIN — GABAPENTIN 100 MG: 100 CAPSULE ORAL at 07:56

## 2020-09-29 RX ADMIN — ACYCLOVIR 400 MG: 800 TABLET ORAL at 07:56

## 2020-09-29 RX ADMIN — ENOXAPARIN SODIUM 40 MG: 40 INJECTION SUBCUTANEOUS at 07:56

## 2020-09-29 RX ADMIN — MORPHINE SULFATE 60 MG: 30 TABLET, FILM COATED, EXTENDED RELEASE ORAL at 21:58

## 2020-09-29 RX ADMIN — FLUCONAZOLE 400 MG: 100 TABLET ORAL at 11:06

## 2020-09-29 RX ADMIN — DULOXETINE 30 MG: 30 CAPSULE, DELAYED RELEASE ORAL at 22:00

## 2020-09-29 RX ADMIN — SODIUM CHLORIDE 75 ML/HR: 900 INJECTION, SOLUTION INTRAVENOUS at 05:28

## 2020-09-29 RX ADMIN — ENOXAPARIN SODIUM 40 MG: 40 INJECTION SUBCUTANEOUS at 21:58

## 2020-09-29 RX ADMIN — ACYCLOVIR 400 MG: 800 TABLET ORAL at 17:05

## 2020-09-29 RX ADMIN — VANCOMYCIN HYDROCHLORIDE 125 MG: 5 INJECTION, POWDER, LYOPHILIZED, FOR SOLUTION INTRAVENOUS at 07:56

## 2020-09-29 RX ADMIN — LORATADINE 10 MG: 10 TABLET ORAL at 14:14

## 2020-09-29 RX ADMIN — GABAPENTIN 100 MG: 100 CAPSULE ORAL at 17:05

## 2020-09-29 RX ADMIN — TBO-FILGRASTIM 480 MCG: 480 INJECTION, SOLUTION SUBCUTANEOUS at 11:06

## 2020-09-29 RX ADMIN — VANCOMYCIN HYDROCHLORIDE 125 MG: 5 INJECTION, POWDER, LYOPHILIZED, FOR SOLUTION INTRAVENOUS at 17:05

## 2020-09-29 RX ADMIN — SODIUM CHLORIDE 75 ML/HR: 900 INJECTION, SOLUTION INTRAVENOUS at 21:55

## 2020-09-29 RX ADMIN — ACETAMINOPHEN 500 MG: 500 TABLET, FILM COATED ORAL at 19:55

## 2020-09-29 NOTE — PROGRESS NOTES
[facility] Hematology & Oncology        Inpatient Hematology / Oncology Progress Note      Admission Date: 2020 12:02 PM  Reason for Admission/Hospital Course: Multiple myeloma ([facility] [C90.00]  Stem cell transplant candidate [Z76.82]      24 Hour Events:  ASCT Day +6  VSS   Afebrile  Cdiff positive-on oral vanc (Augmentin stopped)    ROS:  Constitutional:  negative for fever, chills, weakness, malaise, fatigue. CV: negative for chest pain, palpitations, edema. Respiratory: negative for dyspnea, cough, wheezing. GI: negative for abdominal pain. +diarrhea. +intermittent nausea    10 point review of systems is otherwise negative with the exception of the elements mentioned above in the HPI. Allergies   Allergen Reactions    Zarxio [Filgrastim-Sndz] Shortness of Breath     \"pounding heart\" per pt       OBJECTIVE:  Patient Vitals for the past 8 hrs:   BP Temp Pulse Resp SpO2   20 0755 (!) 141/79 98 °F (36.7 °C) 66 16 97 %   20 0245 125/80 97.9 °F (36.6 °C) 82 16 98 %     Temp (24hrs), Av.1 °F (36.7 °C), Min:97.9 °F (36.6 °C), Max:98.2 °F (36.8 °C)    No intake/output data recorded. Physical Exam:  Constitutional: Well developed, well nourished female in no acute distress, sitting comfortably in the hospital bed. HEENT: Normocephalic and atraumatic. Oropharynx is clear, mucous membranes are moist.  Pupils are equal, round, and reactive to light. Extraocular muscles are intact. Sclerae anicteric. Skin Warm and dry. No bruising and no rash noted. No erythema. No pallor. Respiratory Lungs are clear to auscultation bilaterally without wheezes, rales or rhonchi, normal air exchange without accessory muscle use. CVS Normal rate, regular rhythm and normal S1 and S2. No murmurs, gallops, or rubs. Abdomen Soft, nontender and nondistended, normoactive bowel sounds. No palpable mass. No hepatosplenomegaly. Neuro Grossly nonfocal with no obvious sensory or motor deficits. Neuropathy feet and hands improved. Hand tremors/dizziness   MSK +1 BLE edema. Normal range of motion in general.  No edema and no tenderness. Psych Appropriate mood and affect.         Labs:      Recent Labs     09/29/20 0235 09/28/20 0419 09/27/20 0247   WBC 0.6* 1.2* 2.2*   RBC 2.86* 2.78* 2.82*   HGB 8.3* 8.0* 8.2*   HCT 26.0* 25.0* 25.7*   MCV 90.9 89.9 91.1   MCH 29.0 28.8 29.1   MCHC 31.9 32.0 31.9   RDW 15.0* 15.1* 15.3*   * 155 172   GRANS 77 86* 92*   LYMPH 19 11* 6*   MONOS 2* 2* 0*   EOS 0* 1 1   BASOS 0 0 0   IG 2 1 0   DF AUTOMATED AUTOMATED AUTOMATED   ANEU 0.5* 1.0* 2.1   ABL 0.1* 0.1* 0.1*   ABM 0.0* 0.0* 0.0*   PRABHJOT 0.0 0.0 0.0   ABB 0.0 0.0 0.0   AIG 0.0 0.0 0.0        Recent Labs     09/29/20 0235 09/28/20 0419 09/27/20 0247    144 141   K 3.5 3.3* 3.6   * 108* 107   CO2 28 30 30   AGAP 6* 6* 4*   GLU 86 86 83   BUN 2* 1* 2*   CREA 0.39* 0.40* 0.42*   GFRAA >60 >60 >60   GFRNA >60 >60 >60   CA 8.2* 8.2* 8.3   * 216* 252*   TP 5.7* 5.5* 5.7*   ALB 2.6* 2.5* 2.4*   GLOB 3.1 3.0 3.3   AGRAT 0.8* 0.8* 0.7*   MG 1.9 1.9 2.0         Imaging:    Medications:  Current Facility-Administered Medications   Medication Dose Route Frequency    magic mouthwash (VEENA) oral suspension 5 mL  5 mL Oral Q4H PRN    tbo-filgrastim (GRANIX) injection 480 mcg  480 mcg SubCUTAneous QHS    vancomycin 50 mg/mL oral solution (compounded) 125 mg  125 mg Oral QID    fluconazole (DIFLUCAN) tablet 400 mg  400 mg Oral DAILY    levoFLOXacin (LEVAQUIN) tablet 500 mg  500 mg Oral Q24H    gabapentin (NEURONTIN) capsule 100 mg  100 mg Oral TID    loperamide (IMODIUM) capsule 2 mg  2 mg Oral Q4H PRN    LORazepam (ATIVAN) tablet 1 mg  1 mg Oral Q8H PRN    ondansetron (ZOFRAN) injection 8 mg  8 mg IntraVENous Q8H PRN    prochlorperazine (COMPAZINE) with saline injection 10 mg  10 mg IntraVENous Q4H PRN    acetaminophen (TYLENOL) tablet 500 mg  500 mg Oral Q6H PRN    acyclovir (ZOVIRAX) tablet 400 mg  400 mg Oral BID    DULoxetine (CYMBALTA) capsule 30 mg  30 mg Oral QHS    HYDROmorphone (DILAUDID) tablet 2 mg  2 mg Oral Q4H PRN    morphine CR (MS CONTIN) tablet 60 mg  60 mg Oral Q12H    0.9% sodium chloride infusion  75 mL/hr IntraVENous CONTINUOUS    enoxaparin (LOVENOX) injection 40 mg  40 mg SubCUTAneous Q12H    central line flush (saline) syringe 10 mL  10 mL InterCATHeter PRN         ASSESSMENT:    Problem List  Date Reviewed: 8/21/2020          Codes Class Noted    Stem cell transplant candidate ICD-10-CM: Z76.82  ICD-9-CM: V49.83  9/21/2020        Multiple myeloma ([facility] ICD-10-CM: C90.00  ICD-9-CM: 203.00  8/13/2020        Multiple myeloma not having achieved remission ([facility] ICD-10-CM: C90.00  ICD-9-CM: 203.00  7/21/2020        Obesity, morbid ([facility] ICD-10-CM: E66.01  ICD-9-CM: 278.01  5/20/2020            Ms. Kateryna Caba is a 38 y.o. female admitted on 9/21/2020 with a primary diagnosis of There were no encounter diagnoses. .       35 female, , smoker, history of hypertension, upper eyelids stye (felt noninfectious by optho), more recent diagnosis of IgG lambda multiple myeloma, under care of Dr. Tam Nichols, now kindly referred to us for consideration of autologous stem cell transplant. She is s/p RVD x4 w SIFE CR. Seen today in office 9/21/2020: Cultures from last urine specimen with mixed skin zully indicated of of contamination. Repeat UA unremarkable. Completely asymptomatic today. Finishes nitrofurantoin course today. Denies any fevers or chills, dysuria or hematuria. Extensive ROS unremarkable. Recent labs including myeloma proteins unremarkable. [facility] staying low. No M spike however TORIBIO with IgG lambda band identified. Patient should be able to proceed with HDT/ACT as inpatient. She will be hospitalized through count recovery.       PLAN:  -Multiple myeloma   As above. Admit to hematology service. Gentle hydration. [id number]/ASCT starting tomorrow.  Conditioning regimen will be Melphalan 200 mg/m2. Anti-emetics and anti-diarrheals as needed. - D+1 Abx prophylaxis w Acyclovir, Diflucan, Levaquin and Augmentin  - Granix daily starting D+6.  - Mobilization was w/ granix/Mozobil. Collected CD34+ at 7.08 mil/kg. Will get half back  - S/p RVD x 4. Labs w SIFE CR. Then Bortezomib/Dex alone. - Bone directed therapy and prophylactic medications per primary hematology service. 9/22 Melphalan today  9/23 ASCT Day 0  9/26 D+3. Doing well. Labs and counts are stable. WBC 3.0, Hgb 8.0, plts 178k. 9/28 D+5. Counts dropping as expected  9/29 starting granix today    Peripheral neuropathy hands and feet  9/22 Restart robbie  9/25 having some dizziness and hand tremors. Decrease to 100 mg tid  9/26 Continued neuropathy in BLE but stable. No complaints of dizziness/tremors today. Nausea  -antiemetics  9/26 Improving with medication    Elevated LFTs  9/25 monitor overnight. Consider US if continues to increase. 9/26 LFTs improved. Continue to monitor. , , Alkph 276.   9/28 continues to trend down    Breast tenderness  9/26 No abnormality on exam with the exception of right nipple retraction which developed overnight. Will continue to monitor. Cdiff  9/29 on oral vanc. Had 2 stools so far this am. Not explosive and able to make it to bathroom. Shannan SOPs  Supportive care    Goals and plan of care reviewed with the patient. All questions answered to the best of our ability. Katalina Santana NP   [facility] Hematology & Oncology  [address] [id number] [facility]  Office : [phone]  Fax : [phone]       Attending Addendum:  I have personally performed a face to face diagnostic evaluation on this patient. I have reviewed and agree with the care plan as documented above by Deborah Montoya N.FISH.  My findings are as follows: Patient appears lethargic, heart rate regular without murmurs, abdomen is non-tender, bowel sounds are positive.   35 female history of hypertension, upper eyelids stye, IgG lambda multiple myeloma, standard risk, status post RVD, now undergoing Margarita 200/ASCT D+6. Tolerating therapy reasonably well. Continue with prophylactic antibiotics including acyclovir, Diflucan, and levaquin. She will start Granix daily today. Transfusion support as needed. Counts dropping as to be expected. LFTs being monitored, improving. Loose stools: C. difficile positive, now on p.o. vancomycin, denies any abd pain, closely monitor for improvement.  Augmentin stopped.         Total time 35 min 50% in direct consultation about the patient's diagnosis and management             Omayra Dudley MD  [facility] Hematology/Oncology  [address] [place] [address]  Office : [phone]  Fax : [phone]

## 2020-09-29 NOTE — PROGRESS NOTES
Problem: Falls - Risk of  Goal: *Absence of Falls  Description: Document Kanchan Liu Fall Risk and appropriate interventions in the flowsheet.   Outcome: Progressing Towards Goal  Note: Fall Risk Interventions:            Medication Interventions: Teach patient to arise slowly    Elimination Interventions: Call light in reach, Patient to call for help with toileting needs, Toilet paper/wipes in reach, Toileting schedule/hourly rounds    History of Falls Interventions: Room close to nurse's station         Problem: Pain  Goal: *Control of Pain  Outcome: Progressing Towards Goal     Problem: Chemotherapy, Day 3 to Discharge  Goal: Off Pathway (Use only if patient is Off Pathway)  Outcome: Progressing Towards Goal  Goal: Activity/Safety  Outcome: Progressing Towards Goal  Goal: Consults, if ordered  Outcome: Progressing Towards Goal  Goal: Diagnostic Test/Procedures  Outcome: Progressing Towards Goal  Goal: Nutrition/Diet  Outcome: Progressing Towards Goal  Goal: Discharge Planning  Outcome: Progressing Towards Goal  Goal: Medications  Outcome: Progressing Towards Goal  Goal: Respiratory  Outcome: Progressing Towards Goal  Goal: Treatments/Interventions/Procedures  Outcome: Progressing Towards Goal  Goal: Psychosocial  Outcome: Progressing Towards Goal  Goal: *Optimal pain control at patient's stated goal  Outcome: Progressing Towards Goal  Goal: *Hemodynamically stable  Outcome: Progressing Towards Goal  Goal: *Adequate oxygenation  Outcome: Progressing Towards Goal     Problem: Nausea/Vomiting (Adult)  Goal: *Absence of nausea/vomiting  Outcome: Progressing Towards Goal     Problem: BMT-Respiratory: Discharge Outcomes  Goal: *Demonstrates ability to perform prescribed activity without shortness of breath or discomfort  Outcome: Progressing Towards Goal  Goal: *Verbalizes understanding and describes prescribed diet  Outcome: Progressing Towards Goal  Goal: *Describes follow-up/return visits to physicians  Outcome: Progressing Towards Goal  Goal: *Describes home care/support arrangements established based on need  Outcome: Progressing Towards Goal  Goal: *Describes available resources and support systems  Outcome: Progressing Towards Goal  Goal: *Anxiety reduced or absent  Outcome: Progressing Towards Goal  Goal: *Understands and describes signs and symptoms to report to providers(Stroke Metric)  Outcome: Progressing Towards Goal  Goal: *Hemodynamically stable  Outcome: Progressing Towards Goal  Goal: *Effective airway maintenance  Outcome: Progressing Towards Goal  Goal: *Tolerating diet  Outcome: Progressing Towards Goal  Goal: *Verbalizes understanding and describes medication purposes and frequencies  Outcome: Progressing Towards Goal     Problem: Patient Education: Go to Patient Education Activity  Goal: Patient/Family Education  Outcome: Progressing Towards Goal     Problem: Risk for Spread of Infection  Goal: Prevent transmission of infectious organism to others  Description: Prevent the transmission of infectious organisms to other patients, staff members, and visitors.   Outcome: Progressing Towards Goal     Problem: Patient Education:  Go to Education Activity  Goal: Patient/Family Education  Outcome: Progressing Towards Goal     Problem: Management of Known or Suspected C. difficile  Goal: Minimize complications of C.difficile infection and prevent spread of infection  Outcome: Progressing Towards Goal  Goal: Patient/Family Education  Outcome: Resolved/Met

## 2020-09-29 NOTE — PROGRESS NOTES
1535-Bedside report received from Kiesha Urban RN. Resting in bed. No needs voiced. No s/s of acute distress. 1810-Diagnosis: Multiple Myeloma  Transplant Date: 9/23/20  Day: +6  Chemo regimen used:  Melphalan 9/22. Labs not resulted that need follow-up: none  BMT assessments and toxicities completed. 9/29/20  WBC/ANC= 0.6/0.5  Prophylactic medications : po Levaquin,Diflucan, Acyclovir  (On po Vanco for c diff)  Granix start on D+6 9/29/20  Toxicities greater than 2 : none  Patient seen by MD/NP while inpatient & until engraftment. New orders received: none  Issues: decreasing appetite;+ c diff  Bedside shift change report given to Lala Sampson RN (oncoming nurse) by Consuelo Lombardo (offgoing nurse). Report included the following information SBAR, Kardex, Intake/Output, MAR and Recent Results.

## 2020-09-29 NOTE — PROGRESS NOTES
Diagnosis: Multiple Myeloma  Transplant Date: 9/23/20  Day: +6  Chemo regimen used:  Melphalan 9/22. Labs not resulted that need follow-up: none  BMT assessments and toxicities completed. 9/28/20  WBC/ANC= 0.6/0.5  Prophylactic medications : po Levaquin,Diflucan, Acyclovir  (On po Vanco for c diff)  Granix start on D+6 9/29/20  Toxicities greater than 2 : none  Patient seen by MD/NP while inpatient & until engraftment. New orders received: none  Issues: decreasing appetite;+ c diff      END OF SHIFT NOTE:    Intake/Output  09/28 1901 - 09/29 0700  In: 0580 [P.O.:480; I.V.:1245]  Out: 2150 [Urine:1900]   Voiding: YES  Catheter: NO  Drain:              Stool:  3 occurrences. Stool Assessment  Stool Color: Green (09/28/20 2110)  Stool Appearance: Loose (09/28/20 2110)  Stool Amount: Small (09/28/20 2110)  Stool Source/Status: Rectum (09/28/20 2110)    Emesis:  0 occurrences. VITAL SIGNS  Patient Vitals for the past 12 hrs:   Temp Pulse Resp BP SpO2   09/29/20 0245 97.9 °F (36.6 °C) 82 16 125/80 98 %   09/28/20 2254 98 °F (36.7 °C) 91 18 (!) 148/98 98 %   09/28/20 1945 98.2 °F (36.8 °C) 80 16 125/78 100 %       Pain Assessment  Pain 1  Pain Scale 1: Numeric (0 - 10) (09/29/20 0150)  Pain Intensity 1: 0 (09/29/20 0150)  Patient Stated Pain Goal: 0 (09/29/20 0150)  Pain Reassessment 1: Yes (09/26/20 1500)  Pain Onset 1: pta (09/22/20 0146)  Pain Location 1: Leg (09/25/20 1106)  Pain Orientation 1: Lower (09/25/20 1106)  Pain Description 1: Aching (09/25/20 1106)  Pain Intervention(s) 1: Medication (see MAR) (09/25/20 1106)    Ambulating  Yes    Additional Information:   Afebrile;no bleeding. x3 loose BM overnight. Continues w/ mouth care using saline. Shift report given to oncoming nurse PHAM Mcadams at the bedside.     Nga Thornton RN

## 2020-09-29 NOTE — PROGRESS NOTES
Comprehensive Nutrition Assessment  This assessment was completed remotely from within the hospital. Patient consent was obtained for remote assessment. Type and Reason for Visit: Reassess      Nutrition Assessment:  Pt is ASCT Day +6. Pt is positive for C. Diff 9/28. Pt reported appetite is coming back, however she has been dealing with N/V/D. Pt main concern was diarrhea, and while the frequency has decreased, every BM is loose stool. Pt reported to only consuming juice and yogurt for yesterday and today. Pt stated ensure high protein was \"disgusting\" therefore she has not been consuming it. Estimated Daily Nutrient Needs:  Energy (kcal):  1543-2058kcal/day(15-20kcal/kg CBW: 102.9kg)  Protein (g):  78-103g/day (20% of kcal)         Current Nutrition Therapies:  DIET REGULAR  DIET NUTRITIONAL SUPPLEMENTS Breakfast; Ensure High Protein    Anthropometric Measures:  · Height:  5' 2.99\" (160 cm)  · Current Body Wt:  102.9 kg (226 lb 13.7 oz)    · Body mass index is 40.19 kg/m². · BMI Category:  Obese class 3 (BMI 40.0 or greater)       Nutrition Diagnosis:   · Inadequate oral intake related to decreased appetite, positive C-Diff, and altered GI function as evidenced by intake of less than 25% of estimated nutritional needs through only consuming yogurt and juice. Nutrition Interventions:   Food and/or Nutrient Delivery: Modify oral nutrition supplement. Discontinue ensure high protein. Add ensure clear, ensure enlive, Magic Cup and Glucerna, each once per day. Send banana flakes BID. Coordination of Nutrition Care: Spoke with Saeid Valles NP concerning addition of probiotic to aid in treatment of C. Diff. She plans to discuss with Dr. Ford Mckeon tomorrow 9/30. Goals:  Pt will consume 75% or greater of estimated needs through additional oral supplements within 7 days.         Nutrition Monitoring and Evaluation:   Food/Nutrient Intake Outcomes: Food and nutrient intake, Supplement intake, response to banana flakes. Discharge Planning:     Too soon to determine     Electronically signed by Tala Agee on 9/29/2020 at 1:51 PM    Contact: [phone]

## 2020-09-30 LAB
ALBUMIN SERPL-MCNC: 2.5 G/DL (ref 3.5–5)
ALBUMIN/GLOB SERPL: 0.8 {RATIO} (ref 1.2–3.5)
ALP SERPL-CCNC: 192 U/L (ref 50–136)
ALT SERPL-CCNC: 47 U/L (ref 12–65)
ANION GAP SERPL CALC-SCNC: 5 MMOL/L (ref 7–16)
AST SERPL-CCNC: 27 U/L (ref 15–37)
BASOPHILS # BLD: 0 K/UL (ref 0–0.2)
BASOPHILS NFR BLD: 3 % (ref 0–2)
BILIRUB SERPL-MCNC: 0.4 MG/DL (ref 0.2–1.1)
BUN SERPL-MCNC: 2 MG/DL (ref 6–23)
CALCIUM SERPL-MCNC: 8 MG/DL (ref 8.3–10.4)
CHLORIDE SERPL-SCNC: 109 MMOL/L (ref 98–107)
CO2 SERPL-SCNC: 29 MMOL/L (ref 21–32)
CREAT SERPL-MCNC: 0.39 MG/DL (ref 0.6–1)
DIFFERENTIAL METHOD BLD: ABNORMAL
EOSINOPHIL # BLD: 0 K/UL (ref 0–0.8)
EOSINOPHIL NFR BLD: 0 % (ref 0.5–7.8)
ERYTHROCYTE [DISTWIDTH] IN BLOOD BY AUTOMATED COUNT: 14.7 % (ref 11.9–14.6)
GGT SERPL-CCNC: 127 U/L (ref 5–55)
GLOBULIN SER CALC-MCNC: 3.1 G/DL (ref 2.3–3.5)
GLUCOSE SERPL-MCNC: 82 MG/DL (ref 65–100)
HCT VFR BLD AUTO: 25.2 % (ref 35.8–46.3)
HGB BLD-MCNC: 8.1 G/DL (ref 11.7–15.4)
IMM GRANULOCYTES # BLD AUTO: 0 K/UL (ref 0–0.5)
IMM GRANULOCYTES NFR BLD AUTO: 3 % (ref 0–5)
LDH SERPL L TO P-CCNC: 216 U/L (ref 100–190)
LYMPHOCYTES # BLD: 0.2 K/UL (ref 0.5–4.6)
LYMPHOCYTES NFR BLD: 42 % (ref 13–44)
MAGNESIUM SERPL-MCNC: 1.8 MG/DL (ref 1.8–2.4)
MCH RBC QN AUTO: 28.9 PG (ref 26.1–32.9)
MCHC RBC AUTO-ENTMCNC: 32.1 G/DL (ref 31.4–35)
MCV RBC AUTO: 90 FL (ref 79.6–97.8)
MONOCYTES # BLD: 0 K/UL (ref 0.1–1.3)
MONOCYTES NFR BLD: 5 % (ref 4–12)
NEUTS SEG # BLD: 0.2 K/UL (ref 1.7–8.2)
NEUTS SEG NFR BLD: 47 % (ref 43–78)
NRBC # BLD: 0 K/UL (ref 0–0.2)
PLATELET # BLD AUTO: 83 K/UL (ref 150–450)
PMV BLD AUTO: 8.8 FL (ref 9.4–12.3)
POTASSIUM SERPL-SCNC: 3.4 MMOL/L (ref 3.5–5.1)
PROT SERPL-MCNC: 5.6 G/DL (ref 6.3–8.2)
RBC # BLD AUTO: 2.8 M/UL (ref 4.05–5.2)
SODIUM SERPL-SCNC: 143 MMOL/L (ref 136–145)
WBC # BLD AUTO: 0.4 K/UL (ref 4.3–11.1)

## 2020-09-30 PROCEDURE — 74011250636 HC RX REV CODE- 250/636: Performed by: NURSE PRACTITIONER

## 2020-09-30 PROCEDURE — 74011000250 HC RX REV CODE- 250: Performed by: NURSE PRACTITIONER

## 2020-09-30 PROCEDURE — 83615 LACTATE (LD) (LDH) ENZYME: CPT

## 2020-09-30 PROCEDURE — 74011250637 HC RX REV CODE- 250/637: Performed by: NURSE PRACTITIONER

## 2020-09-30 PROCEDURE — 2709999900 HC NON-CHARGEABLE SUPPLY

## 2020-09-30 PROCEDURE — 74011250636 HC RX REV CODE- 250/636: Performed by: INTERNAL MEDICINE

## 2020-09-30 PROCEDURE — 99233 SBSQ HOSP IP/OBS HIGH 50: CPT | Performed by: INTERNAL MEDICINE

## 2020-09-30 PROCEDURE — 65270000029 HC RM PRIVATE

## 2020-09-30 PROCEDURE — 74011250637 HC RX REV CODE- 250/637: Performed by: INTERNAL MEDICINE

## 2020-09-30 PROCEDURE — 80053 COMPREHEN METABOLIC PANEL: CPT

## 2020-09-30 PROCEDURE — 85025 COMPLETE CBC W/AUTO DIFF WBC: CPT

## 2020-09-30 PROCEDURE — 83735 ASSAY OF MAGNESIUM: CPT

## 2020-09-30 PROCEDURE — 82977 ASSAY OF GGT: CPT

## 2020-09-30 RX ORDER — POTASSIUM CHLORIDE 29.8 MG/ML
40 INJECTION INTRAVENOUS ONCE
Status: COMPLETED | OUTPATIENT
Start: 2020-09-30 | End: 2020-09-30

## 2020-09-30 RX ADMIN — LORATADINE 10 MG: 10 TABLET ORAL at 08:30

## 2020-09-30 RX ADMIN — ACYCLOVIR 400 MG: 800 TABLET ORAL at 17:49

## 2020-09-30 RX ADMIN — VANCOMYCIN HYDROCHLORIDE 125 MG: 5 INJECTION, POWDER, LYOPHILIZED, FOR SOLUTION INTRAVENOUS at 17:48

## 2020-09-30 RX ADMIN — VANCOMYCIN HYDROCHLORIDE 125 MG: 5 INJECTION, POWDER, LYOPHILIZED, FOR SOLUTION INTRAVENOUS at 12:52

## 2020-09-30 RX ADMIN — TBO-FILGRASTIM 480 MCG: 480 INJECTION, SOLUTION SUBCUTANEOUS at 21:52

## 2020-09-30 RX ADMIN — FLUCONAZOLE 400 MG: 100 TABLET ORAL at 08:30

## 2020-09-30 RX ADMIN — ACYCLOVIR 400 MG: 800 TABLET ORAL at 08:30

## 2020-09-30 RX ADMIN — VANCOMYCIN HYDROCHLORIDE 125 MG: 5 INJECTION, POWDER, LYOPHILIZED, FOR SOLUTION INTRAVENOUS at 21:53

## 2020-09-30 RX ADMIN — VANCOMYCIN HYDROCHLORIDE 125 MG: 5 INJECTION, POWDER, LYOPHILIZED, FOR SOLUTION INTRAVENOUS at 08:36

## 2020-09-30 RX ADMIN — GABAPENTIN 100 MG: 100 CAPSULE ORAL at 08:32

## 2020-09-30 RX ADMIN — ENOXAPARIN SODIUM 40 MG: 40 INJECTION SUBCUTANEOUS at 21:52

## 2020-09-30 RX ADMIN — DULOXETINE 30 MG: 30 CAPSULE, DELAYED RELEASE ORAL at 21:53

## 2020-09-30 RX ADMIN — MORPHINE SULFATE 60 MG: 30 TABLET, FILM COATED, EXTENDED RELEASE ORAL at 21:53

## 2020-09-30 RX ADMIN — POTASSIUM CHLORIDE 40 MEQ: 400 INJECTION, SOLUTION INTRAVENOUS at 08:38

## 2020-09-30 RX ADMIN — GABAPENTIN 100 MG: 100 CAPSULE ORAL at 16:01

## 2020-09-30 RX ADMIN — MORPHINE SULFATE 60 MG: 30 TABLET, FILM COATED, EXTENDED RELEASE ORAL at 08:30

## 2020-09-30 RX ADMIN — SODIUM CHLORIDE 75 ML/HR: 900 INJECTION, SOLUTION INTRAVENOUS at 15:48

## 2020-09-30 RX ADMIN — GABAPENTIN 100 MG: 100 CAPSULE ORAL at 21:53

## 2020-09-30 RX ADMIN — LEVOFLOXACIN 500 MG: 500 TABLET, FILM COATED ORAL at 09:20

## 2020-09-30 RX ADMIN — ENOXAPARIN SODIUM 40 MG: 40 INJECTION SUBCUTANEOUS at 08:30

## 2020-09-30 NOTE — PROGRESS NOTES
Diagnosis: Multiple Myeloma  Transplant Date: 9/23/20  Day: +7  Chemo regimen used:  Melphalan 9/22. Labs not resulted that need follow-up: none  No changes in the BMT assessments and toxicities completed. 9/29/20  WBC/ANC= 0.4/0.2  Prophylactic medications : po Levaquin,Diflucan, Acyclovir  (On po Vanco for c diff)  Granix start on D+6 9/29/20, pt tolerated without difficulty  Toxicities greater than 2 : none  Patient seen by MD/NP while inpatient & until engraftment. New orders received: none    END OF SHIFT NOTE:    Intake/Output  09/30 0701 - 09/30 1900  In: -   Out: 600 [Urine:400]   Voiding: YES  Catheter: NO  Drain:              Stool:  3 occurrences. Stool Assessment  Stool Color: Brown (09/30/20 0740)  Stool Appearance: Watery (09/30/20 0740)  Stool Amount: Medium (09/30/20 0740)  Stool Source/Status: Rectum (09/30/20 0740)    Emesis:  0 occurrences. VITAL SIGNS  Patient Vitals for the past 12 hrs:   Temp Pulse Resp BP SpO2   09/30/20 0412 98.5 °F (36.9 °C) 78 14 121/70 98 %   09/29/20 2222 98.2 °F (36.8 °C) 80 18 (!) 142/85 98 %       Pain Assessment  Pain 1  Pain Scale 1: Numeric (0 - 10) (09/29/20 0745)  Pain Intensity 1: 0 (09/29/20 0745)  Patient Stated Pain Goal: 0 (09/29/20 0745)  Pain Reassessment 1: Yes (09/26/20 1500)  Pain Onset 1: pta (09/22/20 0146)  Pain Location 1: Leg (09/25/20 1106)  Pain Orientation 1: Lower (09/25/20 1106)  Pain Description 1: Aching (09/25/20 1106)  Pain Intervention(s) 1: Medication (see MAR) (09/25/20 1106)    Ambulating  Yes      Issues: decreasing appetite;+ c diff, pt rested throughout shift without any complaints  Shift report given to oncoming nurse Cara Suarez RN at the bedside.     Lamine Bailey RN

## 2020-09-30 NOTE — PROGRESS NOTES
END OF SHIFT NOTE:  Day +7 ASCT   Patient out of bed, up in recliner. No fevers or complain or pain or nausea. No fevers. Patient continue to be on Neutropenia and Enteric precautions. Intake/Output  09/30 0701 - 09/30 1900  In: 602 [I.V.:602]  Out: 1750 [Urine:1550]   Voiding: Yes   Catheter: No   Drain:              Stool:  Two stools noted  occurrences. Stool Assessment  Stool Color: Brown (09/30/20 1752)  Stool Appearance: Watery(thick watery ) (09/30/20 1752)  Stool Amount: Medium (09/30/20 1752)  Stool Source/Status: Rectum (09/30/20 1752)    Emesis:   No occurrences. VITAL SIGNS  Patient Vitals for the past 12 hrs:   Temp Pulse Resp BP SpO2   09/30/20 1453 98.1 °F (36.7 °C) 72 18 (!) 157/99 99 %   09/30/20 1142 98.2 °F (36.8 °C) 68 20 133/78 100 %   09/30/20 0730 98.1 °F (36.7 °C) 68 19 135/77 98 %       Pain Assessment  Pain 1  Pain Scale 1: Numeric (0 - 10) (09/30/20 1603)  Pain Intensity 1: 0 (09/30/20 1603)  Patient Stated Pain Goal: 0 (09/29/20 0745)  Pain Reassessment 1: Yes (09/26/20 1500)  Pain Onset 1: pta (09/22/20 0146)  Pain Location 1: Leg (09/25/20 1106)  Pain Orientation 1: Lower (09/25/20 1106)  Pain Description 1: Aching (09/25/20 1106)  Pain Intervention(s) 1: Medication (see MAR) (09/25/20 1106)    Ambulating  Yes in room     Additional Information:  See above     Shift report given to oncoming nurse Tom Oneill RN  at the bedside.     Sylvia Zuñiga RN

## 2020-09-30 NOTE — PROGRESS NOTES
Nutrition Note    This assessment was completed remotely via telephone from within the hospital. Patient consent was obtained for remote assessment. Checked in with pt regarding the different oral supplements she received at dinner (9/29) breakfast and lunch (9/30). Pt reported she tried the glucerna, [employer], ensure clear and ensure enlive. Pt stated the supplements \"all tasted like medicine\" therefore she did not consume any supplement. Banana flakes with applesauce, yogurt and pudding were ordered for pt.      Electronically signed by Tala Agee on 9/30/2020 at 3:16 PM    Contact: [phone]

## 2020-09-30 NOTE — PROGRESS NOTES
[facility] Hematology & Oncology        Inpatient Hematology / Oncology Progress Note      Admission Date: 2020 12:02 PM  Reason for Admission/Hospital Course: Multiple myeloma ([facility] [address] [C90.00]  Stem cell transplant candidate [Z76.82]      24 Hour Events:  ASCT Day +7  ANC 0.2 started granix D+6  VSS   Afebrile      ROS:  Constitutional:  negative for fever, chills, weakness, malaise, fatigue. CV: negative for chest pain, palpitations, edema. Respiratory: negative for dyspnea, cough, wheezing. GI: negative for abdominal pain. +diarrhea. +intermittent nausea    10 point review of systems is otherwise negative with the exception of the elements mentioned above in the HPI. Allergies   Allergen Reactions    Zarxio [Filgrastim-Sndz] Shortness of Breath     \"pounding heart\" per pt. Tolerates Granix       OBJECTIVE:  Patient Vitals for the past 8 hrs:   BP Temp Pulse Resp SpO2   20 0730 135/77 98.1 °F (36.7 °C) 68 19 98 %   20 0412 121/70 98.5 °F (36.9 °C) 78 14 98 %     Temp (24hrs), Av.1 °F (36.7 °C), Min:97.8 °F (36.6 °C), Max:98.5 °F (36.9 °C)     0701 -  1900  In: -   Out: 1000 [Urine:800]    Physical Exam:  Constitutional: Well developed, well nourished female in no acute distress, sitting comfortably in the hospital bed. HEENT: Normocephalic and atraumatic. Oropharynx is clear, mucous membranes are moist.  Pupils are equal, round, and reactive to light. Extraocular muscles are intact. Sclerae anicteric. Skin Warm and dry. No bruising and no rash noted. No erythema. No pallor. Respiratory Lungs are clear to auscultation bilaterally without wheezes, rales or rhonchi, normal air exchange without accessory muscle use. CVS Normal rate, regular rhythm and normal S1 and S2. No murmurs, gallops, or rubs. Abdomen Soft, nontender and nondistended, normoactive bowel sounds. No palpable mass. No hepatosplenomegaly.    Neuro Grossly nonfocal with no obvious sensory or motor deficits. Neuropathy feet and hands improved. Hand tremors/dizziness   MSK Normal range of motion in general.  No edema and no tenderness. Psych Appropriate mood and affect.         Labs:      Recent Labs     09/30/20 0412 09/29/20 0235 09/28/20 0419   WBC 0.4* 0.6* 1.2*   RBC 2.80* 2.86* 2.78*   HGB 8.1* 8.3* 8.0*   HCT 25.2* 26.0* 25.0*   MCV 90.0 90.9 89.9   MCH 28.9 29.0 28.8   MCHC 32.1 31.9 32.0   RDW 14.7* 15.0* 15.1*   PLT 83* 136* 155   GRANS 47 77 86*   LYMPH 42 19 11*   MONOS 5 2* 2*   EOS 0* 0* 1   BASOS 3* 0 0   IG 3 2 1   DF AUTOMATED AUTOMATED AUTOMATED   ANEU 0.2* 0.5* 1.0*   ABL 0.2* 0.1* 0.1*   ABM 0.0* 0.0* 0.0*   PRABHJOT 0.0 0.0 0.0   ABB 0.0 0.0 0.0   AIG 0.0 0.0 0.0        Recent Labs     09/30/20 0412 09/29/20 0235 09/28/20 0419    142 144   K 3.4* 3.5 3.3*   * 108* 108*   CO2 29 28 30   AGAP 5* 6* 6*   GLU 82 86 86   BUN 2* 2* 1*   CREA 0.39* 0.39* 0.40*   GFRAA >60 >60 >60   GFRNA >60 >60 >60   CA 8.0* 8.2* 8.2*   * 203* 216*   TP 5.6* 5.7* 5.5*   ALB 2.5* 2.6* 2.5*   GLOB 3.1 3.1 3.0   AGRAT 0.8* 0.8* 0.8*   MG 1.8 1.9 1.9         Imaging:    Medications:  Current Facility-Administered Medications   Medication Dose Route Frequency    potassium chloride 40 mEq IVPB  40 mEq IntraVENous ONCE    magic mouthwash (VEENA) oral suspension 5 mL  5 mL Oral Q4H PRN    tbo-filgrastim (GRANIX) injection 480 mcg  480 mcg SubCUTAneous QHS    loratadine (CLARITIN) tablet 10 mg  10 mg Oral DAILY    vancomycin 50 mg/mL oral solution (compounded) 125 mg  125 mg Oral QID    fluconazole (DIFLUCAN) tablet 400 mg  400 mg Oral DAILY    levoFLOXacin (LEVAQUIN) tablet 500 mg  500 mg Oral Q24H    gabapentin (NEURONTIN) capsule 100 mg  100 mg Oral TID    loperamide (IMODIUM) capsule 2 mg  2 mg Oral Q4H PRN    LORazepam (ATIVAN) tablet 1 mg  1 mg Oral Q8H PRN    ondansetron (ZOFRAN) injection 8 mg  8 mg IntraVENous Q8H PRN    prochlorperazine (COMPAZINE) with saline injection 10 mg  10 mg IntraVENous Q4H PRN    acetaminophen (TYLENOL) tablet 500 mg  500 mg Oral Q6H PRN    acyclovir (ZOVIRAX) tablet 400 mg  400 mg Oral BID    DULoxetine (CYMBALTA) capsule 30 mg  30 mg Oral QHS    HYDROmorphone (DILAUDID) tablet 2 mg  2 mg Oral Q4H PRN    morphine CR (MS CONTIN) tablet 60 mg  60 mg Oral Q12H    0.9% sodium chloride infusion  75 mL/hr IntraVENous CONTINUOUS    enoxaparin (LOVENOX) injection 40 mg  40 mg SubCUTAneous Q12H    central line flush (saline) syringe 10 mL  10 mL InterCATHeter PRN         ASSESSMENT:    Problem List  Date Reviewed: 8/21/2020          Codes Class Noted    Stem cell transplant candidate ICD-10-CM: Z76.82  ICD-9-CM: V49.83  9/21/2020        Multiple myeloma ([facility] ICD-10-CM: C90.00  ICD-9-CM: 203.00  8/13/2020        Multiple myeloma not having achieved remission ([facility] ICD-10-CM: C90.00  ICD-9-CM: 203.00  7/21/2020        Obesity, morbid ([facility] ICD-10-CM: E66.01  ICD-9-CM: 278.01  5/20/2020            Ms. Giovanna Islas is a 38 y.o. female admitted on 9/21/2020 with a primary diagnosis of There were no encounter diagnoses. .       35 female, , smoker, history of hypertension, upper eyelids stye (felt noninfectious by optho), more recent diagnosis of IgG lambda multiple myeloma, under care of Dr. Orlando Azul, now kindly referred to us for consideration of autologous stem cell transplant. She is s/p RVD x4 w SIFE CR. Seen today in office 9/21/2020: Cultures from last urine specimen with mixed skin zully indicated of of contamination. Repeat UA unremarkable. Completely asymptomatic today. Finishes nitrofurantoin course today. Denies any fevers or chills, dysuria or hematuria. Extensive ROS unremarkable. Recent labs including myeloma proteins unremarkable. [address] staying low. No M spike however TORIBIO with IgG lambda band identified. Patient should be able to proceed with HDT/ACT as inpatient.   She will be hospitalized through count recovery.       PLAN:  -Multiple myeloma   As above. Admit to hematology service. Gentle hydration. [id number]/ASCT starting tomorrow. Conditioning regimen will be Melphalan 200 mg/m2. Anti-emetics and anti-diarrheals as needed. - D+1 Abx prophylaxis w Acyclovir, Diflucan, Levaquin and Augmentin  - Granix daily starting D+6.  - Mobilization was w/ granix/Mozobil. Collected CD34+ at 7.08 mil/kg. Will get half back  - S/p RVD x 4. Labs w SIFE CR. Then Bortezomib/Dex alone. - Bone directed therapy and prophylactic medications per primary hematology service. 9/22 Melphalan today  9/23 ASCT Day 0  9/26 D+3. Doing well. Labs and counts are stable. WBC 3.0, Hgb 8.0, plts 178k. 9/28 D+5. Counts dropping as expected  9/29 starting granix today    Peripheral neuropathy hands and feet  9/22 Restart robbie  9/25 having some dizziness and hand tremors. Decrease to 100 mg tid  9/26 Continued neuropathy in BLE but stable. No complaints of dizziness/tremors today. Nausea  -antiemetics  9/26 Improving with medication    Elevated LFTs  9/25 monitor overnight. Consider US if continues to increase. 9/26 LFTs improved. Continue to monitor. , , Alkph 276.   9/28 continues to trend down    Breast tenderness  9/26 No abnormality on exam with the exception of right nipple retraction which developed overnight. Will continue to monitor. Cdiff  9/29 on oral vanc. Had 2 stools so far this am. Not explosive and able to make it to bathroom. Shannan [facility]  Supportive care  Granix started D+6    Goals and plan of care reviewed with the patient. All questions answered to the best of our ability. Flaco Garcia NP   [facility] Hematology & Oncology  [address] [address]  Office : [phone]  Fax : [phone]          Attending Addendum:  I have personally performed a face to face diagnostic evaluation on this patient.  I have reviewed and agree with the care plan as documented above by Deborah Vora, N.P.  My findings are as follows: Patient appears lethargic, heart rate regular without murmurs, abdomen is non-tender, bowel sounds are positive.  39 female history of hypertension, upper eyelids stye, IgG lambda multiple myeloma, standard risk, status post RVD, now undergoing Margarita 200/ASCT D+7.  Tolerating therapy reasonably well.  Continue with prophylactic antibiotics including acyclovir, Diflucan, and levaquin.  She will start Granix daily today.  Transfusion support as needed.  Counts dropping as to be expected.  LFTs being monitored, improving.  Loose stools: C. difficile positive, now on p.o. vancomycin, denies any abd pain, closely monitor for improvement. Augmentin stopped.  Continue current care.          Total time 35 min 50% in direct consultation about the patient's diagnosis and management             Inna Witt MD  [facility] Hematology/Oncology  [address] [place] [address]  Office : [phone]  Fax : [phone]

## 2020-10-01 LAB
ALBUMIN SERPL-MCNC: 2.6 G/DL (ref 3.5–5)
ALBUMIN/GLOB SERPL: 0.9 {RATIO} (ref 1.2–3.5)
ALP SERPL-CCNC: 169 U/L (ref 50–130)
ALT SERPL-CCNC: 40 U/L (ref 12–65)
ANION GAP SERPL CALC-SCNC: 4 MMOL/L (ref 7–16)
AST SERPL-CCNC: 21 U/L (ref 15–37)
BASOPHILS # BLD: 0 K/UL (ref 0–0.2)
BASOPHILS NFR BLD: 0 % (ref 0–2)
BILIRUB SERPL-MCNC: 0.3 MG/DL (ref 0.2–1.1)
BUN SERPL-MCNC: 3 MG/DL (ref 6–23)
CALCIUM SERPL-MCNC: 8.3 MG/DL (ref 8.3–10.4)
CHLORIDE SERPL-SCNC: 110 MMOL/L (ref 98–107)
CO2 SERPL-SCNC: 30 MMOL/L (ref 21–32)
CREAT SERPL-MCNC: 0.4 MG/DL (ref 0.6–1)
DIFFERENTIAL METHOD BLD: ABNORMAL
EOSINOPHIL # BLD: 0 K/UL (ref 0–0.8)
EOSINOPHIL NFR BLD: 0 % (ref 0.5–7.8)
ERYTHROCYTE [DISTWIDTH] IN BLOOD BY AUTOMATED COUNT: 14.6 % (ref 11.9–14.6)
GLOBULIN SER CALC-MCNC: 2.9 G/DL (ref 2.3–3.5)
GLUCOSE SERPL-MCNC: 83 MG/DL (ref 65–100)
HCT VFR BLD AUTO: 24.8 % (ref 35.8–46.3)
HGB BLD-MCNC: 7.8 G/DL (ref 11.7–15.4)
IMM GRANULOCYTES # BLD AUTO: 0 K/UL (ref 0–0.5)
IMM GRANULOCYTES NFR BLD AUTO: 0 % (ref 0–5)
LYMPHOCYTES # BLD: 0.2 K/UL (ref 0.5–4.6)
LYMPHOCYTES NFR BLD: 89 % (ref 13–44)
MAGNESIUM SERPL-MCNC: 1.9 MG/DL (ref 1.8–2.4)
MCH RBC QN AUTO: 28.2 PG (ref 26.1–32.9)
MCHC RBC AUTO-ENTMCNC: 31.5 G/DL (ref 31.4–35)
MCV RBC AUTO: 89.5 FL (ref 79.6–97.8)
MONOCYTES # BLD: 0 K/UL (ref 0.1–1.3)
MONOCYTES NFR BLD: 8 % (ref 4–12)
NEUTS SEG # BLD: 0 K/UL (ref 1.7–8.2)
NEUTS SEG NFR BLD: 4 % (ref 43–78)
NRBC # BLD: 0 K/UL (ref 0–0.2)
PLATELET # BLD AUTO: 58 K/UL (ref 150–450)
PMV BLD AUTO: 8.7 FL (ref 9.4–12.3)
POTASSIUM SERPL-SCNC: 3.3 MMOL/L (ref 3.5–5.1)
PROT SERPL-MCNC: 5.5 G/DL (ref 6.3–8.2)
RBC # BLD AUTO: 2.77 M/UL (ref 4.05–5.2)
SODIUM SERPL-SCNC: 144 MMOL/L (ref 136–145)
WBC # BLD AUTO: 0.3 K/UL (ref 4.3–11.1)

## 2020-10-01 PROCEDURE — 65270000029 HC RM PRIVATE

## 2020-10-01 PROCEDURE — 99233 SBSQ HOSP IP/OBS HIGH 50: CPT | Performed by: INTERNAL MEDICINE

## 2020-10-01 PROCEDURE — 83735 ASSAY OF MAGNESIUM: CPT

## 2020-10-01 PROCEDURE — 80053 COMPREHEN METABOLIC PANEL: CPT

## 2020-10-01 PROCEDURE — 74011250637 HC RX REV CODE- 250/637: Performed by: INTERNAL MEDICINE

## 2020-10-01 PROCEDURE — 74011250637 HC RX REV CODE- 250/637: Performed by: NURSE PRACTITIONER

## 2020-10-01 PROCEDURE — 2709999900 HC NON-CHARGEABLE SUPPLY

## 2020-10-01 PROCEDURE — 74011250636 HC RX REV CODE- 250/636: Performed by: INTERNAL MEDICINE

## 2020-10-01 PROCEDURE — 74011250636 HC RX REV CODE- 250/636: Performed by: NURSE PRACTITIONER

## 2020-10-01 PROCEDURE — 85025 COMPLETE CBC W/AUTO DIFF WBC: CPT

## 2020-10-01 PROCEDURE — 74011000250 HC RX REV CODE- 250: Performed by: NURSE PRACTITIONER

## 2020-10-01 RX ORDER — POTASSIUM CHLORIDE 29.8 MG/ML
40 INJECTION INTRAVENOUS ONCE
Status: COMPLETED | OUTPATIENT
Start: 2020-10-01 | End: 2020-10-01

## 2020-10-01 RX ADMIN — FLUCONAZOLE 400 MG: 100 TABLET ORAL at 08:24

## 2020-10-01 RX ADMIN — Medication 10 ML: at 20:53

## 2020-10-01 RX ADMIN — ACYCLOVIR 400 MG: 800 TABLET ORAL at 08:30

## 2020-10-01 RX ADMIN — TBO-FILGRASTIM 480 MCG: 480 INJECTION, SOLUTION SUBCUTANEOUS at 20:53

## 2020-10-01 RX ADMIN — MORPHINE SULFATE 60 MG: 30 TABLET, FILM COATED, EXTENDED RELEASE ORAL at 20:53

## 2020-10-01 RX ADMIN — LEVOFLOXACIN 500 MG: 500 TABLET, FILM COATED ORAL at 09:34

## 2020-10-01 RX ADMIN — POTASSIUM CHLORIDE 40 MEQ: 400 INJECTION, SOLUTION INTRAVENOUS at 08:23

## 2020-10-01 RX ADMIN — MORPHINE SULFATE 60 MG: 30 TABLET, FILM COATED, EXTENDED RELEASE ORAL at 08:24

## 2020-10-01 RX ADMIN — DULOXETINE 30 MG: 30 CAPSULE, DELAYED RELEASE ORAL at 21:00

## 2020-10-01 RX ADMIN — GABAPENTIN 100 MG: 100 CAPSULE ORAL at 21:00

## 2020-10-01 RX ADMIN — GABAPENTIN 100 MG: 100 CAPSULE ORAL at 08:24

## 2020-10-01 RX ADMIN — SODIUM CHLORIDE 75 ML/HR: 900 INJECTION, SOLUTION INTRAVENOUS at 20:53

## 2020-10-01 RX ADMIN — VANCOMYCIN HYDROCHLORIDE 125 MG: 5 INJECTION, POWDER, LYOPHILIZED, FOR SOLUTION INTRAVENOUS at 08:31

## 2020-10-01 RX ADMIN — ACYCLOVIR 400 MG: 800 TABLET ORAL at 18:18

## 2020-10-01 RX ADMIN — VANCOMYCIN HYDROCHLORIDE 125 MG: 5 INJECTION, POWDER, LYOPHILIZED, FOR SOLUTION INTRAVENOUS at 18:21

## 2020-10-01 RX ADMIN — VANCOMYCIN HYDROCHLORIDE 125 MG: 5 INJECTION, POWDER, LYOPHILIZED, FOR SOLUTION INTRAVENOUS at 21:00

## 2020-10-01 RX ADMIN — LORATADINE 10 MG: 10 TABLET ORAL at 08:24

## 2020-10-01 RX ADMIN — VANCOMYCIN HYDROCHLORIDE 125 MG: 5 INJECTION, POWDER, LYOPHILIZED, FOR SOLUTION INTRAVENOUS at 12:32

## 2020-10-01 RX ADMIN — GABAPENTIN 100 MG: 100 CAPSULE ORAL at 16:53

## 2020-10-01 NOTE — PROGRESS NOTES
[facility] Hematology & Oncology        Inpatient Hematology / Oncology Progress Note      Admission Date: 2020 12:02 PM  Reason for Admission/Hospital Course: Multiple myeloma ([facility] [address] [C90.00]  Stem cell transplant candidate [Z76.82]      24 Hour Events:  ASCT Day +8  VSS   Afebrile  ANC 0.0  Stools about the same    ROS:  Constitutional:  negative for fever, chills, weakness, malaise, fatigue. CV: negative for chest pain, palpitations, edema. Respiratory: negative for dyspnea, cough, wheezing. GI: negative for abdominal pain. +diarrhea. +intermittent nausea    10 point review of systems is otherwise negative with the exception of the elements mentioned above in the HPI. Allergies   Allergen Reactions    Zarxio [Filgrastim-Sndz] Shortness of Breath     \"pounding heart\" per pt. Tolerates Granix       OBJECTIVE:  Patient Vitals for the past 8 hrs:   BP Temp Pulse Resp SpO2 Weight   10/01/20 0247 127/78 98.6 °F (37 °C) 74 18 98 %    10/01/20 0014 130/75 98.3 °F (36.8 °C) 80 18 99 % 220 lb 7.4 oz (100 kg)     Temp (24hrs), Av.3 °F (36.8 °C), Min:98.1 °F (36.7 °C), Max:98.6 °F (37 °C)    No intake/output data recorded. Physical Exam:  Constitutional: Well developed, well nourished female in no acute distress, sitting comfortably in the hospital bed. HEENT: Normocephalic and atraumatic. Oropharynx is clear, mucous membranes are moist.  Pupils are equal, round, and reactive to light. Extraocular muscles are intact. Sclerae anicteric. Skin Warm and dry. No bruising and no rash noted. No erythema. No pallor. Respiratory Lungs are clear to auscultation bilaterally without wheezes, rales or rhonchi, normal air exchange without accessory muscle use. CVS Normal rate, regular rhythm and normal S1 and S2. No murmurs, gallops, or rubs. Abdomen Soft, nontender and nondistended, normoactive bowel sounds. No palpable mass. No hepatosplenomegaly.    Neuro Grossly nonfocal with no obvious sensory or motor deficits. Neuropathy feet and hands improved. Hand tremors/dizziness   MSK Normal range of motion in general.  No edema and no tenderness. Psych Appropriate mood and affect.         Labs:      Recent Labs     10/01/20  0332 09/30/20  0412 09/29/20  0235   WBC 0.3* 0.4* 0.6*   RBC 2.77* 2.80* 2.86*   HGB 7.8* 8.1* 8.3*   HCT 24.8* 25.2* 26.0*   MCV 89.5 90.0 90.9   MCH 28.2 28.9 29.0   MCHC 31.5 32.1 31.9   RDW 14.6 14.7* 15.0*   PLT 58* 83* 136*   GRANS 4* 47 77   LYMPH 89* 42 19   MONOS 8 5 2*   EOS 0* 0* 0*   BASOS 0 3* 0   IG 0 3 2   DF AUTOMATED AUTOMATED AUTOMATED   ANEU 0.0* 0.2* 0.5*   ABL 0.2* 0.2* 0.1*   ABM 0.0* 0.0* 0.0*   PRABHJOT 0.0 0.0 0.0   ABB 0.0 0.0 0.0   AIG 0.0 0.0 0.0        Recent Labs     10/01/20  0332 09/30/20  0412 09/29/20  0235    143 142   K 3.3* 3.4* 3.5   * 109* 108*   CO2 30 29 28   AGAP 4* 5* 6*   GLU 83 82 86   BUN 3* 2* 2*   CREA 0.40* 0.39* 0.39*   GFRAA >60 >60 >60   GFRNA >60 >60 >60   CA 8.3 8.0* 8.2*   * 192* 203*   TP 5.5* 5.6* 5.7*   ALB 2.6* 2.5* 2.6*   GLOB 2.9 3.1 3.1   AGRAT 0.9* 0.8* 0.8*   MG 1.9 1.8 1.9         Imaging:    Medications:  Current Facility-Administered Medications   Medication Dose Route Frequency    potassium chloride 40 mEq IVPB  40 mEq IntraVENous ONCE    magic mouthwash (VEENA) oral suspension 5 mL  5 mL Oral Q4H PRN    tbo-filgrastim (GRANIX) injection 480 mcg  480 mcg SubCUTAneous QHS    loratadine (CLARITIN) tablet 10 mg  10 mg Oral DAILY    vancomycin 50 mg/mL oral solution (compounded) 125 mg  125 mg Oral QID    fluconazole (DIFLUCAN) tablet 400 mg  400 mg Oral DAILY    levoFLOXacin (LEVAQUIN) tablet 500 mg  500 mg Oral Q24H    gabapentin (NEURONTIN) capsule 100 mg  100 mg Oral TID    loperamide (IMODIUM) capsule 2 mg  2 mg Oral Q4H PRN    LORazepam (ATIVAN) tablet 1 mg  1 mg Oral Q8H PRN    ondansetron (ZOFRAN) injection 8 mg  8 mg IntraVENous Q8H PRN    prochlorperazine (COMPAZINE) with saline injection 10 mg  10 mg IntraVENous Q4H PRN    acetaminophen (TYLENOL) tablet 500 mg  500 mg Oral Q6H PRN    acyclovir (ZOVIRAX) tablet 400 mg  400 mg Oral BID    DULoxetine (CYMBALTA) capsule 30 mg  30 mg Oral QHS    HYDROmorphone (DILAUDID) tablet 2 mg  2 mg Oral Q4H PRN    morphine CR (MS CONTIN) tablet 60 mg  60 mg Oral Q12H    0.9% sodium chloride infusion  75 mL/hr IntraVENous CONTINUOUS    enoxaparin (LOVENOX) injection 40 mg  40 mg SubCUTAneous Q12H    central line flush (saline) syringe 10 mL  10 mL InterCATHeter PRN         ASSESSMENT:    Problem List  Date Reviewed: 8/21/2020          Codes Class Noted    Stem cell transplant candidate ICD-10-CM: Z76.82  ICD-9-CM: V49.83  9/21/2020        Multiple myeloma ([facility] ICD-10-CM: C90.00  ICD-9-CM: 203.00  8/13/2020        Multiple myeloma not having achieved remission ([facility] ICD-10-CM: C90.00  ICD-9-CM: 203.00  7/21/2020        Obesity, morbid ([facility] ICD-10-CM: E66.01  ICD-9-CM: 278.01  5/20/2020            Ms. Radha García is a 38 y.o. female admitted on 9/21/2020 with a primary diagnosis of There were no encounter diagnoses. .       35 female, , smoker, history of hypertension, upper eyelids stye (felt noninfectious by optho), more recent diagnosis of IgG lambda multiple myeloma, under care of Dr. Maddy Loomis, now kindly referred to us for consideration of autologous stem cell transplant. She is s/p RVD x4 w SIFE CR. Seen today in office 9/21/2020: Cultures from last urine specimen with mixed skin zully indicated of of contamination. Repeat UA unremarkable. Completely asymptomatic today. Finishes nitrofurantoin course today. Denies any fevers or chills, dysuria or hematuria. Extensive ROS unremarkable. Recent labs including myeloma proteins unremarkable. [address] staying low. No M spike however TORIBIO with IgG lambda band identified. Patient should be able to proceed with HDT/ACT as inpatient.   She will be hospitalized through count recovery.       PLAN:  -Multiple myeloma   As above. Admit to hematology service. Gentle hydration. [id number]/ASCT starting tomorrow. Conditioning regimen will be Melphalan 200 mg/m2. Anti-emetics and anti-diarrheals as needed. - D+1 Abx prophylaxis w Acyclovir, Diflucan, Levaquin and Augmentin  - Granix daily starting D+6.  - Mobilization was w/ granix/Mozobil. Collected CD34+ at 7.08 mil/kg. Will get half back  - S/p RVD x 4. Labs w SIFE CR. Then Bortezomib/Dex alone. - Bone directed therapy and prophylactic medications per primary hematology service. 9/22 Melphalan today  9/23 ASCT Day 0  9/26 D+3. Doing well. Labs and counts are stable. WBC 3.0, Hgb 8.0, plts 178k. 9/28 D+5. Counts dropping as expected  9/29 starting granix today    Pancytopenia related to chemo and disease  10/1 Transfuse per Shannan SOPs    Peripheral neuropathy hands and feet  9/22 Restart robbie  9/25 having some dizziness and hand tremors. Decrease to 100 mg tid  9/26 Continued neuropathy in BLE but stable. No complaints of dizziness/tremors today. Nausea  -antiemetics  9/26 Improving with medication    Elevated LFTs  9/25 monitor overnight. Consider US if continues to increase. 9/26 LFTs improved. Continue to monitor. , , Alkph 276.   9/28 continues to trend down    Breast tenderness  9/26 No abnormality on exam with the exception of right nipple retraction which developed overnight. Will continue to monitor. Cdiff  9/29 on oral vanc. Had 2 stools so far this am. Not explosive and able to make it to bathroom. 10/1 diarrhea about the same    Shannan SOPs  Supportive care  Granix started D+6    Goals and plan of care reviewed with the patient. All questions answered to the best of our ability.             Dario Sandoval NP   [facility] Hematology & Oncology  [address] [address]  Office : [phone]  Fax : [phone]          Attending Addendum:  I have personally performed a face to face diagnostic evaluation on this patient. I have reviewed and agree with the care plan as documented above by Deborah Bran N.P.  My findings are as follows: Patient appears lethargic, heart rate regular without murmurs, abdomen is non-tender, bowel sounds are positive.  39 female history of hypertension, upper eyelids stye, IgG lambda multiple myeloma, standard risk, status post RVD, now undergoing Margarita 200/ASCT D+8.  Tolerating therapy reasonably well.  Continue with prophylactic antibiotics including acyclovir, Diflucan, and levaquin.  On Granix daily .  Transfusion support as needed.  Counts low as to be expected.  LFTs being monitored, improving.  Loose stools: C. difficile positive, now on p.o. vancomycin, denies any abd pain, closely monitor for improvement. Augmentin stopped.  Continue current care.          Total time 35 min 50% in direct consultation about the patient's diagnosis and management             Yvon Mcclelland MD  [facility] Hematology/Oncology  [address] [address]  Office : [phone]  Fax : [phone]

## 2020-10-01 NOTE — PROGRESS NOTES
END OF SHIFT NOTE:  Day + 8 ASCT   Patient voice no complain of pain or nausea. Fair po intake. No fevers. Elevated blood pressure @ intervals. Intake/Output  No intake/output data recorded. Voiding:Yes   Catheter: No  Drain:              Stool:  One stool per  patient report this early am  occurrences. Stool Assessment  Stool Color: Brown (09/30/20 1752)  Stool Appearance: Watery(thick watery ) (09/30/20 1752)  Stool Amount: Medium (09/30/20 1752)  Stool Source/Status: Rectum (09/30/20 1752)    Emesis:   No  occurrences. VITAL SIGNS  Patient Vitals for the past 12 hrs:   Temp Pulse Resp BP SpO2   10/01/20 1837 98.7 °F (37.1 °C) 84 18 (!) 149/94 99 %   10/01/20 1620 98.6 °F (37 °C) 73 18 126/72 97 %   10/01/20 1228 98.2 °F (36.8 °C) 72 18 137/84 98 %   10/01/20 0810 98.6 °F (37 °C) 88 18 (!) 140/100 99 %       Pain Assessment  Pain 1  Pain Scale 1: Numeric (0 - 10) (10/01/20 1818)  Pain Intensity 1: 0 (10/01/20 1818)  Patient Stated Pain Goal: 0 (10/01/20 0014)  Pain Reassessment 1: Yes (10/01/20 0014)  Pain Onset 1: pta (09/22/20 0146)  Pain Location 1: Leg (09/25/20 1106)  Pain Orientation 1: Lower (09/25/20 1106)  Pain Description 1: Aching (09/25/20 1106)  Pain Intervention(s) 1: Medication (see MAR) (09/25/20 1106)    Ambulating  In room only     Additional Information:  See above     Shift report given to oncoming nurse Segundo Leach RN  at the bedside.     Leida Allan RN

## 2020-10-02 LAB
ALBUMIN SERPL-MCNC: 2.6 G/DL (ref 3.5–5)
ALBUMIN/GLOB SERPL: 0.9 {RATIO} (ref 1.2–3.5)
ALP SERPL-CCNC: 169 U/L (ref 50–130)
ALT SERPL-CCNC: 37 U/L (ref 12–65)
ANION GAP SERPL CALC-SCNC: 5 MMOL/L (ref 7–16)
AST SERPL-CCNC: 23 U/L (ref 15–37)
BILIRUB SERPL-MCNC: 0.4 MG/DL (ref 0.2–1.1)
BUN SERPL-MCNC: 1 MG/DL (ref 6–23)
CALCIUM SERPL-MCNC: 8.4 MG/DL (ref 8.3–10.4)
CHLORIDE SERPL-SCNC: 109 MMOL/L (ref 98–107)
CO2 SERPL-SCNC: 29 MMOL/L (ref 21–32)
CREAT SERPL-MCNC: 0.37 MG/DL (ref 0.6–1)
DIFFERENTIAL METHOD BLD: ABNORMAL
ERYTHROCYTE [DISTWIDTH] IN BLOOD BY AUTOMATED COUNT: 14.3 % (ref 11.9–14.6)
GGT SERPL-CCNC: 113 U/L (ref 5–55)
GLOBULIN SER CALC-MCNC: 2.8 G/DL (ref 2.3–3.5)
GLUCOSE SERPL-MCNC: 81 MG/DL (ref 65–100)
HCT VFR BLD AUTO: 24.4 % (ref 35.8–46.3)
HGB BLD-MCNC: 7.7 G/DL (ref 11.7–15.4)
LDH SERPL L TO P-CCNC: 172 U/L (ref 100–190)
MAGNESIUM SERPL-MCNC: 1.9 MG/DL (ref 1.8–2.4)
MCH RBC QN AUTO: 28 PG (ref 26.1–32.9)
MCHC RBC AUTO-ENTMCNC: 31.6 G/DL (ref 31.4–35)
MCV RBC AUTO: 88.7 FL (ref 79.6–97.8)
NRBC # BLD: 0 K/UL (ref 0–0.2)
PLATELET # BLD AUTO: 44 K/UL (ref 150–450)
PLATELET COMMENTS,PCOM: ABNORMAL
PMV BLD AUTO: 10.2 FL (ref 9.4–12.3)
POTASSIUM SERPL-SCNC: 3.3 MMOL/L (ref 3.5–5.1)
POTASSIUM SERPL-SCNC: 3.8 MMOL/L (ref 3.5–5.1)
PROT SERPL-MCNC: 5.4 G/DL (ref 6.3–8.2)
RBC # BLD AUTO: 2.75 M/UL (ref 4.05–5.2)
RBC MORPH BLD: ABNORMAL
SODIUM SERPL-SCNC: 143 MMOL/L (ref 136–145)
WBC # BLD AUTO: 0.4 K/UL (ref 4.3–11.1)
WBC MORPH BLD: ABNORMAL

## 2020-10-02 PROCEDURE — 74011250636 HC RX REV CODE- 250/636: Performed by: INTERNAL MEDICINE

## 2020-10-02 PROCEDURE — 74011250637 HC RX REV CODE- 250/637: Performed by: INTERNAL MEDICINE

## 2020-10-02 PROCEDURE — 85025 COMPLETE CBC W/AUTO DIFF WBC: CPT

## 2020-10-02 PROCEDURE — 74011000250 HC RX REV CODE- 250: Performed by: NURSE PRACTITIONER

## 2020-10-02 PROCEDURE — 84132 ASSAY OF SERUM POTASSIUM: CPT

## 2020-10-02 PROCEDURE — 36591 DRAW BLOOD OFF VENOUS DEVICE: CPT

## 2020-10-02 PROCEDURE — 83735 ASSAY OF MAGNESIUM: CPT

## 2020-10-02 PROCEDURE — 65270000029 HC RM PRIVATE

## 2020-10-02 PROCEDURE — 74011250636 HC RX REV CODE- 250/636: Performed by: NURSE PRACTITIONER

## 2020-10-02 PROCEDURE — 82977 ASSAY OF GGT: CPT

## 2020-10-02 PROCEDURE — 83615 LACTATE (LD) (LDH) ENZYME: CPT

## 2020-10-02 PROCEDURE — 2709999900 HC NON-CHARGEABLE SUPPLY

## 2020-10-02 PROCEDURE — 99233 SBSQ HOSP IP/OBS HIGH 50: CPT | Performed by: INTERNAL MEDICINE

## 2020-10-02 PROCEDURE — 80053 COMPREHEN METABOLIC PANEL: CPT

## 2020-10-02 PROCEDURE — 74011250637 HC RX REV CODE- 250/637: Performed by: NURSE PRACTITIONER

## 2020-10-02 RX ORDER — POTASSIUM CHLORIDE 29.8 MG/ML
40 INJECTION INTRAVENOUS ONCE
Status: COMPLETED | OUTPATIENT
Start: 2020-10-02 | End: 2020-10-02

## 2020-10-02 RX ADMIN — ACYCLOVIR 400 MG: 800 TABLET ORAL at 17:25

## 2020-10-02 RX ADMIN — SODIUM CHLORIDE 75 ML/HR: 900 INJECTION, SOLUTION INTRAVENOUS at 22:42

## 2020-10-02 RX ADMIN — Medication 10 ML: at 21:08

## 2020-10-02 RX ADMIN — POTASSIUM CHLORIDE 40 MEQ: 400 INJECTION, SOLUTION INTRAVENOUS at 04:24

## 2020-10-02 RX ADMIN — VANCOMYCIN HYDROCHLORIDE 125 MG: 5 INJECTION, POWDER, LYOPHILIZED, FOR SOLUTION INTRAVENOUS at 08:48

## 2020-10-02 RX ADMIN — MORPHINE SULFATE 60 MG: 30 TABLET, FILM COATED, EXTENDED RELEASE ORAL at 21:07

## 2020-10-02 RX ADMIN — VANCOMYCIN HYDROCHLORIDE 125 MG: 5 INJECTION, POWDER, LYOPHILIZED, FOR SOLUTION INTRAVENOUS at 13:05

## 2020-10-02 RX ADMIN — GABAPENTIN 100 MG: 100 CAPSULE ORAL at 08:32

## 2020-10-02 RX ADMIN — LEVOFLOXACIN 500 MG: 500 TABLET, FILM COATED ORAL at 09:43

## 2020-10-02 RX ADMIN — DULOXETINE 30 MG: 30 CAPSULE, DELAYED RELEASE ORAL at 21:07

## 2020-10-02 RX ADMIN — MORPHINE SULFATE 60 MG: 30 TABLET, FILM COATED, EXTENDED RELEASE ORAL at 08:31

## 2020-10-02 RX ADMIN — LORATADINE 10 MG: 10 TABLET ORAL at 08:32

## 2020-10-02 RX ADMIN — GABAPENTIN 100 MG: 100 CAPSULE ORAL at 16:19

## 2020-10-02 RX ADMIN — SODIUM CHLORIDE 75 ML/HR: 900 INJECTION, SOLUTION INTRAVENOUS at 08:48

## 2020-10-02 RX ADMIN — GABAPENTIN 100 MG: 100 CAPSULE ORAL at 21:07

## 2020-10-02 RX ADMIN — TBO-FILGRASTIM 480 MCG: 480 INJECTION, SOLUTION SUBCUTANEOUS at 21:08

## 2020-10-02 RX ADMIN — ACYCLOVIR 400 MG: 800 TABLET ORAL at 08:28

## 2020-10-02 RX ADMIN — VANCOMYCIN HYDROCHLORIDE 125 MG: 5 INJECTION, POWDER, LYOPHILIZED, FOR SOLUTION INTRAVENOUS at 17:26

## 2020-10-02 RX ADMIN — VANCOMYCIN HYDROCHLORIDE 125 MG: 5 INJECTION, POWDER, LYOPHILIZED, FOR SOLUTION INTRAVENOUS at 21:07

## 2020-10-02 RX ADMIN — FLUCONAZOLE 400 MG: 100 TABLET ORAL at 08:31

## 2020-10-02 NOTE — PROGRESS NOTES
Care Management Interventions  Palliative Care Criteria Met (RRAT>21 & CHF Dx)?: No(Risk13% Dx Multiple Myeloma )  Transition of Care Consult (CM Consult): Discharge Planning  Discharge Durable Medical Equipment: No  Physical Therapy Consult: No  Occupational Therapy Consult: No  Speech Therapy Consult: No  Current Support Network: Own Home  Confirm Follow Up Transport: Family  The Patient and/or Patient Representative was Provided with a Choice of Provider and Agrees with the Discharge Plan?: Yes  Discharge Location  Discharge Placement: Home with family assistance  Patient to remain in hospital until counts recovered. D/C plan is home with family assistance. CM following.

## 2020-10-02 NOTE — PROGRESS NOTES
Diagnosis: Multiple Myeloma  Transplant Date: 9/23/20  Day: +9  Chemo regimen used:  Melphalan 9/22. Labs not resulted that need follow-up: none  BMT assessments and toxicities completed. 10/1/20  WBC/ANC= 0.4/0  Prophylactic medications : po Levaquin,Diflucan, Acyclovir  (On po Vanco for c diff)  Granix started on D+6;9/29/20  Toxicities greater than 2 : none  Patient seen by MD/NP while inpatient & until engraftment. New orders received: IV KCL replacement per SOP  Issues: no new issues      END OF SHIFT NOTE:    Intake/Output  10/01 1901 - 10/02 0700  In: 2206 [P.O.:720; I.V.:1486]  Out: 2000 [Urine:1600]   Voiding: YES  Catheter: NO  Drain:              Stool:  2 occurrences. Stool Assessment  Stool Color: Green (10/01/20 2208)  Stool Appearance: Loose (10/01/20 2208)  Stool Amount: Medium (10/01/20 2208)  Stool Source/Status: Rectum (10/01/20 2208)    Emesis:  0 occurrences. VITAL SIGNS  Patient Vitals for the past 12 hrs:   Temp Pulse Resp BP SpO2   10/02/20 0315 98 °F (36.7 °C) 75 16 131/79 96 %   10/01/20 2207 98.1 °F (36.7 °C) 69 16 126/77 99 %   10/01/20 1837 98.7 °F (37.1 °C) 84 18 (!) 149/94 99 %   10/01/20 1620 98.6 °F (37 °C) 73 18 126/72 97 %       Pain Assessment  Pain 1  Pain Scale 1: Numeric (0 - 10) (10/02/20 0315)  Pain Intensity 1: 0 (10/02/20 0315)  Patient Stated Pain Goal: 0 (10/02/20 0315)  Pain Reassessment 1: Yes (10/01/20 0014)  Pain Onset 1: pta (09/22/20 0146)  Pain Location 1: Leg (09/25/20 1106)  Pain Orientation 1: Lower (09/25/20 1106)  Pain Description 1: Aching (09/25/20 1106)  Pain Intervention(s) 1: Medication (see MAR) (09/25/20 1106)    Ambulating  Yes    Additional Information:   Remains afebrile;no bleeding. Had x2 loose BM's. Continues w/ mouth care;appetite fair. Shift report given to oncoming nurse PHAM Yoo  at the bedside.     Bashir Plaza RN

## 2020-10-02 NOTE — PROGRESS NOTES
[facility]        Inpatient Hematology / Oncology Progress Note      Admission Date: 2020 12:02 PM  Reason for Admission/Hospital Course: Multiple myeloma ([facility] [address] [C90.00]  Stem cell transplant candidate [Z76.82]      24 Hour Events:  ASCT Day +9  VSS   Afebrile  ANC 0.0  Stools about the same    ROS:  Constitutional:  negative for fever, chills, weakness, malaise, fatigue. CV: negative for chest pain, palpitations, edema. Respiratory: negative for dyspnea, cough, wheezing. GI: negative for abdominal pain. +diarrhea. +intermittent nausea    10 point review of systems is otherwise negative with the exception of the elements mentioned above in the HPI. Allergies   Allergen Reactions    Zarxio [Filgrastim-Sndz] Shortness of Breath     \"pounding heart\" per pt. Tolerates Granix       OBJECTIVE:  Patient Vitals for the past 8 hrs:   BP Temp Pulse Resp SpO2   10/02/20 0737 (!) 140/88 97.8 °F (36.6 °C) 72 16 98 %   10/02/20 0315 131/79 98 °F (36.7 °C) 75 16 96 %     Temp (24hrs), Av.2 °F (36.8 °C), Min:97.8 °F (36.6 °C), Max:98.7 °F (37.1 °C)    10/02 0701 - 10/02 1900  In: 430 [I.V.:106]  Out: -     Physical Exam:  Constitutional: Well developed, well nourished female in no acute distress, sitting comfortably in the hospital bed. HEENT: Normocephalic and atraumatic. Oropharynx is clear, mucous membranes are moist.  Pupils are equal, round, and reactive to light. Extraocular muscles are intact. Sclerae anicteric. Skin Warm and dry. No bruising and no rash noted. No erythema. No pallor. Respiratory Lungs are clear to auscultation bilaterally without wheezes, rales or rhonchi, normal air exchange without accessory muscle use. CVS Normal rate, regular rhythm and normal S1 and S2. No murmurs, gallops, or rubs. Abdomen Soft, nontender and nondistended, normoactive bowel sounds. No palpable mass. No hepatosplenomegaly.    Neuro Grossly nonfocal with no obvious sensory or motor deficits. Neuropathy feet and hands improved. Hand tremors/dizziness   MSK Normal range of motion in general.  No edema and no tenderness. Psych Appropriate mood and affect.         Labs:      Recent Labs     10/02/20  0306 10/01/20  0332 09/30/20  0412   WBC 0.4* 0.3* 0.4*   RBC 2.75* 2.77* 2.80*   HGB 7.7* 7.8* 8.1*   HCT 24.4* 24.8* 25.2*   MCV 88.7 89.5 90.0   MCH 28.0 28.2 28.9   MCHC 31.6 31.5 32.1   RDW 14.3 14.6 14.7*   PLT 44* 58* 83*   GRANS  --  4* 47   LYMPH  --  89* 42   MONOS  --  8 5   EOS  --  0* 0*   BASOS  --  0 3*   IG  --  0 3   DF MANUAL AUTOMATED AUTOMATED   ANEU  --  0.0* 0.2*   ABL  --  0.2* 0.2*   ABM  --  0.0* 0.0*   PRABHJOT  --  0.0 0.0   ABB  --  0.0 0.0   AIG  --  0.0 0.0        Recent Labs     10/02/20  0306 10/01/20  0332 09/30/20  0412    144 143   K 3.3* 3.3* 3.4*   * 110* 109*   CO2 29 30 29   AGAP 5* 4* 5*   GLU 81 83 82   BUN 1* 3* 2*   CREA 0.37* 0.40* 0.39*   GFRAA >60 >60 >60   GFRNA >60 >60 >60   CA 8.4 8.3 8.0*   * 169* 192*   TP 5.4* 5.5* 5.6*   ALB 2.6* 2.6* 2.5*   GLOB 2.8 2.9 3.1   AGRAT 0.9* 0.9* 0.8*   MG 1.9 1.9 1.8         Imaging:    Medications:  Current Facility-Administered Medications   Medication Dose Route Frequency    magic mouthwash (VEENA) oral suspension 5 mL  5 mL Oral Q4H PRN    tbo-filgrastim (GRANIX) injection 480 mcg  480 mcg SubCUTAneous QHS    loratadine (CLARITIN) tablet 10 mg  10 mg Oral DAILY    vancomycin 50 mg/mL oral solution (compounded) 125 mg  125 mg Oral QID    fluconazole (DIFLUCAN) tablet 400 mg  400 mg Oral DAILY    levoFLOXacin (LEVAQUIN) tablet 500 mg  500 mg Oral Q24H    gabapentin (NEURONTIN) capsule 100 mg  100 mg Oral TID    loperamide (IMODIUM) capsule 2 mg  2 mg Oral Q4H PRN    LORazepam (ATIVAN) tablet 1 mg  1 mg Oral Q8H PRN    ondansetron (ZOFRAN) injection 8 mg  8 mg IntraVENous Q8H PRN    prochlorperazine (COMPAZINE) with saline injection 10 mg  10 mg IntraVENous Q4H PRN    acetaminophen (TYLENOL) tablet 500 mg  500 mg Oral Q6H PRN    acyclovir (ZOVIRAX) tablet 400 mg  400 mg Oral BID    DULoxetine (CYMBALTA) capsule 30 mg  30 mg Oral QHS    HYDROmorphone (DILAUDID) tablet 2 mg  2 mg Oral Q4H PRN    morphine CR (MS CONTIN) tablet 60 mg  60 mg Oral Q12H    0.9% sodium chloride infusion  75 mL/hr IntraVENous CONTINUOUS    enoxaparin (LOVENOX) injection 40 mg  40 mg SubCUTAneous Q12H    central line flush (saline) syringe 10 mL  10 mL InterCATHeter PRN         ASSESSMENT:    Problem List  Date Reviewed: 8/21/2020          Codes Class Noted    Stem cell transplant candidate ICD-10-CM: Z76.82  ICD-9-CM: V49.83  9/21/2020        Multiple myeloma ([facility] ICD-10-CM: C90.00  ICD-9-CM: 203.00  8/13/2020        Multiple myeloma not having achieved remission ([facility] ICD-10-CM: C90.00  ICD-9-CM: 203.00  7/21/2020        Obesity, morbid ([facility] ICD-10-CM: E66.01  ICD-9-CM: 278.01  5/20/2020            Ms. Nano Sarabia is a 38 y.o. female admitted on 9/21/2020 with a primary diagnosis of There were no encounter diagnoses. .       35 female, , smoker, history of hypertension, upper eyelids stye (felt noninfectious by optho), more recent diagnosis of IgG lambda multiple myeloma, under care of Dr. Cheng Agarwal, now kindly referred to us for consideration of autologous stem cell transplant. She is s/p RVD x4 w SIFE CR. Seen today in office 9/21/2020: Cultures from last urine specimen with mixed skin zully indicated of of contamination. Repeat UA unremarkable. Completely asymptomatic today. Finishes nitrofurantoin course today. Denies any fevers or chills, dysuria or hematuria. Extensive ROS unremarkable. Recent labs including myeloma proteins unremarkable. [address] [facility] staying low. No M spike however TORIBIO with IgG lambda band identified. Patient should be able to proceed with HDT/ACT as inpatient. She will be hospitalized through count recovery.       PLAN:  -Multiple myeloma   As above.  Admit to hematology service. Gentle hydration. QBo275/ASCT starting tomorrow. Conditioning regimen will be Melphalan 200 mg/m2. Anti-emetics and anti-diarrheals as needed. - D+1 Abx prophylaxis w Acyclovir, Diflucan, Levaquin and Augmentin  - Granix daily starting D+6.  - Mobilization was w/ granix/Mozobil. Collected CD34+ at 7.08 mil/kg. Will get half back  - S/p RVD x 4. Labs w SIFE CR. Then Bortezomib/Dex alone. - Bone directed therapy and prophylactic medications per primary hematology service. 9/22 Melphalan today  9/23 ASCT Day 0  9/26 D+3. Doing well. Labs and counts are stable. WBC 3.0, Hgb 8.0, plts 178k. 9/28 D+5. Counts dropping as expected  9/29 starting granix today    Pancytopenia related to chemo and disease  10/1 Transfuse per Shannan SOPs    Peripheral neuropathy hands and feet  9/22 Restart robbie  9/25 having some dizziness and hand tremors. Decrease to 100 mg tid  9/26 Continued neuropathy in BLE but stable. No complaints of dizziness/tremors today. Nausea  -antiemetics  9/26 Improving with medication    Elevated LFTs  9/25 monitor overnight. Consider US if continues to increase. 9/26 LFTs improved. Continue to monitor. , , Alkph 276.   9/28 continues to trend down    Breast tenderness  9/26 No abnormality on exam with the exception of right nipple retraction which developed overnight. Will continue to monitor. Cdiff  9/29 on oral vanc. Had 2 stools so far this am. Not explosive and able to make it to bathroom. 10/1 diarrhea about the same    Shannan SOPs  Supportive care  Granix started D+6    Goals and plan of care reviewed with the patient. All questions answered to the best of our ability. Cristy Cruz NP   [facility]  [address] [address]  Office : [phone]  Fax : [phone]        Attending Addendum:  I have personally performed a face to face diagnostic evaluation on this patient.  I have reviewed and agree with the care plan as documented above by Deborah Gillette N.P.  My findings are as follows: Patient appears lethargic, heart rate regular without murmurs, abdomen is non-tender, bowel sounds are positive.  39 female history of hypertension, upper eyelids stye, IgG lambda multiple myeloma, standard risk, status post RVD, now undergoing Margarita 200/ASCT D+8.  Tolerating therapy reasonably well.  Continue with prophylactic antibiotics including acyclovir, Diflucan, and levaquin.  Continue Granix daily.  Transfusion support as needed.  Awaiting count recovert.  LFTs being monitored, improving.  Loose stools: C. difficile positive, now on p.o. vancomycin, denies any abd pain, closely monitor for improvement. Augmentin stopped.  Continue current care.          Total time 35 min 50% in direct consultation about the patient's diagnosis and management             Chase Salcedo MD  [facility]  [id number] [place] [address]  Office : [phone]  Fax : [phone]

## 2020-10-02 NOTE — PROGRESS NOTES
END OF SHIFT NOTE:  Day + 9 ASCT   Patient voice no complain of pain or nausea. No fevers with fair po intake. Lovenox held,with elevated blood pressures @ interval. Repeat Potassium level, after receiving 40  meq  Potassium IV, was 3.8. Intake/Output  No intake/output data recorded. Voiding: Yes   Catheter: No   Drain:              Stool: One  occurrences. Stool Assessment  Stool Color: Doristine Coral (10/02/20 0850)  Stool Appearance: Loose(watery  with oatmeal like consistency ) (10/02/20 0850)  Stool Amount: Medium (10/02/20 0850)  Stool Source/Status: Rectum (10/02/20 0850)    Emesis:   No occurrences. VITAL SIGNS  Patient Vitals for the past 12 hrs:   Temp Pulse Resp BP SpO2   10/02/20 1523 97.9 °F (36.6 °C) 86 16 (!) 144/93 94 %   10/02/20 1110 98.4 °F (36.9 °C) 88 16 (!) 142/96 94 %       Pain Assessment  Pain 1  Pain Scale 1: Numeric (0 - 10) (10/02/20 1730)  Pain Intensity 1: 0 (10/02/20 1730)  Patient Stated Pain Goal: 0 (10/02/20 0315)  Pain Reassessment 1: Yes (10/01/20 0014)  Pain Onset 1: pta (09/22/20 0146)  Pain Location 1: Leg (09/25/20 1106)  Pain Orientation 1: Lower (09/25/20 1106)  Pain Description 1: Aching (09/25/20 1106)  Pain Intervention(s) 1: Medication (see MAR) (09/25/20 1106)    Ambulating  Yes in room     Additional Information:  See above     Shift report given to oncoming nurse Eulalio Esqueda RN  at the bedside.     David Garduno RN

## 2020-10-03 LAB
ANION GAP SERPL CALC-SCNC: 7 MMOL/L (ref 7–16)
BUN SERPL-MCNC: 2 MG/DL (ref 6–23)
CALCIUM SERPL-MCNC: 8.7 MG/DL (ref 8.3–10.4)
CHLORIDE SERPL-SCNC: 107 MMOL/L (ref 98–107)
CO2 SERPL-SCNC: 27 MMOL/L (ref 21–32)
CREAT SERPL-MCNC: 0.39 MG/DL (ref 0.6–1)
DIFFERENTIAL METHOD BLD: ABNORMAL
ERYTHROCYTE [DISTWIDTH] IN BLOOD BY AUTOMATED COUNT: 14.3 % (ref 11.9–14.6)
GLUCOSE SERPL-MCNC: 82 MG/DL (ref 65–100)
HCT VFR BLD AUTO: 25.5 % (ref 35.8–46.3)
HGB BLD-MCNC: 8.2 G/DL (ref 11.7–15.4)
LYMPHOCYTES # BLD: 0.5 K/UL (ref 0.5–4.6)
LYMPHOCYTES NFR BLD MANUAL: 80 % (ref 16–44)
MAGNESIUM SERPL-MCNC: 1.8 MG/DL (ref 1.8–2.4)
MCH RBC QN AUTO: 28.6 PG (ref 26.1–32.9)
MCHC RBC AUTO-ENTMCNC: 32.2 G/DL (ref 31.4–35)
MCV RBC AUTO: 88.9 FL (ref 79.6–97.8)
MONOCYTES # BLD: 0.1 K/UL (ref 0.1–1.3)
MONOCYTES NFR BLD MANUAL: 12 % (ref 3–9)
NEUTS SEG # BLD: 0.1 K/UL (ref 1.7–8.2)
NEUTS SEG NFR BLD MANUAL: 8 % (ref 47–75)
NRBC # BLD: 0 K/UL (ref 0–0.2)
PLATELET # BLD AUTO: 31 K/UL (ref 150–450)
PLATELET COMMENTS,PCOM: ABNORMAL
PMV BLD AUTO: 10.6 FL (ref 9.4–12.3)
POTASSIUM SERPL-SCNC: 3.4 MMOL/L (ref 3.5–5.1)
POTASSIUM SERPL-SCNC: 3.8 MMOL/L (ref 3.5–5.1)
RBC # BLD AUTO: 2.87 M/UL (ref 4.05–5.2)
RBC MORPH BLD: ABNORMAL
SODIUM SERPL-SCNC: 141 MMOL/L (ref 136–145)
WBC # BLD AUTO: 0.7 K/UL (ref 4.3–11.1)

## 2020-10-03 PROCEDURE — 80048 BASIC METABOLIC PNL TOTAL CA: CPT

## 2020-10-03 PROCEDURE — 65270000029 HC RM PRIVATE

## 2020-10-03 PROCEDURE — 83735 ASSAY OF MAGNESIUM: CPT

## 2020-10-03 PROCEDURE — 84132 ASSAY OF SERUM POTASSIUM: CPT

## 2020-10-03 PROCEDURE — 74011250636 HC RX REV CODE- 250/636: Performed by: INTERNAL MEDICINE

## 2020-10-03 PROCEDURE — 74011250637 HC RX REV CODE- 250/637: Performed by: NURSE PRACTITIONER

## 2020-10-03 PROCEDURE — 74011250637 HC RX REV CODE- 250/637: Performed by: INTERNAL MEDICINE

## 2020-10-03 PROCEDURE — 36591 DRAW BLOOD OFF VENOUS DEVICE: CPT

## 2020-10-03 PROCEDURE — 74011250636 HC RX REV CODE- 250/636: Performed by: NURSE PRACTITIONER

## 2020-10-03 PROCEDURE — 99233 SBSQ HOSP IP/OBS HIGH 50: CPT | Performed by: INTERNAL MEDICINE

## 2020-10-03 PROCEDURE — 85025 COMPLETE CBC W/AUTO DIFF WBC: CPT

## 2020-10-03 PROCEDURE — 74011000250 HC RX REV CODE- 250: Performed by: NURSE PRACTITIONER

## 2020-10-03 RX ORDER — POTASSIUM CHLORIDE 14.9 MG/ML
20 INJECTION INTRAVENOUS
Status: COMPLETED | OUTPATIENT
Start: 2020-10-03 | End: 2020-10-03

## 2020-10-03 RX ADMIN — Medication 10 ML: at 21:24

## 2020-10-03 RX ADMIN — SODIUM CHLORIDE 75 ML/HR: 900 INJECTION, SOLUTION INTRAVENOUS at 21:48

## 2020-10-03 RX ADMIN — ACYCLOVIR 400 MG: 800 TABLET ORAL at 18:10

## 2020-10-03 RX ADMIN — LORATADINE 10 MG: 10 TABLET ORAL at 07:49

## 2020-10-03 RX ADMIN — VANCOMYCIN HYDROCHLORIDE 125 MG: 5 INJECTION, POWDER, LYOPHILIZED, FOR SOLUTION INTRAVENOUS at 21:24

## 2020-10-03 RX ADMIN — POTASSIUM CHLORIDE 20 MEQ: 14.9 INJECTION, SOLUTION INTRAVENOUS at 05:11

## 2020-10-03 RX ADMIN — GABAPENTIN 100 MG: 100 CAPSULE ORAL at 16:23

## 2020-10-03 RX ADMIN — FLUCONAZOLE 400 MG: 100 TABLET ORAL at 07:48

## 2020-10-03 RX ADMIN — MORPHINE SULFATE 60 MG: 30 TABLET, FILM COATED, EXTENDED RELEASE ORAL at 21:23

## 2020-10-03 RX ADMIN — GABAPENTIN 100 MG: 100 CAPSULE ORAL at 07:49

## 2020-10-03 RX ADMIN — VANCOMYCIN HYDROCHLORIDE 125 MG: 5 INJECTION, POWDER, LYOPHILIZED, FOR SOLUTION INTRAVENOUS at 08:33

## 2020-10-03 RX ADMIN — VANCOMYCIN HYDROCHLORIDE 125 MG: 5 INJECTION, POWDER, LYOPHILIZED, FOR SOLUTION INTRAVENOUS at 12:44

## 2020-10-03 RX ADMIN — MORPHINE SULFATE 60 MG: 30 TABLET, FILM COATED, EXTENDED RELEASE ORAL at 08:29

## 2020-10-03 RX ADMIN — DULOXETINE 30 MG: 30 CAPSULE, DELAYED RELEASE ORAL at 21:23

## 2020-10-03 RX ADMIN — LEVOFLOXACIN 500 MG: 500 TABLET, FILM COATED ORAL at 08:33

## 2020-10-03 RX ADMIN — GABAPENTIN 100 MG: 100 CAPSULE ORAL at 21:24

## 2020-10-03 RX ADMIN — TBO-FILGRASTIM 480 MCG: 480 INJECTION, SOLUTION SUBCUTANEOUS at 21:23

## 2020-10-03 RX ADMIN — VANCOMYCIN HYDROCHLORIDE 125 MG: 5 INJECTION, POWDER, LYOPHILIZED, FOR SOLUTION INTRAVENOUS at 18:12

## 2020-10-03 RX ADMIN — POTASSIUM CHLORIDE 20 MEQ: 14.9 INJECTION, SOLUTION INTRAVENOUS at 07:50

## 2020-10-03 RX ADMIN — ACYCLOVIR 400 MG: 800 TABLET ORAL at 07:49

## 2020-10-03 NOTE — PROGRESS NOTES
Diagnosis: Multiple Myeloma  Transplant Date: 9/23/20  Day: +10  Chemo regimen used:  Melphalan 9/22. Labs not resulted that need follow-up: none  BMT assessments and toxicities completed. 10/2/20  WBC/ANC= 0.7/0.1  Prophylactic medications : po Levaquin,Diflucan, Acyclovir  (On po Vanco for c diff)  Granix started on D+6;9/29/20  Toxicities greater than 2 : none  Patient seen by MD/NP while inpatient & until engraftment. New orders received: IV KCL replacement per SOP K+3.4  Issues: no new issues      END OF SHIFT NOTE:    Intake/Output  10/02 1901 - 10/03 0700  In: 4542 [P.O.:240; I.V.:1237]  Out: 2000 [Urine:1800]   Voiding: YES  Catheter: NO  Drain:              Stool:  1 occurrence. Stool Assessment  Stool Color: Green (10/02/20 2113)  Stool Appearance: Loose (10/02/20 2113)  Stool Amount: Medium (10/02/20 2113)  Stool Source/Status: Rectum (10/02/20 2113)    Emesis:  0 occurrences. VITAL SIGNS  Patient Vitals for the past 12 hrs:   Temp Pulse Resp BP SpO2   10/03/20 0250 98.1 °F (36.7 °C) 80 16 (!) 141/81 98 %   10/02/20 2306 98.1 °F (36.7 °C) 83 16 129/72 97 %   10/02/20 1934 98.2 °F (36.8 °C) 87 14 132/81 99 %       Pain Assessment  Pain 1  Pain Scale 1: Numeric (0 - 10) (10/03/20 0015)  Pain Intensity 1: 0 (10/03/20 0015)  Patient Stated Pain Goal: 0 (10/03/20 0015)  Pain Reassessment 1: Yes (10/01/20 0014)  Pain Onset 1: pta (09/22/20 0146)  Pain Location 1: Leg (09/25/20 1106)  Pain Orientation 1: Lower (09/25/20 1106)  Pain Description 1: Aching (09/25/20 1106)  Pain Intervention(s) 1: Medication (see MAR) (09/25/20 1106)    Ambulating  Yes    Additional Information:    No new complaints. Compliant w/ mouth care. Remains afebrile. x1 loose BM. Shift report given to oncoming nurse  PHAM Pascual at the bedside.     Demetrice Hummel RN

## 2020-10-03 NOTE — PROGRESS NOTES
[address] [facility] Hematology & Oncology        Inpatient Hematology / Oncology Progress Note      Admission Date: 2020 12:02 PM  Reason for Admission/Hospital Course: Multiple myeloma ([facility] [address] [C90.00]  Stem cell transplant candidate [Z76.82]      24 Hour Events:  ASCT Day +10  VSS   Afebrile  ANC 0.1  Stools about the same    ROS:  Constitutional:  negative for fever, chills, weakness, malaise, fatigue. CV: negative for chest pain, palpitations, edema. Respiratory: negative for dyspnea, cough, wheezing. GI: negative for abdominal pain. +diarrhea. +intermittent nausea    10 point review of systems is otherwise negative with the exception of the elements mentioned above in the HPI. Allergies   Allergen Reactions    Zarxio [Filgrastim-Sndz] Shortness of Breath     \"pounding heart\" per pt. Tolerates Granix       OBJECTIVE:  Patient Vitals for the past 8 hrs:   BP Temp Pulse Resp SpO2   10/03/20 0744 (!) 146/90 99.6 °F (37.6 °C) 77 17 97 %   10/03/20 0250 (!) 141/81 98.1 °F (36.7 °C) 80 16 98 %     Temp (24hrs), Av.4 °F (36.9 °C), Min:97.9 °F (36.6 °C), Max:99.6 °F (37.6 °C)    No intake/output data recorded. Physical Exam:  Constitutional: Well developed, well nourished female in no acute distress, sitting comfortably in the hospital bed. HEENT: Normocephalic and atraumatic. Oropharynx is clear, mucous membranes are moist.  Pupils are equal, round, and reactive to light. Extraocular muscles are intact. Sclerae anicteric. Skin Warm and dry. No bruising and no rash noted. No erythema. No pallor. Respiratory Lungs are clear to auscultation bilaterally without wheezes, rales or rhonchi, normal air exchange without accessory muscle use. CVS Normal rate, regular rhythm and normal S1 and S2. No murmurs, gallops, or rubs. Abdomen Soft, nontender and nondistended, normoactive bowel sounds. No palpable mass. No hepatosplenomegaly.    Neuro Grossly nonfocal with no obvious sensory or motor deficits. Neuropathy feet and hands improved. Hand tremors/dizziness   MSK Normal range of motion in general.  No edema and no tenderness. Psych Appropriate mood and affect.         Labs:      Recent Labs     10/03/20  0240 10/02/20  0306 10/01/20  0332   WBC 0.7* 0.4* 0.3*   RBC 2.87* 2.75* 2.77*   HGB 8.2* 7.7* 7.8*   HCT 25.5* 24.4* 24.8*   MCV 88.9 88.7 89.5   MCH 28.6 28.0 28.2   MCHC 32.2 31.6 31.5   RDW 14.3 14.3 14.6   PLT 31* 44* 58*   GRANS 8*  --  4*   LYMPH 80*  --  89*   MONOS 12*  --  8   EOS  --   --  0*   BASOS  --   --  0   IG  --   --  0   DF MANUAL MANUAL AUTOMATED   ANEU 0.1*  --  0.0*   ABL 0.5  --  0.2*   ABM 0.1  --  0.0*   PRABHJOT  --   --  0.0   ABB  --   --  0.0   AIG  --   --  0.0        Recent Labs     10/03/20  0240 10/02/20  0945 10/02/20  0306 10/01/20  0332     --  143 144   K 3.4* 3.8 3.3* 3.3*     --  109* 110*   CO2 27  --  29 30   AGAP 7  --  5* 4*   GLU 82  --  81 83   BUN 2*  --  1* 3*   CREA 0.39*  --  0.37* 0.40*   GFRAA >60  --  >60 >60   GFRNA >60  --  >60 >60   CA 8.7  --  8.4 8.3   AP  --   --  169* 169*   TP  --   --  5.4* 5.5*   ALB  --   --  2.6* 2.6*   GLOB  --   --  2.8 2.9   AGRAT  --   --  0.9* 0.9*   MG 1.8  --  1.9 1.9         Imaging:    Medications:  Current Facility-Administered Medications   Medication Dose Route Frequency    potassium chloride 20 mEq in 100 ml IVPB  20 mEq IntraVENous Q2H    magic mouthwash (VEEAN) oral suspension 5 mL  5 mL Oral Q4H PRN    tbo-filgrastim (GRANIX) injection 480 mcg  480 mcg SubCUTAneous QHS    loratadine (CLARITIN) tablet 10 mg  10 mg Oral DAILY    vancomycin 50 mg/mL oral solution (compounded) 125 mg  125 mg Oral QID    fluconazole (DIFLUCAN) tablet 400 mg  400 mg Oral DAILY    levoFLOXacin (LEVAQUIN) tablet 500 mg  500 mg Oral Q24H    gabapentin (NEURONTIN) capsule 100 mg  100 mg Oral TID    loperamide (IMODIUM) capsule 2 mg  2 mg Oral Q4H PRN    LORazepam (ATIVAN) tablet 1 mg  1 mg Oral Q8H PRN    ondansetron (ZOFRAN) injection 8 mg  8 mg IntraVENous Q8H PRN    prochlorperazine (COMPAZINE) with saline injection 10 mg  10 mg IntraVENous Q4H PRN    acetaminophen (TYLENOL) tablet 500 mg  500 mg Oral Q6H PRN    acyclovir (ZOVIRAX) tablet 400 mg  400 mg Oral BID    DULoxetine (CYMBALTA) capsule 30 mg  30 mg Oral QHS    HYDROmorphone (DILAUDID) tablet 2 mg  2 mg Oral Q4H PRN    morphine CR (MS CONTIN) tablet 60 mg  60 mg Oral Q12H    0.9% sodium chloride infusion  75 mL/hr IntraVENous CONTINUOUS    [Held by provider] enoxaparin (LOVENOX) injection 40 mg  40 mg SubCUTAneous Q12H    central line flush (saline) syringe 10 mL  10 mL InterCATHeter PRN         ASSESSMENT:    Problem List  Date Reviewed: 8/21/2020          Codes Class Noted    Stem cell transplant candidate ICD-10-CM: Z76.82  ICD-9-CM: V49.83  9/21/2020        Multiple myeloma ([facility] ICD-10-CM: C90.00  ICD-9-CM: 203.00  8/13/2020        Multiple myeloma not having achieved remission ([facility] [address] ICD-10-CM: C90.00  ICD-9-CM: 203.00  7/21/2020        Obesity, morbid ([facility] [address] ICD-10-CM: E66.01  ICD-9-CM: 278.01  5/20/2020            Ms. Spencer Haley is a 38 y.o. female admitted on 9/21/2020 with a primary diagnosis of There were no encounter diagnoses. .       35 female, , smoker, history of hypertension, upper eyelids stye (felt noninfectious by optho), more recent diagnosis of IgG lambda multiple myeloma, under care of Dr. Cathy Catalan, now kindly referred to us for consideration of autologous stem cell transplant. She is s/p RVD x4 w SIFE CR. Seen today in office 9/21/2020: Cultures from last urine specimen with mixed skin zully indicated of of contamination. Repeat UA unremarkable. Completely asymptomatic today. Finishes nitrofurantoin course today. Denies any fevers or chills, dysuria or hematuria. Extensive ROS unremarkable. Recent labs including myeloma proteins unremarkable. [facility] staying low.   No M spike however TORIBIO with IgG lambda band identified. Patient should be able to proceed with HDT/ACT as inpatient. She will be hospitalized through count recovery.       PLAN:  -Multiple myeloma   As above. Admit to hematology service. Gentle hydration. [id number] starting tomorrow. Conditioning regimen will be Melphalan 200 mg/m2. Anti-emetics and anti-diarrheals as needed. - D+1 Abx prophylaxis w Acyclovir, Diflucan, Levaquin and Augmentin  - Granix daily starting D+6.  - Mobilization was w/ granix/Mozobil. Collected CD34+ at 7.08 mil/kg. Will get half back  - S/p RVD x 4. Labs w SIFE CR. Then Bortezomib/Dex alone. - Bone directed therapy and prophylactic medications per primary hematology service. 9/22 Melphalan today  9/23 ASCT Day 0  9/26 D+3. Doing well. Labs and counts are stable. WBC 3.0, Hgb 8.0, plts 178k. 9/28 D+5. Counts dropping as expected  9/29 starting granix today    Pancytopenia related to chemo and disease  10/1 Transfuse per Shannan SOPs    Peripheral neuropathy hands and feet  9/22 Restart robbie  9/25 having some dizziness and hand tremors. Decrease to 100 mg tid  9/26 Continued neuropathy in BLE but stable. No complaints of dizziness/tremors today. Nausea  -antiemetics  9/26 Improving with medication    Elevated LFTs  9/25 monitor overnight. Consider US if continues to increase. 9/26 LFTs improved. Continue to monitor. , , Alkph 276.   9/28 continues to trend down    Breast tenderness  9/26 No abnormality on exam with the exception of right nipple retraction which developed overnight. Will continue to monitor. Cdiff  9/29 on oral vanc. Had 2 stools so far this am. Not explosive and able to make it to bathroom. 10/1 diarrhea about the same    Shannan SOPs  Supportive care  Granix started D+6    Goals and plan of care reviewed with the patient. All questions answered to the best of our ability.             Karma Mendoza, GENIE   [facility] Hematology & Oncology  [address] [address] [facility]  [place] [address]  Office : [phone]  Fax : [phone]        Attending Addendum:  I have personally performed a face to face diagnostic evaluation on this patient. I have reviewed and agree with the care plan as documented above by Deborah Knutson N.P.  My findings are as follows: Patient appears lethargic, heart rate regular without murmurs, abdomen is non-tender, bowel sounds are positive.  39 female history of hypertension, upper eyelids stye, IgG lambda multiple myeloma, standard risk, status post RVD, now undergoing Margarita 200/ASCT D+10.  Tolerating therapy reasonably well.  Continue with prophylactic antibiotics including acyclovir, Diflucan, and levaquin.  Continue Granix daily.  Transfusion support as needed.  Awaiting count recovery, WBC now rising.   LFTs being monitored, improving.  Loose stools: C. difficile positive, now on p.o. vancomycin, denies any abd pain, closely monitor for improvement. Augmentin stopped. Continue current care.          Total time 35 min 50% in direct consultation about the patient's diagnosis and management             Issac Rogers MD  [facility] Hematology/Oncology  [address] [address]  Office : [phone]  Fax : [phone]

## 2020-10-04 LAB
ANION GAP SERPL CALC-SCNC: 7 MMOL/L (ref 7–16)
BUN SERPL-MCNC: 1 MG/DL (ref 6–23)
CALCIUM SERPL-MCNC: 8.1 MG/DL (ref 8.3–10.4)
CHLORIDE SERPL-SCNC: 107 MMOL/L (ref 98–107)
CO2 SERPL-SCNC: 28 MMOL/L (ref 21–32)
CREAT SERPL-MCNC: 0.42 MG/DL (ref 0.6–1)
DIFFERENTIAL METHOD BLD: ABNORMAL
ERYTHROCYTE [DISTWIDTH] IN BLOOD BY AUTOMATED COUNT: 14.4 % (ref 11.9–14.6)
GLUCOSE SERPL-MCNC: 80 MG/DL (ref 65–100)
HCT VFR BLD AUTO: 24.1 % (ref 35.8–46.3)
HGB BLD-MCNC: 7.7 G/DL (ref 11.7–15.4)
LYMPHOCYTES # BLD: 0.7 K/UL (ref 0.5–4.6)
LYMPHOCYTES NFR BLD MANUAL: 66 % (ref 16–44)
MAGNESIUM SERPL-MCNC: 1.6 MG/DL (ref 1.8–2.4)
MCH RBC QN AUTO: 28.4 PG (ref 26.1–32.9)
MCHC RBC AUTO-ENTMCNC: 32 G/DL (ref 31.4–35)
MCV RBC AUTO: 88.9 FL (ref 79.6–97.8)
METAMYELOCYTES NFR BLD MANUAL: 2 %
MONOCYTES # BLD: 0.1 K/UL (ref 0.1–1.3)
MONOCYTES NFR BLD MANUAL: 12 % (ref 3–9)
NEUTS BAND NFR BLD MANUAL: 2 % (ref 0–6)
NEUTS SEG # BLD: 0.2 K/UL (ref 1.7–8.2)
NEUTS SEG NFR BLD MANUAL: 18 % (ref 47–75)
NRBC # BLD: 0.02 K/UL (ref 0–0.2)
PLATELET # BLD AUTO: 20 K/UL (ref 150–450)
PLATELET COMMENTS,PCOM: ABNORMAL
PMV BLD AUTO: 10.5 FL (ref 9.4–12.3)
POTASSIUM SERPL-SCNC: 3.2 MMOL/L (ref 3.5–5.1)
POTASSIUM SERPL-SCNC: 3.9 MMOL/L (ref 3.5–5.1)
RBC # BLD AUTO: 2.71 M/UL (ref 4.05–5.2)
RBC MORPH BLD: ABNORMAL
SODIUM SERPL-SCNC: 142 MMOL/L (ref 136–145)
TOTAL CELLS COUNTED SPEC: 50
WBC # BLD AUTO: 1 K/UL (ref 4.3–11.1)
WBC MORPH BLD: ABNORMAL

## 2020-10-04 PROCEDURE — 36591 DRAW BLOOD OFF VENOUS DEVICE: CPT

## 2020-10-04 PROCEDURE — 99233 SBSQ HOSP IP/OBS HIGH 50: CPT | Performed by: INTERNAL MEDICINE

## 2020-10-04 PROCEDURE — 83735 ASSAY OF MAGNESIUM: CPT

## 2020-10-04 PROCEDURE — 74011250637 HC RX REV CODE- 250/637: Performed by: NURSE PRACTITIONER

## 2020-10-04 PROCEDURE — 2709999900 HC NON-CHARGEABLE SUPPLY

## 2020-10-04 PROCEDURE — 74011000250 HC RX REV CODE- 250: Performed by: NURSE PRACTITIONER

## 2020-10-04 PROCEDURE — 74011250636 HC RX REV CODE- 250/636: Performed by: INTERNAL MEDICINE

## 2020-10-04 PROCEDURE — 74011250636 HC RX REV CODE- 250/636: Performed by: NURSE PRACTITIONER

## 2020-10-04 PROCEDURE — 85025 COMPLETE CBC W/AUTO DIFF WBC: CPT

## 2020-10-04 PROCEDURE — 84132 ASSAY OF SERUM POTASSIUM: CPT

## 2020-10-04 PROCEDURE — 80048 BASIC METABOLIC PNL TOTAL CA: CPT

## 2020-10-04 PROCEDURE — 65270000029 HC RM PRIVATE

## 2020-10-04 PROCEDURE — 74011250637 HC RX REV CODE- 250/637: Performed by: INTERNAL MEDICINE

## 2020-10-04 RX ORDER — POTASSIUM CHLORIDE 14.9 MG/ML
20 INJECTION INTRAVENOUS
Status: COMPLETED | OUTPATIENT
Start: 2020-10-04 | End: 2020-10-04

## 2020-10-04 RX ORDER — MAGNESIUM SULFATE HEPTAHYDRATE 40 MG/ML
2 INJECTION, SOLUTION INTRAVENOUS ONCE
Status: COMPLETED | OUTPATIENT
Start: 2020-10-04 | End: 2020-10-04

## 2020-10-04 RX ADMIN — ACYCLOVIR 400 MG: 800 TABLET ORAL at 07:41

## 2020-10-04 RX ADMIN — GABAPENTIN 100 MG: 100 CAPSULE ORAL at 07:41

## 2020-10-04 RX ADMIN — ACYCLOVIR 400 MG: 800 TABLET ORAL at 17:08

## 2020-10-04 RX ADMIN — HYDROMORPHONE HYDROCHLORIDE 2 MG: 2 TABLET ORAL at 20:07

## 2020-10-04 RX ADMIN — VANCOMYCIN HYDROCHLORIDE 125 MG: 5 INJECTION, POWDER, LYOPHILIZED, FOR SOLUTION INTRAVENOUS at 20:15

## 2020-10-04 RX ADMIN — LORATADINE 10 MG: 10 TABLET ORAL at 09:00

## 2020-10-04 RX ADMIN — DULOXETINE 30 MG: 30 CAPSULE, DELAYED RELEASE ORAL at 20:07

## 2020-10-04 RX ADMIN — POTASSIUM CHLORIDE 20 MEQ: 14.9 INJECTION, SOLUTION INTRAVENOUS at 05:10

## 2020-10-04 RX ADMIN — SODIUM CHLORIDE 75 ML/HR: 900 INJECTION, SOLUTION INTRAVENOUS at 18:02

## 2020-10-04 RX ADMIN — HYDROMORPHONE HYDROCHLORIDE 2 MG: 2 TABLET ORAL at 15:47

## 2020-10-04 RX ADMIN — SODIUM CHLORIDE 75 ML/HR: 900 INJECTION, SOLUTION INTRAVENOUS at 05:10

## 2020-10-04 RX ADMIN — MORPHINE SULFATE 60 MG: 30 TABLET, FILM COATED, EXTENDED RELEASE ORAL at 07:41

## 2020-10-04 RX ADMIN — VANCOMYCIN HYDROCHLORIDE 125 MG: 5 INJECTION, POWDER, LYOPHILIZED, FOR SOLUTION INTRAVENOUS at 12:53

## 2020-10-04 RX ADMIN — FLUCONAZOLE 400 MG: 100 TABLET ORAL at 07:40

## 2020-10-04 RX ADMIN — VANCOMYCIN HYDROCHLORIDE 125 MG: 5 INJECTION, POWDER, LYOPHILIZED, FOR SOLUTION INTRAVENOUS at 07:40

## 2020-10-04 RX ADMIN — MORPHINE SULFATE 60 MG: 30 TABLET, FILM COATED, EXTENDED RELEASE ORAL at 20:09

## 2020-10-04 RX ADMIN — POTASSIUM CHLORIDE 20 MEQ: 14.9 INJECTION, SOLUTION INTRAVENOUS at 07:40

## 2020-10-04 RX ADMIN — LEVOFLOXACIN 500 MG: 500 TABLET, FILM COATED ORAL at 09:52

## 2020-10-04 RX ADMIN — TBO-FILGRASTIM 480 MCG: 480 INJECTION, SOLUTION SUBCUTANEOUS at 20:09

## 2020-10-04 RX ADMIN — GABAPENTIN 100 MG: 100 CAPSULE ORAL at 20:08

## 2020-10-04 RX ADMIN — VANCOMYCIN HYDROCHLORIDE 125 MG: 5 INJECTION, POWDER, LYOPHILIZED, FOR SOLUTION INTRAVENOUS at 17:10

## 2020-10-04 RX ADMIN — MAGNESIUM SULFATE 2 G: 2 INJECTION INTRAVENOUS at 04:10

## 2020-10-04 RX ADMIN — GABAPENTIN 100 MG: 100 CAPSULE ORAL at 15:47

## 2020-10-04 NOTE — PROGRESS NOTES
Bedside report received from [facility]. Pt resting with eyes closed.

## 2020-10-04 NOTE — PROGRESS NOTES
Diagnosis: Multiple Myeloma  Transplant Date: 9/23/20  Day: +11  Chemo regimen used:  Melphalan 9/22. Labs not resulted that need follow-up: none  BMT assessments and toxicities completed. 10/4/20  WBC/ANC= 1.0/0.2  Prophylactic medications : po Levaquin, Diflucan, Acyclovir  (On po Vanco for c diff)  Granix started on D+6; 9/29/20  Toxicities greater than 2 : none  Patient seen by MD/NP while inpatient & until engraftment. New orders received: IV KCL  & Mag replacements per SOP  Issues: no new issues    PRN PO dilaudid given x 1 for lower back pain.      END OF SHIFT NOTE:    Intake/Output  10/04 0701 - 10/04 1900  In: 8134 [P.O.:480; I.V.:564]  Out: 2100 [Urine:1600]   Voiding: YES  Catheter: NO  Drain:              Stool:  2 occurrences. Stool Assessment  Stool Color: Brown (10/04/20 1256)  Stool Appearance: Loose (10/04/20 1256)  Stool Amount: Small (10/04/20 1256)  Stool Source/Status: Rectum (10/03/20 1945)    Emesis:  0 occurrences. VITAL SIGNS  Patient Vitals for the past 12 hrs:   Temp Pulse Resp BP SpO2   10/04/20 1542 98.2 °F (36.8 °C) 90 16 129/88 97 %   10/04/20 0748 97.9 °F (36.6 °C) 98 18 (!) 142/99 97 %       Pain Assessment  Pain 1  Pain Scale 1: Numeric (0 - 10) (10/04/20 1640)  Pain Intensity 1: 2 (10/04/20 1640)  Patient Stated Pain Goal: 0 (10/04/20 0748)  Pain Reassessment 1: Yes (10/04/20 1640)  Pain Onset 1: (1 hr ago) (10/04/20 1542)  Pain Location 1: Back (10/04/20 1542)  Pain Orientation 1: Lower (10/04/20 1542)  Pain Description 1: Aching (10/04/20 1542)  Pain Intervention(s) 1: Medication (see MAR) (10/04/20 1542)    Ambulating  Yes         Shift report given to oncoming nurse at the bedside.     Ileana Clark RN

## 2020-10-04 NOTE — PROGRESS NOTES
[facility] Hematology & Oncology        Inpatient Hematology / Oncology Progress Note      Admission Date: 2020 12:02 PM  Reason for Admission/Hospital Course: Multiple myeloma ([facility] [address] [C90.00]  Stem cell transplant candidate [Z76.82]      24 Hour Events:  ASCT Day +11   VSS   Afebrile  WBC 1.0  Stools about the same    ROS:  Constitutional:  negative for fever, chills, weakness, malaise, fatigue. CV: negative for chest pain, palpitations, edema. Respiratory: negative for dyspnea, cough, wheezing. GI: negative for abdominal pain. +diarrhea. +intermittent nausea    10 point review of systems is otherwise negative with the exception of the elements mentioned above in the HPI. Allergies   Allergen Reactions    Zarxio [Filgrastim-Sndz] Shortness of Breath     \"pounding heart\" per pt. Tolerates Granix       OBJECTIVE:  Patient Vitals for the past 8 hrs:   BP Temp Pulse Resp SpO2   10/04/20 0748 (!) 142/99 97.9 °F (36.6 °C) 98 18 97 %     Temp (24hrs), Av.1 °F (36.7 °C), Min:97.9 °F (36.6 °C), Max:98.2 °F (36.8 °C)    10/04 07 - 10/04 1900  In: 738 [P.O.:360; I.V.:564]  Out: 1300 [Urine:1000]    Physical Exam:  Constitutional: Well developed, well nourished female in no acute distress, sitting comfortably in the hospital bed. HEENT: Normocephalic and atraumatic. Oropharynx is clear, mucous membranes are moist.  Pupils are equal, round, and reactive to light. Extraocular muscles are intact. Sclerae anicteric. Skin Warm and dry. No bruising and no rash noted. No erythema. No pallor. Respiratory Lungs are clear to auscultation bilaterally without wheezes, rales or rhonchi, normal air exchange without accessory muscle use. CVS Normal rate, regular rhythm and normal S1 and S2. No murmurs, gallops, or rubs. Abdomen Soft, nontender and nondistended, normoactive bowel sounds. No palpable mass. No hepatosplenomegaly. Neuro Grossly nonfocal with no obvious sensory or motor deficits. Neuropathy feet and hands improved. Hand tremors/dizziness   MSK Normal range of motion in general.  No edema and no tenderness. Psych Appropriate mood and affect. Labs:      Recent Labs     10/04/20  0227 10/03/20  0240 10/02/20  0306   WBC 1.0* 0.7* 0.4*   RBC 2.71* 2.87* 2.75*   HGB 7.7* 8.2* 7.7*   HCT 24.1* 25.5* 24.4*   MCV 88.9 88.9 88.7   MCH 28.4 28.6 28.0   MCHC 32.0 32.2 31.6   RDW 14.4 14.3 14.3   PLT 20* 31* 44*   GRANS 18* 8*  --    LYMPH 66* 80*  --    MONOS 12* 12*  --    DF MANUAL MANUAL MANUAL   ANEU 0.2* 0.1*  --    ABL 0.7 0.5  --    ABM 0.1 0.1  --         Recent Labs     10/04/20  0954 10/04/20  0227 10/03/20  1125 10/03/20  0240  10/02/20  0306   NA  --  142  --  141  --  143   K 3.9 3.2* 3.8 3.4*   < > 3.3*   CL  --  107  --  107  --  109*   CO2  --  28  --  27  --  29   AGAP  --  7  --  7  --  5*   GLU  --  80  --  82  --  81   BUN  --  1*  --  2*  --  1*   CREA  --  0.42*  --  0.39*  --  0.37*   GFRAA  --  >60  --  >60  --  >60   GFRNA  --  >60  --  >60  --  >60   CA  --  8.1*  --  8.7  --  8.4   AP  --   --   --   --   --  169*   TP  --   --   --   --   --  5.4*   ALB  --   --   --   --   --  2.6*   GLOB  --   --   --   --   --  2.8   AGRAT  --   --   --   --   --  0.9*   MG  --  1.6*  --  1.8  --  1.9    < > = values in this interval not displayed.          Imaging:    Medications:  Current Facility-Administered Medications   Medication Dose Route Frequency    lanolin alcohol-mineral oil-petrolatum (EUCERIN) topical cream   Topical PRN    magic mouthwash (VEENA) oral suspension 5 mL  5 mL Oral Q4H PRN    tbo-filgrastim (GRANIX) injection 480 mcg  480 mcg SubCUTAneous QHS    loratadine (CLARITIN) tablet 10 mg  10 mg Oral DAILY    vancomycin 50 mg/mL oral solution (compounded) 125 mg  125 mg Oral QID    fluconazole (DIFLUCAN) tablet 400 mg  400 mg Oral DAILY    levoFLOXacin (LEVAQUIN) tablet 500 mg  500 mg Oral Q24H    gabapentin (NEURONTIN) capsule 100 mg  100 mg Oral TID  loperamide (IMODIUM) capsule 2 mg  2 mg Oral Q4H PRN    LORazepam (ATIVAN) tablet 1 mg  1 mg Oral Q8H PRN    ondansetron (ZOFRAN) injection 8 mg  8 mg IntraVENous Q8H PRN    prochlorperazine (COMPAZINE) with saline injection 10 mg  10 mg IntraVENous Q4H PRN    acetaminophen (TYLENOL) tablet 500 mg  500 mg Oral Q6H PRN    acyclovir (ZOVIRAX) tablet 400 mg  400 mg Oral BID    DULoxetine (CYMBALTA) capsule 30 mg  30 mg Oral QHS    HYDROmorphone (DILAUDID) tablet 2 mg  2 mg Oral Q4H PRN    morphine CR (MS CONTIN) tablet 60 mg  60 mg Oral Q12H    0.9% sodium chloride infusion  75 mL/hr IntraVENous CONTINUOUS    [Held by provider] enoxaparin (LOVENOX) injection 40 mg  40 mg SubCUTAneous Q12H    central line flush (saline) syringe 10 mL  10 mL InterCATHeter PRN         ASSESSMENT:    Problem List  Date Reviewed: 8/21/2020          Codes Class Noted    Stem cell transplant candidate ICD-10-CM: Z76.82  ICD-9-CM: V49.83  9/21/2020        Multiple myeloma ([facility] ICD-10-CM: C90.00  ICD-9-CM: 203.00  8/13/2020        Multiple myeloma not having achieved remission ([facility] ICD-10-CM: C90.00  ICD-9-CM: 203.00  7/21/2020        Obesity, morbid ([facility] ICD-10-CM: E66.01  ICD-9-CM: 278.01  5/20/2020            Ms. Nano Sarabia is a 38 y.o. female admitted on 9/21/2020 with a primary diagnosis of There were no encounter diagnoses. .       35 female, , smoker, history of hypertension, upper eyelids stye (felt noninfectious by optho), more recent diagnosis of IgG lambda multiple myeloma, under care of Dr. Cheng Agarwal, now kindly referred to us for consideration of autologous stem cell transplant. She is s/p RVD x4 w SIFE CR. Seen today in office 9/21/2020: Cultures from last urine specimen with mixed skin zully indicated of of contamination. Repeat UA unremarkable. Completely asymptomatic today. Finishes nitrofurantoin course today. Denies any fevers or chills, dysuria or hematuria. Extensive ROS unremarkable.   Recent labs including myeloma proteins unremarkable. [facility] staying low. No M spike however TORIBIO with IgG lambda band identified. Patient should be able to proceed with HDT/ACT as inpatient. She will be hospitalized through count recovery.       PLAN:  -Multiple myeloma   As above. Admit to hematology service. Gentle hydration. [id number]/ASCT starting tomorrow. Conditioning regimen will be Melphalan 200 mg/m2. Anti-emetics and anti-diarrheals as needed. - D+1 Abx prophylaxis w Acyclovir, Diflucan, Levaquin and Augmentin  - Granix daily starting D+6.  - Mobilization was w/ granix/Mozobil. Collected CD34+ at 7.08 mil/kg. Will get half back  - S/p RVD x 4. Labs w SIFE CR. Then Bortezomib/Dex alone. - Bone directed therapy and prophylactic medications per primary hematology service. 9/22 Melphalan today  9/23 ASCT Day 0  9/26 D+3. Doing well. Labs and counts are stable. WBC 3.0, Hgb 8.0, plts 178k. 9/28 D+5. Counts dropping as expected  9/29 starting granix today    Pancytopenia related to chemo and disease  10/1 Transfuse per Shannan SOPs    Peripheral neuropathy hands and feet  9/22 Restart robbie  9/25 having some dizziness and hand tremors. Decrease to 100 mg tid  9/26 Continued neuropathy in BLE but stable. No complaints of dizziness/tremors today. Nausea  -antiemetics  9/26 Improving with medication    Elevated LFTs  9/25 monitor overnight. Consider US if continues to increase. 9/26 LFTs improved. Continue to monitor. , , Alkph 276.   9/28 continues to trend down    Breast tenderness  9/26 No abnormality on exam with the exception of right nipple retraction which developed overnight. Will continue to monitor. Cdiff  9/29 on oral vanc. Had 2 stools so far this am. Not explosive and able to make it to bathroom. 10/1 diarrhea about the same    Shannan SOPs  Supportive care  Granix started D+6    Goals and plan of care reviewed with the patient. All questions answered to the best of our ability. Flaco Garcia NP   [facility] Hematology & Oncology  [address] [place] [address]  Office : [phone]  Fax : [phone]        Attending Addendum:  I have personally performed a face to face diagnostic evaluation on this patient. I have reviewed and agree with the care plan as documented above by Deborah Vora N.P.  My findings are as follows: Patient appears lethargic, heart rate regular without murmurs, abdomen is non-tender, bowel sounds are positive.  39 female history of hypertension, upper eyelids stye, IgG lambda multiple myeloma, standard risk, status post RVD, now undergoing Margarita 200/ASCT D+11.  Tolerating therapy reasonably well.  Continue with prophylactic antibiotics including acyclovir, Diflucan, and levaquin.  Continue Granix daily.  Transfusion support as needed.  Awaiting count recovery, WBC now rising.   LFTs being monitored, improving.  Loose stools: C. difficile positive, now on p.o. vancomycin, denies any abd pain, closely monitor for improvement. Augmentin stopped. Continue current care. Outpt management once ANC recovers and loose stools improve.         Total time 35 min 50% in direct consultation about the patient's diagnosis and management             Jeremy Arroyo MD  [facility] Hematology/Oncology  [address] [address]  Office : [phone]  Fax : [phone]

## 2020-10-04 NOTE — PROGRESS NOTES
Diagnosis: Multiple Myeloma  Transplant Date: 9/23/20  Day: +11  Chemo regimen used:  Melphalan 9/22. Labs not resulted that need follow-up: none  BMT assessments and toxicities completed. 10/3/20  WBC/ANC= 1.0/0.2  Prophylactic medications : po Levaquin,Diflucan, Acyclovir  (On po Vanco for c diff)  Granix started on D+6;9/29/20  Toxicities greater than 2 : none  Patient seen by MD/NP while inpatient & until engraftment. New orders received: IV KCL  & Mag replacements per SOP  Issues: no new issues      END OF SHIFT NOTE:    Intake/Output  10/03 1901 - 10/04 0700  In: 1979 [P.O.:480; I.V.:1499]  Out: 800 [Urine:700]   Voiding: YES  Catheter: NO  Drain:              Stool:  1 occurrence. Stool Assessment  Stool Color: Green (10/03/20 1945)  Stool Appearance: Loose (10/03/20 1945)  Stool Amount: Medium (10/03/20 1945)  Stool Source/Status: Rectum (10/03/20 1945)    Emesis:  0 occurrences. VITAL SIGNS  Patient Vitals for the past 12 hrs:   Temp Pulse Resp BP SpO2   10/04/20 0245 98 °F (36.7 °C) 82 16 128/73 95 %   10/03/20 2345 98.1 °F (36.7 °C) 84 14 130/75 96 %   10/03/20 2034 98.2 °F (36.8 °C) 93 14 127/76 96 %       Pain Assessment  Pain 1  Pain Scale 1: Numeric (0 - 10) (10/04/20 0200)  Pain Intensity 1: 0 (10/04/20 0200)  Patient Stated Pain Goal: 0 (10/04/20 0200)  Pain Reassessment 1: Yes (10/01/20 0014)  Pain Onset 1: pta (09/22/20 0146)  Pain Location 1: Leg (09/25/20 1106)  Pain Orientation 1: Lower (09/25/20 1106)  Pain Description 1: Aching (09/25/20 1106)  Pain Intervention(s) 1: Medication (see MAR) (09/25/20 1106)    Ambulating  Yes    Additional Information:    Remains afebrile. No bleeding. Continues w/ mouth care. X1 loose BM overnight. Shift report given to oncoming nurse PHAM Garcia  at the bedside.     Otis Washington RN

## 2020-10-05 LAB
ALBUMIN SERPL-MCNC: 2.5 G/DL (ref 3.5–5)
ALBUMIN/GLOB SERPL: 0.8 {RATIO} (ref 1.2–3.5)
ALP SERPL-CCNC: 171 U/L (ref 50–136)
ALT SERPL-CCNC: 31 U/L (ref 12–65)
ANION GAP SERPL CALC-SCNC: 5 MMOL/L (ref 7–16)
AST SERPL-CCNC: 20 U/L (ref 15–37)
BILIRUB SERPL-MCNC: 0.4 MG/DL (ref 0.2–1.1)
BUN SERPL-MCNC: <1 MG/DL (ref 6–23)
CALCIUM SERPL-MCNC: 8.6 MG/DL (ref 8.3–10.4)
CHLORIDE SERPL-SCNC: 108 MMOL/L (ref 98–107)
CO2 SERPL-SCNC: 29 MMOL/L (ref 21–32)
CREAT SERPL-MCNC: 0.43 MG/DL (ref 0.6–1)
DIFFERENTIAL METHOD BLD: ABNORMAL
ERYTHROCYTE [DISTWIDTH] IN BLOOD BY AUTOMATED COUNT: 14.5 % (ref 11.9–14.6)
GGT SERPL-CCNC: 105 U/L (ref 5–55)
GLOBULIN SER CALC-MCNC: 3.2 G/DL (ref 2.3–3.5)
GLUCOSE SERPL-MCNC: 80 MG/DL (ref 65–100)
HCT VFR BLD AUTO: 24.9 % (ref 35.8–46.3)
HGB BLD-MCNC: 7.9 G/DL (ref 11.7–15.4)
LDH SERPL L TO P-CCNC: 300 U/L (ref 100–190)
LYMPHOCYTES # BLD: 1 K/UL (ref 0.5–4.6)
LYMPHOCYTES NFR BLD MANUAL: 24 % (ref 16–44)
MAGNESIUM SERPL-MCNC: 2 MG/DL (ref 1.8–2.4)
MCH RBC QN AUTO: 28.3 PG (ref 26.1–32.9)
MCHC RBC AUTO-ENTMCNC: 31.7 G/DL (ref 31.4–35)
MCV RBC AUTO: 89.2 FL (ref 79.6–97.8)
METAMYELOCYTES NFR BLD MANUAL: 5 %
MONOCYTES # BLD: 0.5 K/UL (ref 0.1–1.3)
MONOCYTES NFR BLD MANUAL: 11 % (ref 3–9)
MYELOCYTES NFR BLD MANUAL: 2 %
NEUTS BAND NFR BLD MANUAL: 20 % (ref 0–6)
NEUTS SEG # BLD: 2.7 K/UL (ref 1.7–8.2)
NEUTS SEG NFR BLD MANUAL: 38 % (ref 47–75)
NRBC # BLD: 0.06 K/UL (ref 0–0.2)
PLATELET # BLD AUTO: 21 K/UL (ref 150–450)
PLATELET COMMENTS,PCOM: ABNORMAL
PMV BLD AUTO: 11.2 FL (ref 9.4–12.3)
POTASSIUM SERPL-SCNC: 3.5 MMOL/L (ref 3.5–5.1)
PROT SERPL-MCNC: 5.7 G/DL (ref 6.3–8.2)
RBC # BLD AUTO: 2.79 M/UL (ref 4.05–5.2)
RBC MORPH BLD: ABNORMAL
RBC MORPH BLD: ABNORMAL
SODIUM SERPL-SCNC: 142 MMOL/L (ref 136–145)
WBC # BLD AUTO: 4.2 K/UL (ref 4.3–11.1)
WBC MORPH BLD: ABNORMAL

## 2020-10-05 PROCEDURE — 74011250637 HC RX REV CODE- 250/637: Performed by: NURSE PRACTITIONER

## 2020-10-05 PROCEDURE — 82977 ASSAY OF GGT: CPT

## 2020-10-05 PROCEDURE — 83735 ASSAY OF MAGNESIUM: CPT

## 2020-10-05 PROCEDURE — 74011000250 HC RX REV CODE- 250: Performed by: NURSE PRACTITIONER

## 2020-10-05 PROCEDURE — 99232 SBSQ HOSP IP/OBS MODERATE 35: CPT | Performed by: INTERNAL MEDICINE

## 2020-10-05 PROCEDURE — 65270000029 HC RM PRIVATE

## 2020-10-05 PROCEDURE — 80053 COMPREHEN METABOLIC PANEL: CPT

## 2020-10-05 PROCEDURE — 83615 LACTATE (LD) (LDH) ENZYME: CPT

## 2020-10-05 PROCEDURE — 74011250636 HC RX REV CODE- 250/636: Performed by: NURSE PRACTITIONER

## 2020-10-05 PROCEDURE — 74011250637 HC RX REV CODE- 250/637: Performed by: INTERNAL MEDICINE

## 2020-10-05 PROCEDURE — 85025 COMPLETE CBC W/AUTO DIFF WBC: CPT

## 2020-10-05 PROCEDURE — 2709999900 HC NON-CHARGEABLE SUPPLY

## 2020-10-05 RX ORDER — TRAMADOL HYDROCHLORIDE 50 MG/1
25 TABLET ORAL
Status: DISCONTINUED | OUTPATIENT
Start: 2020-10-05 | End: 2020-10-06 | Stop reason: HOSPADM

## 2020-10-05 RX ADMIN — MORPHINE SULFATE 60 MG: 30 TABLET, FILM COATED, EXTENDED RELEASE ORAL at 08:18

## 2020-10-05 RX ADMIN — MORPHINE SULFATE 60 MG: 30 TABLET, FILM COATED, EXTENDED RELEASE ORAL at 19:51

## 2020-10-05 RX ADMIN — VANCOMYCIN HYDROCHLORIDE 125 MG: 5 INJECTION, POWDER, LYOPHILIZED, FOR SOLUTION INTRAVENOUS at 17:36

## 2020-10-05 RX ADMIN — HYDROMORPHONE HYDROCHLORIDE 2 MG: 2 TABLET ORAL at 04:10

## 2020-10-05 RX ADMIN — SODIUM CHLORIDE 75 ML/HR: 900 INJECTION, SOLUTION INTRAVENOUS at 10:09

## 2020-10-05 RX ADMIN — DULOXETINE 30 MG: 30 CAPSULE, DELAYED RELEASE ORAL at 19:51

## 2020-10-05 RX ADMIN — HYDROMORPHONE HYDROCHLORIDE 2 MG: 2 TABLET ORAL at 08:18

## 2020-10-05 RX ADMIN — SODIUM CHLORIDE 75 ML/HR: 900 INJECTION, SOLUTION INTRAVENOUS at 19:52

## 2020-10-05 RX ADMIN — GABAPENTIN 100 MG: 100 CAPSULE ORAL at 08:18

## 2020-10-05 RX ADMIN — LORATADINE 10 MG: 10 TABLET ORAL at 09:00

## 2020-10-05 RX ADMIN — GABAPENTIN 100 MG: 100 CAPSULE ORAL at 19:51

## 2020-10-05 RX ADMIN — ACYCLOVIR 400 MG: 800 TABLET ORAL at 08:17

## 2020-10-05 RX ADMIN — HYDROMORPHONE HYDROCHLORIDE 2 MG: 2 TABLET ORAL at 19:51

## 2020-10-05 RX ADMIN — ACYCLOVIR 400 MG: 800 TABLET ORAL at 17:36

## 2020-10-05 RX ADMIN — VANCOMYCIN HYDROCHLORIDE 125 MG: 5 INJECTION, POWDER, LYOPHILIZED, FOR SOLUTION INTRAVENOUS at 08:17

## 2020-10-05 RX ADMIN — TRAMADOL HYDROCHLORIDE 25 MG: 50 TABLET, FILM COATED ORAL at 18:05

## 2020-10-05 RX ADMIN — FLUCONAZOLE 400 MG: 100 TABLET ORAL at 08:17

## 2020-10-05 RX ADMIN — LEVOFLOXACIN 500 MG: 500 TABLET, FILM COATED ORAL at 08:18

## 2020-10-05 RX ADMIN — VANCOMYCIN HYDROCHLORIDE 125 MG: 5 INJECTION, POWDER, LYOPHILIZED, FOR SOLUTION INTRAVENOUS at 13:01

## 2020-10-05 RX ADMIN — TRAMADOL HYDROCHLORIDE 25 MG: 50 TABLET, FILM COATED ORAL at 11:17

## 2020-10-05 RX ADMIN — VANCOMYCIN HYDROCHLORIDE 125 MG: 5 INJECTION, POWDER, LYOPHILIZED, FOR SOLUTION INTRAVENOUS at 19:51

## 2020-10-05 RX ADMIN — GABAPENTIN 100 MG: 100 CAPSULE ORAL at 17:36

## 2020-10-05 NOTE — PROGRESS NOTES
[facility] Hematology & Oncology        Inpatient Hematology / Oncology Progress Note      Admission Date: 2020 12:02 PM  Reason for Admission/Hospital Course: Multiple myeloma ([facility] [address] [C90.00]  Stem cell transplant candidate [Z76.82]      24 Hour Events:  ASCT Day +13  VSS   Afebrile, feels well - some back pain today  WBC 4.2/ANC 2.7  3 stools documented - per pt only had one and it was solid     ROS:  Constitutional:  negative for fever, chills, weakness, malaise, fatigue. CV: negative for chest pain, palpitations, edema. Respiratory: negative for dyspnea, cough, wheezing. GI: negative for abdominal pain. +intermittent nausea    10 point review of systems is otherwise negative with the exception of the elements mentioned above in the HPI. Allergies   Allergen Reactions    Zarxio [Filgrastim-Sndz] Shortness of Breath     \"pounding heart\" per pt. Tolerates Granix       OBJECTIVE:  Patient Vitals for the past 8 hrs:   BP Temp Pulse Resp SpO2   10/05/20 0756 136/76 98.2 °F (36.8 °C) 93 16 97 %   10/05/20 0436 120/78 98.2 °F (36.8 °C) 78 15 98 %     Temp (24hrs), Av.2 °F (36.8 °C), Min:98.2 °F (36.8 °C), Max:98.3 °F (36.8 °C)    10/05 0701 - 10/05 1900  In: -   Out: 600 [Urine:600]    Physical Exam:  Constitutional: Well developed, well nourished female in no acute distress, sitting comfortably in chair. HEENT: Normocephalic and atraumatic. Oropharynx is clear, mucous membranes are moist.  Pupils are equal, round, and reactive to light. Extraocular muscles are intact. Sclerae anicteric. Skin Warm and dry. No bruising and no rash noted. No erythema. No pallor. Respiratory Lungs are clear to auscultation bilaterally without wheezes, rales or rhonchi, normal air exchange without accessory muscle use. CVS Normal rate, regular rhythm and normal S1 and S2. No murmurs, gallops, or rubs. Abdomen Soft, nontender and nondistended, normoactive bowel sounds. No palpable mass.   No hepatosplenomegaly. Neuro Grossly nonfocal with no obvious sensory or motor deficits. Neuropathy feet and hands improved. Hand tremors/dizziness   MSK Normal range of motion in general.  No edema and no tenderness. Psych Appropriate mood and affect. Labs:      Recent Labs     10/05/20  0421 10/04/20  0227 10/03/20  0240   WBC 4.2* 1.0* 0.7*   RBC 2.79* 2.71* 2.87*   HGB 7.9* 7.7* 8.2*   HCT 24.9* 24.1* 25.5*   MCV 89.2 88.9 88.9   MCH 28.3 28.4 28.6   MCHC 31.7 32.0 32.2   RDW 14.5 14.4 14.3   PLT 21* 20* 31*   GRANS 38* 18* 8*   LYMPH 24 66* 80*   MONOS 11* 12* 12*   DF MANUAL MANUAL MANUAL   ANEU 2.7 0.2* 0.1*   ABL 1.0 0.7 0.5   ABM 0.5 0.1 0.1        Recent Labs     10/05/20  0421 10/04/20  0954 10/04/20  0227  10/03/20  0240     --  142  --  141   K 3.5 3.9 3.2*   < > 3.4*   *  --  107  --  107   CO2 29  --  28  --  27   AGAP 5*  --  7  --  7   GLU 80  --  80  --  82   BUN <1*  --  1*  --  2*   CREA 0.43*  --  0.42*  --  0.39*   GFRAA >60  --  >60  --  >60   GFRNA >60  --  >60  --  >60   CA 8.6  --  8.1*  --  8.7   *  --   --   --   --    TP 5.7*  --   --   --   --    ALB 2.5*  --   --   --   --    GLOB 3.2  --   --   --   --    AGRAT 0.8*  --   --   --   --    MG 2.0  --  1.6*  --  1.8    < > = values in this interval not displayed.          Imaging:    Medications:  Current Facility-Administered Medications   Medication Dose Route Frequency    lanolin alcohol-mineral oil-petrolatum (EUCERIN) topical cream   Topical PRN    magic mouthwash (VEENA) oral suspension 5 mL  5 mL Oral Q4H PRN    tbo-filgrastim (GRANIX) injection 480 mcg  480 mcg SubCUTAneous QHS    loratadine (CLARITIN) tablet 10 mg  10 mg Oral DAILY    vancomycin 50 mg/mL oral solution (compounded) 125 mg  125 mg Oral QID    fluconazole (DIFLUCAN) tablet 400 mg  400 mg Oral DAILY    levoFLOXacin (LEVAQUIN) tablet 500 mg  500 mg Oral Q24H    gabapentin (NEURONTIN) capsule 100 mg  100 mg Oral TID    loperamide (IMODIUM) capsule 2 mg  2 mg Oral Q4H PRN    LORazepam (ATIVAN) tablet 1 mg  1 mg Oral Q8H PRN    ondansetron (ZOFRAN) injection 8 mg  8 mg IntraVENous Q8H PRN    prochlorperazine (COMPAZINE) with saline injection 10 mg  10 mg IntraVENous Q4H PRN    acetaminophen (TYLENOL) tablet 500 mg  500 mg Oral Q6H PRN    acyclovir (ZOVIRAX) tablet 400 mg  400 mg Oral BID    DULoxetine (CYMBALTA) capsule 30 mg  30 mg Oral QHS    HYDROmorphone (DILAUDID) tablet 2 mg  2 mg Oral Q4H PRN    morphine CR (MS CONTIN) tablet 60 mg  60 mg Oral Q12H    0.9% sodium chloride infusion  75 mL/hr IntraVENous CONTINUOUS    [Held by provider] enoxaparin (LOVENOX) injection 40 mg  40 mg SubCUTAneous Q12H    central line flush (saline) syringe 10 mL  10 mL InterCATHeter PRN         ASSESSMENT:    Problem List  Date Reviewed: 8/21/2020          Codes Class Noted    Stem cell transplant candidate ICD-10-CM: Z76.82  ICD-9-CM: V49.83  9/21/2020        Multiple myeloma ([facility] ICD-10-CM: C90.00  ICD-9-CM: 203.00  8/13/2020        Multiple myel[facility] not having achieved [facility] ICD-10-CM: C90.00  ICD-9-CM: 203.00  7/21/2020        Obesity, morbid ([facility] ICD-10-CM: E66.01  ICD-9-CM: 278.01  5/20/2020            Ms. Raghu Fernández is a 38 y.o. female admitted on 9/21/2020 with a primary diagnosis of There were no encounter diagnoses. .       35 female, , smoker, history of hypertension, upper eyelids stye (felt noninfectious by optho), more recent diagnosis of IgG lambda multiple myeloma, under care of Dr. Jose Peter, now kindly referred to us for consideration of autologous stem cell transplant. She is s/p RVD x4 w SIFE CR. Seen today in office 9/21/2020: Cultures from last urine specimen with mixed skin zully indicated of of contamination. Repeat UA unremarkable. Completely asymptomatic today. Finishes nitrofurantoin course today. Denies any fevers or chills, dysuria or hematuria. Extensive ROS unremarkable.   Recent labs including myeloma proteins unremarkable. [facility] staying low. No M spike however TORIBIO with IgG lambda band identified. Patient should be able to proceed with HDT/ACT as inpatient. She will be hospitalized through count recovery.       PLAN:  -Multiple myeloma   As above. Admit to hematology service. Gentle hydration. MPy547/ASCT starting tomorrow. Conditioning regimen will be Melphalan 200 mg/m2. Anti-emetics and anti-diarrheals as needed. - D+1 Abx prophylaxis w Acyclovir, Diflucan, Levaquin and Augmentin  - Granix daily starting D+6.  - Mobilization was w/ granix/Mozobil. Collected CD34+ at 7.08 mil/kg. Will get half back  - S/p RVD x 4. Labs w SIFE CR. Then Bortezomib/Dex alone. - Bone directed therapy and prophylactic medications per primary hematology service. 9/22 Melphalan today  9/23 ASCT Day 0  9/26 D+3. Doing well. Labs and counts are stable. WBC 3.0, Hgb 8.0, plts 178k. 9/28 D+5. Counts dropping as expected  9/29 starting granix today   10/5 Day +12. ANC 2.7/hgb 7.9/plts 21k - will recheck counts tomorrow. Back pain today, may be secondary to GCSF. Little improvement with Dilaudid - will try Tramadol instead. Pancytopenia related to chemo and disease  10/1 Transfuse per Shannan SOPs    Peripheral neuropathy hands and feet  9/22 Restart robbie  9/25 having some dizziness and hand tremors. Decrease to 100 mg tid  9/26 Continued neuropathy in BLE but stable. No complaints of dizziness/tremors today. Nausea  -antiemetics  9/26 Improving with medication    Elevated LFTs  9/25 monitor overnight. Consider US if continues to increase. 9/26 LFTs improved. Continue to monitor. , , Alkph 276.   9/28 continues to trend down    Cdiff  9/29 on oral vanc. Had 2 stools so far this am. Not explosive and able to make it to bathroom. 10/1 diarrhea about the same  10/5 day 9 oral vanc    Shannan SOPs  Supportive care  Granix started D+6    Goals and plan of care reviewed with the patient.   All questions answered to the best of our ability.           Donato Helton MD   [facility] Hematology & Oncology  [address] [address]  Office : [phone]  Fax : [phone]

## 2020-10-05 NOTE — PROGRESS NOTES
Diagnosis: Multiple Myeloma  Transplant Date: 9/23/20  Day: +12  Chemo regimen used: Cara Suarez 9/22. Labs not resulted that need follow-up: none  BMT assessments and toxicities completed. 10/5/20  WBC/ANC= 4.2/2.7  Prophylactic medications : po Levaquin, Diflucan, Acyclovir  (On po Vanco for c diff)  Granix started on D+6; 9/29/20, D/C'd D+12 10/5/20  Toxicities greater than 2 : none  Patient seen by MD/NP while inpatient & until engraftment. New orders received: PRN Ultram for pain  Issues: no new issues     PRN PO dilaudid given x 1 for lower back pain. Pt had 1 soft BM this shift. Bedside report given to oncoming RN.

## 2020-10-06 VITALS
RESPIRATION RATE: 18 BRPM | OXYGEN SATURATION: 94 % | DIASTOLIC BLOOD PRESSURE: 81 MMHG | HEART RATE: 97 BPM | HEIGHT: 63 IN | TEMPERATURE: 98.1 F | SYSTOLIC BLOOD PRESSURE: 132 MMHG | WEIGHT: 220.8 LBS | BODY MASS INDEX: 39.12 KG/M2

## 2020-10-06 LAB
ANION GAP SERPL CALC-SCNC: 5 MMOL/L (ref 7–16)
BUN SERPL-MCNC: 1 MG/DL (ref 6–23)
CALCIUM SERPL-MCNC: 9 MG/DL (ref 8.3–10.4)
CHLORIDE SERPL-SCNC: 108 MMOL/L (ref 98–107)
CO2 SERPL-SCNC: 29 MMOL/L (ref 21–32)
CREAT SERPL-MCNC: 0.49 MG/DL (ref 0.6–1)
DIFFERENTIAL METHOD BLD: ABNORMAL
ERYTHROCYTE [DISTWIDTH] IN BLOOD BY AUTOMATED COUNT: 14.9 % (ref 11.9–14.6)
GLUCOSE SERPL-MCNC: 80 MG/DL (ref 65–100)
HCT VFR BLD AUTO: 26.5 % (ref 35.8–46.3)
HGB BLD-MCNC: 8.6 G/DL (ref 11.7–15.4)
LYMPHOCYTES # BLD: 1.5 K/UL (ref 0.5–4.6)
LYMPHOCYTES NFR BLD MANUAL: 14 % (ref 16–44)
MAGNESIUM SERPL-MCNC: 1.9 MG/DL (ref 1.8–2.4)
MCH RBC QN AUTO: 29.1 PG (ref 26.1–32.9)
MCHC RBC AUTO-ENTMCNC: 32.5 G/DL (ref 31.4–35)
MCV RBC AUTO: 89.5 FL (ref 79.6–97.8)
METAMYELOCYTES NFR BLD MANUAL: 3 %
MONOCYTES # BLD: 2.2 K/UL (ref 0.1–1.3)
MONOCYTES NFR BLD MANUAL: 21 % (ref 3–9)
MYELOCYTES NFR BLD MANUAL: 4 %
NEUTS BAND NFR BLD MANUAL: 21 % (ref 0–6)
NEUTS SEG # BLD: 6.2 K/UL (ref 1.7–8.2)
NEUTS SEG NFR BLD MANUAL: 36 % (ref 47–75)
NRBC # BLD: 0.11 K/UL (ref 0–0.2)
PLATELET # BLD AUTO: 37 K/UL (ref 150–450)
PLATELET COMMENTS,PCOM: ABNORMAL
PMV BLD AUTO: 11.7 FL (ref 9.4–12.3)
POTASSIUM SERPL-SCNC: 3.4 MMOL/L (ref 3.5–5.1)
PROMYELOCYTES NFR BLD MANUAL: 1 %
RBC # BLD AUTO: 2.96 M/UL (ref 4.05–5.2)
RBC MORPH BLD: ABNORMAL
SODIUM SERPL-SCNC: 142 MMOL/L (ref 136–145)
WBC # BLD AUTO: 10.4 K/UL (ref 4.3–11.1)
WBC MORPH BLD: SLIGHT

## 2020-10-06 PROCEDURE — 85025 COMPLETE CBC W/AUTO DIFF WBC: CPT

## 2020-10-06 PROCEDURE — 80048 BASIC METABOLIC PNL TOTAL CA: CPT

## 2020-10-06 PROCEDURE — 74011250636 HC RX REV CODE- 250/636: Performed by: NURSE PRACTITIONER

## 2020-10-06 PROCEDURE — 83735 ASSAY OF MAGNESIUM: CPT

## 2020-10-06 PROCEDURE — 74011250637 HC RX REV CODE- 250/637: Performed by: NURSE PRACTITIONER

## 2020-10-06 PROCEDURE — 2709999900 HC NON-CHARGEABLE SUPPLY

## 2020-10-06 PROCEDURE — 99239 HOSP IP/OBS DSCHRG MGMT >30: CPT | Performed by: INTERNAL MEDICINE

## 2020-10-06 PROCEDURE — 74011000250 HC RX REV CODE- 250: Performed by: NURSE PRACTITIONER

## 2020-10-06 PROCEDURE — 74011250637 HC RX REV CODE- 250/637: Performed by: INTERNAL MEDICINE

## 2020-10-06 RX ORDER — GABAPENTIN 100 MG/1
100 CAPSULE ORAL 3 TIMES DAILY
Qty: 90 CAP | Refills: 1 | Status: SHIPPED | OUTPATIENT
Start: 2020-10-06 | End: 2020-11-02 | Stop reason: DRUGHIGH

## 2020-10-06 RX ORDER — HYDROMORPHONE HYDROCHLORIDE 2 MG/1
2 TABLET ORAL
Qty: 120 TAB | Refills: 0 | Status: SHIPPED | OUTPATIENT
Start: 2020-10-06 | End: 2020-11-05

## 2020-10-06 RX ORDER — LORATADINE 10 MG/1
10 TABLET ORAL DAILY
Qty: 30 TAB | Refills: 0 | Status: SHIPPED
Start: 2020-10-07 | End: 2021-10-06

## 2020-10-06 RX ORDER — FLUCONAZOLE 200 MG/1
400 TABLET ORAL DAILY
Qty: 60 TAB | Refills: 0 | Status: SHIPPED | OUTPATIENT
Start: 2020-10-07 | End: 2020-10-26

## 2020-10-06 RX ORDER — MORPHINE SULFATE 60 MG/1
60 TABLET, FILM COATED, EXTENDED RELEASE ORAL EVERY 12 HOURS
Qty: 60 TAB | Refills: 0 | Status: SHIPPED | OUTPATIENT
Start: 2020-10-06 | End: 2020-11-05

## 2020-10-06 RX ORDER — LEVOFLOXACIN 500 MG/1
500 TABLET, FILM COATED ORAL EVERY 24 HOURS
Qty: 30 TAB | Refills: 0 | Status: SHIPPED | OUTPATIENT
Start: 2020-10-07 | End: 2020-10-26

## 2020-10-06 RX ADMIN — MORPHINE SULFATE 60 MG: 30 TABLET, FILM COATED, EXTENDED RELEASE ORAL at 09:07

## 2020-10-06 RX ADMIN — GABAPENTIN 100 MG: 100 CAPSULE ORAL at 08:53

## 2020-10-06 RX ADMIN — ACYCLOVIR 400 MG: 800 TABLET ORAL at 08:53

## 2020-10-06 RX ADMIN — LORATADINE 10 MG: 10 TABLET ORAL at 08:53

## 2020-10-06 RX ADMIN — LEVOFLOXACIN 500 MG: 500 TABLET, FILM COATED ORAL at 09:07

## 2020-10-06 RX ADMIN — VANCOMYCIN HYDROCHLORIDE 125 MG: 5 INJECTION, POWDER, LYOPHILIZED, FOR SOLUTION INTRAVENOUS at 09:07

## 2020-10-06 RX ADMIN — FLUCONAZOLE 400 MG: 100 TABLET ORAL at 08:53

## 2020-10-06 NOTE — DISCHARGE SUMMARY
[facility] Hematology & Oncology: Inpatient Hematology / Oncology Discharge Summary Note    Patient ID:  Kisha Houser  [id number]  48 y.o.  1980    Admit Date: 9/21/2020    Discharge Date: 10/6/2020    Admission Diagnoses: [facility] [C90.00]  Stem cell transplant candidate [Z76.82]    Discharge Diagnoses:  Principal Diagnosis: <principal problem not specified>  Active Problems:    Multiple myeloma ([facility] [address] (8/13/2020)      Stem cell transplant candidate (9/21/2020)        Hospital Course:  Ms. Jovany Tang is a 38year old female admitted on 9/21/2020 to receive conditioning and auto transplant. Per admission note:  35 female, , smoker, history of hypertension, upper eyelids stye (felt noninfectious by optho), more recent diagnosis of IgG lambda multiple myeloma, under care of Dr. Kellie Angel, now kindly referred to us for consideration of autologous stem cell transplant.  She is s/p RVD x4 w SIFE CR. Seen today in office 9/21/2020: Cultures from last urine specimen with mixed skin zully indicated of of contamination.  Repeat UA unremarkable.  Completely asymptomatic today.  Finishes nitrofurantoin course today.  Denies any fevers or chills, dysuria or hematuria.  Extensive ROS unremarkable.  Recent labs including myeloma proteins unremarkable. [place] staying low.  No M spike however TORIBIO with IgG lambda band identified.  Patient should be able to proceed with HDT/ACT as inpatient. Gama Doshi will be hospitalized through count recovery. She is now D+13 from her auto transplant. She received Melphalan conditioning regimen prior to transplant. She received antibiotic prophylaxis beginning on Day 1. She started Granix on D+6 and it was discontinued on 10/5/2020. She was started on Gabapentin for peripheral neuropathy of hands and feet. She had elevated LFTs noted on 9/25/20 which improved and AST/ALT/Bilirubin normal as of 10/5/20.   She had a positive C diff on 9/28/20 which has improved with oral vancomycin, she will complete 2 doses today and 4 doses tomorrow for a total of 10 days. Her diarrhea has improved. Her counts have recovered now and she is ready to discharge home. I have sent her prescriptions to her pharmacy of choice including her pain medicine and prophylactic antibiotics. She states she has acyclovir at home already. She will f/u twice weekly for now, and Navigation aware to work on appointments. Consults:  None    Pertinent Diagnostic Studies:   Labs:    Recent Labs     10/06/20  0425 10/05/20  0421 10/04/20  0227   WBC 10.4 4.2* 1.0*   HGB 8.6* 7.9* 7.7*   PLT 37* 21* 20*   ANEU 6.2 2.7 0.2*      Recent Labs     10/06/20  0425 10/05/20  0421 10/04/20  0954 10/04/20  0227    142  --  142   K 3.4* 3.5 3.9 3.2*   * 108*  --  107   CO2 29 29  --  28   GLU 80 80  --  80   BUN 1* <1*  --  1*   CREA 0.49* 0.43*  --  0.42*   CA 9.0 8.6  --  8.1*   AP  --  171*  --   --    TP  --  5.7*  --   --    ALB  --  2.5*  --   --    MG 1.9 2.0  --  1.6*       Imaging:  None    Current Discharge Medication List      START taking these medications    Details   fluconazole (DIFLUCAN) 200 mg tablet Take 2 Tabs by mouth daily for 30 days. FDA advises cautious prescribing of oral fluconazole in pregnancy. Indications: immunocompromised  Qty: 60 Tab, Refills: 0      gabapentin (NEURONTIN) 100 mg capsule Take 1 Cap by mouth three (3) times daily. Indications: neuropathic pain  Qty: 90 Cap, Refills: 1      levoFLOXacin (LEVAQUIN) 500 mg tablet Take 1 Tab by mouth every twenty-four (24) hours. Indications: immunocompromised host  Qty: 30 Tab, Refills: 0      loratadine (CLARITIN) 10 mg tablet Take 1 Tab by mouth daily. Qty: 30 Tab, Refills: 0      vancomycin 50 mg/mL oral solution (compounded) Take 2.5 mL by mouth four (4) times daily.  2 doses today and 4 doses tomorrow to complete 10 days total.  Qty: 15 mL, Refills: 0         CONTINUE these medications which have CHANGED Details   HYDROmorphone (Dilaudid) 2 mg tablet Take 1 Tab by mouth every four (4) hours as needed for Pain for up to 30 days. Max Daily Amount: 12 mg.  Qty: 120 Tab, Refills: 0    Associated Diagnoses: Cancer associated pain      morphine CR (MS CONTIN) 60 mg CR tablet Take 1 Tab by mouth every twelve (12) hours for 30 days. Max Daily Amount: 120 mg.  Qty: 60 Tab, Refills: 0    Associated Diagnoses: Cancer associated pain         CONTINUE these medications which have NOT CHANGED    Details   acyclovir (ZOVIRAX) 400 mg tablet Take 400 mg by mouth two (2) times a day. DULoxetine (Cymbalta) 30 mg capsule Take 30 mg by mouth nightly. potassium chloride (K-DUR, KLOR-CON) 20 mEq tablet Take 1 Tab by mouth daily. Qty: 30 Tab, Refills: 2      erythromycin (ILOTYCIN) ophthalmic ointment Apply 1 cm to both eye lids 4 times a day for 5 days  Qty: 1 Tube, Refills: 1      acetaminophen (Tylenol Extra Strength) 500 mg tablet Take  by mouth every six (6) hours as needed for Pain. STOP taking these medications       nitrofurantoin, macrocrystal-monohydrate, (MACROBID) 100 mg capsule Comments:   Reason for Stopping:         dexAMETHasone (DECADRON) 6 mg tablet Comments:   Reason for Stopping:               OBJECTIVE:  Patient Vitals for the past 8 hrs:   BP Temp Pulse Resp SpO2   10/06/20 0742 132/84 98.8 °F (37.1 °C) (!) 102 18 94 %   10/06/20 0423 126/81 98.1 °F (36.7 °C) (!) 111 16 95 %     Temp (24hrs), Av.2 °F (36.8 °C), Min:97.8 °F (36.6 °C), Max:98.8 °F (37.1 °C)    10/06 07 - 10/06 1900  In: 847 [P.O.:360; I.V.:356]  Out: 950 [Urine:750]    Physical Exam:  Constitutional: Well developed, well nourished female in no acute distress, sitting comfortably on the examination table. HEENT: Normocephalic and atraumatic. Oropharynx is clear, mucous membranes are moist.  Neck supple. Lymph node   Deferred. Skin Warm and dry. No bruising and no rash noted. No erythema. No pallor.     Respiratory Lungs are clear to auscultation bilaterally without wheezes, rales or rhonchi, normal air exchange without accessory muscle use. CVS Normal rate, regular rhythm and normal S1 and S2. No murmurs, gallops, or rubs. Abdomen Soft, nontender and nondistended, normoactive bowel sounds. No palpable mass. No hepatosplenomegaly. Neuro Grossly nonfocal with no obvious sensory or motor deficits. MSK Normal range of motion in general.  No edema and no tenderness. Psych Appropriate mood and affect. ASSESSMENT:    Active Problems:    Multiple myeloma ([address] (8/13/2020)      Stem cell transplant candidate (9/21/2020)        DISPOSITION:  Follow-up Appointments   Procedures    FOLLOW UP VISIT Appointment in: Other (Specify) Navigation to work on f/u appointments     Navigation to work on f/u appointments     Standing Status:   Standing     Number of Occurrences:   1     Order Specific Question:   Appointment in     Answer: Other (Specify)     I have reviewed the patients controlled substance prescription history, as maintained in the [place] prescription monitoring program, so that the prescription(s) for a  controlled substance can be given. Over 30 minutes was spent in discharge planning and coordination of care.             Lin Ernst NP  [facility] Hematology & Oncology  [address] [address]  Office : [phone]  Fax : [phone]

## 2020-10-06 NOTE — PROGRESS NOTES
Problem: Falls - Risk of  Goal: *Absence of Falls  Description: Document Elton Atwood Fall Risk and appropriate interventions in the flowsheet.   Outcome: Resolved/Met     Problem: Pain  Goal: *Control of Pain  Outcome: Resolved/Met     Problem: Nausea/Vomiting (Adult)  Goal: *Absence of nausea/vomiting  Outcome: Resolved/Met     Problem: BMT-Respiratory: Discharge Outcomes  Goal: *Demonstrates ability to perform prescribed activity without shortness of breath or discomfort  Outcome: Resolved/Met  Goal: *Verbalizes understanding and describes prescribed diet  Outcome: Resolved/Met  Goal: *Describes follow-up/return visits to physicians  Outcome: Resolved/Met  Goal: *Describes home care/support arrangements established based on need  Outcome: Resolved/Met  Goal: *Describes available resources and support systems  Outcome: Resolved/Met  Goal: *Anxiety reduced or absent  Outcome: Resolved/Met  Goal: *Understands and describes signs and symptoms to report to providers(Stroke Metric)  Outcome: Resolved/Met  Goal: *Hemodynamically stable  Outcome: Resolved/Met  Goal: *Effective airway maintenance  Outcome: Resolved/Met  Goal: *Tolerating diet  Outcome: Resolved/Met  Goal: *Verbalizes understanding and describes medication purposes and frequencies  Outcome: Resolved/Met     Problem: Chemotherapy, Day 3 to Discharge  Goal: Off Pathway (Use only if patient is Off Pathway)  Outcome: Resolved/Met  Goal: Activity/Safety  Outcome: Resolved/Met  Goal: Consults, if ordered  Outcome: Resolved/Met  Goal: Diagnostic Test/Procedures  Outcome: Resolved/Met  Goal: Nutrition/Diet  Outcome: Resolved/Met  Goal: Discharge Planning  Outcome: Resolved/Met  Goal: Medications  Outcome: Resolved/Met  Goal: Respiratory  Outcome: Resolved/Met  Goal: Treatments/Interventions/Procedures  Outcome: Resolved/Met  Goal: Psychosocial  Outcome: Resolved/Met  Goal: *Optimal pain control at patient's stated goal  Outcome: Resolved/Met  Goal: *Hemodynamically stable  Outcome: Resolved/Met  Goal: *Adequate oxygenation  Outcome: Resolved/Met     Problem: Diarrhea (Adult and Pediatrics)  Goal: *Absence of diarrhea  Outcome: Resolved/Met  Goal: *PALLIATIVE CARE:  Absence of diarrhea  Outcome: Resolved/Met     Problem: Patient Education: Go to Patient Education Activity  Goal: Patient/Family Education  Outcome: Resolved/Met     Problem: Risk for Spread of Infection  Goal: Prevent transmission of infectious organism to others  Description: Prevent the transmission of infectious organisms to other patients, staff members, and visitors.   Outcome: Resolved/Met     Problem: Patient Education:  Go to Education Activity  Goal: Patient/Family Education  Outcome: Resolved/Met     Problem: Management of Known or Suspected C. difficile  Goal: Minimize complications of C.difficile infection and prevent spread of infection  Outcome: Resolved/Met     Problem: Nutrition Deficit  Goal: *Optimize nutritional status  Outcome: Resolved/Met

## 2020-10-06 NOTE — PROGRESS NOTES
EOS: VSS. No complaints or requests. Medicated once at bedtime with Dilaudid PO for generalized discomfort. Eating well. Independent with ADL's. Counts recovered. Anticipate discharge home today. Report to oncoming RN. Continue with POC.

## 2020-10-07 ENCOUNTER — PATIENT OUTREACH (OUTPATIENT)
Dept: CASE MANAGEMENT | Age: 40
End: 2020-10-07

## 2020-10-07 NOTE — PROGRESS NOTES
1st Attempt to contact patient for [facility] Covid 19 risk education  call, no answer on home number .  Will attempt to contact patient again within 24 hours

## 2020-10-08 ENCOUNTER — HOSPITAL ENCOUNTER (OUTPATIENT)
Dept: INFUSION THERAPY | Age: 40
Discharge: HOME | End: 2020-10-08
Payer: COMMERCIAL

## 2020-10-08 ENCOUNTER — PATIENT OUTREACH (OUTPATIENT)
Dept: CASE MANAGEMENT | Age: 40
End: 2020-10-08

## 2020-10-08 VITALS
OXYGEN SATURATION: 94 % | BODY MASS INDEX: 38.91 KG/M2 | DIASTOLIC BLOOD PRESSURE: 92 MMHG | RESPIRATION RATE: 18 BRPM | WEIGHT: 219.6 LBS | SYSTOLIC BLOOD PRESSURE: 139 MMHG | HEART RATE: 90 BPM | TEMPERATURE: 98.2 F

## 2020-10-08 DIAGNOSIS — C90.00 MULTIPLE MYELOMA, REMISSION STATUS UNSPECIFIED (HCC): Primary | ICD-10-CM

## 2020-10-08 LAB
ALBUMIN SERPL-MCNC: 2.9 G/DL (ref 3.5–5)
ALBUMIN/GLOB SERPL: 0.9 {RATIO} (ref 1.2–3.5)
ALP SERPL-CCNC: 179 U/L (ref 50–136)
ALT SERPL-CCNC: 24 U/L (ref 12–65)
ANION GAP SERPL CALC-SCNC: 5 MMOL/L (ref 7–16)
AST SERPL-CCNC: 18 U/L (ref 15–37)
BASOPHILS # BLD: 0 K/UL (ref 0–0.2)
BASOPHILS NFR BLD: 0 % (ref 0–2)
BILIRUB SERPL-MCNC: 0.3 MG/DL (ref 0.2–1.1)
BUN SERPL-MCNC: 1 MG/DL (ref 6–23)
CALCIUM SERPL-MCNC: 8.3 MG/DL (ref 8.3–10.4)
CHLORIDE SERPL-SCNC: 107 MMOL/L (ref 98–107)
CO2 SERPL-SCNC: 30 MMOL/L (ref 21–32)
CREAT SERPL-MCNC: 0.5 MG/DL (ref 0.6–1)
DIFFERENTIAL METHOD BLD: ABNORMAL
EOSINOPHIL # BLD: 0 K/UL (ref 0–0.8)
EOSINOPHIL NFR BLD: 0 % (ref 0.5–7.8)
ERYTHROCYTE [DISTWIDTH] IN BLOOD BY AUTOMATED COUNT: 15.3 % (ref 11.9–14.6)
GGT SERPL-CCNC: 101 U/L (ref 5–55)
GLOBULIN SER CALC-MCNC: 3.4 G/DL (ref 2.3–3.5)
GLUCOSE SERPL-MCNC: 95 MG/DL (ref 65–100)
HCT VFR BLD AUTO: 25.1 % (ref 35.8–46.3)
HGB BLD-MCNC: 8.1 G/DL (ref 11.7–15.4)
IMM GRANULOCYTES # BLD AUTO: 2.4 K/UL (ref 0–0.5)
IMM GRANULOCYTES NFR BLD AUTO: 22 % (ref 0–5)
LDH SERPL L TO P-CCNC: 497 U/L (ref 100–190)
LYMPHOCYTES # BLD: 1.4 K/UL (ref 0.5–4.6)
LYMPHOCYTES NFR BLD: 13 % (ref 13–44)
MAGNESIUM SERPL-MCNC: 1.7 MG/DL (ref 1.8–2.4)
MCH RBC QN AUTO: 28.8 PG (ref 26.1–32.9)
MCHC RBC AUTO-ENTMCNC: 32.3 G/DL (ref 31.4–35)
MCV RBC AUTO: 89.3 FL (ref 79.6–97.8)
MONOCYTES # BLD: 2.2 K/UL (ref 0.1–1.3)
MONOCYTES NFR BLD: 20 % (ref 4–12)
NEUTS SEG # BLD: 5 K/UL (ref 1.7–8.2)
NEUTS SEG NFR BLD: 45 % (ref 43–78)
NRBC # BLD: 0.06 K/UL (ref 0–0.2)
PHOSPHATE SERPL-MCNC: 3.4 MG/DL (ref 2.5–4.5)
PLATELET # BLD AUTO: 101 K/UL (ref 150–450)
PLATELET COMMENTS,PCOM: ABNORMAL
PMV BLD AUTO: 10.8 FL (ref 9.4–12.3)
POTASSIUM SERPL-SCNC: 3 MMOL/L (ref 3.5–5.1)
PROT SERPL-MCNC: 6.3 G/DL (ref 6.3–8.2)
RBC # BLD AUTO: 2.81 M/UL (ref 4.05–5.25)
RBC MORPH BLD: ABNORMAL
SODIUM SERPL-SCNC: 142 MMOL/L (ref 136–145)
WBC # BLD AUTO: 11 K/UL (ref 4.3–11.1)
WBC MORPH BLD: ABNORMAL

## 2020-10-08 PROCEDURE — 80053 COMPREHEN METABOLIC PANEL: CPT

## 2020-10-08 PROCEDURE — 82977 ASSAY OF GGT: CPT

## 2020-10-08 PROCEDURE — 85025 COMPLETE CBC W/AUTO DIFF WBC: CPT

## 2020-10-08 PROCEDURE — 74011250636 HC RX REV CODE- 250/636: Performed by: INTERNAL MEDICINE

## 2020-10-08 PROCEDURE — 96366 THER/PROPH/DIAG IV INF ADDON: CPT

## 2020-10-08 PROCEDURE — 96365 THER/PROPH/DIAG IV INF INIT: CPT

## 2020-10-08 PROCEDURE — 96361 HYDRATE IV INFUSION ADD-ON: CPT

## 2020-10-08 PROCEDURE — 83615 LACTATE (LD) (LDH) ENZYME: CPT

## 2020-10-08 PROCEDURE — 83735 ASSAY OF MAGNESIUM: CPT

## 2020-10-08 PROCEDURE — 84100 ASSAY OF PHOSPHORUS: CPT

## 2020-10-08 RX ORDER — POTASSIUM CHLORIDE 14.9 MG/ML
20 INJECTION INTRAVENOUS ONCE
Status: COMPLETED | OUTPATIENT
Start: 2020-10-08 | End: 2020-10-08

## 2020-10-08 RX ORDER — SODIUM CHLORIDE 0.9 % (FLUSH) 0.9 %
10-40 SYRINGE (ML) INJECTION AS NEEDED
Status: DISCONTINUED | OUTPATIENT
Start: 2020-10-08 | End: 2020-10-09 | Stop reason: HOSPADM

## 2020-10-08 RX ORDER — DEXTROSE, SODIUM CHLORIDE, AND POTASSIUM CHLORIDE 5; .45; .15 G/100ML; G/100ML; G/100ML
1000 INJECTION INTRAVENOUS
Status: COMPLETED | OUTPATIENT
Start: 2020-10-08 | End: 2020-10-08

## 2020-10-08 RX ADMIN — Medication 20 ML: at 12:40

## 2020-10-08 RX ADMIN — POTASSIUM CHLORIDE 20 MEQ: 200 INJECTION, SOLUTION INTRAVENOUS at 10:27

## 2020-10-08 RX ADMIN — DEXTROSE, SODIUM CHLORIDE, AND POTASSIUM CHLORIDE 1000 ML: 5; .45; .15 INJECTION INTRAVENOUS at 08:25

## 2020-10-08 NOTE — PROGRESS NOTES
2nd  attempt to contact patient for [facility] Covid 19 Risk education  Call. No answer, on home number with message left on V/M requesting a return call . Episode will be closed at this time as [email or web address] has been unsuccessful in reaching the patient.  Will remove myself from the care team.

## 2020-10-08 NOTE — PROGRESS NOTES
Diagnosis: Multiple Myeloma  Transplant Date: 9/23/20  Day: (+/-) +15. Chemo regimen used: Melphalan  Labs needed at next visit: see orders. Labs not resulted that need follow-up: None. Next Appointment scheduled: 10/12/20 @ 0715. BMT assessments and toxicities completed. WBC/ANC= 11.0/5.0  Prophylactic medications started 9/24 and stopped. Granix started on Day +6 and last dose given on 10/5/20. Toxicities greater than 2 :  None. Bactrim or equivalent to be initiated on Day +30. Patient seen by MD/NP Antionette Granados NP until engraftment. New orders received: None. Issues:  K+ 3.0 patient received totl of 40 meq KCl over 4 hours IV today. Patient arrived ambulatory. Labs drawn & reviewed. Patient received 1 liter D5 0.45% NS with 20 mEq KCl over 2 hours and IVPB of 20 mEq KCL over 2 hours. Patient instructed to increase potassium to 2 tabs daily. Apheresis catheter dressing changed. Discharged ambulatory in stable condition.

## 2020-10-12 ENCOUNTER — HOSPITAL ENCOUNTER (OUTPATIENT)
Dept: INFUSION THERAPY | Age: 40
Discharge: HOME | End: 2020-10-12
Payer: COMMERCIAL

## 2020-10-12 VITALS
TEMPERATURE: 97.9 F | BODY MASS INDEX: 38.1 KG/M2 | SYSTOLIC BLOOD PRESSURE: 133 MMHG | DIASTOLIC BLOOD PRESSURE: 98 MMHG | WEIGHT: 215 LBS | OXYGEN SATURATION: 98 % | RESPIRATION RATE: 16 BRPM | HEART RATE: 102 BPM

## 2020-10-12 DIAGNOSIS — C90.00 MULTIPLE MYELOMA, REMISSION STATUS UNSPECIFIED (HCC): Primary | ICD-10-CM

## 2020-10-12 DIAGNOSIS — C90.00 MULTIPLE MYELOMA NOT HAVING ACHIEVED REMISSION (HCC): ICD-10-CM

## 2020-10-12 LAB
ALBUMIN SERPL-MCNC: 3 G/DL (ref 3.5–5)
ALBUMIN/GLOB SERPL: 0.9 {RATIO} (ref 1.2–3.5)
ALP SERPL-CCNC: 176 U/L (ref 50–136)
ALT SERPL-CCNC: 32 U/L (ref 12–65)
ANION GAP SERPL CALC-SCNC: 8 MMOL/L (ref 7–16)
AST SERPL-CCNC: 43 U/L (ref 15–37)
BASOPHILS # BLD: 0 K/UL (ref 0–0.2)
BASOPHILS NFR BLD: 0 % (ref 0–2)
BILIRUB SERPL-MCNC: 0.4 MG/DL (ref 0.2–1.1)
BUN SERPL-MCNC: 6 MG/DL (ref 6–23)
CALCIUM SERPL-MCNC: 8.9 MG/DL (ref 8.3–10.4)
CHLORIDE SERPL-SCNC: 109 MMOL/L (ref 98–107)
CO2 SERPL-SCNC: 25 MMOL/L (ref 21–32)
CREAT SERPL-MCNC: 0.6 MG/DL (ref 0.6–1)
DIFFERENTIAL METHOD BLD: ABNORMAL
EOSINOPHIL # BLD: 0 K/UL (ref 0–0.8)
EOSINOPHIL NFR BLD: 0 % (ref 0.5–7.8)
ERYTHROCYTE [DISTWIDTH] IN BLOOD BY AUTOMATED COUNT: 15.6 % (ref 11.9–14.6)
GGT SERPL-CCNC: 99 U/L (ref 5–55)
GLOBULIN SER CALC-MCNC: 3.5 G/DL (ref 2.3–3.5)
GLUCOSE SERPL-MCNC: 91 MG/DL (ref 65–100)
HCT VFR BLD AUTO: 25.4 % (ref 35.8–46.3)
HGB BLD-MCNC: 8.3 G/DL (ref 11.7–15.4)
IMM GRANULOCYTES # BLD AUTO: 0.4 K/UL (ref 0–0.5)
IMM GRANULOCYTES NFR BLD AUTO: 6 % (ref 0–5)
LDH SERPL L TO P-CCNC: 516 U/L (ref 100–190)
LYMPHOCYTES # BLD: 1.2 K/UL (ref 0.5–4.6)
LYMPHOCYTES NFR BLD: 18 % (ref 13–44)
MAGNESIUM SERPL-MCNC: 2 MG/DL (ref 1.8–2.4)
MCH RBC QN AUTO: 29 PG (ref 26.1–32.9)
MCHC RBC AUTO-ENTMCNC: 32.7 G/DL (ref 31.4–35)
MCV RBC AUTO: 88.8 FL (ref 79.6–97.8)
MONOCYTES # BLD: 1 K/UL (ref 0.1–1.3)
MONOCYTES NFR BLD: 15 % (ref 4–12)
NEUTS SEG # BLD: 4.2 K/UL (ref 1.7–8.2)
NEUTS SEG NFR BLD: 61 % (ref 43–78)
NRBC # BLD: 0.06 K/UL (ref 0–0.2)
PHOSPHATE SERPL-MCNC: 4.1 MG/DL (ref 2.5–4.5)
PLATELET # BLD AUTO: 297 K/UL (ref 150–450)
PLATELET COMMENTS,PCOM: ADEQUATE
PMV BLD AUTO: 10.1 FL (ref 9.4–12.3)
POTASSIUM SERPL-SCNC: 3.9 MMOL/L (ref 3.5–5.1)
PROT SERPL-MCNC: 6.5 G/DL (ref 6.3–8.2)
RBC # BLD AUTO: 2.86 M/UL (ref 4.05–5.25)
RBC MORPH BLD: ABNORMAL
SODIUM SERPL-SCNC: 142 MMOL/L (ref 136–145)
WBC # BLD AUTO: 6.8 K/UL (ref 4.3–11.1)
WBC MORPH BLD: ABNORMAL

## 2020-10-12 PROCEDURE — 84100 ASSAY OF PHOSPHORUS: CPT

## 2020-10-12 PROCEDURE — 85025 COMPLETE CBC W/AUTO DIFF WBC: CPT

## 2020-10-12 PROCEDURE — 80053 COMPREHEN METABOLIC PANEL: CPT

## 2020-10-12 PROCEDURE — 96360 HYDRATION IV INFUSION INIT: CPT

## 2020-10-12 PROCEDURE — 83735 ASSAY OF MAGNESIUM: CPT

## 2020-10-12 PROCEDURE — 82977 ASSAY OF GGT: CPT

## 2020-10-12 PROCEDURE — 74011000258 HC RX REV CODE- 258: Performed by: INTERNAL MEDICINE

## 2020-10-12 PROCEDURE — 83615 LACTATE (LD) (LDH) ENZYME: CPT

## 2020-10-12 RX ORDER — DEXTROSE MONOHYDRATE AND SODIUM CHLORIDE 5; .45 G/100ML; G/100ML
1000 INJECTION, SOLUTION INTRAVENOUS
Status: COMPLETED | OUTPATIENT
Start: 2020-10-12 | End: 2020-10-12

## 2020-10-12 RX ORDER — SODIUM CHLORIDE 0.9 % (FLUSH) 0.9 %
10 SYRINGE (ML) INJECTION AS NEEDED
Status: DISCONTINUED | OUTPATIENT
Start: 2020-10-12 | End: 2020-10-14 | Stop reason: HOSPADM

## 2020-10-12 RX ORDER — DEXTROSE, SODIUM CHLORIDE, AND POTASSIUM CHLORIDE 5; .45; .15 G/100ML; G/100ML; G/100ML
1000 INJECTION INTRAVENOUS
Status: CANCELLED | OUTPATIENT
Start: 2020-10-12

## 2020-10-12 RX ORDER — MAGNESIUM SULFATE HEPTAHYDRATE 40 MG/ML
2 INJECTION, SOLUTION INTRAVENOUS ONCE
Status: DISCONTINUED | OUTPATIENT
Start: 2020-10-12 | End: 2020-10-12

## 2020-10-12 RX ADMIN — DEXTROSE MONOHYDRATE AND SODIUM CHLORIDE 1000 ML: 5; .45 INJECTION, SOLUTION INTRAVENOUS at 07:38

## 2020-10-12 RX ADMIN — Medication 10 ML: at 07:36

## 2020-10-12 RX ADMIN — Medication 10 ML: at 08:42

## 2020-10-15 ENCOUNTER — HOSPITAL ENCOUNTER (OUTPATIENT)
Dept: INFUSION THERAPY | Age: 40
Discharge: HOME | End: 2020-10-15
Payer: COMMERCIAL

## 2020-10-15 VITALS
DIASTOLIC BLOOD PRESSURE: 90 MMHG | WEIGHT: 207.8 LBS | TEMPERATURE: 96.9 F | OXYGEN SATURATION: 100 % | SYSTOLIC BLOOD PRESSURE: 127 MMHG | BODY MASS INDEX: 36.82 KG/M2 | RESPIRATION RATE: 18 BRPM | HEART RATE: 99 BPM

## 2020-10-15 DIAGNOSIS — C90.00 MULTIPLE MYELOMA, REMISSION STATUS UNSPECIFIED (HCC): Primary | ICD-10-CM

## 2020-10-15 LAB
ALBUMIN SERPL-MCNC: 3.4 G/DL (ref 3.5–5)
ALBUMIN/GLOB SERPL: 0.9 {RATIO} (ref 1.2–3.5)
ALP SERPL-CCNC: 177 U/L (ref 50–136)
ALT SERPL-CCNC: 54 U/L (ref 12–65)
ANION GAP SERPL CALC-SCNC: 9 MMOL/L (ref 7–16)
AST SERPL-CCNC: 39 U/L (ref 15–37)
BASOPHILS # BLD: 0 K/UL (ref 0–0.2)
BASOPHILS NFR BLD: 0 % (ref 0–2)
BILIRUB SERPL-MCNC: 0.4 MG/DL (ref 0.2–1.1)
BUN SERPL-MCNC: 6 MG/DL (ref 6–23)
CALCIUM SERPL-MCNC: 9.2 MG/DL (ref 8.3–10.4)
CHLORIDE SERPL-SCNC: 109 MMOL/L (ref 98–107)
CO2 SERPL-SCNC: 25 MMOL/L (ref 21–32)
CREAT SERPL-MCNC: 0.6 MG/DL (ref 0.6–1)
DIFFERENTIAL METHOD BLD: ABNORMAL
EOSINOPHIL # BLD: 0 K/UL (ref 0–0.8)
EOSINOPHIL NFR BLD: 0 % (ref 0.5–7.8)
ERYTHROCYTE [DISTWIDTH] IN BLOOD BY AUTOMATED COUNT: 16.2 % (ref 11.9–14.6)
GGT SERPL-CCNC: 101 U/L (ref 5–55)
GLOBULIN SER CALC-MCNC: 3.7 G/DL (ref 2.3–3.5)
GLUCOSE SERPL-MCNC: 102 MG/DL (ref 65–100)
HCT VFR BLD AUTO: 30.5 % (ref 35.8–46.3)
HGB BLD-MCNC: 9.8 G/DL (ref 11.7–15.4)
IMM GRANULOCYTES # BLD AUTO: 0.1 K/UL (ref 0–0.5)
IMM GRANULOCYTES NFR BLD AUTO: 1 % (ref 0–5)
LDH SERPL L TO P-CCNC: 364 U/L (ref 100–190)
LYMPHOCYTES # BLD: 1.7 K/UL (ref 0.5–4.6)
LYMPHOCYTES NFR BLD: 20 % (ref 13–44)
MAGNESIUM SERPL-MCNC: 2.2 MG/DL (ref 1.8–2.4)
MCH RBC QN AUTO: 28.7 PG (ref 26.1–32.9)
MCHC RBC AUTO-ENTMCNC: 32.1 G/DL (ref 31.4–35)
MCV RBC AUTO: 89.2 FL (ref 79.6–97.8)
MONOCYTES # BLD: 1 K/UL (ref 0.1–1.3)
MONOCYTES NFR BLD: 12 % (ref 4–12)
NEUTS SEG # BLD: 5.8 K/UL (ref 1.7–8.2)
NEUTS SEG NFR BLD: 67 % (ref 43–78)
NRBC # BLD: 0.1 K/UL (ref 0–0.2)
PHOSPHATE SERPL-MCNC: 4.1 MG/DL (ref 2.5–4.5)
PLATELET # BLD AUTO: 366 K/UL (ref 150–450)
PMV BLD AUTO: 9.5 FL (ref 9.4–12.3)
POTASSIUM SERPL-SCNC: 3.4 MMOL/L (ref 3.5–5.1)
PROT SERPL-MCNC: 7.1 G/DL (ref 6.3–8.2)
RBC # BLD AUTO: 3.42 M/UL (ref 4.05–5.25)
SODIUM SERPL-SCNC: 143 MMOL/L (ref 136–145)
WBC # BLD AUTO: 8.6 K/UL (ref 4.3–11.1)

## 2020-10-15 PROCEDURE — 74011250636 HC RX REV CODE- 250/636: Performed by: INTERNAL MEDICINE

## 2020-10-15 PROCEDURE — 82977 ASSAY OF GGT: CPT

## 2020-10-15 PROCEDURE — 84100 ASSAY OF PHOSPHORUS: CPT

## 2020-10-15 PROCEDURE — 96360 HYDRATION IV INFUSION INIT: CPT

## 2020-10-15 PROCEDURE — 83615 LACTATE (LD) (LDH) ENZYME: CPT

## 2020-10-15 PROCEDURE — 83735 ASSAY OF MAGNESIUM: CPT

## 2020-10-15 PROCEDURE — 85025 COMPLETE CBC W/AUTO DIFF WBC: CPT

## 2020-10-15 PROCEDURE — 96361 HYDRATE IV INFUSION ADD-ON: CPT

## 2020-10-15 PROCEDURE — 80053 COMPREHEN METABOLIC PANEL: CPT

## 2020-10-15 RX ORDER — DEXTROSE, SODIUM CHLORIDE, AND POTASSIUM CHLORIDE 5; .45; .15 G/100ML; G/100ML; G/100ML
1000 INJECTION INTRAVENOUS
Status: COMPLETED | OUTPATIENT
Start: 2020-10-15 | End: 2020-10-15

## 2020-10-15 RX ORDER — SODIUM CHLORIDE 0.9 % (FLUSH) 0.9 %
10-40 SYRINGE (ML) INJECTION EVERY 8 HOURS
Status: DISCONTINUED | OUTPATIENT
Start: 2020-10-15 | End: 2020-10-17 | Stop reason: HOSPADM

## 2020-10-15 RX ADMIN — Medication 20 ML: at 09:31

## 2020-10-15 RX ADMIN — DEXTROSE, SODIUM CHLORIDE, AND POTASSIUM CHLORIDE 1000 ML: 5; .45; .15 INJECTION INTRAVENOUS at 07:30

## 2020-10-15 NOTE — PROGRESS NOTES
Diagnosis: myeloma   Transplant Date: 09/23/2020  Day: (+/-) +22. Chemo regimen used: Melphalan  Labs needed at next visit: See treatment plan. Labs not resulted that need follow-up: None. Next Appointment scheduled: 10/19/2020. BMT assessments and toxicities completed. WBC/ANC= 8.6/5.8. Prophylactic medications started 09/24/2020 and stopped 10/12/2020. Granix started on 09/29/2020 and last dose given on 10/4/2020. Toxicities greater than 2 :None. Bactrim to be initiated on day + 30. Patient seen by MD/NP MAXINE Mullins NP until engraftment. New orders received:  start taking your PO potassium, increase neurontin 100 am 1000noon 300pm per Idania Killian NP. Issues:Pt arrived to [facility] ambulatory, labs drawn and reviewed. Today pt got 1lt D51/2ns with 20K. Pt instructed to call with any issues or concerns.  Pt verbalized understanding

## 2020-10-19 ENCOUNTER — HOSPITAL ENCOUNTER (OUTPATIENT)
Dept: INFUSION THERAPY | Age: 40
Discharge: HOME | End: 2020-10-19
Payer: COMMERCIAL

## 2020-10-19 VITALS
SYSTOLIC BLOOD PRESSURE: 120 MMHG | HEART RATE: 78 BPM | WEIGHT: 208.8 LBS | RESPIRATION RATE: 17 BRPM | DIASTOLIC BLOOD PRESSURE: 84 MMHG | BODY MASS INDEX: 37 KG/M2 | OXYGEN SATURATION: 98 % | TEMPERATURE: 97 F

## 2020-10-19 LAB
ALBUMIN SERPL-MCNC: 3.2 G/DL (ref 3.5–5)
ALBUMIN/GLOB SERPL: 0.9 {RATIO} (ref 1.2–3.5)
ALP SERPL-CCNC: 160 U/L (ref 50–136)
ALT SERPL-CCNC: 29 U/L (ref 12–65)
ANION GAP SERPL CALC-SCNC: 7 MMOL/L (ref 7–16)
AST SERPL-CCNC: 22 U/L (ref 15–37)
BASOPHILS # BLD: 0 K/UL (ref 0–0.2)
BASOPHILS NFR BLD: 1 % (ref 0–2)
BILIRUB SERPL-MCNC: 0.5 MG/DL (ref 0.2–1.1)
BUN SERPL-MCNC: 4 MG/DL (ref 6–23)
CALCIUM SERPL-MCNC: 8.8 MG/DL (ref 8.3–10.4)
CHLORIDE SERPL-SCNC: 110 MMOL/L (ref 98–107)
CO2 SERPL-SCNC: 25 MMOL/L (ref 21–32)
CREAT SERPL-MCNC: 0.5 MG/DL (ref 0.6–1)
DIFFERENTIAL METHOD BLD: ABNORMAL
EOSINOPHIL # BLD: 0.1 K/UL (ref 0–0.8)
EOSINOPHIL NFR BLD: 1 % (ref 0.5–7.8)
ERYTHROCYTE [DISTWIDTH] IN BLOOD BY AUTOMATED COUNT: 15.9 % (ref 11.9–14.6)
GGT SERPL-CCNC: 78 U/L (ref 5–55)
GLOBULIN SER CALC-MCNC: 3.4 G/DL (ref 2.3–3.5)
GLUCOSE SERPL-MCNC: 89 MG/DL (ref 65–100)
HCT VFR BLD AUTO: 28.5 % (ref 35.8–46.3)
HGB BLD-MCNC: 9.1 G/DL (ref 11.7–15.4)
IMM GRANULOCYTES # BLD AUTO: 0 K/UL (ref 0–0.5)
IMM GRANULOCYTES NFR BLD AUTO: 0 % (ref 0–5)
LDH SERPL L TO P-CCNC: 281 U/L (ref 100–190)
LYMPHOCYTES # BLD: 1.4 K/UL (ref 0.5–4.6)
LYMPHOCYTES NFR BLD: 17 % (ref 13–44)
MAGNESIUM SERPL-MCNC: 1.7 MG/DL (ref 1.8–2.4)
MCH RBC QN AUTO: 28.9 PG (ref 26.1–32.9)
MCHC RBC AUTO-ENTMCNC: 31.9 G/DL (ref 31.4–35)
MCV RBC AUTO: 90.5 FL (ref 79.6–97.8)
MONOCYTES # BLD: 0.7 K/UL (ref 0.1–1.3)
MONOCYTES NFR BLD: 9 % (ref 4–12)
NEUTS SEG # BLD: 5.9 K/UL (ref 1.7–8.2)
NEUTS SEG NFR BLD: 72 % (ref 43–78)
NRBC # BLD: 0 K/UL (ref 0–0.2)
PHOSPHATE SERPL-MCNC: 3.6 MG/DL (ref 2.5–4.5)
PLATELET # BLD AUTO: 243 K/UL (ref 150–450)
PMV BLD AUTO: 9.4 FL (ref 9.4–12.3)
POTASSIUM SERPL-SCNC: 3.6 MMOL/L (ref 3.5–5.1)
PROT SERPL-MCNC: 6.6 G/DL (ref 6.3–8.2)
RBC # BLD AUTO: 3.15 M/UL (ref 4.05–5.25)
SODIUM SERPL-SCNC: 142 MMOL/L (ref 136–145)
WBC # BLD AUTO: 8.1 K/UL (ref 4.3–11.1)

## 2020-10-19 PROCEDURE — 85025 COMPLETE CBC W/AUTO DIFF WBC: CPT

## 2020-10-19 PROCEDURE — 36591 DRAW BLOOD OFF VENOUS DEVICE: CPT

## 2020-10-19 PROCEDURE — 80053 COMPREHEN METABOLIC PANEL: CPT

## 2020-10-19 PROCEDURE — 83735 ASSAY OF MAGNESIUM: CPT

## 2020-10-19 PROCEDURE — 82977 ASSAY OF GGT: CPT

## 2020-10-19 PROCEDURE — 83615 LACTATE (LD) (LDH) ENZYME: CPT

## 2020-10-19 PROCEDURE — 84100 ASSAY OF PHOSPHORUS: CPT

## 2020-10-19 RX ORDER — SODIUM CHLORIDE 0.9 % (FLUSH) 0.9 %
10-30 SYRINGE (ML) INJECTION AS NEEDED
Status: DISCONTINUED | OUTPATIENT
Start: 2020-10-19 | End: 2020-10-21 | Stop reason: HOSPADM

## 2020-10-19 NOTE — PROGRESS NOTES
Diagnosis: Myeloma  Transplant Date: 09/23/2020  Day: (+/-) +26. Chemo regimen used: Melphalan  Labs needed at next visit: See Treatment plan. Labs not resulted that need follow-up: None. Next Appointment scheduled: 10/22/2020. BMT assessments and toxicities completed. WBC/ANC= 8.1/5.9. Prophylactic medications started 09/24/2020 and stopped 10/4/2020. Granix started on 9/29/2020 and last dose given on 10/4/2020. Toxicities greater than 2 :None. Bactrim to be initiated on day +30  Patient seen by MD/NP SHELLY Smith NP until engraftment. New orders received: None. Issues:Pt arrived to [facility] ambulatory, pt reports eating and drinking well. Pt taking oral potassium with PTA medications. Pt instructed to call with any issues or concerns. Pt verbalized understanding.

## 2020-10-22 ENCOUNTER — PATIENT OUTREACH (OUTPATIENT)
Dept: CASE MANAGEMENT | Age: 40
End: 2020-10-22

## 2020-10-22 ENCOUNTER — HOSPITAL ENCOUNTER (OUTPATIENT)
Dept: INFUSION THERAPY | Age: 40
Discharge: HOME | End: 2020-10-22
Payer: COMMERCIAL

## 2020-10-22 VITALS
OXYGEN SATURATION: 100 % | DIASTOLIC BLOOD PRESSURE: 85 MMHG | TEMPERATURE: 96.6 F | HEART RATE: 100 BPM | RESPIRATION RATE: 18 BRPM | SYSTOLIC BLOOD PRESSURE: 124 MMHG

## 2020-10-22 DIAGNOSIS — C90.00 MULTIPLE MYELOMA, REMISSION STATUS UNSPECIFIED (HCC): ICD-10-CM

## 2020-10-22 LAB
ALBUMIN SERPL-MCNC: 3.3 G/DL (ref 3.5–5)
ALBUMIN/GLOB SERPL: 0.9 {RATIO} (ref 1.2–3.5)
ALP SERPL-CCNC: 177 U/L (ref 50–136)
ALT SERPL-CCNC: 26 U/L (ref 12–65)
ANION GAP SERPL CALC-SCNC: 5 MMOL/L (ref 7–16)
AST SERPL-CCNC: 25 U/L (ref 15–37)
BASOPHILS # BLD: 0.1 K/UL (ref 0–0.2)
BASOPHILS NFR BLD: 1 % (ref 0–2)
BILIRUB SERPL-MCNC: 0.4 MG/DL (ref 0.2–1.1)
BUN SERPL-MCNC: 2 MG/DL (ref 6–23)
CALCIUM SERPL-MCNC: 9 MG/DL (ref 8.3–10.4)
CHLORIDE SERPL-SCNC: 109 MMOL/L (ref 98–107)
CO2 SERPL-SCNC: 27 MMOL/L (ref 21–32)
CREAT SERPL-MCNC: 0.6 MG/DL (ref 0.6–1)
DIFFERENTIAL METHOD BLD: ABNORMAL
EOSINOPHIL # BLD: 0.3 K/UL (ref 0–0.8)
EOSINOPHIL NFR BLD: 4 % (ref 0.5–7.8)
ERYTHROCYTE [DISTWIDTH] IN BLOOD BY AUTOMATED COUNT: 16.2 % (ref 11.9–14.6)
GGT SERPL-CCNC: 79 U/L (ref 5–55)
GLOBULIN SER CALC-MCNC: 3.6 G/DL (ref 2.3–3.5)
GLUCOSE SERPL-MCNC: 100 MG/DL (ref 65–100)
HCT VFR BLD AUTO: 29.9 % (ref 35.8–46.3)
HGB BLD-MCNC: 9.6 G/DL (ref 11.7–15.4)
IMM GRANULOCYTES # BLD AUTO: 0 K/UL (ref 0–0.5)
IMM GRANULOCYTES NFR BLD AUTO: 0 % (ref 0–5)
LDH SERPL L TO P-CCNC: 317 U/L (ref 100–190)
LYMPHOCYTES # BLD: 1.5 K/UL (ref 0.5–4.6)
LYMPHOCYTES NFR BLD: 18 % (ref 13–44)
MAGNESIUM SERPL-MCNC: 1.9 MG/DL (ref 1.8–2.4)
MCH RBC QN AUTO: 29 PG (ref 26.1–32.9)
MCHC RBC AUTO-ENTMCNC: 32.1 G/DL (ref 31.4–35)
MCV RBC AUTO: 90.3 FL (ref 79.6–97.8)
MONOCYTES # BLD: 0.7 K/UL (ref 0.1–1.3)
MONOCYTES NFR BLD: 9 % (ref 4–12)
NEUTS SEG # BLD: 5.5 K/UL (ref 1.7–8.2)
NEUTS SEG NFR BLD: 68 % (ref 43–78)
NRBC # BLD: 0 K/UL (ref 0–0.2)
PHOSPHATE SERPL-MCNC: 3.7 MG/DL (ref 2.5–4.5)
PLATELET # BLD AUTO: 235 K/UL (ref 150–450)
PMV BLD AUTO: 9.8 FL (ref 9.4–12.3)
POTASSIUM SERPL-SCNC: 4 MMOL/L (ref 3.5–5.1)
PROT SERPL-MCNC: 6.9 G/DL (ref 6.3–8.2)
RBC # BLD AUTO: 3.31 M/UL (ref 4.05–5.25)
SODIUM SERPL-SCNC: 141 MMOL/L (ref 136–145)
WBC # BLD AUTO: 8.1 K/UL (ref 4.3–11.1)

## 2020-10-22 PROCEDURE — 36591 DRAW BLOOD OFF VENOUS DEVICE: CPT

## 2020-10-22 PROCEDURE — 85025 COMPLETE CBC W/AUTO DIFF WBC: CPT

## 2020-10-22 PROCEDURE — 83735 ASSAY OF MAGNESIUM: CPT

## 2020-10-22 PROCEDURE — 84100 ASSAY OF PHOSPHORUS: CPT

## 2020-10-22 PROCEDURE — 82977 ASSAY OF GGT: CPT

## 2020-10-22 PROCEDURE — 83615 LACTATE (LD) (LDH) ENZYME: CPT

## 2020-10-22 PROCEDURE — 80053 COMPREHEN METABOLIC PANEL: CPT

## 2020-10-22 RX ORDER — SODIUM CHLORIDE 0.9 % (FLUSH) 0.9 %
10 SYRINGE (ML) INJECTION AS NEEDED
Status: DISCONTINUED | OUTPATIENT
Start: 2020-10-22 | End: 2020-10-24 | Stop reason: HOSPADM

## 2020-10-22 RX ADMIN — Medication 10 ML: at 07:20

## 2020-10-22 NOTE — PROGRESS NOTES
Diagnosis: Myeloma  Transplant Date: 09/23/2020  Day: (+/-) +29. Chemo regimen used: Melphalan  Labs needed at next visit: See Treatment plan. Labs not resulted that need follow-up: None. Next Appointment scheduled: 10/26/2020. BMT assessments and toxicities completed. WBC/ANC= 8.1/5.5  Prophylactic medications started 09/24/2020 and stopped 10/4/2020. Granix started on 9/29/2020 and last dose given on 10/4/2020. Toxicities greater than 2 :None. Bactrim to be initiated on day +30  Patient seen by MD/GENIE Early NP   New orders received: None. Issues: none    Pt arrived ambulatory to C. Blood drawn and sent to lab. Pt refused hydration. Pt informed of bactrim to start tomorrow. Discharged ambulatory.

## 2020-10-22 NOTE — PROGRESS NOTES
10/22/2020- Patient seen in the office today. Patient is feeling well, a little tired but over all good. Still complaining of some neuropathy in hands and feet, the increase in gabapentin has helped her rest more at night. Advised patient to try B complex vitamins to see if that will help with the neuropathy as well. Patient will be day +30 tomorrow so she will begin the bactrim twice a day on mondays, wednesdays, and fridays. Prescription sent to pharmacy. No other needs at this time.

## 2020-10-26 ENCOUNTER — HOSPITAL ENCOUNTER (OUTPATIENT)
Dept: LAB | Age: 40
Discharge: HOME | End: 2020-10-26
Payer: COMMERCIAL

## 2020-10-26 ENCOUNTER — HOSPITAL ENCOUNTER (OUTPATIENT)
Dept: INTERVENTIONAL RADIOLOGY/VASCULAR | Age: 40
Discharge: HOME | End: 2020-10-26
Attending: NURSE PRACTITIONER
Payer: COMMERCIAL

## 2020-10-26 ENCOUNTER — PATIENT OUTREACH (OUTPATIENT)
Dept: CASE MANAGEMENT | Age: 40
End: 2020-10-26

## 2020-10-26 VITALS
TEMPERATURE: 98.4 F | RESPIRATION RATE: 16 BRPM | OXYGEN SATURATION: 100 % | HEART RATE: 97 BPM | DIASTOLIC BLOOD PRESSURE: 83 MMHG | SYSTOLIC BLOOD PRESSURE: 135 MMHG

## 2020-10-26 DIAGNOSIS — C90.00 MULTIPLE MYELOMA, REMISSION STATUS UNSPECIFIED (HCC): ICD-10-CM

## 2020-10-26 DIAGNOSIS — Z94.81 S/P AUTOLOGOUS BONE MARROW TRANSPLANTATION (HCC): ICD-10-CM

## 2020-10-26 LAB
ALBUMIN SERPL-MCNC: 3.6 G/DL (ref 3.5–5)
ALBUMIN/GLOB SERPL: 0.9 {RATIO} (ref 1.2–3.5)
ALP SERPL-CCNC: 185 U/L (ref 50–136)
ALT SERPL-CCNC: 29 U/L (ref 12–65)
ANION GAP SERPL CALC-SCNC: 6 MMOL/L (ref 7–16)
AST SERPL-CCNC: 35 U/L (ref 15–37)
BASOPHILS # BLD: 0.1 K/UL (ref 0–0.2)
BASOPHILS NFR BLD: 1 % (ref 0–2)
BILIRUB SERPL-MCNC: 0.6 MG/DL (ref 0.2–1.1)
BUN SERPL-MCNC: 5 MG/DL (ref 6–23)
CALCIUM SERPL-MCNC: 9.8 MG/DL (ref 8.3–10.4)
CHLORIDE SERPL-SCNC: 107 MMOL/L (ref 98–107)
CO2 SERPL-SCNC: 25 MMOL/L (ref 21–32)
CREAT SERPL-MCNC: 0.8 MG/DL (ref 0.6–1)
DIFFERENTIAL METHOD BLD: ABNORMAL
EOSINOPHIL # BLD: 0.3 K/UL (ref 0–0.8)
EOSINOPHIL NFR BLD: 4 % (ref 0.5–7.8)
ERYTHROCYTE [DISTWIDTH] IN BLOOD BY AUTOMATED COUNT: 16.8 % (ref 11.9–14.6)
GLOBULIN SER CALC-MCNC: 3.9 G/DL (ref 2.3–3.5)
GLUCOSE SERPL-MCNC: 81 MG/DL (ref 65–100)
HCT VFR BLD AUTO: 33 % (ref 35.8–46.3)
HGB BLD-MCNC: 10.3 G/DL (ref 11.7–15.4)
IMM GRANULOCYTES # BLD AUTO: 0 K/UL (ref 0–0.5)
IMM GRANULOCYTES NFR BLD AUTO: 0 % (ref 0–5)
LYMPHOCYTES # BLD: 1.9 K/UL (ref 0.5–4.6)
LYMPHOCYTES NFR BLD: 22 % (ref 13–44)
MAGNESIUM SERPL-MCNC: 2.1 MG/DL (ref 1.8–2.4)
MCH RBC QN AUTO: 28.7 PG (ref 26.1–32.9)
MCHC RBC AUTO-ENTMCNC: 31.2 G/DL (ref 31.4–35)
MCV RBC AUTO: 91.9 FL (ref 79.6–97.8)
MONOCYTES # BLD: 0.9 K/UL (ref 0.1–1.3)
MONOCYTES NFR BLD: 11 % (ref 4–12)
NEUTS SEG # BLD: 5.5 K/UL (ref 1.7–8.2)
NEUTS SEG NFR BLD: 62 % (ref 43–78)
NRBC # BLD: 0 K/UL (ref 0–0.2)
PHOSPHATE SERPL-MCNC: 3.7 MG/DL (ref 2.5–4.5)
PLATELET # BLD AUTO: 266 K/UL (ref 150–450)
PMV BLD AUTO: 9.7 FL (ref 9.4–12.3)
POTASSIUM SERPL-SCNC: 3.6 MMOL/L (ref 3.5–5.1)
PROT SERPL-MCNC: 7.5 G/DL (ref 6.3–8.2)
RBC # BLD AUTO: 3.59 M/UL (ref 4.05–5.25)
SODIUM SERPL-SCNC: 138 MMOL/L (ref 136–145)
WBC # BLD AUTO: 8.7 K/UL (ref 4.3–11.1)

## 2020-10-26 PROCEDURE — 36415 COLL VENOUS BLD VENIPUNCTURE: CPT

## 2020-10-26 PROCEDURE — 36589 REMOVAL TUNNELED CV CATH: CPT

## 2020-10-26 PROCEDURE — 84100 ASSAY OF PHOSPHORUS: CPT

## 2020-10-26 PROCEDURE — 74011000250 HC RX REV CODE- 250: Performed by: PHYSICIAN ASSISTANT

## 2020-10-26 PROCEDURE — 83735 ASSAY OF MAGNESIUM: CPT

## 2020-10-26 PROCEDURE — 80053 COMPREHEN METABOLIC PANEL: CPT

## 2020-10-26 PROCEDURE — 85025 COMPLETE CBC W/AUTO DIFF WBC: CPT

## 2020-10-26 RX ORDER — LIDOCAINE HYDROCHLORIDE AND EPINEPHRINE 10; 10 MG/ML; UG/ML
1-20 INJECTION, SOLUTION INFILTRATION; PERINEURAL
Status: DISCONTINUED | OUTPATIENT
Start: 2020-10-26 | End: 2020-10-30 | Stop reason: HOSPADM

## 2020-10-26 RX ADMIN — LIDOCAINE HYDROCHLORIDE,EPINEPHRINE BITARTRATE 80 MG: 10; .01 INJECTION, SOLUTION INFILTRATION; PERINEURAL at 13:06

## 2020-10-26 NOTE — PROGRESS NOTES
Recovery period without difficulty. Pt alert and oriented and denies pain. Dressing is clean, dry, and intact. Reviewed discharge instructions with patient and mother, both verbalized understanding. No e-sign.

## 2020-10-26 NOTE — PROGRESS NOTES
10/26/20 - Patient here for follow up. Patient is D33 at this time. Patient will proceed to IR to have tunneled catheter removed. Patient will increase gabapentin dose to 600 mg morning and mid day and 900 in the evening to help with neuropathy.

## 2020-10-26 NOTE — DISCHARGE INSTRUCTIONS
[address]  Department of Interventional Radiology  [address] [place] [address] [address] [facility]  [phone] Office  [phone] Fax    DRAIN/PORT/CATHETER REMOVAL  DISCHARGE INSTRUCTIONS    General Information:     Your doctor has ordered for us to remove your drain, port, or catheter. This could be that you do not need it anymore, it is not doing its job, your physician has decided on another plan for your treatment and/or it may need replacing. Home Care Instructions: You can resume your regular diet and medication regimen. Do not drink alcohol, drive, or make any important legal decisions in the next 24 hours. Do not lift anything heavier than a gallon of milk, or do anything strenuous for the next 24 hours. You will notice a dressing over the site of the removal. This dressing should stay in place until the site is healed. The dressing should be changed at least every 48 hours. You should change the dressing sooner if it becomes soiled or wet. The nurse who discharges you to home should review with you any wound care instructions. Resume your normal level of activity slowly. You may shower after 24 hours, but do not take a bath, swim or immerse yourself in water until the site has healed, and keep the dressing dry with plastic wrap while showering. The site may ooze for a couple of days. This drainage should lessen with each passing day. Call If:     You should call your Physician and/or the Radiology Nurse if you have any bleeding other than a small spot on your bandage. Call if you have any signs of infection, fever, or increased pain at the site of the tube. Call if the oozing increases, if it changes color, or begins to have an odor. Follow-Up Instructions: Please see your ordering doctor as he/she has requested. To Reach Us: If you have any questions about your procedure, please call the Interventional Radiology department at [phone]. After business hours (5pm) and weekends, call the answering service at [phone] and ask for the Radiologist on call to be paged. Si tiene Preguntas acerca del procedimiento, por favor llame al departamento de Radiología Intervencional al [phone]. Después de horas de oficina (5 pm) y los fines de [place], llamar al [facility] al [phone] y pregunte por el Radiologo de [facility]. Interventional Radiology General Nurse Discharge    After general anesthesia or intravenous sedation, for 24 hours or while taking prescription Narcotics:  · Limit your activities  · Do not drive and operate hazardous machinery  · Do not make important personal or business decisions  · Do  not drink alcoholic beverages  · If you have not urinated within 8 hours after discharge, please contact your surgeon on call. * Please give a list of your current medications to your Primary Care Provider. * Please update this list whenever your medications are discontinued, doses are     changed, or new medications (including over-the-counter products) are added. * Please carry medication information at all times in case of emergency situations. These are general instructions for a healthy lifestyle:    No smoking/ No tobacco products/ Avoid exposure to second hand smoke  Surgeon General's Warning:  Quitting smoking now greatly reduces serious risk to your health. Obesity, smoking, and sedentary lifestyle greatly increases your risk for illness  A healthy diet, regular physical exercise & weight monitoring are important for maintaining a healthy lifestyle    You may be retaining fluid if you have a history of heart failure or if you experience any of the following symptoms:  Weight gain of 3 pounds or more overnight or 5 pounds in a week, increased swelling in our hands or feet or shortness of breath while lying flat in bed.   Please call your doctor as soon as you notice any of these symptoms; do not wait until your next office visit. Recognize signs and symptoms of STROKE:  F-face looks uneven    A-arms unable to move or move unevenly    S-speech slurred or non-existent    T-time-call 911 as soon as signs and symptoms begin-DO NOT go       Back to bed or wait to see if you get better-TIME IS BRAIN.       Patient Signature:  Date: 10/26/2020  Discharging Nurse: Josefina Hill RN

## 2020-11-02 ENCOUNTER — PATIENT OUTREACH (OUTPATIENT)
Dept: CASE MANAGEMENT | Age: 40
End: 2020-11-02

## 2020-11-02 ENCOUNTER — HOSPITAL ENCOUNTER (OUTPATIENT)
Dept: LAB | Age: 40
Discharge: HOME | End: 2020-11-02
Payer: COMMERCIAL

## 2020-11-02 DIAGNOSIS — C90.00 MULTIPLE MYELOMA, REMISSION STATUS UNSPECIFIED (HCC): ICD-10-CM

## 2020-11-02 LAB
ALBUMIN SERPL-MCNC: 3.6 G/DL (ref 3.5–5)
ALBUMIN/GLOB SERPL: 1 {RATIO} (ref 1.2–3.5)
ALP SERPL-CCNC: 180 U/L (ref 50–136)
ALT SERPL-CCNC: 38 U/L (ref 12–65)
ANION GAP SERPL CALC-SCNC: 5 MMOL/L (ref 7–16)
AST SERPL-CCNC: 42 U/L (ref 15–37)
BASOPHILS # BLD: 0 K/UL (ref 0–0.2)
BASOPHILS NFR BLD: 1 % (ref 0–2)
BILIRUB SERPL-MCNC: 0.6 MG/DL (ref 0.2–1.1)
BUN SERPL-MCNC: 11 MG/DL (ref 6–23)
CALCIUM SERPL-MCNC: 9.5 MG/DL (ref 8.3–10.4)
CHLORIDE SERPL-SCNC: 110 MMOL/L (ref 98–107)
CO2 SERPL-SCNC: 25 MMOL/L (ref 21–32)
CREAT SERPL-MCNC: 0.8 MG/DL (ref 0.6–1)
DIFFERENTIAL METHOD BLD: ABNORMAL
EOSINOPHIL # BLD: 0.2 K/UL (ref 0–0.8)
EOSINOPHIL NFR BLD: 3 % (ref 0.5–7.8)
ERYTHROCYTE [DISTWIDTH] IN BLOOD BY AUTOMATED COUNT: 16.8 % (ref 11.9–14.6)
GLOBULIN SER CALC-MCNC: 3.7 G/DL (ref 2.3–3.5)
GLUCOSE SERPL-MCNC: 85 MG/DL (ref 65–100)
HCT VFR BLD AUTO: 31.5 % (ref 35.8–46.3)
HGB BLD-MCNC: 10.2 G/DL (ref 11.7–15.4)
IMM GRANULOCYTES # BLD AUTO: 0 K/UL (ref 0–0.5)
IMM GRANULOCYTES NFR BLD AUTO: 0 % (ref 0–5)
LYMPHOCYTES # BLD: 1.3 K/UL (ref 0.5–4.6)
LYMPHOCYTES NFR BLD: 19 % (ref 13–44)
MAGNESIUM SERPL-MCNC: 2.1 MG/DL (ref 1.8–2.4)
MCH RBC QN AUTO: 29.7 PG (ref 26.1–32.9)
MCHC RBC AUTO-ENTMCNC: 32.4 G/DL (ref 31.4–35)
MCV RBC AUTO: 91.8 FL (ref 79.6–97.8)
MONOCYTES # BLD: 0.5 K/UL (ref 0.1–1.3)
MONOCYTES NFR BLD: 7 % (ref 4–12)
NEUTS SEG # BLD: 5.1 K/UL (ref 1.7–8.2)
NEUTS SEG NFR BLD: 71 % (ref 43–78)
NRBC # BLD: 0 K/UL (ref 0–0.2)
PHOSPHATE SERPL-MCNC: 4.4 MG/DL (ref 2.5–4.5)
PLATELET # BLD AUTO: 307 K/UL (ref 150–450)
PMV BLD AUTO: 9.7 FL (ref 9.4–12.3)
POTASSIUM SERPL-SCNC: 4.3 MMOL/L (ref 3.5–5.1)
PROT SERPL-MCNC: 7.3 G/DL (ref 6.3–8.2)
RBC # BLD AUTO: 3.43 M/UL (ref 4.05–5.25)
SODIUM SERPL-SCNC: 140 MMOL/L (ref 136–145)
WBC # BLD AUTO: 7.1 K/UL (ref 4.3–11.1)

## 2020-11-02 PROCEDURE — 36415 COLL VENOUS BLD VENIPUNCTURE: CPT

## 2020-11-02 PROCEDURE — 84100 ASSAY OF PHOSPHORUS: CPT

## 2020-11-02 PROCEDURE — 83735 ASSAY OF MAGNESIUM: CPT

## 2020-11-02 PROCEDURE — 80053 COMPREHEN METABOLIC PANEL: CPT

## 2020-11-02 PROCEDURE — 85025 COMPLETE CBC W/AUTO DIFF WBC: CPT

## 2020-11-02 NOTE — PROGRESS NOTES
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 Patient is doing well from a side effect standpoint but is dealing with a lot in her personal life with recent death of 2 family members. No infusion needed today. Informed patient that if she needed to adjust appt next week to let us know. Cymbalta 60 mg added to [facility] for neuropathy and new gabapentin script sent as well.

## 2020-11-09 ENCOUNTER — APPOINTMENT (OUTPATIENT)
Dept: INFUSION THERAPY | Age: 40
End: 2020-11-09

## 2020-11-11 ENCOUNTER — PATIENT OUTREACH (OUTPATIENT)
Dept: CASE MANAGEMENT | Age: 40
End: 2020-11-11

## 2020-11-11 ENCOUNTER — HOSPITAL ENCOUNTER (OUTPATIENT)
Dept: LAB | Age: 40
Discharge: HOME | End: 2020-11-11
Payer: COMMERCIAL

## 2020-11-11 DIAGNOSIS — Z94.81 AUTOLOGOUS BONE MARROW TRANSPLANTATION STATUS (HCC): ICD-10-CM

## 2020-11-11 DIAGNOSIS — C90.01 MULTIPLE MYELOMA IN REMISSION (HCC): ICD-10-CM

## 2020-11-11 LAB
ALBUMIN SERPL-MCNC: 3.4 G/DL (ref 3.5–5)
ALBUMIN/GLOB SERPL: 0.8 {RATIO} (ref 1.2–3.5)
ALP SERPL-CCNC: 146 U/L (ref 50–136)
ALT SERPL-CCNC: 19 U/L (ref 12–65)
ANION GAP SERPL CALC-SCNC: 5 MMOL/L (ref 7–16)
AST SERPL-CCNC: 21 U/L (ref 15–37)
BASOPHILS # BLD: 0 K/UL (ref 0–0.2)
BASOPHILS NFR BLD: 0 % (ref 0–2)
BILIRUB SERPL-MCNC: 0.6 MG/DL (ref 0.2–1.1)
BUN SERPL-MCNC: 5 MG/DL (ref 6–23)
CALCIUM SERPL-MCNC: 9.8 MG/DL (ref 8.3–10.4)
CHLORIDE SERPL-SCNC: 108 MMOL/L (ref 98–107)
CO2 SERPL-SCNC: 27 MMOL/L (ref 21–32)
CREAT SERPL-MCNC: 0.8 MG/DL (ref 0.6–1)
DIFFERENTIAL METHOD BLD: ABNORMAL
EOSINOPHIL # BLD: 0.2 K/UL (ref 0–0.8)
EOSINOPHIL NFR BLD: 3 % (ref 0.5–7.8)
ERYTHROCYTE [DISTWIDTH] IN BLOOD BY AUTOMATED COUNT: 15.9 % (ref 11.9–14.6)
GLOBULIN SER CALC-MCNC: 4.1 G/DL (ref 2.3–3.5)
GLUCOSE SERPL-MCNC: 83 MG/DL (ref 65–100)
HCT VFR BLD AUTO: 32.6 % (ref 35.8–46.3)
HGB BLD-MCNC: 10.5 G/DL (ref 11.7–15.4)
IMM GRANULOCYTES # BLD AUTO: 0 K/UL (ref 0–0.5)
IMM GRANULOCYTES NFR BLD AUTO: 0 % (ref 0–5)
LYMPHOCYTES # BLD: 1.7 K/UL (ref 0.5–4.6)
LYMPHOCYTES NFR BLD: 22 % (ref 13–44)
MAGNESIUM SERPL-MCNC: 1.9 MG/DL (ref 1.8–2.4)
MCH RBC QN AUTO: 29.7 PG (ref 26.1–32.9)
MCHC RBC AUTO-ENTMCNC: 32.2 G/DL (ref 31.4–35)
MCV RBC AUTO: 92.1 FL (ref 79.6–97.8)
MONOCYTES # BLD: 0.4 K/UL (ref 0.1–1.3)
MONOCYTES NFR BLD: 5 % (ref 4–12)
NEUTS SEG # BLD: 5.4 K/UL (ref 1.7–8.2)
NEUTS SEG NFR BLD: 70 % (ref 43–78)
NRBC # BLD: 0 K/UL (ref 0–0.2)
PLATELET # BLD AUTO: 256 K/UL (ref 150–450)
PMV BLD AUTO: 9.1 FL (ref 9.4–12.3)
POTASSIUM SERPL-SCNC: 3.8 MMOL/L (ref 3.5–5.1)
PROT SERPL-MCNC: 7.5 G/DL (ref 6.3–8.2)
RBC # BLD AUTO: 3.54 M/UL (ref 4.05–5.25)
SODIUM SERPL-SCNC: 140 MMOL/L (ref 136–145)
WBC # BLD AUTO: 7.7 K/UL (ref 4.3–11.1)

## 2020-11-11 PROCEDURE — 85025 COMPLETE CBC W/AUTO DIFF WBC: CPT

## 2020-11-11 PROCEDURE — 80053 COMPREHEN METABOLIC PANEL: CPT

## 2020-11-11 PROCEDURE — 83735 ASSAY OF MAGNESIUM: CPT

## 2020-11-11 PROCEDURE — 36415 COLL VENOUS BLD VENIPUNCTURE: CPT

## 2020-11-12 NOTE — PROGRESS NOTES
11/11/20 - Patient here for D49 post transplant. Patient is doing very well, no major issues to report. Patient will extend visits to every other week at this time. No needs noted.

## 2020-11-16 ENCOUNTER — APPOINTMENT (OUTPATIENT)
Dept: INFUSION THERAPY | Age: 40
End: 2020-11-16

## 2020-11-23 ENCOUNTER — HOSPITAL ENCOUNTER (OUTPATIENT)
Dept: INFUSION THERAPY | Age: 40
Discharge: HOME | End: 2020-11-23

## 2020-11-23 ENCOUNTER — HOSPITAL ENCOUNTER (OUTPATIENT)
Dept: LAB | Age: 40
Discharge: HOME | End: 2020-11-23
Payer: COMMERCIAL

## 2020-11-23 ENCOUNTER — PATIENT OUTREACH (OUTPATIENT)
Dept: CASE MANAGEMENT | Age: 40
End: 2020-11-23

## 2020-11-23 DIAGNOSIS — C90.00 MULTIPLE MYELOMA, REMISSION STATUS UNSPECIFIED (HCC): ICD-10-CM

## 2020-11-23 LAB
ALBUMIN SERPL-MCNC: 3.5 G/DL (ref 3.5–5)
ALBUMIN/GLOB SERPL: 1 {RATIO} (ref 1.2–3.5)
ALP SERPL-CCNC: 160 U/L (ref 50–136)
ALT SERPL-CCNC: 30 U/L (ref 12–65)
ANION GAP SERPL CALC-SCNC: 5 MMOL/L (ref 7–16)
AST SERPL-CCNC: 34 U/L (ref 15–37)
BASOPHILS # BLD: 0 K/UL (ref 0–0.2)
BASOPHILS NFR BLD: 1 % (ref 0–2)
BILIRUB SERPL-MCNC: 0.5 MG/DL (ref 0.2–1.1)
BUN SERPL-MCNC: 8 MG/DL (ref 6–23)
CALCIUM SERPL-MCNC: 9.6 MG/DL (ref 8.3–10.4)
CHLORIDE SERPL-SCNC: 110 MMOL/L (ref 98–107)
CO2 SERPL-SCNC: 26 MMOL/L (ref 21–32)
CREAT SERPL-MCNC: 0.7 MG/DL (ref 0.6–1)
DIFFERENTIAL METHOD BLD: ABNORMAL
EOSINOPHIL # BLD: 0.2 K/UL (ref 0–0.8)
EOSINOPHIL NFR BLD: 3 % (ref 0.5–7.8)
ERYTHROCYTE [DISTWIDTH] IN BLOOD BY AUTOMATED COUNT: 15.5 % (ref 11.9–14.6)
GLOBULIN SER CALC-MCNC: 3.6 G/DL (ref 2.3–3.5)
GLUCOSE SERPL-MCNC: 86 MG/DL (ref 65–100)
HCT VFR BLD AUTO: 34.4 % (ref 35.8–46.3)
HGB BLD-MCNC: 10.8 G/DL (ref 11.7–15.4)
IMM GRANULOCYTES # BLD AUTO: 0 K/UL (ref 0–0.5)
IMM GRANULOCYTES NFR BLD AUTO: 0 % (ref 0–5)
LYMPHOCYTES # BLD: 1.3 K/UL (ref 0.5–4.6)
LYMPHOCYTES NFR BLD: 20 % (ref 13–44)
MAGNESIUM SERPL-MCNC: 1.9 MG/DL (ref 1.8–2.4)
MCH RBC QN AUTO: 29 PG (ref 26.1–32.9)
MCHC RBC AUTO-ENTMCNC: 31.4 G/DL (ref 31.4–35)
MCV RBC AUTO: 92.5 FL (ref 79.6–97.8)
MONOCYTES # BLD: 0.4 K/UL (ref 0.1–1.3)
MONOCYTES NFR BLD: 5 % (ref 4–12)
NEUTS SEG # BLD: 4.7 K/UL (ref 1.7–8.2)
NEUTS SEG NFR BLD: 72 % (ref 43–78)
NRBC # BLD: 0 K/UL (ref 0–0.2)
PLATELET # BLD AUTO: 281 K/UL (ref 150–450)
PMV BLD AUTO: 9.2 FL (ref 9.4–12.3)
POTASSIUM SERPL-SCNC: 4.1 MMOL/L (ref 3.5–5.1)
PROT SERPL-MCNC: 7.1 G/DL (ref 6.3–8.2)
RBC # BLD AUTO: 3.72 M/UL (ref 4.05–5.25)
SODIUM SERPL-SCNC: 141 MMOL/L (ref 136–145)
WBC # BLD AUTO: 6.6 K/UL (ref 4.3–11.1)

## 2020-11-23 PROCEDURE — 83735 ASSAY OF MAGNESIUM: CPT

## 2020-11-23 PROCEDURE — 36415 COLL VENOUS BLD VENIPUNCTURE: CPT

## 2020-11-23 PROCEDURE — 80053 COMPREHEN METABOLIC PANEL: CPT

## 2020-11-23 PROCEDURE — 85025 COMPLETE CBC W/AUTO DIFF WBC: CPT

## 2020-11-23 NOTE — PROGRESS NOTES
11/23: Patient seen in the office with Dr. 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 Feeling well overall, energy is slowly coming back. Labs look good and patient can stop taking Potassium. Patient also states she has a rash to her L side of abd, under breast and back that itches. Patient thinks it might have been when she started the Bactrim, MD states to stop taking Bactrim for the next week/2 weeks to see if that is what is contributing to the itching. Also instructed patient that she can take Benadryl if needed. D100 studies are coming up at the end of the year. Patient instructed to call with any needs.

## 2020-11-30 ENCOUNTER — APPOINTMENT (OUTPATIENT)
Dept: INFUSION THERAPY | Age: 40
End: 2020-11-30

## 2020-12-07 ENCOUNTER — HOSPITAL ENCOUNTER (OUTPATIENT)
Dept: INFUSION THERAPY | Age: 40
Discharge: HOME | End: 2020-12-07

## 2020-12-07 ENCOUNTER — PATIENT OUTREACH (OUTPATIENT)
Dept: CASE MANAGEMENT | Age: 40
End: 2020-12-07

## 2020-12-07 ENCOUNTER — HOSPITAL ENCOUNTER (OUTPATIENT)
Dept: LAB | Age: 40
Discharge: HOME | End: 2020-12-07
Payer: COMMERCIAL

## 2020-12-07 DIAGNOSIS — Z94.81 AUTOLOGOUS BONE MARROW TRANSPLANTATION STATUS (HCC): ICD-10-CM

## 2020-12-07 DIAGNOSIS — C90.00 MULTIPLE MYELOMA, REMISSION STATUS UNSPECIFIED (HCC): ICD-10-CM

## 2020-12-07 DIAGNOSIS — C90.00 MULTIPLE MYELOMA NOT HAVING ACHIEVED REMISSION (HCC): ICD-10-CM

## 2020-12-07 LAB
ALBUMIN SERPL-MCNC: 3.3 G/DL (ref 3.5–5)
ALBUMIN/GLOB SERPL: 0.9 {RATIO} (ref 1.2–3.5)
ALP SERPL-CCNC: 176 U/L (ref 50–136)
ALT SERPL-CCNC: 166 U/L (ref 12–65)
ANION GAP SERPL CALC-SCNC: 5 MMOL/L (ref 7–16)
AST SERPL-CCNC: 255 U/L (ref 15–37)
BASOPHILS # BLD: 0 K/UL (ref 0–0.2)
BASOPHILS NFR BLD: 0 % (ref 0–2)
BILIRUB SERPL-MCNC: 0.4 MG/DL (ref 0.2–1.1)
BUN SERPL-MCNC: 11 MG/DL (ref 6–23)
CALCIUM SERPL-MCNC: 9.1 MG/DL (ref 8.3–10.4)
CHLORIDE SERPL-SCNC: 111 MMOL/L (ref 98–107)
CO2 SERPL-SCNC: 27 MMOL/L (ref 21–32)
CREAT SERPL-MCNC: 0.6 MG/DL (ref 0.6–1)
DIFFERENTIAL METHOD BLD: ABNORMAL
EOSINOPHIL # BLD: 0.1 K/UL (ref 0–0.8)
EOSINOPHIL NFR BLD: 2 % (ref 0.5–7.8)
ERYTHROCYTE [DISTWIDTH] IN BLOOD BY AUTOMATED COUNT: 15.4 % (ref 11.9–14.6)
GLOBULIN SER CALC-MCNC: 3.6 G/DL (ref 2.3–3.5)
GLUCOSE SERPL-MCNC: 79 MG/DL (ref 65–100)
HCG UR QL: NEGATIVE
HCT VFR BLD AUTO: 33.9 % (ref 35.8–46.3)
HGB BLD-MCNC: 10.5 G/DL (ref 11.7–15.4)
IMM GRANULOCYTES # BLD AUTO: 0 K/UL (ref 0–0.5)
IMM GRANULOCYTES NFR BLD AUTO: 0 % (ref 0–5)
LYMPHOCYTES # BLD: 1.4 K/UL (ref 0.5–4.6)
LYMPHOCYTES NFR BLD: 25 % (ref 13–44)
MAGNESIUM SERPL-MCNC: 2.2 MG/DL (ref 1.8–2.4)
MCH RBC QN AUTO: 29.9 PG (ref 26.1–32.9)
MCHC RBC AUTO-ENTMCNC: 31 G/DL (ref 31.4–35)
MCV RBC AUTO: 96.6 FL (ref 79.6–97.8)
MONOCYTES # BLD: 0.3 K/UL (ref 0.1–1.3)
MONOCYTES NFR BLD: 5 % (ref 4–12)
NEUTS SEG # BLD: 3.8 K/UL (ref 1.7–8.2)
NEUTS SEG NFR BLD: 67 % (ref 43–78)
NRBC # BLD: 0 K/UL (ref 0–0.2)
PLATELET # BLD AUTO: 279 K/UL (ref 150–450)
PMV BLD AUTO: 9.5 FL (ref 9.4–12.3)
POTASSIUM SERPL-SCNC: 3.6 MMOL/L (ref 3.5–5.1)
PROT SERPL-MCNC: 6.9 G/DL (ref 6.3–8.2)
RBC # BLD AUTO: 3.51 M/UL (ref 4.05–5.25)
SODIUM SERPL-SCNC: 143 MMOL/L (ref 136–145)
WBC # BLD AUTO: 5.6 K/UL (ref 4.3–11.1)

## 2020-12-07 PROCEDURE — 80053 COMPREHEN METABOLIC PANEL: CPT

## 2020-12-07 PROCEDURE — 83735 ASSAY OF MAGNESIUM: CPT

## 2020-12-07 PROCEDURE — 85025 COMPLETE CBC W/AUTO DIFF WBC: CPT

## 2020-12-07 PROCEDURE — 36415 COLL VENOUS BLD VENIPUNCTURE: CPT

## 2020-12-07 PROCEDURE — 81025 URINE PREGNANCY TEST: CPT

## 2020-12-21 ENCOUNTER — HOSPITAL ENCOUNTER (OUTPATIENT)
Dept: INFUSION THERAPY | Age: 40
Discharge: HOME | End: 2020-12-21

## 2020-12-21 ENCOUNTER — HOSPITAL ENCOUNTER (OUTPATIENT)
Dept: LAB | Age: 40
Discharge: HOME | End: 2020-12-21
Payer: COMMERCIAL

## 2020-12-21 ENCOUNTER — PATIENT OUTREACH (OUTPATIENT)
Dept: CASE MANAGEMENT | Age: 40
End: 2020-12-21

## 2020-12-21 DIAGNOSIS — Z94.81 AUTOLOGOUS BONE MARROW TRANSPLANTATION STATUS (HCC): ICD-10-CM

## 2020-12-21 DIAGNOSIS — C90.00 MULTIPLE MYELOMA, REMISSION STATUS UNSPECIFIED (HCC): ICD-10-CM

## 2020-12-21 LAB
ALBUMIN SERPL-MCNC: 3.3 G/DL (ref 3.5–5)
ALBUMIN/GLOB SERPL: 0.9 {RATIO} (ref 1.2–3.5)
ALP SERPL-CCNC: 172 U/L (ref 50–136)
ALT SERPL-CCNC: 45 U/L (ref 12–65)
ANION GAP SERPL CALC-SCNC: 7 MMOL/L (ref 7–16)
AST SERPL-CCNC: 40 U/L (ref 15–37)
BASOPHILS # BLD: 0 K/UL (ref 0–0.2)
BASOPHILS NFR BLD: 1 % (ref 0–2)
BILIRUB SERPL-MCNC: 0.5 MG/DL (ref 0.2–1.1)
BUN SERPL-MCNC: 12 MG/DL (ref 6–23)
CALCIUM SERPL-MCNC: 9.2 MG/DL (ref 8.3–10.4)
CHLORIDE SERPL-SCNC: 110 MMOL/L (ref 98–107)
CO2 SERPL-SCNC: 25 MMOL/L (ref 21–32)
CREAT SERPL-MCNC: 0.6 MG/DL (ref 0.6–1)
DIFFERENTIAL METHOD BLD: ABNORMAL
EOSINOPHIL # BLD: 0.1 K/UL (ref 0–0.8)
EOSINOPHIL NFR BLD: 1 % (ref 0.5–7.8)
ERYTHROCYTE [DISTWIDTH] IN BLOOD BY AUTOMATED COUNT: 14.9 % (ref 11.9–14.6)
GLOBULIN SER CALC-MCNC: 3.5 G/DL (ref 2.3–3.5)
GLUCOSE SERPL-MCNC: 80 MG/DL (ref 65–100)
HCT VFR BLD AUTO: 31.2 % (ref 35.8–46.3)
HGB BLD-MCNC: 10.1 G/DL (ref 11.7–15.4)
IMM GRANULOCYTES # BLD AUTO: 0 K/UL (ref 0–0.5)
IMM GRANULOCYTES NFR BLD AUTO: 0 % (ref 0–5)
LYMPHOCYTES # BLD: 1.9 K/UL (ref 0.5–4.6)
LYMPHOCYTES NFR BLD: 29 % (ref 13–44)
MAGNESIUM SERPL-MCNC: 2 MG/DL (ref 1.8–2.4)
MCH RBC QN AUTO: 29.8 PG (ref 26.1–32.9)
MCHC RBC AUTO-ENTMCNC: 32.4 G/DL (ref 31.4–35)
MCV RBC AUTO: 92 FL (ref 79.6–97.8)
MONOCYTES # BLD: 0.4 K/UL (ref 0.1–1.3)
MONOCYTES NFR BLD: 6 % (ref 4–12)
NEUTS SEG # BLD: 4.2 K/UL (ref 1.7–8.2)
NEUTS SEG NFR BLD: 64 % (ref 43–78)
NRBC # BLD: 0 K/UL (ref 0–0.2)
PLATELET # BLD AUTO: 267 K/UL (ref 150–450)
PMV BLD AUTO: 9.2 FL (ref 9.4–12.3)
POTASSIUM SERPL-SCNC: 3.8 MMOL/L (ref 3.5–5.1)
PROT SERPL-MCNC: 6.8 G/DL (ref 6.3–8.2)
RBC # BLD AUTO: 3.39 M/UL (ref 4.05–5.25)
SODIUM SERPL-SCNC: 142 MMOL/L (ref 136–145)
WBC # BLD AUTO: 6.5 K/UL (ref 4.3–11.1)

## 2020-12-21 PROCEDURE — 80053 COMPREHEN METABOLIC PANEL: CPT

## 2020-12-21 PROCEDURE — 85025 COMPLETE CBC W/AUTO DIFF WBC: CPT

## 2020-12-21 PROCEDURE — 83735 ASSAY OF MAGNESIUM: CPT

## 2020-12-21 PROCEDURE — 36415 COLL VENOUS BLD VENIPUNCTURE: CPT

## 2020-12-21 NOTE — PROGRESS NOTES
12/21: Patient seen in the office today with Dr. Carola Sutherland, patient is D+89 ASCT. Patient slowly regaining strength, able to cook more in the kitchen. Patient still c/o being off balance at times. Neuropathy ongoing and doesn't seem to be improving per patient. Patient is currently taking Cymbalta 30mg daily (which was reduced from 60mg d/t elevated LFTs) and Neurontin. Dr. Carola Sutherland wants to change to Effexor 75mg daily and start taking Dapsone 100mg daily. Patient also states her body is \"achy\" and she has been taking Tylenol for pain, instructed patient she can also take Ibuprofen 400mg BID. BP also elevated and has been the last several visits, reassessed vitals after visit and BP: 148/98, pt currently not taking any BP medications, states she did in her 25s. Instructed patient to schedule a follow up visit with her family physician to assess BP. Patient's D100 studies scheduled, Revlimid 10mg ordered and has been shipped to patient for maintenance therapy which she should start around her f/u on January 4. Patient instructed to call with any needs.

## 2020-12-28 ENCOUNTER — HOSPITAL ENCOUNTER (OUTPATIENT)
Dept: GENERAL RADIOLOGY | Age: 40
Discharge: HOME | End: 2020-12-28
Attending: INTERNAL MEDICINE
Payer: COMMERCIAL

## 2020-12-28 ENCOUNTER — APPOINTMENT (OUTPATIENT)
Dept: RESPIRATORY THERAPY | Age: 40
End: 2020-12-28
Attending: INTERNAL MEDICINE
Payer: COMMERCIAL

## 2020-12-28 ENCOUNTER — HOSPITAL ENCOUNTER (OUTPATIENT)
Dept: NON INVASIVE DIAGNOSTICS | Age: 40
Discharge: HOME | End: 2020-12-28
Attending: INTERNAL MEDICINE
Payer: COMMERCIAL

## 2020-12-28 ENCOUNTER — HOSPITAL ENCOUNTER (OUTPATIENT)
Dept: CT IMAGING | Age: 40
Discharge: HOME | End: 2020-12-28
Attending: INTERNAL MEDICINE
Payer: COMMERCIAL

## 2020-12-28 ENCOUNTER — HOSPITAL ENCOUNTER (OUTPATIENT)
Dept: RESPIRATORY THERAPY | Age: 40
Discharge: HOME | End: 2020-12-28
Attending: INTERNAL MEDICINE
Payer: COMMERCIAL

## 2020-12-28 VITALS
TEMPERATURE: 97.3 F | RESPIRATION RATE: 14 BRPM | OXYGEN SATURATION: 97 % | WEIGHT: 210 LBS | HEIGHT: 63 IN | SYSTOLIC BLOOD PRESSURE: 163 MMHG | BODY MASS INDEX: 37.21 KG/M2 | HEART RATE: 76 BPM | DIASTOLIC BLOOD PRESSURE: 96 MMHG

## 2020-12-28 DIAGNOSIS — C90.00 MULTIPLE MYELOMA, REMISSION STATUS UNSPECIFIED (HCC): ICD-10-CM

## 2020-12-28 DIAGNOSIS — C90.01 MULTIPLE MYELOMA IN REMISSION (HCC): ICD-10-CM

## 2020-12-28 DIAGNOSIS — Z94.81 AUTOLOGOUS BONE MARROW TRANSPLANTATION STATUS (HCC): ICD-10-CM

## 2020-12-28 LAB
BASOPHILS # BLD: 0 K/UL (ref 0–0.2)
BASOPHILS NFR BLD: 0 % (ref 0–2)
BONE MARROW PREP & W,BMA: NORMAL
DIFFERENTIAL METHOD BLD: ABNORMAL
EOSINOPHIL # BLD: 0.1 K/UL (ref 0–0.8)
EOSINOPHIL NFR BLD: 1 % (ref 0.5–7.8)
ERYTHROCYTE [DISTWIDTH] IN BLOOD BY AUTOMATED COUNT: 15 % (ref 11.9–14.6)
HCT VFR BLD AUTO: 32.4 % (ref 35.8–46.3)
HGB BLD-MCNC: 10.3 G/DL (ref 11.7–15.4)
IMM GRANULOCYTES # BLD AUTO: 0 K/UL (ref 0–0.5)
IMM GRANULOCYTES NFR BLD AUTO: 0 % (ref 0–5)
LYMPHOCYTES # BLD: 1.8 K/UL (ref 0.5–4.6)
LYMPHOCYTES NFR BLD: 20 % (ref 13–44)
MCH RBC QN AUTO: 29.8 PG (ref 26.1–32.9)
MCHC RBC AUTO-ENTMCNC: 31.8 G/DL (ref 31.4–35)
MCV RBC AUTO: 93.6 FL (ref 79.6–97.8)
MONOCYTES # BLD: 0.7 K/UL (ref 0.1–1.3)
MONOCYTES NFR BLD: 7 % (ref 4–12)
NEUTS SEG # BLD: 6.3 K/UL (ref 1.7–8.2)
NEUTS SEG NFR BLD: 72 % (ref 43–78)
NRBC # BLD: 0 K/UL (ref 0–0.2)
PLATELET # BLD AUTO: 260 K/UL (ref 150–450)
PMV BLD AUTO: 10.1 FL (ref 9.4–12.3)
RBC # BLD AUTO: 3.46 M/UL (ref 4.05–5.2)
WBC # BLD AUTO: 8.9 K/UL (ref 4.3–11.1)

## 2020-12-28 PROCEDURE — 71046 X-RAY EXAM CHEST 2 VIEWS: CPT

## 2020-12-28 PROCEDURE — 93005 ELECTROCARDIOGRAM TRACING: CPT | Performed by: INTERNAL MEDICINE

## 2020-12-28 PROCEDURE — 94726 PLETHYSMOGRAPHY LUNG VOLUMES: CPT

## 2020-12-28 PROCEDURE — 94010 BREATHING CAPACITY TEST: CPT

## 2020-12-28 PROCEDURE — 88313 SPECIAL STAINS GROUP 2: CPT

## 2020-12-28 PROCEDURE — 88305 TISSUE EXAM BY PATHOLOGIST: CPT

## 2020-12-28 PROCEDURE — 77030028872 CT BX BONE MARROW AND ASPIRATION

## 2020-12-28 PROCEDURE — 94729 DIFFUSING CAPACITY: CPT

## 2020-12-28 PROCEDURE — 85025 COMPLETE CBC W/AUTO DIFF WBC: CPT

## 2020-12-28 PROCEDURE — 99152 MOD SED SAME PHYS/QHP 5/>YRS: CPT

## 2020-12-28 PROCEDURE — 74011000250 HC RX REV CODE- 250: Performed by: RADIOLOGY

## 2020-12-28 PROCEDURE — 74011250636 HC RX REV CODE- 250/636: Performed by: RADIOLOGY

## 2020-12-28 PROCEDURE — 88311 DECALCIFY TISSUE: CPT

## 2020-12-28 RX ORDER — FENTANYL CITRATE 50 UG/ML
25-100 INJECTION, SOLUTION INTRAMUSCULAR; INTRAVENOUS
Status: DISCONTINUED | OUTPATIENT
Start: 2020-12-28 | End: 2021-01-01 | Stop reason: HOSPADM

## 2020-12-28 RX ORDER — LIDOCAINE HYDROCHLORIDE 20 MG/ML
1-10 INJECTION, SOLUTION INFILTRATION; PERINEURAL ONCE
Status: COMPLETED | OUTPATIENT
Start: 2020-12-28 | End: 2020-12-28

## 2020-12-28 RX ORDER — DIPHENHYDRAMINE HYDROCHLORIDE 50 MG/ML
50 INJECTION, SOLUTION INTRAMUSCULAR; INTRAVENOUS
Status: DISCONTINUED | OUTPATIENT
Start: 2020-12-28 | End: 2021-01-01 | Stop reason: HOSPADM

## 2020-12-28 RX ORDER — MORPHINE SULFATE 10 MG/ML
1 INJECTION, SOLUTION INTRAMUSCULAR; INTRAVENOUS
Status: DISCONTINUED | OUTPATIENT
Start: 2020-12-28 | End: 2021-01-01 | Stop reason: HOSPADM

## 2020-12-28 RX ORDER — MIDAZOLAM HYDROCHLORIDE 1 MG/ML
.5-2 INJECTION, SOLUTION INTRAMUSCULAR; INTRAVENOUS
Status: DISCONTINUED | OUTPATIENT
Start: 2020-12-28 | End: 2021-01-01 | Stop reason: HOSPADM

## 2020-12-28 RX ORDER — SODIUM CHLORIDE 9 MG/ML
150 INJECTION, SOLUTION INTRAVENOUS CONTINUOUS
Status: DISCONTINUED | OUTPATIENT
Start: 2020-12-28 | End: 2020-12-29 | Stop reason: HOSPADM

## 2020-12-28 RX ORDER — HYDROCODONE BITARTRATE AND ACETAMINOPHEN 7.5; 325 MG/1; MG/1
1 TABLET ORAL
Status: DISCONTINUED | OUTPATIENT
Start: 2020-12-28 | End: 2021-01-01 | Stop reason: HOSPADM

## 2020-12-28 RX ADMIN — FENTANYL CITRATE 50 MCG: 50 INJECTION, SOLUTION INTRAMUSCULAR; INTRAVENOUS at 16:33

## 2020-12-28 RX ADMIN — MIDAZOLAM 1 MG: 1 INJECTION INTRAMUSCULAR; INTRAVENOUS at 16:20

## 2020-12-28 RX ADMIN — FENTANYL CITRATE 50 MCG: 50 INJECTION, SOLUTION INTRAMUSCULAR; INTRAVENOUS at 16:20

## 2020-12-28 RX ADMIN — FENTANYL CITRATE 50 MCG: 50 INJECTION, SOLUTION INTRAMUSCULAR; INTRAVENOUS at 16:26

## 2020-12-28 RX ADMIN — MIDAZOLAM 1 MG: 1 INJECTION INTRAMUSCULAR; INTRAVENOUS at 16:26

## 2020-12-28 RX ADMIN — DIPHENHYDRAMINE HYDROCHLORIDE 50 MG: 50 INJECTION, SOLUTION INTRAMUSCULAR; INTRAVENOUS at 16:00

## 2020-12-28 RX ADMIN — FENTANYL CITRATE 50 MCG: 50 INJECTION, SOLUTION INTRAMUSCULAR; INTRAVENOUS at 16:30

## 2020-12-28 RX ADMIN — MIDAZOLAM 2 MG: 1 INJECTION INTRAMUSCULAR; INTRAVENOUS at 16:29

## 2020-12-28 RX ADMIN — LIDOCAINE HYDROCHLORIDE 4 ML: 20 INJECTION, SOLUTION INFILTRATION; PERINEURAL at 16:40

## 2020-12-28 NOTE — PROCEDURES
Department of Interventional Radiology  [phone]        Interventional Radiology Brief Procedure Note    Patient: Som Rodriguez MRN: [id number]  SSN: [id number]    YOB: 1980  Age: 38 y.o. Sex: female      Date of Procedure: 12/28/2020    Pre-Procedure Diagnosis: Multiple Myeloma    Post-Procedure Diagnosis: SAME    Procedure(s): Image Guided Biopsy    Brief Description of Procedure: Right Iliac Bone Marrow Aspiration and Core Biopsy. Performed By: Apolinar Painting MD     Assistants: None    Anesthesia:Moderate Sedation    Estimated Blood Loss: Less than 10ml    Specimens:  Pathology    Implants:  None    Findings: Unremarkable.      Complications: None    Recommendations: 1 hour bedrest.      Follow Up: Dr Garrett Puente    Signed By: Apolinar Painting MD     December 28, 2020

## 2020-12-28 NOTE — DISCHARGE INSTRUCTIONS
[address]  Department of Interventional Radiology  [facility]  [phone] Office  [phone] Fax    BIOPSY DISCHARGE INSTRUCTIONS    General Instructions:     A biopsy is the removal of a small piece of tissue for microscopic examination or testing. Healthy tissue can be obtained for the purpose of tissue-type matching for transplants. Unhealthy tissues are more commonly biopsied to diagnose disease. Lung Biopsy:     A needle lung biopsy is performed when there is a mass discovered in the lung area. The most serious risk is infection, bleeding, and pneumothorax (a collapsed lung). Signs of pneumothorax include shortness of breath, rapid heart rate, and blueness of the skin. If any of these occur, call 911. Liver Biopsy: This test helps detect cancer, infections, and the cause of an enlargement of the liver or elevated liver enzymes. It also helps to diagnose a number of liver diseases. The pain associated with the procedure may be felt in the shoulder. The risks include internal bleeding, pneumothorax, and injury to the surrounding organs. Renal Biopsy: This procedure is sometimes done to evaluate a transplanted kidney. It is also used to evaluate unexplained decrease in kidney function, blood, or protein in the urine. You may see bright red blood in the urine the first 24 hours after the test. If the bleeding lasts longer, you need to call your doctor. There is a risk of infection and bleeding into the muscle, which may cause soreness. Spinal Biopsy: This test is sometimes done in conjunction with other procedures. Your back will be sore, as we are taking a small sample of bone, which is slightly more difficult to sample than tissue. General Biopsy:     A mass can grow in any area of the body, and we are taking a specimen as ordered by your doctor. The risks are the same.  They include bleeding, pain, and infection. Home Care Instructions: You may resume your regular diet and medication regimen. Do not drink alcohol, drive, or make any important legal decisions in the next 24 hours. Do not lift anything heavier than a gallon of milk until the soreness goes away. You may use over the counter acetaminophen or ibuprofen for the soreness. You may apply an ice pack to the affected area for 20-30 minutes at time for the first 24 hours. After that, you may apply a heat pack. Call If: You should call your Physician and/or the Radiology Nurse if you have any questions or concerns about the biopsy site. Call if you should have increased pain, fever, redness, drainage, or bleeding more than a small spot on the bandage. Follow-Up Instructions: Please see your ordering doctor as he/she has requested. To Reach Us: If you have any questions about your procedure, please call the Interventional Radiology department at [phone]. After business hours (5pm) and weekends, call the answering service at [phone] and ask for the Radiologist on call to be paged. Si tiene Preguntas acerca del procedimiento, por favor llame al departamento de Radiología Intervencional al [phone]. Después de horas de oficina (5 pm) y los fines de [place], llamar al [facility] al [phone] y pregunte por el Radiologo de [facility]. Interventional Radiology General Nurse Discharge    After general anesthesia or intravenous sedation, for 24 hours or while taking prescription Narcotics:  · Limit your activities  · Do not drive and operate hazardous machinery  · Do not make important personal or business decisions  · Do  not drink alcoholic beverages  · If you have not urinated within 8 hours after discharge, please contact your surgeon on call. * Please give a list of your current medications to your Primary Care Provider.   * Please update this list whenever your medications are discontinued, doses are changed, or new medications (including over-the-counter products) are added. * Please carry medication information at all times in case of emergency situations. These are general instructions for a healthy lifestyle:    No smoking/ No tobacco products/ Avoid exposure to second hand smoke  Surgeon General's Warning:  Quitting smoking now greatly reduces serious risk to your health. Obesity, smoking, and sedentary lifestyle greatly increases your risk for illness  A healthy diet, regular physical exercise & weight monitoring are important for maintaining a healthy lifestyle    You may be retaining fluid if you have a history of heart failure or if you experience any of the following symptoms:  Weight gain of 3 pounds or more overnight or 5 pounds in a week, increased swelling in our hands or feet or shortness of breath while lying flat in bed. Please call your doctor as soon as you notice any of these symptoms; do not wait until your next office visit. Recognize signs and symptoms of STROKE:  F-face looks uneven    A-arms unable to move or move unevenly    S-speech slurred or non-existent    T-time-call 911 as soon as signs and symptoms begin-DO NOT go       Back to bed or wait to see if you get better-TIME IS BRAIN.       Patient Signature:  Date: 12/28/2020  Discharging Nurse: Keira Renteria RN

## 2020-12-28 NOTE — H&P
Department of Interventional Radiology  [phone]    History and Physical    Patient:  Leonel Ortiz MRN:  [id number]  SSN:  [id number]    YOB: 1980  Age:  38 y.o. Sex:  female      Primary Care Provider:  Dary Carter NP  Referring Physician:  Tennille Lopez MD    Subjective:     Chief Complaint: biopsy    History of the Present Illness: The patient is a 38 y.o. female with multiple myeloma who presents for bone marrow biopsy. NPO. Past Medical History:   Diagnosis Date    Cancer [facility])     multiple myeloma    Chronic pain      Past Surgical History:   Procedure Laterality Date    HX BACK SURGERY  04/2020    HX TUBAL LIGATION  08/2007    IR INSERT TUNL CVC W/O PORT OVER 5 YR  8/17/2020    IR REMOVE TUNL CVAD W/O PORT / PUMP  10/26/2020        Review of Systems:    Pertinent items are noted in the History of Present Illness. Prior to Admission medications    Medication Sig Start Date End Date Taking? Authorizing Provider   venlafaxine-SR [facility] 75 mg capsule Take 1 Cap by mouth daily. 12/21/20  Yes Tennille Lopez MD   DULoxetine (CYMBALTA) 30 mg capsule Take 1 Cap by mouth daily. 12/7/20  Yes Margarette Kemp NP   gabapentin (NEURONTIN) 300 mg capsule Take 3 Caps by mouth three (3) times daily. Indications: neuropathic pain 11/2/20  Yes Margarette Kemp NP   trimethoprim-sulfamethoxazole (Bactrim DS) 160-800 mg per tablet Twice a day Monday , Wednesday, and friday 10/22/20  Yes Nacho Jones NP   acyclovir (ZOVIRAX) 400 mg tablet Take 1 Tab by mouth two (2) times a day. 10/19/20  Yes Nacho Jones NP   potassium chloride (K-DUR, KLOR-CON) 20 mEq tablet Take 1 Tab by mouth daily. 10/19/20  Yes Karolina Bundy NP   loratadine (CLARITIN) 10 mg tablet Take 1 Tab by mouth daily. 10/7/20  Yes Margarette Kemp NP   acetaminophen (Tylenol Extra Strength) 500 mg tablet Take  by mouth every six (6) hours as needed for Pain.    Yes Provider, Historical gabapentin (NEURONTIN) 300 mg capsule Take 900 mg by mouth three (3) times daily. 20   Provider, Historical   dapsone 100 mg tablet Take 1 Tab by mouth daily. 20   Lara Ambrose MD        Allergies   Allergen Reactions    Zarxio [Filgrastim-Sndz] Shortness of Breath     \"pounding heart\" per pt.    Tolerates Granix       Family History   Problem Relation Age of Onset    Hypertension Mother     Arthritis-osteo Mother     Diabetes Father     No Known Problems Sister     No Known Problems Brother     Diabetes Sister      Social History     Tobacco Use    Smoking status: Former Smoker     Packs/day: 0.50     Years: 26.00     Pack years: 13.00     Quit date: 2020     Years since quittin.7    Smokeless tobacco: Never Used   Substance Use Topics    Alcohol use: Not Currently        Objective:       Physical Examination:    Vitals:    20 1316   BP: (!) 154/92   Pulse: 89   Resp: 16   Temp: 97.3 °F (36.3 °C)   SpO2: 94%   Weight: 95.3 kg (210 lb)   Height: 5' 3\" (1.6 m)       Pain Assessment  Pain Intensity 1: 4 (20 1313)  Pain Location 1: Generalized            HEART: regular rate and rhythm  LUNG: clear to auscultation bilaterally  ABDOMEN: normal findings: soft, non-tender  EXTREMITIES: warm, no edema    Laboratory:     Lab Results   Component Value Date/Time    Sodium 142 2020 08:32 AM    Sodium 143 2020 08:12 AM    Potassium 3.8 2020 08:32 AM    Potassium 3.6 2020 08:12 AM    Chloride 110 (H) 2020 08:32 AM    Chloride 111 (H) 2020 08:12 AM    CO2 25 2020 08:32 AM    CO2 27 2020 08:12 AM    Anion gap 7 2020 08:32 AM    Anion gap 5 (L) 2020 08:12 AM    Glucose 80 2020 08:32 AM    Glucose 79 2020 08:12 AM    BUN 12 2020 08:32 AM    BUN 11 2020 08:12 AM    Creatinine 0.60 2020 08:32 AM    Creatinine 0.60 2020 08:12 AM    GFR est AA >60 2020 08:32 AM    GFR est AA >60 2020 08:12 AM    GFR est non-AA >60 12/21/2020 08:32 AM    GFR est non-AA >60 12/07/2020 08:12 AM    Calcium 9.2 12/21/2020 08:32 AM    Calcium 9.1 12/07/2020 08:12 AM    Magnesium 2.0 12/21/2020 08:32 AM    Magnesium 2.2 12/07/2020 08:12 AM    Phosphorus 4.4 11/02/2020 08:45 AM    Phosphorus 3.7 10/26/2020 10:16 AM    Albumin 3.3 (L) 12/21/2020 08:32 AM    Albumin 3.3 (L) 12/07/2020 08:12 AM    Protein, total 6.8 12/21/2020 08:32 AM    Protein, total 6.9 12/07/2020 08:12 AM    Globulin 3.5 12/21/2020 08:32 AM    Globulin 3.6 (H) 12/07/2020 08:12 AM    A-G Ratio 0.9 (L) 12/21/2020 08:32 AM    A-G Ratio 0.9 (L) 12/07/2020 08:12 AM    ALT (SGPT) 45 12/21/2020 08:32 AM    ALT (SGPT) 166 (H) 12/07/2020 08:12 AM     Lab Results   Component Value Date/Time    WBC 8.9 12/28/2020 01:18 PM    WBC 6.5 12/21/2020 08:32 AM    HGB 10.3 (L) 12/28/2020 01:18 PM    HGB 10.1 (L) 12/21/2020 08:32 AM    HCT 32.4 (L) 12/28/2020 01:18 PM    HCT 31.2 (L) 12/21/2020 08:32 AM    PLATELET 397 57/46/6789 01:18 PM    PLATELET 929 49/07/6496 08:32 AM     Lab Results   Component Value Date/Time    aPTT 23.6 (L) 08/16/2020 07:45 AM    Prothrombin time 15.0 (H) 08/16/2020 07:45 AM    INR 1.1 08/16/2020 07:45 AM       Assessment:     Multiple myeloma    Hospital Problems  Date Reviewed: 12/7/2020    None          Plan:     Planned Procedure:  Bone marrow biopsy    Risks, benefits, and alternatives reviewed with patient and she agrees to proceed with the procedure.       Signed By: Conchis Roy PA-C     December 28, 2020

## 2020-12-28 NOTE — PROGRESS NOTES
TRANSFER - IN REPORT:    Verbal report received from PHAM Mederos on Morales Erickson  being received from IR procedure for routine post - op      Report consisted of patients Situation, Background, Assessment and   Recommendations(SBAR). Information from the following report(s) SBAR, Kardex, Procedure Summary and MAR was reviewed with the receiving nurse. Opportunity for questions and clarification was provided. Assessment completed upon patients arrival to unit and care assumed.

## 2020-12-29 ENCOUNTER — HOSPITAL ENCOUNTER (OUTPATIENT)
Dept: NON INVASIVE DIAGNOSTICS | Age: 40
Discharge: HOME | End: 2020-12-29
Attending: INTERNAL MEDICINE
Payer: COMMERCIAL

## 2020-12-29 ENCOUNTER — HOSPITAL ENCOUNTER (OUTPATIENT)
Dept: LAB | Age: 40
Discharge: HOME | End: 2020-12-29
Payer: COMMERCIAL

## 2020-12-29 ENCOUNTER — HOSPITAL ENCOUNTER (OUTPATIENT)
Dept: GENERAL RADIOLOGY | Age: 40
Discharge: HOME | End: 2020-12-29
Attending: INTERNAL MEDICINE
Payer: COMMERCIAL

## 2020-12-29 DIAGNOSIS — C90.00 MULTIPLE MYELOMA, REMISSION STATUS UNSPECIFIED (HCC): ICD-10-CM

## 2020-12-29 DIAGNOSIS — Z94.81 AUTOLOGOUS BONE MARROW TRANSPLANTATION STATUS (HCC): ICD-10-CM

## 2020-12-29 DIAGNOSIS — Z94.81 S/P AUTOLOGOUS BONE MARROW TRANSPLANTATION (HCC): ICD-10-CM

## 2020-12-29 DIAGNOSIS — C90.01 MULTIPLE MYELOMA IN REMISSION (HCC): ICD-10-CM

## 2020-12-29 LAB
ALBUMIN SERPL-MCNC: 3.5 G/DL (ref 3.5–5)
ALBUMIN/GLOB SERPL: 0.9 {RATIO} (ref 1.2–3.5)
ALP SERPL-CCNC: 235 U/L (ref 50–136)
ALT SERPL-CCNC: 69 U/L (ref 12–65)
ANION GAP SERPL CALC-SCNC: 7 MMOL/L (ref 7–16)
AST SERPL-CCNC: 33 U/L (ref 15–37)
ATRIAL RATE: 82 BPM
BASOPHILS # BLD: 0 K/UL (ref 0–0.2)
BASOPHILS NFR BLD: 1 % (ref 0–2)
BILIRUB DIRECT SERPL-MCNC: 0.2 MG/DL
BILIRUB SERPL-MCNC: 1 MG/DL (ref 0.2–1.1)
BUN SERPL-MCNC: 8 MG/DL (ref 6–23)
CALCIUM SERPL-MCNC: 9 MG/DL (ref 8.3–10.4)
CALCULATED P AXIS, ECG09: 59 DEGREES
CALCULATED R AXIS, ECG10: 9 DEGREES
CALCULATED T AXIS, ECG11: -62 DEGREES
CHLORIDE SERPL-SCNC: 105 MMOL/L (ref 98–107)
CHOLEST SERPL-MCNC: 194 MG/DL
CO2 SERPL-SCNC: 27 MMOL/L (ref 21–32)
CREAT SERPL-MCNC: 0.7 MG/DL (ref 0.6–1)
DIAGNOSIS, 93000: NORMAL
DIFFERENTIAL METHOD BLD: ABNORMAL
EOSINOPHIL # BLD: 0.1 K/UL (ref 0–0.8)
EOSINOPHIL NFR BLD: 1 % (ref 0.5–7.8)
ERYTHROCYTE [DISTWIDTH] IN BLOOD BY AUTOMATED COUNT: 14.8 % (ref 11.9–14.6)
FSH SERPL-ACNC: 52 MIU/ML
GGT SERPL-CCNC: 80 U/L (ref 5–55)
GLOBULIN SER CALC-MCNC: 4 G/DL (ref 2.3–3.5)
GLUCOSE SERPL-MCNC: 79 MG/DL (ref 65–100)
HCT VFR BLD AUTO: 33.3 % (ref 35.8–46.3)
HDLC SERPL-MCNC: 78 MG/DL (ref 40–60)
HDLC SERPL: 2.5 {RATIO}
HGB BLD-MCNC: 10.3 G/DL (ref 11.7–15.4)
IMM GRANULOCYTES # BLD AUTO: 0 K/UL (ref 0–0.5)
IMM GRANULOCYTES NFR BLD AUTO: 0 % (ref 0–5)
LDLC SERPL CALC-MCNC: 89.8 MG/DL
LH SERPL-ACNC: 29.3 MIU/ML
LIPID PROFILE,FLP: ABNORMAL
LYMPHOCYTES # BLD: 1.9 K/UL (ref 0.5–4.6)
LYMPHOCYTES NFR BLD: 23 % (ref 13–44)
MAGNESIUM SERPL-MCNC: 1.9 MG/DL (ref 1.8–2.4)
MCH RBC QN AUTO: 28.9 PG (ref 26.1–32.9)
MCHC RBC AUTO-ENTMCNC: 30.9 G/DL (ref 31.4–35)
MCV RBC AUTO: 93.3 FL (ref 79.6–97.8)
MONOCYTES # BLD: 0.5 K/UL (ref 0.1–1.3)
MONOCYTES NFR BLD: 6 % (ref 4–12)
NEUTS SEG # BLD: 5.6 K/UL (ref 1.7–8.2)
NEUTS SEG NFR BLD: 69 % (ref 43–78)
NRBC # BLD: 0 K/UL (ref 0–0.2)
P-R INTERVAL, ECG05: 144 MS
PLATELET # BLD AUTO: 270 K/UL (ref 150–450)
PMV BLD AUTO: 9.4 FL (ref 9.4–12.3)
POTASSIUM SERPL-SCNC: 3.3 MMOL/L (ref 3.5–5.1)
PROT SERPL-MCNC: 7.5 G/DL (ref 6.3–8.2)
Q-T INTERVAL, ECG07: 402 MS
QRS DURATION, ECG06: 94 MS
QTC CALCULATION (BEZET), ECG08: 469 MS
RBC # BLD AUTO: 3.57 M/UL (ref 4.05–5.25)
SODIUM SERPL-SCNC: 139 MMOL/L (ref 136–145)
T4 FREE SERPL-MCNC: 0.8 NG/DL (ref 0.78–1.46)
TRIGL SERPL-MCNC: 131 MG/DL (ref 35–150)
TSH SERPL DL<=0.005 MIU/L-ACNC: 1.48 UIU/ML (ref 0.36–3.74)
VENTRICULAR RATE, ECG03: 82 BPM
VLDLC SERPL CALC-MCNC: 26.2 MG/DL (ref 6–23)
WBC # BLD AUTO: 8.1 K/UL (ref 4.3–11.1)

## 2020-12-29 PROCEDURE — 83735 ASSAY OF MAGNESIUM: CPT

## 2020-12-29 PROCEDURE — 84443 ASSAY THYROID STIM HORMONE: CPT

## 2020-12-29 PROCEDURE — 82248 BILIRUBIN DIRECT: CPT

## 2020-12-29 PROCEDURE — 36415 COLL VENOUS BLD VENIPUNCTURE: CPT

## 2020-12-29 PROCEDURE — 85025 COMPLETE CBC W/AUTO DIFF WBC: CPT

## 2020-12-29 PROCEDURE — 80053 COMPREHEN METABOLIC PANEL: CPT

## 2020-12-29 PROCEDURE — 84481 FREE ASSAY (FT-3): CPT

## 2020-12-29 PROCEDURE — 83001 ASSAY OF GONADOTROPIN (FSH): CPT

## 2020-12-29 PROCEDURE — 84439 ASSAY OF FREE THYROXINE: CPT

## 2020-12-29 PROCEDURE — 83883 ASSAY NEPHELOMETRY NOT SPEC: CPT

## 2020-12-29 PROCEDURE — 80061 LIPID PANEL: CPT

## 2020-12-29 PROCEDURE — 77074 RADEX OSSEOUS SURVEY LMTD: CPT

## 2020-12-29 PROCEDURE — 82784 ASSAY IGA/IGD/IGG/IGM EACH: CPT

## 2020-12-29 PROCEDURE — 86334 IMMUNOFIX E-PHORESIS SERUM: CPT

## 2020-12-29 PROCEDURE — 82232 ASSAY OF BETA-2 PROTEIN: CPT

## 2020-12-29 PROCEDURE — 86335 IMMUNFIX E-PHORSIS/URINE/CSF: CPT

## 2020-12-29 PROCEDURE — 82977 ASSAY OF GGT: CPT

## 2020-12-29 PROCEDURE — 83002 ASSAY OF GONADOTROPIN (LH): CPT

## 2020-12-29 PROCEDURE — 84166 PROTEIN E-PHORESIS/URINE/CSF: CPT

## 2020-12-29 PROCEDURE — 86360 T CELL ABSOLUTE COUNT/RATIO: CPT

## 2020-12-29 PROCEDURE — 93306 TTE W/DOPPLER COMPLETE: CPT

## 2020-12-29 NOTE — PROCEDURES
[facility]  PROCEDURE NOTE    Name:  Renee Forrest  MR#:  [id number]  :  1980  ACCOUNT #:  [de-identified]  DATE OF SERVICE:  2020    PROCEDURE PERFORMED:  Complete pulmonary function test.    INTERPRETATION:  The flow volume loop is normal.    Spirometry is normal.    Lung volumes are normal.    The diffusion capacity adjusted for the patient's hemoglobin is substantially reduced.       MD KEITH Valderrama/KEVON_T/[email or web address]  D:  2020 17:25  T:  2020 21:42  JOB #:  [id number]

## 2020-12-30 LAB
ALBUMIN SERPL ELPH-MCNC: 3.34 G/DL (ref 3.2–5.6)
ALBUMIN UR ELPH-MCNC: <1.6 MG/DL
ALBUMIN/GLOB SERPL: 1 {RATIO}
ALPHA1 GLOB 24H UR ELPH-MCNC: <0.3 MG/DL
ALPHA1 GLOB SERPL ELPH-MCNC: 0.34 G/DL (ref 0.1–0.4)
ALPHA2 GLOB SERPL ELPH-MCNC: 0.94 G/DL (ref 0.4–1.2)
ALPHA2 GLOB SERPL ELPH-MCNC: <0.8 MG/DL
B-GLOBULIN SERPL QL ELPH: 1.09 G/DL (ref 0.6–1.3)
B-GLOBULIN UR QL ELPH: <1.5 MG/DL
B2 MICROGLOB SERPL-MCNC: 2.4 MG/L (ref 0.8–2.34)
CD3 CELLS # BLD: 1136 /UL (ref 622–2402)
CD3 CELLS NFR BLD: 54.1 % (ref 57.5–86.2)
CD3+CD4+ CELLS # BLD: 756 /UL (ref 359–1519)
CD3+CD4+ CELLS NFR BLD: 36 % (ref 30.8–58.5)
CD3+CD4+ CELLS/CD3+CD8+ CLL BLD: 2.02 % (ref 0.92–3.72)
CD3+CD8+ CELLS # BLD: 374 /UL (ref 109–897)
CD3+CD8+ CELLS NFR BLD: 17.8 % (ref 12–35.5)
COLLECT DURATION TIME UR: 24 HR
GAMMA GLOB MFR SERPL ELPH: 1.09 G/DL (ref 0.5–1.6)
GAMMA GLOB MFR UR ELPH: <0.9 MG/DL
IGA SERPL-MCNC: 55 MG/DL (ref 85–499)
IGG SERPL-MCNC: 1060 MG/DL (ref 610–1616)
IGM SERPL-MCNC: 31 MG/DL (ref 35–242)
KAPPA LC FREE SER-MCNC: 15.88 MG/L (ref 3.3–19.4)
KAPPA LC FREE/LAMBDA FREE SER: 1.06 {RATIO} (ref 0.26–1.65)
LAMBDA LC FREE SERPL-MCNC: 14.93 MG/L (ref 5.71–26.3)
LYMPHOCYTES # BLD AUTO: 2.1 X10E3/UL (ref 0.7–3.1)
LYMPHOCYTES NFR BLD AUTO: 25 %
M PROTEIN SERPL ELPH-MCNC: ABNORMAL G/DL
M PROTEIN UR-MCNC: NORMAL MG/DL
PROT 24H UR-MRATE: NORMAL MG/24HR
PROT PATTERN SERPL ELPH-IMP: ABNORMAL
PROT PATTERN SPEC IFE-IMP: ABNORMAL
PROT PATTERN SPEC IFE-IMP: NORMAL
PROT PATTERN UR ELPH-IMP: NORMAL
PROT SERPL-MCNC: 6.8 G/DL (ref 6.3–8.2)
PROT UR-MCNC: <5 MG/DL
SPECIMEN VOL ?TM UR: 1530 ML
T3FREE SERPL-MCNC: 3.1 PG/ML (ref 2–4.4)
WBC # BLD AUTO: 8.3 X10E3/UL (ref 3.4–10.8)

## 2021-01-04 ENCOUNTER — HOSPITAL ENCOUNTER (OUTPATIENT)
Dept: INFUSION THERAPY | Age: 41
End: 2021-01-04

## 2021-01-04 ENCOUNTER — PATIENT OUTREACH (OUTPATIENT)
Dept: CASE MANAGEMENT | Age: 41
End: 2021-01-04

## 2021-01-04 ENCOUNTER — HOSPITAL ENCOUNTER (OUTPATIENT)
Dept: LAB | Age: 41
Discharge: HOME | End: 2021-01-04
Payer: COMMERCIAL

## 2021-01-04 DIAGNOSIS — C90.01 MULTIPLE MYELOMA IN REMISSION (HCC): ICD-10-CM

## 2021-01-04 LAB
ALBUMIN SERPL-MCNC: 3.6 G/DL (ref 3.5–5)
ALBUMIN/GLOB SERPL: 1 {RATIO} (ref 1.2–3.5)
ALP SERPL-CCNC: 202 U/L (ref 50–136)
ALT SERPL-CCNC: 31 U/L (ref 12–65)
ANION GAP SERPL CALC-SCNC: 6 MMOL/L (ref 7–16)
AST SERPL-CCNC: 9 U/L (ref 15–37)
BASOPHILS # BLD: 0 K/UL (ref 0–0.2)
BASOPHILS NFR BLD: 0 % (ref 0–2)
BILIRUB SERPL-MCNC: 0.9 MG/DL (ref 0.2–1.1)
BUN SERPL-MCNC: 9 MG/DL (ref 6–23)
CALCIUM SERPL-MCNC: 9.7 MG/DL (ref 8.3–10.4)
CHLORIDE SERPL-SCNC: 106 MMOL/L (ref 98–107)
CO2 SERPL-SCNC: 27 MMOL/L (ref 21–32)
CREAT SERPL-MCNC: 0.7 MG/DL (ref 0.6–1)
DIFFERENTIAL METHOD BLD: ABNORMAL
EOSINOPHIL # BLD: 0.1 K/UL (ref 0–0.8)
EOSINOPHIL NFR BLD: 1 % (ref 0.5–7.8)
ERYTHROCYTE [DISTWIDTH] IN BLOOD BY AUTOMATED COUNT: 14.4 % (ref 11.9–14.6)
GLOBULIN SER CALC-MCNC: 3.7 G/DL (ref 2.3–3.5)
GLUCOSE SERPL-MCNC: 97 MG/DL (ref 65–100)
HCT VFR BLD AUTO: 33.9 % (ref 35.8–46.3)
HGB BLD-MCNC: 10.6 G/DL (ref 11.7–15.4)
IMM GRANULOCYTES # BLD AUTO: 0 K/UL (ref 0–0.5)
IMM GRANULOCYTES NFR BLD AUTO: 1 % (ref 0–5)
LYMPHOCYTES # BLD: 2.1 K/UL (ref 0.5–4.6)
LYMPHOCYTES NFR BLD: 26 % (ref 13–44)
MAGNESIUM SERPL-MCNC: 2.1 MG/DL (ref 1.8–2.4)
MCH RBC QN AUTO: 29 PG (ref 26.1–32.9)
MCHC RBC AUTO-ENTMCNC: 31.3 G/DL (ref 31.4–35)
MCV RBC AUTO: 92.9 FL (ref 79.6–97.8)
MONOCYTES # BLD: 0.5 K/UL (ref 0.1–1.3)
MONOCYTES NFR BLD: 6 % (ref 4–12)
NEUTS SEG # BLD: 5.2 K/UL (ref 1.7–8.2)
NEUTS SEG NFR BLD: 66 % (ref 43–78)
NRBC # BLD: 0 K/UL (ref 0–0.2)
PLATELET # BLD AUTO: 316 K/UL (ref 150–450)
PMV BLD AUTO: 9.2 FL (ref 9.4–12.3)
POTASSIUM SERPL-SCNC: 3.6 MMOL/L (ref 3.5–5.1)
PROT SERPL-MCNC: 7.3 G/DL (ref 6.3–8.2)
RBC # BLD AUTO: 3.65 M/UL (ref 4.05–5.25)
SODIUM SERPL-SCNC: 139 MMOL/L (ref 136–145)
WBC # BLD AUTO: 7.8 K/UL (ref 4.3–11.1)

## 2021-01-04 PROCEDURE — 83735 ASSAY OF MAGNESIUM: CPT

## 2021-01-04 PROCEDURE — 36415 COLL VENOUS BLD VENIPUNCTURE: CPT

## 2021-01-04 PROCEDURE — 85025 COMPLETE CBC W/AUTO DIFF WBC: CPT

## 2021-01-04 PROCEDURE — 80053 COMPREHEN METABOLIC PANEL: CPT

## 2021-01-04 NOTE — PROGRESS NOTES
1/4/20 - Patient here for D100 follow up. Patient will have PET tomorrow and begin Revlimid 10 mg 3/4 weeks. EF reduced and patient will follow up with Cardiology.

## 2021-01-05 ENCOUNTER — HOSPITAL ENCOUNTER (OUTPATIENT)
Dept: PET IMAGING | Age: 41
Discharge: HOME | End: 2021-01-05
Payer: COMMERCIAL

## 2021-01-05 DIAGNOSIS — C90.00 MULTIPLE MYELOMA, REMISSION STATUS UNSPECIFIED (HCC): ICD-10-CM

## 2021-01-05 DIAGNOSIS — Z94.81 AUTOLOGOUS BONE MARROW TRANSPLANTATION STATUS (HCC): ICD-10-CM

## 2021-01-05 PROCEDURE — 74011000636 HC RX REV CODE- 636: Performed by: INTERNAL MEDICINE

## 2021-01-05 PROCEDURE — A9552 F18 FDG: HCPCS

## 2021-01-05 RX ORDER — SODIUM CHLORIDE 0.9 % (FLUSH) 0.9 %
10 SYRINGE (ML) INJECTION
Status: COMPLETED | OUTPATIENT
Start: 2021-01-05 | End: 2021-01-05

## 2021-01-05 RX ADMIN — DIATRIZOATE MEGLUMINE AND DIATRIZOATE SODIUM 10 ML: 660; 100 LIQUID ORAL; RECTAL at 11:20

## 2021-01-05 RX ADMIN — Medication 10 ML: at 11:20

## 2021-01-22 PROBLEM — I50.42 CHRONIC COMBINED SYSTOLIC AND DIASTOLIC HEART FAILURE (HCC): Status: ACTIVE | Noted: 2021-01-22

## 2021-02-02 ENCOUNTER — HOSPITAL ENCOUNTER (OUTPATIENT)
Dept: LAB | Age: 41
Discharge: HOME | End: 2021-02-02
Payer: COMMERCIAL

## 2021-02-02 ENCOUNTER — PATIENT OUTREACH (OUTPATIENT)
Dept: CASE MANAGEMENT | Age: 41
End: 2021-02-02

## 2021-02-02 DIAGNOSIS — C90.00 MULTIPLE MYELOMA, REMISSION STATUS UNSPECIFIED (HCC): ICD-10-CM

## 2021-02-02 LAB
ALBUMIN SERPL-MCNC: 3.7 G/DL (ref 3.5–5)
ALBUMIN/GLOB SERPL: 1 {RATIO} (ref 1.2–3.5)
ALP SERPL-CCNC: 127 U/L (ref 50–136)
ALT SERPL-CCNC: 18 U/L (ref 12–65)
ANION GAP SERPL CALC-SCNC: 4 MMOL/L (ref 7–16)
AST SERPL-CCNC: 12 U/L (ref 15–37)
BASOPHILS # BLD: 0 K/UL (ref 0–0.2)
BASOPHILS NFR BLD: 1 % (ref 0–2)
BILIRUB SERPL-MCNC: 1.7 MG/DL (ref 0.2–1.1)
BUN SERPL-MCNC: 10 MG/DL (ref 6–23)
CALCIUM SERPL-MCNC: 9.2 MG/DL (ref 8.3–10.4)
CHLORIDE SERPL-SCNC: 109 MMOL/L (ref 98–107)
CO2 SERPL-SCNC: 29 MMOL/L (ref 21–32)
CREAT SERPL-MCNC: 0.8 MG/DL (ref 0.6–1)
DIFFERENTIAL METHOD BLD: ABNORMAL
EOSINOPHIL # BLD: 0 K/UL (ref 0–0.8)
EOSINOPHIL NFR BLD: 0 % (ref 0.5–7.8)
ERYTHROCYTE [DISTWIDTH] IN BLOOD BY AUTOMATED COUNT: 15.9 % (ref 11.9–14.6)
GLOBULIN SER CALC-MCNC: 3.7 G/DL (ref 2.3–3.5)
GLUCOSE SERPL-MCNC: 77 MG/DL (ref 65–100)
HCT VFR BLD AUTO: 32.4 % (ref 35.8–46.3)
HGB BLD-MCNC: 9.9 G/DL (ref 11.7–15.4)
IMM GRANULOCYTES # BLD AUTO: 0 K/UL (ref 0–0.5)
IMM GRANULOCYTES NFR BLD AUTO: 0 % (ref 0–5)
LYMPHOCYTES # BLD: 2.8 K/UL (ref 0.5–4.6)
LYMPHOCYTES NFR BLD: 42 % (ref 13–44)
MAGNESIUM SERPL-MCNC: 1.9 MG/DL (ref 1.8–2.4)
MCH RBC QN AUTO: 28.9 PG (ref 26.1–32.9)
MCHC RBC AUTO-ENTMCNC: 30.6 G/DL (ref 31.4–35)
MCV RBC AUTO: 94.5 FL (ref 79.6–97.8)
MONOCYTES # BLD: 0.6 K/UL (ref 0.1–1.3)
MONOCYTES NFR BLD: 9 % (ref 4–12)
NEUTS SEG # BLD: 3.2 K/UL (ref 1.7–8.2)
NEUTS SEG NFR BLD: 49 % (ref 43–78)
NRBC # BLD: 0 K/UL (ref 0–0.2)
PLATELET # BLD AUTO: 288 K/UL (ref 150–450)
PMV BLD AUTO: 9.2 FL (ref 9.4–12.3)
POTASSIUM SERPL-SCNC: 4.2 MMOL/L (ref 3.5–5.1)
PROT SERPL-MCNC: 7.4 G/DL (ref 6.3–8.2)
RBC # BLD AUTO: 3.43 M/UL (ref 4.05–5.25)
SODIUM SERPL-SCNC: 142 MMOL/L (ref 136–145)
WBC # BLD AUTO: 6.7 K/UL (ref 4.3–11.1)

## 2021-02-02 PROCEDURE — 85025 COMPLETE CBC W/AUTO DIFF WBC: CPT

## 2021-02-02 PROCEDURE — 83883 ASSAY NEPHELOMETRY NOT SPEC: CPT

## 2021-02-02 PROCEDURE — 86334 IMMUNOFIX E-PHORESIS SERUM: CPT

## 2021-02-02 PROCEDURE — 36415 COLL VENOUS BLD VENIPUNCTURE: CPT

## 2021-02-02 PROCEDURE — 82784 ASSAY IGA/IGD/IGG/IGM EACH: CPT

## 2021-02-02 PROCEDURE — 80053 COMPREHEN METABOLIC PANEL: CPT

## 2021-02-02 PROCEDURE — 83735 ASSAY OF MAGNESIUM: CPT

## 2021-02-02 NOTE — PROGRESS NOTES
2/2 Pt came in for a follow up visit. She is doing so good!! Her PET and bmbx both came back neg! Dr. Logan Holbrook will take over care for pt and prescribe her Revlimid. She will take Dapson for 2 more months. She will complete her 1yr post BMT studies in September. We will see her back in October and pt will get her 1yr immunizations .

## 2021-02-03 LAB
KAPPA LC FREE SER-MCNC: 14.27 MG/L (ref 3.3–19.4)
KAPPA LC FREE/LAMBDA FREE SER: 0.79 {RATIO} (ref 0.26–1.65)
LAMBDA LC FREE SERPL-MCNC: 18.02 MG/L (ref 5.71–26.3)

## 2021-02-04 LAB
ALBUMIN SERPL ELPH-MCNC: 3.76 G/DL (ref 3.2–5.6)
ALBUMIN/GLOB SERPL: 1.2 {RATIO}
ALPHA1 GLOB SERPL ELPH-MCNC: 0.27 G/DL (ref 0.1–0.4)
ALPHA2 GLOB SERPL ELPH-MCNC: 0.75 G/DL (ref 0.4–1.2)
B-GLOBULIN SERPL QL ELPH: 0.95 G/DL (ref 0.6–1.3)
GAMMA GLOB MFR SERPL ELPH: 1.07 G/DL (ref 0.5–1.6)
IGA SERPL-MCNC: 32 MG/DL (ref 85–499)
IGG SERPL-MCNC: 988 MG/DL (ref 610–1616)
IGM SERPL-MCNC: 48 MG/DL (ref 35–242)
M PROTEIN SERPL ELPH-MCNC: ABNORMAL G/DL
PROT PATTERN SERPL ELPH-IMP: ABNORMAL
PROT PATTERN SPEC IFE-IMP: ABNORMAL
PROT SERPL-MCNC: 6.8 G/DL (ref 6.3–8.2)

## 2021-02-08 NOTE — H&P
Department of Interventional Radiology  ([phone])    History and Physical    Patient:  Caitlyn Maguire MRN:  [id number]  SSN:  [id number]    YOB: 1980  Age:  35 y.o. Sex:  female      Primary Care Provider:  Brittany Ibrahim NP  Referring Physician:  Karla Lock MD    Subjective:     Chief Complaint: Multiple Myeloma    History of the Present Illness: The patient is a 35 y.o. female who presents with MM and probable thoracic Vertebral Compression Fracture. NPO. No thinners. Mother Elliot Mccoy her today. Past Medical History:   Diagnosis Date    Chronic pain      Past Surgical History:   Procedure Laterality Date    HX BACK SURGERY  2020    HX TUBAL LIGATION  2007      Review of Systems:    Pertinent items are noted in the History of Present Illness. Current Outpatient Medications   Medication Sig    acyclovir (ZOVIRAX) 400 mg tablet Take 400 mg by mouth two (2) times a day.  HYDROmorphone (Dilaudid) 2 mg tablet Take  by mouth every four (4) hours as needed for Pain.  morphine sulfate (MORPHINE, BULK,) by Does Not Apply route.  acetaminophen (Tylenol Extra Strength) 500 mg tablet Take  by mouth every six (6) hours as needed for Pain. No current facility-administered medications for this encounter.          No Known Allergies    Family History   Problem Relation Age of Onset    Hypertension Mother    [address] [facility] Arthritis-osteo Mother     Diabetes Father     No Known Problems Sister     No Known Problems Brother     Diabetes Sister      Social History     Tobacco Use    Smoking status: Former Smoker     Packs/day: 0.50     Years: 26.00     Pack years: 13.00     Last attempt to quit: 2020     Years since quittin.3    Smokeless tobacco: Never Used   Substance Use Topics    Alcohol use: Not Currently        Objective:       Physical Examination:    Vitals:    20 1315   BP: 121/76   Pulse: 85   Resp: 18   Temp: 98.4 °F (36.9 °C)   SpO2: 94%     Blood PA submitted to [facility] via CoverMyMeds  for Novolog vial solution 80 mL/90 days   Key: [id number]    Real Time approval was received for Novolog solution effective 2/8/2021-2/8/2021  PA Case: [id number]   pressure 121/76, pulse 85, temperature 98.4 °F (36.9 °C), resp. rate 18, SpO2 94 %, not currently breastfeeding.   General:  alert, cooperative, no distress  Heart:  regular rate and rhythm, S1, S2 normal, no murmur, click, rub or gallop  Lungs:  clear to auscultation bilaterally  Abdomen:  soft, nondistended, normal bowel sounds  Neuro:  normal without focal findings    Laboratory:     Lab Results   Component Value Date/Time    Sodium 140 07/21/2020 02:00 PM    Sodium 142 05/20/2020 04:07 PM    Potassium 3.9 07/21/2020 02:00 PM    Potassium 3.4 (L) 05/20/2020 04:07 PM    Chloride 108 (H) 07/21/2020 02:00 PM    Chloride 108 (H) 05/20/2020 04:07 PM    CO2 28 07/21/2020 02:00 PM    CO2 28 05/20/2020 04:07 PM    Anion gap 4 (L) 07/21/2020 02:00 PM    Anion gap 6 (L) 05/20/2020 04:07 PM    Glucose 110 (H) 07/21/2020 02:00 PM    Glucose 112 (H) 05/20/2020 04:07 PM    BUN 3 (L) 07/21/2020 02:00 PM    BUN 6 05/20/2020 04:07 PM    Creatinine 0.60 07/21/2020 02:00 PM    Creatinine 0.70 05/20/2020 04:07 PM    GFR est AA >60 07/21/2020 02:00 PM    GFR est AA >60 05/20/2020 04:07 PM    GFR est non-AA >60 07/21/2020 02:00 PM    GFR est non-AA >60 05/20/2020 04:07 PM    Calcium 9.3 07/21/2020 02:00 PM    Calcium 9.8 05/20/2020 04:07 PM    Magnesium 2.3 07/21/2020 02:00 PM    Magnesium 2.6 (H) 05/20/2020 04:07 PM    Phosphorus 1.9 (L) 05/20/2020 04:07 PM    Albumin 3.6 07/21/2020 02:00 PM    Albumin 3.2 (L) 05/20/2020 04:07 PM    Protein, total 7.0 07/21/2020 02:00 PM    Protein, total 7.6 07/21/2020 02:00 PM    Globulin 4.0 (H) 07/21/2020 02:00 PM    Globulin 4.0 (H) 05/20/2020 04:07 PM    A-G Ratio 1.0 (L) 07/21/2020 02:00 PM    A-G Ratio 0.9 (L) 07/21/2020 02:00 PM    ALT (SGPT) 33 07/21/2020 02:00 PM    ALT (SGPT) 39 05/20/2020 04:07 PM     Lab Results   Component Value Date/Time    WBC 10.6 08/03/2020 12:25 PM    WBC 8.4 07/21/2020 02:00 PM    HGB 11.5 (L) 08/03/2020 12:25 PM    HGB 12.6 07/21/2020 02:00 PM    HCT 36.2 08/03/2020 12:25 PM    HCT 39.6 07/21/2020 02:00 PM    PLATELET 447 41/21/3519 12:25 PM    PLATELET 187 04/02/4642 02:00 PM     No results found for: APTT, PTP, INR, INREXT    Assessment:     MM    Plan:     Planned Procedure:  Bone Marrow Aspiration/Biopsy w sedation. Risks, benefits, and alternatives reviewed with patient and she agrees to proceed with the procedure.       Signed By: Bryan Meeks MD     August 3, 2020

## 2021-02-25 PROBLEM — I50.22 CHRONIC SYSTOLIC HEART FAILURE (HCC): Status: ACTIVE | Noted: 2021-01-22

## 2021-09-21 ENCOUNTER — HOSPITAL ENCOUNTER (OUTPATIENT)
Dept: NON INVASIVE DIAGNOSTICS | Age: 41
Discharge: HOME | End: 2021-09-21
Attending: INTERNAL MEDICINE
Payer: COMMERCIAL

## 2021-09-21 ENCOUNTER — APPOINTMENT (OUTPATIENT)
Dept: NON INVASIVE DIAGNOSTICS | Age: 41
End: 2021-09-21
Attending: INTERNAL MEDICINE
Payer: COMMERCIAL

## 2021-09-21 ENCOUNTER — HOSPITAL ENCOUNTER (OUTPATIENT)
Dept: GENERAL RADIOLOGY | Age: 41
Discharge: HOME | End: 2021-09-21
Attending: INTERNAL MEDICINE
Payer: COMMERCIAL

## 2021-09-21 ENCOUNTER — HOSPITAL ENCOUNTER (OUTPATIENT)
Dept: CT IMAGING | Age: 41
Discharge: HOME | End: 2021-09-21
Attending: INTERNAL MEDICINE
Payer: COMMERCIAL

## 2021-09-21 ENCOUNTER — HOSPITAL ENCOUNTER (OUTPATIENT)
Dept: RESPIRATORY THERAPY | Age: 41
Discharge: HOME | End: 2021-09-21
Attending: INTERNAL MEDICINE

## 2021-09-21 VITALS
OXYGEN SATURATION: 97 % | SYSTOLIC BLOOD PRESSURE: 121 MMHG | WEIGHT: 227 LBS | DIASTOLIC BLOOD PRESSURE: 71 MMHG | HEART RATE: 64 BPM | TEMPERATURE: 97.6 F | BODY MASS INDEX: 41.77 KG/M2 | HEIGHT: 62 IN | RESPIRATION RATE: 16 BRPM

## 2021-09-21 VITALS — HEIGHT: 62 IN | WEIGHT: 227 LBS | BODY MASS INDEX: 41.77 KG/M2

## 2021-09-21 DIAGNOSIS — Z94.81 S/P AUTOLOGOUS BONE MARROW TRANSPLANTATION (HCC): ICD-10-CM

## 2021-09-21 LAB
ATRIAL RATE: 64 BPM
BASOPHILS # BLD: 0 K/UL (ref 0–0.2)
BASOPHILS NFR BLD: 1 % (ref 0–2)
BONE MARROW PREP & W,BMA: NORMAL
CALCULATED P AXIS, ECG09: 71 DEGREES
CALCULATED R AXIS, ECG10: 33 DEGREES
CALCULATED T AXIS, ECG11: -59 DEGREES
DIAGNOSIS, 93000: NORMAL
DIFFERENTIAL METHOD BLD: ABNORMAL
ECHO AO ASC DIAM: 2.8 CM
ECHO AO ROOT DIAM: 2.9 CM
ECHO AV AREA PEAK VELOCITY: 2.45 CM2
ECHO AV AREA VTI: 2.46 CM2
ECHO AV AREA/BSA PEAK VELOCITY: 1.2 CM2/M2
ECHO AV AREA/BSA VTI: 1.2 CM2/M2
ECHO AV MEAN GRADIENT: 3 MMHG
ECHO AV PEAK GRADIENT: 6 MMHG
ECHO AV PEAK VELOCITY: 119 CM/S
ECHO AV VTI: 28.1 CM
ECHO EST RA PRESSURE: 3 MMHG
ECHO LA AREA 2C: 23.7 CM2
ECHO LA AREA 4C: 19.5 CM2
ECHO LA MAJOR AXIS: 5.67 CM
ECHO LA MINOR AXIS: 2.81 CM
ECHO LV E' LATERAL VELOCITY: 9.86 CM/S
ECHO LV E' SEPTAL VELOCITY: 7.74 CM/S
ECHO LV INTERNAL DIMENSION DIASTOLIC: 5.6 CM (ref 3.9–5.3)
ECHO LV INTERNAL DIMENSION SYSTOLIC: 4.2 CM
ECHO LV IVSD: 0.8 CM (ref 0.6–0.9)
ECHO LV MASS 2D: 178.1 G (ref 67–162)
ECHO LV MASS INDEX 2D: 88.2 G/M2 (ref 43–95)
ECHO LV POSTERIOR WALL DIASTOLIC: 0.9 CM (ref 0.6–0.9)
ECHO LVOT DIAM: 2.1 CM
ECHO LVOT PEAK GRADIENT: 3 MMHG
ECHO LVOT VTI: 20 CM
ECHO MV A VELOCITY: 62.4 CM/S
ECHO MV E VELOCITY: 93.7 CM/S
ECHO MV E/A RATIO: 1.5
ECHO MV E/E' LATERAL: 9.5
ECHO MV E/E' RATIO (AVERAGED): 10.8
ECHO MV E/E' SEPTAL: 12.11
ECHO MV REGURGITANT RADIUS PISA: 0.3 CM
ECHO MV REGURGITANT VTIA: 200 CM
ECHO RIGHT VENTRICULAR SYSTOLIC PRESSURE (RVSP): 39 MMHG
ECHO RV INTERNAL DIMENSION: 2.2 CM
ECHO RV TAPSE: 2.57 CM (ref 1.5–2)
ECHO TV REGURGITANT MAX VELOCITY: 302 CM/S
ECHO TV REGURGITANT PEAK GRADIENT: 36 MMHG
EOSINOPHIL # BLD: 0.1 K/UL (ref 0–0.8)
EOSINOPHIL NFR BLD: 2 % (ref 0.5–7.8)
ERYTHROCYTE [DISTWIDTH] IN BLOOD BY AUTOMATED COUNT: 14.8 % (ref 11.9–14.6)
HCT VFR BLD AUTO: 36.7 % (ref 35.8–46.3)
HGB BLD-MCNC: 11.6 G/DL (ref 11.7–15.4)
IMM GRANULOCYTES # BLD AUTO: 0 K/UL (ref 0–0.5)
IMM GRANULOCYTES NFR BLD AUTO: 1 % (ref 0–5)
LYMPHOCYTES # BLD: 2.1 K/UL (ref 0.5–4.6)
LYMPHOCYTES NFR BLD: 41 % (ref 13–44)
MCH RBC QN AUTO: 28.2 PG (ref 26.1–32.9)
MCHC RBC AUTO-ENTMCNC: 31.6 G/DL (ref 31.4–35)
MCV RBC AUTO: 89.1 FL (ref 79.6–97.8)
MONOCYTES # BLD: 0.3 K/UL (ref 0.1–1.3)
MONOCYTES NFR BLD: 6 % (ref 4–12)
NEUTS SEG # BLD: 2.6 K/UL (ref 1.7–8.2)
NEUTS SEG NFR BLD: 50 % (ref 43–78)
NRBC # BLD: 0 K/UL (ref 0–0.2)
P-R INTERVAL, ECG05: 148 MS
PLATELET # BLD AUTO: 255 K/UL (ref 150–450)
PMV BLD AUTO: 9.9 FL (ref 9.4–12.3)
Q-T INTERVAL, ECG07: 442 MS
QRS DURATION, ECG06: 92 MS
QTC CALCULATION (BEZET), ECG08: 455 MS
RBC # BLD AUTO: 4.12 M/UL (ref 4.05–5.2)
VENTRICULAR RATE, ECG03: 64 BPM
WBC # BLD AUTO: 5.1 K/UL (ref 4.3–11.1)

## 2021-09-21 PROCEDURE — 93005 ELECTROCARDIOGRAM TRACING: CPT | Performed by: INTERNAL MEDICINE

## 2021-09-21 PROCEDURE — 77074 RADEX OSSEOUS SURVEY LMTD: CPT

## 2021-09-21 PROCEDURE — 88311 DECALCIFY TISSUE: CPT

## 2021-09-21 PROCEDURE — 88305 TISSUE EXAM BY PATHOLOGIST: CPT

## 2021-09-21 PROCEDURE — 74011000250 HC RX REV CODE- 250: Performed by: RADIOLOGY

## 2021-09-21 PROCEDURE — 74011250636 HC RX REV CODE- 250/636: Performed by: RADIOLOGY

## 2021-09-21 PROCEDURE — 93306 TTE W/DOPPLER COMPLETE: CPT

## 2021-09-21 PROCEDURE — 85025 COMPLETE CBC W/AUTO DIFF WBC: CPT

## 2021-09-21 PROCEDURE — 99152 MOD SED SAME PHYS/QHP 5/>YRS: CPT

## 2021-09-21 PROCEDURE — 88313 SPECIAL STAINS GROUP 2: CPT

## 2021-09-21 PROCEDURE — 77012 CT SCAN FOR NEEDLE BIOPSY: CPT

## 2021-09-21 RX ORDER — SODIUM CHLORIDE 9 MG/ML
500 INJECTION, SOLUTION INTRAVENOUS CONTINUOUS
Status: DISCONTINUED | OUTPATIENT
Start: 2021-09-21 | End: 2021-09-22 | Stop reason: HOSPADM

## 2021-09-21 RX ORDER — MIDAZOLAM HYDROCHLORIDE 1 MG/ML
.5-2 INJECTION, SOLUTION INTRAMUSCULAR; INTRAVENOUS
Status: DISCONTINUED | OUTPATIENT
Start: 2021-09-21 | End: 2021-09-25 | Stop reason: HOSPADM

## 2021-09-21 RX ORDER — FENTANYL CITRATE 50 UG/ML
25-100 INJECTION, SOLUTION INTRAMUSCULAR; INTRAVENOUS
Status: DISCONTINUED | OUTPATIENT
Start: 2021-09-21 | End: 2021-09-25 | Stop reason: HOSPADM

## 2021-09-21 RX ORDER — LIDOCAINE HYDROCHLORIDE 20 MG/ML
20-300 INJECTION, SOLUTION INFILTRATION; PERINEURAL
Status: DISCONTINUED | OUTPATIENT
Start: 2021-09-21 | End: 2021-09-25 | Stop reason: HOSPADM

## 2021-09-21 RX ADMIN — SODIUM CHLORIDE 500 ML: 900 INJECTION, SOLUTION INTRAVENOUS at 12:28

## 2021-09-21 RX ADMIN — FENTANYL CITRATE 50 MCG: 50 INJECTION INTRAMUSCULAR; INTRAVENOUS at 12:28

## 2021-09-21 RX ADMIN — FENTANYL CITRATE 50 MCG: 50 INJECTION INTRAMUSCULAR; INTRAVENOUS at 12:38

## 2021-09-21 RX ADMIN — LIDOCAINE HYDROCHLORIDE 200 MG: 20 INJECTION, SOLUTION INFILTRATION; PERINEURAL at 12:37

## 2021-09-21 RX ADMIN — MIDAZOLAM 1 MG: 1 INJECTION INTRAMUSCULAR; INTRAVENOUS at 12:33

## 2021-09-21 RX ADMIN — MIDAZOLAM 1 MG: 1 INJECTION INTRAMUSCULAR; INTRAVENOUS at 12:28

## 2021-09-21 RX ADMIN — FENTANYL CITRATE 50 MCG: 50 INJECTION INTRAMUSCULAR; INTRAVENOUS at 12:34

## 2021-09-21 RX ADMIN — MIDAZOLAM 1 MG: 1 INJECTION INTRAMUSCULAR; INTRAVENOUS at 12:38

## 2021-09-21 NOTE — DISCHARGE INSTRUCTIONS
[address]  Department of Interventional Radiology  [facility]  [phone] Office  [phone] Fax    BIOPSY DISCHARGE INSTRUCTIONS    General Instructions:     A biopsy is the removal of a small piece of tissue for microscopic examination or testing. Healthy tissue can be obtained for the purpose of tissue-type matching for transplants. Unhealthy tissues are more commonly biopsied to diagnose disease. Lung Biopsy:     A needle lung biopsy is performed when there is a mass discovered in the lung area. The most serious risk is infection, bleeding, and pneumothorax (a collapsed lung). Signs of pneumothorax include shortness of breath, rapid heart rate, and blueness of the skin. If any of these occur, call 911. Liver Biopsy: This test helps detect cancer, infections, and the cause of an enlargement of the liver or elevated liver enzymes. It also helps to diagnose a number of liver diseases. The pain associated with the procedure may be felt in the shoulder. The risks include internal bleeding, pneumothorax, and injury to the surrounding organs. Renal Biopsy: This procedure is sometimes done to evaluate a transplanted kidney. It is also used to evaluate unexplained decrease in kidney function, blood, or protein in the urine. You may see bright red blood in the urine the first 24 hours after the test. If the bleeding lasts longer, you need to call your doctor. There is a risk of infection and bleeding into the muscle, which may cause soreness. Spinal Biopsy: This test is sometimes done in conjunction with other procedures. Your back will be sore, as we are taking a small sample of bone, which is slightly more difficult to sample than tissue. General Biopsy:     A mass can grow in any area of the body, and we are taking a specimen as ordered by your doctor. The risks are the same.  They include bleeding, pain, and infection. Home Care Instructions: You may resume your regular diet and medication regimen. Do not drink alcohol, drive, or make any important legal decisions in the next 24 hours. Do not lift anything heavier than a gallon of milk until the soreness goes away. You may use over the counter acetaminophen or ibuprofen for the soreness. You may apply an ice pack to the affected area for 20-30 minutes at time for the first 24 hours. After that, you may apply a heat pack. Call If: You should call your Physician and/or the Radiology Nurse if you have any questions or concerns about the biopsy site. Call if you should have increased pain, fever, redness, drainage, or bleeding more than a small spot on the bandage. Follow-Up Instructions: Please see your ordering doctor as he/she has requested. To Reach Us: If you have any questions about your procedure, please call the Interventional Radiology department at [phone]. After business hours (5pm) and weekends, call the answering service at [phone] and ask for the Radiologist on call to be paged. Si tiene Preguntas acerca del procedimiento, por favor llame al departamento de Radiología Intervencional al [phone]. Después de horas de oficina (5 pm) y los fines de [place], llamar al Drew Kimball al [phone] y pregunte por el Radiologo de [facility]. Interventional Radiology General Nurse Discharge    After general anesthesia or intravenous sedation, for 24 hours or while taking prescription Narcotics:  · Limit your activities  · Do not drive and operate hazardous machinery  · Do not make important personal or business decisions  · Do  not drink alcoholic beverages  · If you have not urinated within 8 hours after discharge, please contact your surgeon on call. * Please give a list of your current medications to your Primary Care Provider.   * Please update this list whenever your medications are discontinued, doses are changed, or new medications (including over-the-counter products) are added. * Please carry medication information at all times in case of emergency situations. These are general instructions for a healthy lifestyle:    No smoking/ No tobacco products/ Avoid exposure to second hand smoke  Surgeon General's Warning:  Quitting smoking now greatly reduces serious risk to your health. Obesity, smoking, and sedentary lifestyle greatly increases your risk for illness  A healthy diet, regular physical exercise & weight monitoring are important for maintaining a healthy lifestyle    You may be retaining fluid if you have a history of heart failure or if you experience any of the following symptoms:  Weight gain of 3 pounds or more overnight or 5 pounds in a week, increased swelling in our hands or feet or shortness of breath while lying flat in bed. Please call your doctor as soon as you notice any of these symptoms; do not wait until your next office visit. Recognize signs and symptoms of STROKE:  F-face looks uneven    A-arms unable to move or move unevenly    S-speech slurred or non-existent    T-time-call 911 as soon as signs and symptoms begin-DO NOT go       Back to bed or wait to see if you get better-TIME IS BRAIN.       Patient Signature:  Date: 9/21/2021  Discharging Nurse: Shelby Devries RN

## 2021-09-21 NOTE — PROGRESS NOTES
Pt to CT via stretcher with RN.   IR Nurse Pre-Procedure Checklist Part 2          Consent signed: Yes    H&P complete:  Yes    Antibiotics: Not applicable    Airway/Mallampati Done: Yes    Shaved: Not applicable    Pregnancy Form:No    Patient Position: Yes    MD Side: Yes     Biopsy Worksheet: Yes    Specimen Medium: Yes

## 2021-09-21 NOTE — PROCEDURES
Department of Interventional Radiology  [phone]        Interventional Radiology Brief Procedure Note    Patient: Murali Irvin MRN: [id number]  SSN: [id number]    YOB: 1980  Age: 42 y.o. Sex: female      Date of Procedure: 9/21/2021    Pre-Procedure Diagnosis: multiple myeloma    Post-Procedure Diagnosis: SAME    Procedure(s): Venous Chest Port Placement    Brief Description of Procedure: as above    Performed By: Michaela Soler PA-C     Assistants: None    Anesthesia:Moderate Sedation per ELDER Lundy MD    Estimated Blood Loss: Less than 10ml    Specimens:  aspirate and core    Implants:  None    Findings: no post biopsy bleeding    Complications: None    Recommendations: routine wound care     Follow Up: prn    Signed By: Michaela Soler PA-C     September 21, 2021

## 2021-09-21 NOTE — H&P
Department of Interventional Radiology  [phone]    History and Physical    Patient:  Louise Ross MRN:  [id number]  SSN:  [id number]    YOB: 1980  Age:  42 y.o. Sex:  female      Primary Care Provider:  Molly Zimmer NP  Referring Physician:  Velma Pickering MD    Subjective:     Chief Complaint: biopsy    History of the Present Illness: The patient is a 42 y.o. female with multiple myeloma who presents for bone marrow biopsy. NPO. Past Medical History:   Diagnosis Date    Cancer [facility])     multiple myeloma    Chronic pain      Past Surgical History:   Procedure Laterality Date    HX BACK SURGERY  04/2020    HX TUBAL LIGATION  08/2007    IR INSERT TUNL CVC W/O PORT OVER 5 YR  8/17/2020    IR REMOVE TUNL CVAD W/O PORT / PUMP  10/26/2020        Review of Systems:    Pertinent items are noted in the History of Present Illness. Prior to Admission medications    Medication Sig Start Date End Date Taking? Authorizing Provider   HYDROcodone-acetaminophen (NORCO)  mg tablet Take 1 Tab by mouth every six (6) hours as needed for Pain. Provider, Historical   gabapentin (NEURONTIN) 300 mg capsule Take 3 Caps by mouth three (3) times daily. Indications: neuropathic pain 2/26/21   Velma Pickering MD   OTHER Chemo medication 21 days on and 7 days off    Provider, Historical   carvediloL (COREG) 3.125 mg tablet Take 1 Tab by mouth two (2) times daily (with meals). 2/25/21   Nikki Gomez MD   lenalidomide 10 mg cap Take 10 mg by mouth daily. Provider, Historical   aspirin delayed-release 81 mg tablet Take 81 mg by mouth daily. 1/29/21   Provider, Historical   losartan (COZAAR) 25 mg tablet Take 1 Tab by mouth daily. 1/22/21   Nikki Gomez MD   DULoxetine (CYMBALTA) 30 mg capsule Take 1 Cap by mouth daily. 12/7/20   Ligia Grant NP   loratadine (CLARITIN) 10 mg tablet Take 1 Tab by mouth daily.  10/7/20   Ligia Grant NP   acetaminophen (Tylenol Extra Strength) 500 mg tablet Take  by mouth every six (6) hours as needed for Pain. Provider, Historical        Allergies   Allergen Reactions    Zarxio [Filgrastim-Sndz] Shortness of Breath     \"pounding heart\" per pt.    Tolerates Granix       Family History   Problem Relation Age of Onset    Hypertension Mother     Arthritis-osteo Mother     Diabetes Father     No Known Problems Sister     No Known Problems Brother     Diabetes Sister      Social History     Tobacco Use    Smoking status: Former Smoker     Packs/day: 0.50     Years: 26.00     Pack years: 13.00     Quit date: 2020     Years since quittin.4    Smokeless tobacco: Never Used   Substance Use Topics    Alcohol use: Not Currently        Objective:       Physical Examination:    Vitals:    21 1123   BP: (!) 145/108   Pulse: (!) 58   Resp: 18   Temp: 97.6 °F (36.4 °C)   SpO2: 100%   Weight: 103 kg (227 lb)   Height: 5' 2\" (1.575 m)       Pain Assessment  Pain Intensity 1: 3 (21 1120)  Pain Location 1: Generalized  Pain Intervention(s) 1: Repositioned   goal 0      HEART: regular rate and rhythm  LUNG: clear to auscultation bilaterally  ABDOMEN: normal findings: soft, non-tender  EXTREMITIES: warm, no edema    Laboratory:     Lab Results   Component Value Date/Time    Sodium 142 2021 11:04 AM    Sodium 139 2021 01:51 PM    Potassium 4.2 2021 11:04 AM    Potassium 3.6 2021 01:51 PM    Chloride 109 (H) 2021 11:04 AM    Chloride 106 2021 01:51 PM    CO2 29 2021 11:04 AM    CO2 27 2021 01:51 PM    Anion gap 4 (L) 2021 11:04 AM    Anion gap 6 (L) 2021 01:51 PM    Glucose 77 2021 11:04 AM    Glucose 97 2021 01:51 PM    BUN 10 2021 11:04 AM    BUN 9 2021 01:51 PM    Creatinine 0.80 2021 11:04 AM    Creatinine 0.70 2021 01:51 PM    GFR est AA >60 2021 11:04 AM    GFR est AA >60 2021 01:51 PM    GFR est non-AA >60 2021 11:04 AM    GFR est non-AA >60 01/04/2021 01:51 PM    Calcium 9.2 02/02/2021 11:04 AM    Calcium 9.7 01/04/2021 01:51 PM    Magnesium 1.9 02/02/2021 11:04 AM    Magnesium 2.1 01/04/2021 01:51 PM    Phosphorus 4.4 11/02/2020 08:45 AM    Phosphorus 3.7 10/26/2020 10:16 AM    Albumin 3.7 02/02/2021 11:04 AM    Albumin 3.6 01/04/2021 01:51 PM    Protein, total 7.4 02/02/2021 11:04 AM    Protein, total 6.8 02/02/2021 11:04 AM    Globulin 3.7 (H) 02/02/2021 11:04 AM    Globulin 3.7 (H) 01/04/2021 01:51 PM    A-G Ratio 1.0 (L) 02/02/2021 11:04 AM    A-G Ratio 1.2 02/02/2021 11:04 AM    ALT (SGPT) 18 02/02/2021 11:04 AM    ALT (SGPT) 31 01/04/2021 01:51 PM     Lab Results   Component Value Date/Time    WBC 6.7 02/02/2021 11:04 AM    WBC 7.8 01/04/2021 01:51 PM    HGB 9.9 (L) 02/02/2021 11:04 AM    HGB 10.6 (L) 01/04/2021 01:51 PM    HCT 32.4 (L) 02/02/2021 11:04 AM    HCT 33.9 (L) 01/04/2021 01:51 PM    PLATELET 797 69/78/5562 11:04 AM    PLATELET 334 24/39/9418 01:51 PM     Lab Results   Component Value Date/Time    aPTT 23.6 (L) 08/16/2020 07:45 AM    Prothrombin time 15.0 (H) 08/16/2020 07:45 AM    INR 1.1 08/16/2020 07:45 AM       Assessment:     Multiple myeloma    Hospital Problems  Date Reviewed: 7/21/2021    None          Plan:     Planned Procedure:  Bone marrow biopsy    Risks, benefits, and alternatives reviewed with patient and she agrees to proceed with the procedure.       Signed By: Mina Rivera PA-C     September 21, 2021

## 2021-09-21 NOTE — PROGRESS NOTES
TRANSFER - OUT REPORT:    Verbal report given to Abril Stack on [facility]  being transferred to IR Recovery for routine post - op       Report consisted of patients Situation, Background, Assessment and   Recommendations(SBAR). Information from the following report(s) SBAR, Procedure Summary and MAR was reviewed with the receiving nurse. Opportunity for questions and clarification was provided. Conscious Sedation:   150 Mcg of Fentanyl administered  3 Mg of Versed administered    Pt tolerated procedure well.      Visit Vitals  /70   Pulse 63   Temp 97.6 °F (36.4 °C)   Resp 16   Ht 5' 2\" (1.575 m)   Wt 103 kg (227 lb)   SpO2 100%   Breastfeeding No   BMI 41.52 kg/m²     Past Medical History:   Diagnosis Date    Cancer [facility])     multiple myeloma    Chronic pain      Peripheral IV 09/21/21 Left Antecubital (Active)

## 2021-09-28 LAB
FLOW CYTOMETRY, FBTC1: NORMAL
SPECIMEN SOURCE: NORMAL
TEST ORDERED:: NORMAL

## 2021-10-06 ENCOUNTER — HOSPITAL ENCOUNTER (OUTPATIENT)
Dept: LAB | Age: 41
Discharge: HOME | End: 2021-10-06
Payer: COMMERCIAL

## 2021-10-06 ENCOUNTER — HOSPITAL ENCOUNTER (OUTPATIENT)
Dept: INFUSION THERAPY | Age: 41
Discharge: HOME | End: 2021-10-06
Payer: COMMERCIAL

## 2021-10-06 DIAGNOSIS — Z94.81 S/P AUTOLOGOUS BONE MARROW TRANSPLANTATION (HCC): ICD-10-CM

## 2021-10-06 DIAGNOSIS — C90.00 MULTIPLE MYELOMA NOT HAVING ACHIEVED REMISSION (HCC): Primary | ICD-10-CM

## 2021-10-06 LAB
ALBUMIN SERPL-MCNC: 3.2 G/DL (ref 3.5–5)
ALBUMIN/GLOB SERPL: 0.7 {RATIO} (ref 1.2–3.5)
ALP SERPL-CCNC: 138 U/L (ref 50–136)
ALT SERPL-CCNC: 31 U/L (ref 12–65)
ANION GAP SERPL CALC-SCNC: 6 MMOL/L (ref 7–16)
AST SERPL-CCNC: 15 U/L (ref 15–37)
BASOPHILS # BLD: 0.1 K/UL (ref 0–0.2)
BASOPHILS NFR BLD: 1 % (ref 0–2)
BILIRUB SERPL-MCNC: 0.5 MG/DL (ref 0.2–1.1)
BUN SERPL-MCNC: 8 MG/DL (ref 6–23)
CALCIUM SERPL-MCNC: 8.3 MG/DL (ref 8.3–10.4)
CHLORIDE SERPL-SCNC: 106 MMOL/L (ref 98–107)
CHOLEST SERPL-MCNC: 175 MG/DL
CO2 SERPL-SCNC: 29 MMOL/L (ref 21–32)
CREAT SERPL-MCNC: 0.7 MG/DL (ref 0.6–1)
DIFFERENTIAL METHOD BLD: NORMAL
EOSINOPHIL # BLD: 0.1 K/UL (ref 0–0.8)
EOSINOPHIL NFR BLD: 1 % (ref 0.5–7.8)
ERYTHROCYTE [DISTWIDTH] IN BLOOD BY AUTOMATED COUNT: 14.6 % (ref 11.9–14.6)
FSH SERPL-ACNC: 40.2 MIU/ML
GGT SERPL-CCNC: 35 U/L (ref 5–55)
GLOBULIN SER CALC-MCNC: 4.6 G/DL (ref 2.3–3.5)
GLUCOSE SERPL-MCNC: 80 MG/DL (ref 65–100)
HCT VFR BLD AUTO: 36.9 % (ref 35.8–46.3)
HDLC SERPL-MCNC: 65 MG/DL (ref 40–60)
HDLC SERPL: 2.7 {RATIO}
HGB BLD-MCNC: 11.8 G/DL (ref 11.7–15.4)
IMM GRANULOCYTES # BLD AUTO: 0.1 K/UL (ref 0–0.5)
IMM GRANULOCYTES NFR BLD AUTO: 1 % (ref 0–5)
LDLC SERPL CALC-MCNC: 84.4 MG/DL
LH SERPL-ACNC: 31.2 MIU/ML
LYMPHOCYTES # BLD: 2.6 K/UL (ref 0.5–4.6)
LYMPHOCYTES NFR BLD: 30 % (ref 13–44)
MAGNESIUM SERPL-MCNC: 1.9 MG/DL (ref 1.8–2.4)
MCH RBC QN AUTO: 28.5 PG (ref 26.1–32.9)
MCHC RBC AUTO-ENTMCNC: 32 G/DL (ref 31.4–35)
MCV RBC AUTO: 89.1 FL (ref 79.6–97.8)
MONOCYTES # BLD: 0.4 K/UL (ref 0.1–1.3)
MONOCYTES NFR BLD: 4 % (ref 4–12)
NEUTS SEG # BLD: 5.4 K/UL (ref 1.7–8.2)
NEUTS SEG NFR BLD: 63 % (ref 43–78)
NRBC # BLD: 0 K/UL (ref 0–0.2)
PLATELET # BLD AUTO: 260 K/UL (ref 150–450)
PMV BLD AUTO: 10 FL (ref 9.4–12.3)
POTASSIUM SERPL-SCNC: 3.4 MMOL/L (ref 3.5–5.1)
PROT SERPL-MCNC: 7.8 G/DL (ref 6.3–8.2)
RBC # BLD AUTO: 4.14 M/UL (ref 4.05–5.2)
SODIUM SERPL-SCNC: 141 MMOL/L (ref 136–145)
T4 FREE SERPL-MCNC: 0.8 NG/DL (ref 0.78–1.46)
TRIGL SERPL-MCNC: 128 MG/DL (ref 35–150)
TSH SERPL DL<=0.005 MIU/L-ACNC: 1.3 UIU/ML (ref 0.36–3.74)
VLDLC SERPL CALC-MCNC: 25.6 MG/DL (ref 6–23)
WBC # BLD AUTO: 8.6 K/UL (ref 4.3–11.1)

## 2021-10-06 PROCEDURE — 90746 HEPB VACCINE 3 DOSE ADULT IM: CPT | Performed by: INTERNAL MEDICINE

## 2021-10-06 PROCEDURE — 90670 PCV13 VACCINE IM: CPT | Performed by: INTERNAL MEDICINE

## 2021-10-06 PROCEDURE — 84439 ASSAY OF FREE THYROXINE: CPT

## 2021-10-06 PROCEDURE — 86334 IMMUNOFIX E-PHORESIS SERUM: CPT

## 2021-10-06 PROCEDURE — 82977 ASSAY OF GGT: CPT

## 2021-10-06 PROCEDURE — 80053 COMPREHEN METABOLIC PANEL: CPT

## 2021-10-06 PROCEDURE — 82784 ASSAY IGA/IGD/IGG/IGM EACH: CPT

## 2021-10-06 PROCEDURE — 83735 ASSAY OF MAGNESIUM: CPT

## 2021-10-06 PROCEDURE — 83002 ASSAY OF GONADOTROPIN (LH): CPT

## 2021-10-06 PROCEDURE — 90471 IMMUNIZATION ADMIN: CPT

## 2021-10-06 PROCEDURE — 36415 COLL VENOUS BLD VENIPUNCTURE: CPT

## 2021-10-06 PROCEDURE — 80061 LIPID PANEL: CPT

## 2021-10-06 PROCEDURE — 83001 ASSAY OF GONADOTROPIN (FSH): CPT

## 2021-10-06 PROCEDURE — 90648 HIB PRP-T VACCINE 4 DOSE IM: CPT | Performed by: INTERNAL MEDICINE

## 2021-10-06 PROCEDURE — 84443 ASSAY THYROID STIM HORMONE: CPT

## 2021-10-06 PROCEDURE — 83883 ASSAY NEPHELOMETRY NOT SPEC: CPT

## 2021-10-06 PROCEDURE — 90700 DTAP VACCINE < 7 YRS IM: CPT | Performed by: INTERNAL MEDICINE

## 2021-10-06 PROCEDURE — 90734 MENACWYD/MENACWYCRM VACC IM: CPT | Performed by: INTERNAL MEDICINE

## 2021-10-06 PROCEDURE — 85025 COMPLETE CBC W/AUTO DIFF WBC: CPT

## 2021-10-06 PROCEDURE — 74011250636 HC RX REV CODE- 250/636: Performed by: INTERNAL MEDICINE

## 2021-10-06 PROCEDURE — 84481 FREE ASSAY (FT-3): CPT

## 2021-10-06 PROCEDURE — 82232 ASSAY OF BETA-2 PROTEIN: CPT

## 2021-10-06 PROCEDURE — 90472 IMMUNIZATION ADMIN EACH ADD: CPT

## 2021-10-06 PROCEDURE — 90713 POLIOVIRUS IPV SC/IM: CPT | Performed by: INTERNAL MEDICINE

## 2021-10-06 RX ADMIN — Medication 0.5 ML: at 16:59

## 2021-10-06 RX ADMIN — PNEUMOCOCCAL 13-VALENT CONJUGATE VACCINE 0.5 ML: 2.2; 2.2; 2.2; 2.2; 2.2; 4.4; 2.2; 2.2; 2.2; 2.2; 2.2; 2.2; 2.2 INJECTION, SUSPENSION INTRAMUSCULAR at 16:45

## 2021-10-06 RX ADMIN — Medication 0.5 ML: at 16:52

## 2021-10-06 RX ADMIN — DIPHTHERIA AND TETANUS TOXOIDS AND ACELLULAR PERTUSSIS VACCINE ADSORBED 0.5 ML: 10; 25; 25; 25; 8 SUSPENSION INTRAMUSCULAR at 16:43

## 2021-10-06 RX ADMIN — HEPATITIS B VACCINE (RECOMBINANT) 20 MCG: 20 INJECTION, SUSPENSION INTRAMUSCULAR at 16:54

## 2021-10-06 RX ADMIN — POLIOVIRUS TYPE 1 ANTIGEN (FORMALDEHYDE INACTIVATED), POLIOVIRUS TYPE 2 ANTIGEN (FORMALDEHYDE INACTIVATED), AND POLIOVIRUS TYPE 3 ANTIGEN (FORMALDEHYDE INACTIVATED) 0.5 ML: 40; 8; 32 INJECTION, SUSPENSION INTRAMUSCULAR at 16:41

## 2021-10-06 NOTE — PROGRESS NOTES
Arrived to the [facility]. Vaccines x 6 completed. Patient tolerated well. Observed patient x 30 minutes, no reactions. Any issues or concerns during appointment: No.  Patient aware of next infusion appointment on 12/8 (date) at 1330 (time). Patient aware of next lab and [facility] office visit on 4/6/22 (date) at 56 (time). Discharged ambulatory in stable condition.

## 2021-10-07 LAB
B2 MICROGLOB SERPL-MCNC: 1.8 MG/L (ref 0.8–2.34)
KAPPA LC FREE SER-MCNC: 30.04 MG/L (ref 3.3–19.4)
KAPPA LC FREE/LAMBDA FREE SER: 1.72 {RATIO} (ref 0.26–1.65)
LAMBDA LC FREE SERPL-MCNC: 17.44 MG/L (ref 5.71–26.3)
T3FREE SERPL-MCNC: 2.4 PG/ML (ref 2–4.4)

## 2021-10-08 LAB
ALBUMIN SERPL ELPH-MCNC: 2.95 G/DL (ref 3.2–5.6)
ALBUMIN/GLOB SERPL: 0.7 {RATIO}
ALPHA1 GLOB SERPL ELPH-MCNC: 0.29 G/DL (ref 0.1–0.4)
ALPHA2 GLOB SERPL ELPH-MCNC: 0.95 G/DL (ref 0.4–1.2)
B-GLOBULIN SERPL QL ELPH: 1.33 G/DL (ref 0.6–1.3)
GAMMA GLOB MFR SERPL ELPH: 1.58 G/DL (ref 0.5–1.6)
IGA SERPL-MCNC: 109 MG/DL (ref 85–499)
IGG SERPL-MCNC: 1529 MG/DL (ref 610–1616)
IGM SERPL-MCNC: 39 MG/DL (ref 35–242)
M PROTEIN SERPL ELPH-MCNC: ABNORMAL G/DL
PROT PATTERN SERPL ELPH-IMP: ABNORMAL
PROT PATTERN SPEC IFE-IMP: ABNORMAL
PROT SERPL-MCNC: 7.1 G/DL (ref 6.3–8.2)

## 2021-10-13 ENCOUNTER — HOSPITAL ENCOUNTER (OUTPATIENT)
Dept: LAB | Age: 41
Discharge: HOME | End: 2021-10-13
Payer: COMMERCIAL

## 2021-10-13 ENCOUNTER — HOSPITAL ENCOUNTER (OUTPATIENT)
Dept: RESPIRATORY THERAPY | Age: 41
Discharge: HOME | End: 2021-10-13
Attending: INTERNAL MEDICINE
Payer: COMMERCIAL

## 2021-10-13 ENCOUNTER — HOSPITAL ENCOUNTER (OUTPATIENT)
Dept: PET IMAGING | Age: 41
Discharge: HOME | End: 2021-10-13
Payer: COMMERCIAL

## 2021-10-13 DIAGNOSIS — Z94.81 S/P AUTOLOGOUS BONE MARROW TRANSPLANTATION (HCC): ICD-10-CM

## 2021-10-13 LAB
GLUCOSE BLD STRIP.AUTO-MCNC: 86 MG/DL (ref 65–100)
SERVICE CMNT-IMP: NORMAL

## 2021-10-13 PROCEDURE — 82962 GLUCOSE BLOOD TEST: CPT

## 2021-10-13 PROCEDURE — 94010 BREATHING CAPACITY TEST: CPT

## 2021-10-13 PROCEDURE — 84166 PROTEIN E-PHORESIS/URINE/CSF: CPT

## 2021-10-13 PROCEDURE — 94726 PLETHYSMOGRAPHY LUNG VOLUMES: CPT

## 2021-10-13 PROCEDURE — 74011000636 HC RX REV CODE- 636: Performed by: INTERNAL MEDICINE

## 2021-10-13 PROCEDURE — 86335 IMMUNFIX E-PHORSIS/URINE/CSF: CPT

## 2021-10-13 PROCEDURE — 94729 DIFFUSING CAPACITY: CPT

## 2021-10-13 PROCEDURE — A9552 F18 FDG: HCPCS

## 2021-10-13 RX ORDER — SODIUM CHLORIDE 0.9 % (FLUSH) 0.9 %
10 SYRINGE (ML) INJECTION
Status: COMPLETED | OUTPATIENT
Start: 2021-10-13 | End: 2021-10-13

## 2021-10-13 RX ORDER — FLUDEOXYGLUCOSE F18 300 MCI/ML
10 INJECTION INTRAVENOUS ONCE
Status: COMPLETED | OUTPATIENT
Start: 2021-10-13 | End: 2021-10-13

## 2021-10-13 RX ADMIN — DIATRIZOATE MEGLUMINE AND DIATRIZOATE SODIUM 10 ML: 660; 100 LIQUID ORAL; RECTAL at 12:46

## 2021-10-13 RX ADMIN — FLUDEOXYGLUCOSE F18 13.05 MILLICURIE: 300 INJECTION INTRAVENOUS at 12:46

## 2021-10-13 RX ADMIN — Medication 10 ML: at 12:46

## 2021-10-18 LAB
ALBUMIN UR ELPH-MCNC: 2.3 MG/DL
ALBUMIN/GLOB SERPL: 0.3 {RATIO}
ALPHA1 GLOB 24H UR ELPH-MCNC: 0.8 MG/DL
ALPHA2 GLOB SERPL ELPH-MCNC: 1.5 MG/DL
B-GLOBULIN UR QL ELPH: 2.8 MG/DL
COLLECT DURATION TIME UR: 24 HR
GAMMA GLOB MFR UR ELPH: 2.6 MG/DL
M PROTEIN UR-MCNC: NORMAL MG/DL
PROT 24H UR-MRATE: 55 MG/24HR
PROT PATTERN SPEC IFE-IMP: NORMAL
PROT PATTERN UR ELPH-IMP: NORMAL
PROT UR-MCNC: 10 MG/DL
SPECIMEN VOL ?TM UR: 550 ML

## 2022-03-18 PROBLEM — E66.01 OBESITY, MORBID (HCC): Status: ACTIVE | Noted: 2020-05-20

## 2022-03-18 PROBLEM — I50.22 CHRONIC SYSTOLIC HEART FAILURE (HCC): Status: ACTIVE | Noted: 2021-01-22

## 2022-03-18 PROBLEM — C90.00 MULTIPLE MYELOMA (HCC): Status: ACTIVE | Noted: 2020-08-13

## 2022-03-19 PROBLEM — Z76.82 STEM CELL TRANSPLANT CANDIDATE: Status: ACTIVE | Noted: 2020-09-21

## 2022-03-19 PROBLEM — C90.00 MULTIPLE MYELOMA NOT HAVING ACHIEVED REMISSION (HCC): Status: ACTIVE | Noted: 2020-07-21

## 2022-04-19 ENCOUNTER — PATIENT OUTREACH (OUTPATIENT)
Dept: CASE MANAGEMENT | Age: 42
End: 2022-04-19

## 2022-04-19 NOTE — PROGRESS NOTES
4/19/20 - Patient doing well post transplant. Patient will have vaccines today next OV on 4/20. No further navigation needs noted at this time. Navigation will sign off.

## 2022-04-20 ENCOUNTER — HOSPITAL ENCOUNTER (OUTPATIENT)
Dept: LAB | Age: 42
Discharge: HOME | End: 2022-04-20
Payer: COMMERCIAL

## 2022-04-20 ENCOUNTER — HOSPITAL ENCOUNTER (OUTPATIENT)
Dept: INFUSION THERAPY | Age: 42
Discharge: HOME | End: 2022-04-20
Payer: COMMERCIAL

## 2022-04-20 DIAGNOSIS — C90.00 MULTIPLE MYELOMA NOT HAVING ACHIEVED REMISSION (HCC): ICD-10-CM

## 2022-04-20 DIAGNOSIS — C90.00 MULTIPLE MYELOMA NOT HAVING ACHIEVED REMISSION (HCC): Primary | ICD-10-CM

## 2022-04-20 LAB
ALBUMIN SERPL-MCNC: 3.2 G/DL (ref 3.5–5)
ALBUMIN/GLOB SERPL: 0.8 {RATIO} (ref 1.2–3.5)
ALP SERPL-CCNC: 162 U/L (ref 50–136)
ALT SERPL-CCNC: 26 U/L (ref 12–65)
ANION GAP SERPL CALC-SCNC: 5 MMOL/L (ref 7–16)
AST SERPL-CCNC: 11 U/L (ref 15–37)
BASOPHILS # BLD: 0.1 K/UL (ref 0–0.2)
BASOPHILS NFR BLD: 1 % (ref 0–2)
BILIRUB SERPL-MCNC: 0.8 MG/DL (ref 0.2–1.1)
BUN SERPL-MCNC: 9 MG/DL (ref 6–23)
CALCIUM SERPL-MCNC: 8.9 MG/DL (ref 8.3–10.4)
CHLORIDE SERPL-SCNC: 109 MMOL/L (ref 98–107)
CO2 SERPL-SCNC: 27 MMOL/L (ref 21–32)
CREAT SERPL-MCNC: 0.8 MG/DL (ref 0.6–1)
DIFFERENTIAL METHOD BLD: ABNORMAL
EOSINOPHIL # BLD: 0.1 K/UL (ref 0–0.8)
EOSINOPHIL NFR BLD: 1 % (ref 0.5–7.8)
ERYTHROCYTE [DISTWIDTH] IN BLOOD BY AUTOMATED COUNT: 14.7 % (ref 11.9–14.6)
GLOBULIN SER CALC-MCNC: 4.2 G/DL (ref 2.3–3.5)
GLUCOSE SERPL-MCNC: 90 MG/DL (ref 65–100)
HCT VFR BLD AUTO: 34.1 % (ref 35.8–46.3)
HGB BLD-MCNC: 10.9 G/DL (ref 11.7–15.4)
IMM GRANULOCYTES # BLD AUTO: 0 K/UL (ref 0–0.5)
IMM GRANULOCYTES NFR BLD AUTO: 0 % (ref 0–5)
LYMPHOCYTES # BLD: 2.9 K/UL (ref 0.5–4.6)
LYMPHOCYTES NFR BLD: 39 % (ref 13–44)
MCH RBC QN AUTO: 28.9 PG (ref 26.1–32.9)
MCHC RBC AUTO-ENTMCNC: 32 G/DL (ref 31.4–35)
MCV RBC AUTO: 90.5 FL (ref 79.6–97.8)
MONOCYTES # BLD: 0.5 K/UL (ref 0.1–1.3)
MONOCYTES NFR BLD: 7 % (ref 4–12)
NEUTS SEG # BLD: 3.9 K/UL (ref 1.7–8.2)
NEUTS SEG NFR BLD: 52 % (ref 43–78)
NRBC # BLD: 0 K/UL (ref 0–0.2)
PLATELET # BLD AUTO: 259 K/UL (ref 150–450)
PMV BLD AUTO: 9.6 FL (ref 9.4–12.3)
POTASSIUM SERPL-SCNC: 3.4 MMOL/L (ref 3.5–5.1)
PROT SERPL-MCNC: 7.4 G/DL (ref 6.3–8.2)
RBC # BLD AUTO: 3.77 M/UL (ref 4.05–5.2)
SODIUM SERPL-SCNC: 141 MMOL/L (ref 136–145)
WBC # BLD AUTO: 7.4 K/UL (ref 4.3–11.1)

## 2022-04-20 PROCEDURE — 90648 HIB PRP-T VACCINE 4 DOSE IM: CPT | Performed by: INTERNAL MEDICINE

## 2022-04-20 PROCEDURE — 90471 IMMUNIZATION ADMIN: CPT

## 2022-04-20 PROCEDURE — 83521 IG LIGHT CHAINS FREE EACH: CPT

## 2022-04-20 PROCEDURE — 90713 POLIOVIRUS IPV SC/IM: CPT | Performed by: INTERNAL MEDICINE

## 2022-04-20 PROCEDURE — 36415 COLL VENOUS BLD VENIPUNCTURE: CPT

## 2022-04-20 PROCEDURE — 82784 ASSAY IGA/IGD/IGG/IGM EACH: CPT

## 2022-04-20 PROCEDURE — 74011250636 HC RX REV CODE- 250/636: Performed by: INTERNAL MEDICINE

## 2022-04-20 PROCEDURE — 74011000636 HC RX REV CODE- 636: Performed by: INTERNAL MEDICINE

## 2022-04-20 PROCEDURE — 80053 COMPREHEN METABOLIC PANEL: CPT

## 2022-04-20 PROCEDURE — 90700 DTAP VACCINE < 7 YRS IM: CPT | Performed by: INTERNAL MEDICINE

## 2022-04-20 PROCEDURE — 90734 MENACWYD/MENACWYCRM VACC IM: CPT | Performed by: INTERNAL MEDICINE

## 2022-04-20 PROCEDURE — 90472 IMMUNIZATION ADMIN EACH ADD: CPT

## 2022-04-20 PROCEDURE — 90670 PCV13 VACCINE IM: CPT | Performed by: INTERNAL MEDICINE

## 2022-04-20 PROCEDURE — 90746 HEPB VACCINE 3 DOSE ADULT IM: CPT | Performed by: INTERNAL MEDICINE

## 2022-04-20 PROCEDURE — 85025 COMPLETE CBC W/AUTO DIFF WBC: CPT

## 2022-04-20 RX ADMIN — DIPHTHERIA AND TETANUS TOXOIDS AND ACELLULAR PERTUSSIS VACCINE ADSORBED 0.5 ML: 10; 25; 25; 25; 8 SUSPENSION INTRAMUSCULAR at 12:09

## 2022-04-20 RX ADMIN — HEPATITIS B VACCINE (RECOMBINANT) 20 MCG: 20 INJECTION, SUSPENSION INTRAMUSCULAR at 12:09

## 2022-04-20 RX ADMIN — PNEUMOCOCCAL 13-VALENT CONJUGATE VACCINE 0.5 ML: 2.2; 2.2; 2.2; 2.2; 2.2; 4.4; 2.2; 2.2; 2.2; 2.2; 2.2; 2.2; 2.2 INJECTION, SUSPENSION INTRAMUSCULAR at 12:08

## 2022-04-20 RX ADMIN — POLIOVIRUS TYPE 1 ANTIGEN (FORMALDEHYDE INACTIVATED), POLIOVIRUS TYPE 2 ANTIGEN (FORMALDEHYDE INACTIVATED), AND POLIOVIRUS TYPE 3 ANTIGEN (FORMALDEHYDE INACTIVATED) 0.5 ML: 40; 8; 32 INJECTION, SUSPENSION INTRAMUSCULAR at 12:06

## 2022-04-20 RX ADMIN — Medication 0.5 ML: at 12:17

## 2022-04-20 RX ADMIN — Medication 0.5 ML: at 12:14

## 2022-04-21 LAB
KAPPA LC FREE SER-MCNC: 28 MG/L (ref 3.3–19.4)
KAPPA LC FREE/LAMBDA FREE SER: 1.49 {RATIO} (ref 0.26–1.65)
LAMBDA LC FREE SERPL-MCNC: 18.8 MG/L (ref 5.7–26.3)

## 2022-04-22 LAB
ALBUMIN SERPL ELPH-MCNC: 3.2 G/DL (ref 2.9–4.4)
ALBUMIN/GLOB SERPL: 1 {RATIO} (ref 0.7–1.7)
ALPHA1 GLOB SERPL ELPH-MCNC: 0.3 G/DL (ref 0–0.4)
ALPHA2 GLOB SERPL ELPH-MCNC: 0.8 G/DL (ref 0.4–1)
B-GLOBULIN SERPL ELPH-MCNC: 1.1 G/DL (ref 0.7–1.3)
GAMMA GLOB SERPL ELPH-MCNC: 1.4 G/DL (ref 0.4–1.8)
GLOBULIN SER-MCNC: 3.5 G/DL (ref 2.2–3.9)
IGA SERPL-MCNC: 102 MG/DL (ref 87–352)
IGG SERPL-MCNC: 1370 MG/DL (ref 586–1602)
IGM SERPL-MCNC: 31 MG/DL (ref 26–217)
INTERPRETATION SERPL IEP-IMP: NORMAL
M PROTEIN SERPL ELPH-MCNC: NORMAL G/DL
PROT SERPL-MCNC: 6.7 G/DL (ref 6–8.5)

## 2022-06-03 ENCOUNTER — TELEPHONE (OUTPATIENT)
Dept: CARDIOLOGY CLINIC | Age: 42
End: 2022-06-03

## 2022-06-03 NOTE — TELEPHONE ENCOUNTER
Needs Carvedilol 3.125 mg and Potassium  Did not leave pharmacy on voice mail.  Please call in today

## 2022-06-17 RX ORDER — LOSARTAN POTASSIUM 25 MG/1
25 TABLET ORAL DAILY
Qty: 30 TABLET | Refills: 2 | Status: SHIPPED | OUTPATIENT
Start: 2022-06-17

## 2022-06-17 RX ORDER — CARVEDILOL 3.12 MG/1
3.12 TABLET ORAL 2 TIMES DAILY WITH MEALS
Qty: 60 TABLET | Refills: 1 | Status: SHIPPED | OUTPATIENT
Start: 2022-06-17

## 2022-06-17 NOTE — PROGRESS NOTES
Pt called in regarding refill call from 6/3 I sent refill to her pharmacy.   Requested Prescriptions     Signed Prescriptions Disp Refills    carvedilol (COREG) 3.125 MG tablet 60 tablet 1     Sig: Take 1 tablet by mouth 2 times daily (with meals)    losartan (COZAAR) 25 MG tablet 30 tablet 2     Sig: Take 1 tablet by mouth daily

## 2022-09-29 ENCOUNTER — TELEPHONE (OUTPATIENT)
Dept: ONCOLOGY | Age: 42
End: 2022-09-29

## 2022-09-29 NOTE — TELEPHONE ENCOUNTER
Call from Neris in IR to clarify whether patient still is in need of the BMBX that is scheduled. He noted patient is seeing another doctor at self so he was just making sure.  Will send message to Dr. Jen Lovelace nurse Marguerite Izquierdo for clarification  Patient needs the BmBX

## 2022-10-05 DIAGNOSIS — R68.89 OTHER GENERAL SYMPTOMS AND SIGNS: ICD-10-CM

## 2022-10-05 DIAGNOSIS — Z13.6 ENCOUNTER FOR SCREENING FOR CARDIOVASCULAR DISORDERS: ICD-10-CM

## 2022-10-05 DIAGNOSIS — C90.00 MULTIPLE MYELOMA NOT HAVING ACHIEVED REMISSION (HCC): Primary | ICD-10-CM

## 2022-10-06 ENCOUNTER — HOSPITAL ENCOUNTER (OUTPATIENT)
Dept: LAB | Age: 42
Discharge: HOME | End: 2022-10-09
Payer: MEDICARE

## 2022-10-06 ENCOUNTER — HOSPITAL ENCOUNTER (OUTPATIENT)
Dept: PULMONOLOGY | Age: 42
Discharge: HOME | End: 2022-10-09
Payer: MEDICARE

## 2022-10-06 ENCOUNTER — HOSPITAL ENCOUNTER (OUTPATIENT)
Dept: CT IMAGING | Age: 42
Discharge: HOME | End: 2022-10-09
Payer: MEDICARE

## 2022-10-06 ENCOUNTER — HOSPITAL ENCOUNTER (OUTPATIENT)
Dept: PET IMAGING | Age: 42
Discharge: HOME | End: 2022-10-09
Payer: MEDICARE

## 2022-10-06 VITALS
SYSTOLIC BLOOD PRESSURE: 155 MMHG | OXYGEN SATURATION: 96 % | WEIGHT: 247 LBS | BODY MASS INDEX: 45.18 KG/M2 | RESPIRATION RATE: 16 BRPM | HEART RATE: 74 BPM | DIASTOLIC BLOOD PRESSURE: 101 MMHG | TEMPERATURE: 97.8 F

## 2022-10-06 DIAGNOSIS — Z13.6 ENCOUNTER FOR SCREENING FOR CARDIOVASCULAR DISORDERS: ICD-10-CM

## 2022-10-06 DIAGNOSIS — R68.89 OTHER GENERAL SYMPTOMS AND SIGNS: ICD-10-CM

## 2022-10-06 DIAGNOSIS — C91.10 LEUKEMIA, LYMPHOCYTIC, CHRONIC (HCC): ICD-10-CM

## 2022-10-06 DIAGNOSIS — C90.00 MULTIPLE MYELOMA NOT HAVING ACHIEVED REMISSION (HCC): ICD-10-CM

## 2022-10-06 DIAGNOSIS — Z94.81 S/P AUTOLOGOUS BONE MARROW TRANSPLANTATION (HCC): ICD-10-CM

## 2022-10-06 LAB
ALBUMIN SERPL-MCNC: 3.6 G/DL (ref 3.5–5)
ALBUMIN/GLOB SERPL: 0.9 {RATIO} (ref 1.2–3.5)
ALP SERPL-CCNC: 183 U/L (ref 50–136)
ALT SERPL-CCNC: 130 U/L (ref 12–65)
ANION GAP SERPL CALC-SCNC: 5 MMOL/L (ref 4–13)
AST SERPL-CCNC: 81 U/L (ref 15–37)
BASOPHILS # BLD: 0.1 K/UL (ref 0–0.2)
BASOPHILS # BLD: 0.1 K/UL (ref 0–0.2)
BASOPHILS NFR BLD: 1 % (ref 0–2)
BASOPHILS NFR BLD: 1 % (ref 0–2)
BILIRUB SERPL-MCNC: 0.9 MG/DL (ref 0.2–1.1)
BONE MARROW PREP & WRIGHT STAIN: NORMAL
BUN SERPL-MCNC: 9 MG/DL (ref 6–23)
CALCIUM SERPL-MCNC: 9 MG/DL (ref 8.3–10.4)
CHLORIDE SERPL-SCNC: 104 MMOL/L (ref 101–110)
CHOLEST SERPL-MCNC: 180 MG/DL
CO2 SERPL-SCNC: 27 MMOL/L (ref 21–32)
CREAT SERPL-MCNC: 0.7 MG/DL (ref 0.6–1)
DIFFERENTIAL METHOD BLD: NORMAL
DIFFERENTIAL METHOD BLD: NORMAL
EOSINOPHIL # BLD: 0.1 K/UL (ref 0–0.8)
EOSINOPHIL # BLD: 0.1 K/UL (ref 0–0.8)
EOSINOPHIL NFR BLD: 1 % (ref 0.5–7.8)
EOSINOPHIL NFR BLD: 1 % (ref 0.5–7.8)
ERYTHROCYTE [DISTWIDTH] IN BLOOD BY AUTOMATED COUNT: 14.1 % (ref 11.9–14.6)
ERYTHROCYTE [DISTWIDTH] IN BLOOD BY AUTOMATED COUNT: 14.2 % (ref 11.9–14.6)
FSH SERPL-ACNC: 33.1 MIU/ML
GLOBULIN SER CALC-MCNC: 4.2 G/DL (ref 2.3–3.5)
GLUCOSE SERPL-MCNC: 94 MG/DL (ref 65–100)
HCT VFR BLD AUTO: 38.1 % (ref 35.8–46.3)
HCT VFR BLD AUTO: 39 % (ref 35.8–46.3)
HDLC SERPL-MCNC: 67 MG/DL (ref 40–60)
HDLC SERPL: 2.7 {RATIO}
HGB BLD-MCNC: 12.2 G/DL (ref 11.7–15.4)
HGB BLD-MCNC: 12.5 G/DL (ref 11.7–15.4)
IMM GRANULOCYTES # BLD AUTO: 0 K/UL (ref 0–0.5)
IMM GRANULOCYTES # BLD AUTO: 0.1 K/UL (ref 0–0.5)
IMM GRANULOCYTES NFR BLD AUTO: 1 % (ref 0–5)
IMM GRANULOCYTES NFR BLD AUTO: 1 % (ref 0–5)
LDLC SERPL CALC-MCNC: 84.6 MG/DL
LH SERPL-ACNC: 30.8 MIU/ML
LYMPHOCYTES # BLD: 2.4 K/UL (ref 0.5–4.6)
LYMPHOCYTES # BLD: 2.5 K/UL (ref 0.5–4.6)
LYMPHOCYTES NFR BLD: 40 % (ref 13–44)
LYMPHOCYTES NFR BLD: 43 % (ref 13–44)
MAGNESIUM SERPL-MCNC: 2.2 MG/DL (ref 1.8–2.4)
MCH RBC QN AUTO: 29.3 PG (ref 26.1–32.9)
MCH RBC QN AUTO: 29.3 PG (ref 26.1–32.9)
MCHC RBC AUTO-ENTMCNC: 32 G/DL (ref 31.4–35)
MCHC RBC AUTO-ENTMCNC: 32.1 G/DL (ref 31.4–35)
MCV RBC AUTO: 91.5 FL (ref 79.6–97.8)
MCV RBC AUTO: 91.6 FL (ref 79.6–97.8)
MONOCYTES # BLD: 0.4 K/UL (ref 0.1–1.3)
MONOCYTES # BLD: 0.5 K/UL (ref 0.1–1.3)
MONOCYTES NFR BLD: 8 % (ref 4–12)
MONOCYTES NFR BLD: 8 % (ref 4–12)
NEUTS SEG # BLD: 2.6 K/UL (ref 1.7–8.2)
NEUTS SEG # BLD: 3.1 K/UL (ref 1.7–8.2)
NEUTS SEG NFR BLD: 46 % (ref 43–78)
NEUTS SEG NFR BLD: 49 % (ref 43–78)
NRBC # BLD: 0 K/UL (ref 0–0.2)
NRBC # BLD: 0 K/UL (ref 0–0.2)
PLATELET # BLD AUTO: 250 K/UL (ref 150–450)
PLATELET # BLD AUTO: 251 K/UL (ref 150–450)
PMV BLD AUTO: 9.7 FL (ref 9.4–12.3)
PMV BLD AUTO: 9.7 FL (ref 9.4–12.3)
POTASSIUM SERPL-SCNC: 3.7 MMOL/L (ref 3.5–5.1)
PROT SERPL-MCNC: 7.8 G/DL (ref 6.3–8.2)
RBC # BLD AUTO: 4.16 M/UL (ref 4.05–5.2)
RBC # BLD AUTO: 4.26 M/UL (ref 4.05–5.2)
SODIUM SERPL-SCNC: 136 MMOL/L (ref 136–145)
T4 FREE SERPL-MCNC: 1 NG/DL (ref 0.78–1.4)
TRIGL SERPL-MCNC: 142 MG/DL (ref 35–150)
TSH, 3RD GENERATION: 1.35 UIU/ML (ref 0.36–3)
VLDLC SERPL CALC-MCNC: 28.4 MG/DL (ref 6–23)
WBC # BLD AUTO: 5.5 K/UL (ref 4.3–11.1)
WBC # BLD AUTO: 6.3 K/UL (ref 4.3–11.1)

## 2022-10-06 PROCEDURE — 6360000004 HC RX CONTRAST MEDICATION: Performed by: INTERNAL MEDICINE

## 2022-10-06 PROCEDURE — 2500000003 HC RX 250 WO HCPCS: Performed by: RADIOLOGY

## 2022-10-06 PROCEDURE — 94729 DIFFUSING CAPACITY: CPT

## 2022-10-06 PROCEDURE — 88313 SPECIAL STAINS GROUP 2: CPT

## 2022-10-06 PROCEDURE — 86334 IMMUNOFIX E-PHORESIS SERUM: CPT

## 2022-10-06 PROCEDURE — 82962 GLUCOSE BLOOD TEST: CPT

## 2022-10-06 PROCEDURE — 80061 LIPID PANEL: CPT

## 2022-10-06 PROCEDURE — 84439 ASSAY OF FREE THYROXINE: CPT

## 2022-10-06 PROCEDURE — 88185 FLOWCYTOMETRY/TC ADD-ON: CPT

## 2022-10-06 PROCEDURE — 85025 COMPLETE CBC W/AUTO DIFF WBC: CPT

## 2022-10-06 PROCEDURE — 94726 PLETHYSMOGRAPHY LUNG VOLUMES: CPT

## 2022-10-06 PROCEDURE — 83521 IG LIGHT CHAINS FREE EACH: CPT

## 2022-10-06 PROCEDURE — 83001 ASSAY OF GONADOTROPIN (FSH): CPT

## 2022-10-06 PROCEDURE — 84443 ASSAY THYROID STIM HORMONE: CPT

## 2022-10-06 PROCEDURE — 83002 ASSAY OF GONADOTROPIN (LH): CPT

## 2022-10-06 PROCEDURE — 3430000000 HC RX DIAGNOSTIC RADIOPHARMACEUTICAL: Performed by: INTERNAL MEDICINE

## 2022-10-06 PROCEDURE — 86360 T CELL ABSOLUTE COUNT/RATIO: CPT

## 2022-10-06 PROCEDURE — 88184 FLOWCYTOMETRY/ TC 1 MARKER: CPT

## 2022-10-06 PROCEDURE — 94010 BREATHING CAPACITY TEST: CPT

## 2022-10-06 PROCEDURE — 2580000003 HC RX 258: Performed by: INTERNAL MEDICINE

## 2022-10-06 PROCEDURE — 83735 ASSAY OF MAGNESIUM: CPT

## 2022-10-06 PROCEDURE — 6360000002 HC RX W HCPCS: Performed by: RADIOLOGY

## 2022-10-06 PROCEDURE — 36415 COLL VENOUS BLD VENIPUNCTURE: CPT

## 2022-10-06 PROCEDURE — 88311 DECALCIFY TISSUE: CPT

## 2022-10-06 PROCEDURE — 84481 FREE ASSAY (FT-3): CPT

## 2022-10-06 PROCEDURE — 88305 TISSUE EXAM BY PATHOLOGIST: CPT

## 2022-10-06 PROCEDURE — 78815 PET IMAGE W/CT SKULL-THIGH: CPT

## 2022-10-06 PROCEDURE — 82232 ASSAY OF BETA-2 PROTEIN: CPT

## 2022-10-06 PROCEDURE — 77012 CT SCAN FOR NEEDLE BIOPSY: CPT

## 2022-10-06 PROCEDURE — A9552 F18 FDG: HCPCS | Performed by: INTERNAL MEDICINE

## 2022-10-06 RX ORDER — LIDOCAINE HYDROCHLORIDE 20 MG/ML
INJECTION, SOLUTION INFILTRATION; PERINEURAL
Status: COMPLETED | OUTPATIENT
Start: 2022-10-06 | End: 2022-10-06

## 2022-10-06 RX ORDER — MIDAZOLAM HYDROCHLORIDE 2 MG/2ML
INJECTION, SOLUTION INTRAMUSCULAR; INTRAVENOUS
Status: COMPLETED | OUTPATIENT
Start: 2022-10-06 | End: 2022-10-06

## 2022-10-06 RX ORDER — FENTANYL CITRATE 50 UG/ML
INJECTION, SOLUTION INTRAMUSCULAR; INTRAVENOUS
Status: COMPLETED | OUTPATIENT
Start: 2022-10-06 | End: 2022-10-06

## 2022-10-06 RX ORDER — SODIUM CHLORIDE 0.9 % (FLUSH) 0.9 %
30 SYRINGE (ML) INJECTION AS NEEDED
Status: DISCONTINUED | OUTPATIENT
Start: 2022-10-06 | End: 2022-10-10 | Stop reason: HOSPADM

## 2022-10-06 RX ORDER — FLUDEOXYGLUCOSE F 18 200 MCI/ML
10.85 INJECTION, SOLUTION INTRAVENOUS
Status: COMPLETED | OUTPATIENT
Start: 2022-10-06 | End: 2022-10-06

## 2022-10-06 RX ORDER — DIPHENHYDRAMINE HYDROCHLORIDE 50 MG/ML
INJECTION INTRAMUSCULAR; INTRAVENOUS
Status: COMPLETED | OUTPATIENT
Start: 2022-10-06 | End: 2022-10-06

## 2022-10-06 RX ADMIN — MIDAZOLAM HYDROCHLORIDE 1 MG: 1 INJECTION, SOLUTION INTRAMUSCULAR; INTRAVENOUS at 15:25

## 2022-10-06 RX ADMIN — FENTANYL CITRATE 50 MCG: 50 INJECTION, SOLUTION INTRAMUSCULAR; INTRAVENOUS at 15:19

## 2022-10-06 RX ADMIN — DIPHENHYDRAMINE HYDROCHLORIDE 50 MG: 50 INJECTION, SOLUTION INTRAMUSCULAR; INTRAVENOUS at 15:19

## 2022-10-06 RX ADMIN — DIATRIZOATE MEGLUMINE AND DIATRIZOATE SODIUM 10 ML: 660; 100 LIQUID ORAL; RECTAL at 09:25

## 2022-10-06 RX ADMIN — FENTANYL CITRATE 50 MCG: 50 INJECTION, SOLUTION INTRAMUSCULAR; INTRAVENOUS at 15:25

## 2022-10-06 RX ADMIN — SODIUM CHLORIDE, PRESERVATIVE FREE 10 ML: 5 INJECTION INTRAVENOUS at 09:25

## 2022-10-06 RX ADMIN — LIDOCAINE HYDROCHLORIDE 10 ML: 20 INJECTION, SOLUTION INFILTRATION; PERINEURAL at 15:26

## 2022-10-06 RX ADMIN — MIDAZOLAM HYDROCHLORIDE 1 MG: 1 INJECTION, SOLUTION INTRAMUSCULAR; INTRAVENOUS at 15:29

## 2022-10-06 RX ADMIN — FLUDEOXYGLUCOSE F 18 10.85 MILLICURIE: 200 INJECTION, SOLUTION INTRAVENOUS at 09:25

## 2022-10-06 RX ADMIN — FENTANYL CITRATE 50 MCG: 50 INJECTION, SOLUTION INTRAMUSCULAR; INTRAVENOUS at 15:29

## 2022-10-06 RX ADMIN — MIDAZOLAM HYDROCHLORIDE 1 MG: 1 INJECTION, SOLUTION INTRAMUSCULAR; INTRAVENOUS at 15:19

## 2022-10-06 ASSESSMENT — PAIN SCALES - GENERAL: PAINLEVEL_OUTOF10: 0

## 2022-10-06 ASSESSMENT — PAIN - FUNCTIONAL ASSESSMENT: PAIN_FUNCTIONAL_ASSESSMENT: 0-10

## 2022-10-06 NOTE — H&P
Department of Interventional Radiology  [phone]    History and Physical    Patient:  Jayde Dupree MRN:  [id number]  SSN:  [id number]    YOB: 1980  Age:  36 y.o. Sex:  female      Primary Care Provider:  JOSE RAFAEL Campo - SRIKANTH  Referring Physician:  Leighton Choudhury MD    Subjective:     Chief Complaint: Multiple myeloma    History of the Present Illness: The patient is a 36 y.o. female who presents for CT-guided bone marrow biopsy under moderate sedation. Patient is followed by the oncologist Dr. Castillo Gambino. She has a history of IgG lambda multiple myeloma  Prior CTA chest imaging from 2020 showed T7 compression fracture possibly pathologic, along with subtle abnormal lytic lesions of the spine, ribs, sternum and scapula. Patient is NPO. She denies taking any blood thinners    Past Medical History:   Diagnosis Date    Cancer [facility])     multiple myeloma    Chronic pain      Past Surgical History:   Procedure Laterality Date    BACK SURGERY  04/2020    IR REMOVAL TUNNELED CVC WO PORT PUMP  10/26/2020    IR TUNNELED CATHETER PLACEMENT GREATER THAN 5 YEARS  8/17/2020    IR TUNNELED CATHETER PLACEMENT GREATER THAN 5 YEARS  8/17/2020    IR TUNNELED CATHETER PLACEMENT GREATER THAN 5 YEARS 8/17/2020 [facility]    TUBAL LIGATION  08/2007        Review of Systems:    Pertinent items are noted in HPI. Prior to Admission medications    Medication Sig Start Date End Date Taking?  Authorizing Provider   carvedilol (COREG) 3.125 MG tablet Take 1 tablet by mouth 2 times daily (with meals) 6/17/22   Pavel Carrillo MD   losartan (COZAAR) 25 MG tablet Take 1 tablet by mouth daily 6/17/22   Pavel Carrillo MD   acetaminophen (TYLENOL) 500 MG tablet Take by mouth every 6 hours as needed    Ar Automatic Reconciliation   aspirin 81 MG EC tablet Take 81 mg by mouth daily 1/29/21   Ar Automatic Reconciliation   DULoxetine (CYMBALTA) 30 MG extended release capsule Take 30 mg by mouth daily 20   Ar Automatic Reconciliation   HYDROcodone-acetaminophen (NORCO)  MG per tablet Take 1 tablet by mouth every 6 hours as needed. Ar Automatic Reconciliation   lenalidomide (REVLIMID) 10 MG chemo capsule Take 10 mg by mouth daily    Ar Automatic Reconciliation        Allergies   Allergen Reactions    Filgrastim Shortness Of Breath     \"pounding heart\" per pt. Tolerates Granix       Family History   Problem Relation Age of Onset    Osteoarthritis Mother     Diabetes Father     No Known Problems Brother     Hypertension Mother     Diabetes Sister     No Known Problems Sister      Social History     Tobacco Use    Smoking status: Former     Packs/day: 0.50     Types: Cigarettes     Quit date: 2020     Years since quittin.5    Smokeless tobacco: Never   Substance Use Topics    Alcohol use: Not Currently        Not in a hospital admission.     Objective:       Physical Examination:    Vitals:    10/06/22 1353   BP: 123/87   Pulse: 68   Resp: 16   Temp: 97.8 °F (36.6 °C)   TempSrc: Tympanic   SpO2: 99%   Weight: 247 lb (112 kg)       Pain Assessment  Pain Level: 3          Pain Level: 3                                       HEART: regular rate and rhythm, S1, S2 normal, no murmur, click, rub or gallop  LUNG: clear to auscultation bilaterally  ABDOMEN: soft, non-tender; bowel sounds normal; no masses,  no organomegaly  EXTREMITIES: no pedal edema noted    Laboratory:     Lab Results   Component Value Date/Time     10/06/2022 11:18 AM     2022 10:19 AM    K 3.7 10/06/2022 11:18 AM    K 3.4 2022 10:19 AM     10/06/2022 11:18 AM     2022 10:19 AM    CO2 27 10/06/2022 11:18 AM    CO2 27 2022 10:19 AM    BUN 9 10/06/2022 11:18 AM    BUN 9 2022 10:19 AM    GFRAA >60 2022 10:19 AM    GFRAA >60 10/06/2021 02:22 PM    MG 2.2 10/06/2022 11:18 AM    MG 1.9 10/06/2021 02:22 PM    PHOS 4.4 2020 08:45 AM    PHOS 3.7 10/26/2020 10:16 AM GLOB 4.2 10/06/2022 11:18 AM    GLOB 4.2 04/20/2022 10:19 AM     10/06/2022 11:18 AM    ALT 26 04/20/2022 10:19 AM     Lab Results   Component Value Date/Time    WBC 5.5 10/06/2022 02:04 PM    WBC 6.3 10/06/2022 11:18 AM    HGB 12.5 10/06/2022 02:04 PM    HGB 12.2 10/06/2022 11:18 AM    HCT 39.0 10/06/2022 02:04 PM    HCT 38.1 10/06/2022 11:18 AM     10/06/2022 02:04 PM     10/06/2022 11:18 AM     Lab Results   Component Value Date/Time    APTT 23.6 08/16/2020 07:45 AM    INR 1.1 08/16/2020 07:45 AM       Assessment:     48-year-old female with history of multiple myeloma        Plan:     Planned Procedure: CT-guided bone marrow biopsy under moderate sedation    Risks, benefits, and alternatives reviewed with patient and she agrees to proceed with the procedure.       Signed By: ARGENTINA Lopez     October 6, 2022

## 2022-10-06 NOTE — OP NOTE
Department of Interventional Radiology  [phone]        Interventional Radiology Brief Procedure Note    Patient: Carrie Foster MRN: [id number]  SSN: [id number]    YOB: 1980  Age: 36 y.o.   Sex: female      Date of Procedure: 10/6/2022    Pre-Procedure Diagnosis: MM    Post-Procedure Diagnosis: SAME    Procedure(s): Image Guided Biopsy    Brief Description of Procedure: bone marrow    Performed By: Asim Youssef MD     Assistants: None    Anesthesia:Moderate Sedation    Estimated Blood Loss: Less than 10ml    Specimens:  Pathology    Implants:  None    Findings: very lytic bones    Complications: None    Recommendations: routine post care     Follow Up: prn    Signed By: Asim Youssef MD     October 6, 2022

## 2022-10-06 NOTE — PROGRESS NOTES
TRANSFER - OUT REPORT:           Verbal report given to Alvaro Sanchez RN(name) on Marylee Milder  being transferred to IR Recovery(unit) for routine post-op              Report consisted of patients Situation, Background, Assessment and      Recommendations(SBAR). Information from the following report(s) SBAR, Procedure Summary, and MAR was reviewed with the receiving nurse. Opportunity for questions and clarification was provided. Conscious Sedation:    150 Mcg of Fentanyl administered   3 Mg of Versed administered   50 Mg of Benadryl administered        Pt tolerated procedure well.          Peripheral Intravenous Line:   Peripheral IV 10/06/22 Left Antecubital (Active)       VITALS:  BP (!) 155/105   Pulse 78   Temp 97.8 °F (36.6 °C) (Tympanic)   Resp 16   Wt 247 lb (112 kg)   SpO2 90%   BMI 45.18 kg/m²

## 2022-10-06 NOTE — DISCHARGE INSTRUCTIONS
If you have any questions about your procedure, please call the Interventional Radiology department at [phone]. After business hours (5pm) and weekends, call the answering service at [phone] and ask for the Radiologist on call to be paged. Si tiene Preguntas acerca del procedimiento, por favor llame al departamento de Radiología Intervencional al [phone]. Después de horas de oficina (5 pm) y los fines de [place], llamar al Aracelis Bowles Elena al [phone] y pregunte por el Radiologo de [facility]. Interventional Radiology General Nurse Discharge    After general anesthesia or intravenous sedation, for 24 hours or while taking prescription Narcotics:  Limit your activities  Do not drive and operate hazardous machinery  Do not make important personal or business decisions  Do  not drink alcoholic beverages  If you have not urinated within 8 hours after discharge, please contact your surgeon on call. * Please give a list of your current medications to your Primary Care Provider. * Please update this list whenever your medications are discontinued, doses are     changed, or new medications (including over-the-counter products) are added. * Please carry medication information at all times in case of emergency situations. These are general instructions for a healthy lifestyle:    No smoking/ No tobacco products/ Avoid exposure to second hand smoke  Surgeon General's Warning:  Quitting smoking now greatly reduces serious risk to your health.     Obesity, smoking, and sedentary lifestyle greatly increases your risk for illness  A healthy diet, regular physical exercise & weight monitoring are important for maintaining a healthy lifestyle    You may be retaining fluid if you have a history of heart failure or if you experience any of the following symptoms:  Weight gain of 3 pounds or more overnight or 5 pounds in a week, increased swelling in our hands or feet or shortness of breath while lying flat in bed. Please call your doctor as soon as you notice any of these symptoms; do not wait until your next office visit. Recognize signs and symptoms of STROKE:  F-face looks uneven    A-arms unable to move or move unevenly    S-speech slurred or non-existent    T-time-call 911 as soon as signs and symptoms begin-DO NOT go       Back to bed or wait to see if you get better-TIME IS BRAIN.

## 2022-10-07 LAB
B2 MICROGLOB SERPL-MCNC: 1.4 MG/L (ref 0.6–2.4)
BASOPHILS # BLD AUTO: 0.1 X10E3/UL (ref 0–0.2)
BASOPHILS NFR BLD AUTO: 1 %
CD3+CD4+ CELLS # BLD: 1123 /UL (ref 359–1519)
CD3+CD4+ CELLS NFR BLD: 43.2 % (ref 30.8–58.5)
CD3+CD4+ CELLS/CD3+CD8+ CLL BLD: 1.71 % (ref 0.92–3.72)
CD3+CD8+ CELLS # BLD: 658 /UL (ref 109–897)
CD3+CD8+ CELLS NFR BLD: 25.3 % (ref 12–35.5)
EOSINOPHIL # BLD AUTO: 0.1 X10E3/UL (ref 0–0.4)
EOSINOPHIL NFR BLD AUTO: 1 %
ERYTHROCYTE [DISTWIDTH] IN BLOOD BY AUTOMATED COUNT: 14.5 % (ref 11.7–15.4)
GLUCOSE BLD STRIP.AUTO-MCNC: 110 MG/DL (ref 65–100)
HCT VFR BLD AUTO: 38.2 % (ref 34–46.6)
HGB BLD-MCNC: 12.3 G/DL (ref 11.1–15.9)
IMM GRANULOCYTES # BLD: 0 X10E3/UL (ref 0–0.1)
IMM GRANULOCYTES NFR BLD: 1 %
KAPPA LC FREE SER-MCNC: 33.7 MG/L (ref 3.3–19.4)
KAPPA LC FREE/LAMBDA FREE SER: 1.17 {RATIO} (ref 0.26–1.65)
LAMBDA LC FREE SERPL-MCNC: 28.7 MG/L (ref 5.7–26.3)
LYMPHOCYTES # BLD AUTO: 2.6 X10E3/UL (ref 0.7–3.1)
LYMPHOCYTES NFR BLD AUTO: 40 %
MCH RBC QN AUTO: 29.4 PG (ref 26.6–33)
MCHC RBC AUTO-ENTMCNC: 32.2 G/DL (ref 31.5–35.7)
MCV RBC AUTO: 91 FL (ref 79–97)
MONOCYTES # BLD AUTO: 0.5 X10E3/UL (ref 0.1–0.9)
MONOCYTES NFR BLD AUTO: 7 %
NEUTROPHILS # BLD AUTO: 3.3 X10E3/UL (ref 1.4–7)
NEUTROPHILS NFR BLD AUTO: 50 %
PLATELET # BLD AUTO: 268 X10E3/UL (ref 150–450)
RBC # BLD AUTO: 4.18 X10E6/UL (ref 3.77–5.28)
SERVICE CMNT-IMP: ABNORMAL
T3FREE SERPL-MCNC: 3.3 PG/ML (ref 2–4.4)
WBC # BLD AUTO: 6.4 X10E3/UL (ref 3.4–10.8)

## 2022-10-10 LAB
ALBUMIN SERPL ELPH-MCNC: 3.6 G/DL (ref 2.9–4.4)
ALBUMIN/GLOB SERPL: 1.1 {RATIO} (ref 0.7–1.7)
ALPHA1 GLOB SERPL ELPH-MCNC: 0.2 G/DL (ref 0–0.4)
ALPHA2 GLOB SERPL ELPH-MCNC: 0.7 G/DL (ref 0.4–1)
B-GLOBULIN SERPL ELPH-MCNC: 1.1 G/DL (ref 0.7–1.3)
GAMMA GLOB SERPL ELPH-MCNC: 1.4 G/DL (ref 0.4–1.8)
GLOBULIN SER-MCNC: 3.5 G/DL (ref 2.2–3.9)
IGA SERPL-MCNC: 119 MG/DL (ref 87–352)
IGG SERPL-MCNC: 1526 MG/DL (ref 586–1602)
IGM SERPL-MCNC: 31 MG/DL (ref 26–217)
INTERPRETATION SERPL IEP-IMP: ABNORMAL
M PROTEIN SERPL ELPH-MCNC: 0.5 G/DL
PROT SERPL-MCNC: 7.1 G/DL (ref 6–8.5)

## 2022-10-13 DIAGNOSIS — C90.00 MULTIPLE MYELOMA NOT HAVING ACHIEVED REMISSION (HCC): Primary | ICD-10-CM

## 2022-10-14 LAB
FLOW CYTOMETRY RESULTS: NORMAL
SPECIMEN SOURCE: NORMAL
TEST ORDERED: NORMAL

## 2022-10-20 ENCOUNTER — HOSPITAL ENCOUNTER (OUTPATIENT)
Dept: GENERAL RADIOLOGY | Age: 42
Discharge: HOME | End: 2022-10-23
Payer: MEDICARE

## 2022-10-20 ENCOUNTER — TELEMEDICINE (OUTPATIENT)
Dept: ONCOLOGY | Age: 42
End: 2022-10-20
Payer: MEDICARE

## 2022-10-20 DIAGNOSIS — C90.00 MULTIPLE MYELOMA NOT HAVING ACHIEVED REMISSION (HCC): Primary | ICD-10-CM

## 2022-10-20 DIAGNOSIS — Z51.81 THERAPEUTIC DRUG MONITORING: ICD-10-CM

## 2022-10-20 DIAGNOSIS — D84.9 IMMUNOCOMPROMISED (HCC): ICD-10-CM

## 2022-10-20 DIAGNOSIS — C90.00 MULTIPLE MYELOMA NOT HAVING ACHIEVED REMISSION (HCC): ICD-10-CM

## 2022-10-20 DIAGNOSIS — G62.9 POLYNEUROPATHY, UNSPECIFIED: ICD-10-CM

## 2022-10-20 DIAGNOSIS — R53.83 OTHER FATIGUE: ICD-10-CM

## 2022-10-20 PROCEDURE — 77075 RADEX OSSEOUS SURVEY COMPL: CPT

## 2022-10-20 PROCEDURE — G8427 DOCREV CUR MEDS BY ELIG CLIN: HCPCS | Performed by: INTERNAL MEDICINE

## 2022-10-20 PROCEDURE — 99214 OFFICE O/P EST MOD 30 MIN: CPT | Performed by: INTERNAL MEDICINE

## 2022-10-20 NOTE — PROGRESS NOTES
10/20/2022    TELEHEALTH EVALUATION -- Audio/Visual (During ZFKGR-41 public health emergency)    HPI:    Thien Valdivia (:  1980) has requested an audio/video evaluation for the following concern(s):    10/20/22: Patient seen over telemedicine. Reports doing reasonably. Reasonable p.o. intake and mobility. Remains on Revlimid maintenance which she is tolerating well. Neuropathy over time improved and now she is off gabapentin and Effexor. Recent blood work reviewed, lambda LC at 28, SPEP with low-level M spike which appears new at 0.5 g per DL. PFTs with mildly reduced DLCO, possibly early emphysema, CHF, anemia or pulmonary vascular disease. 2D echo not done. Bone marrow biopsy reported as suboptimal with 20 to 30% cellularity with 10% involvement with plasma cells, with IHC failing to demonstrate monoclonality, felt likely due to sampling error. FISH negative. Flow with monoclonal population detected. Will clarify with hematopathology referral for repeat bone marrow. Suspicion is high for relapsing disease in which case Shahrzad-carfilzomib-Dex or Car-Pom-Dex will be reasonable options. We may choose to do a second SCT in such a case. Review of Systems  As mentioned above. All other systems reviewed in full and are unremarkable. Prior to Visit Medications    Medication Sig Taking?  Authorizing Provider   carvedilol (COREG) 3.125 MG tablet Take 1 tablet by mouth 2 times daily (with meals)  Kathi Garcia MD   losartan (COZAAR) 25 MG tablet Take 1 tablet by mouth daily  Kathi Garcia MD   acetaminophen (TYLENOL) 500 MG tablet Take by mouth every 6 hours as needed  Ar Automatic Reconciliation   aspirin 81 MG EC tablet Take 81 mg by mouth daily  Ar Automatic Reconciliation   DULoxetine (CYMBALTA) 30 MG extended release capsule Take 30 mg by mouth daily  Ar Automatic Reconciliation   HYDROcodone-acetaminophen (NORCO)  MG per tablet Take 1 tablet by mouth every 6 hours as needed. Ar Automatic Reconciliation   lenalidomide (REVLIMID) 10 MG chemo capsule Take 10 mg by mouth daily  Ar Automatic Reconciliation       Social History     Tobacco Use    Smoking status: Former     Packs/day: 0.50     Types: Cigarettes     Quit date: 2020     Years since quittin.5    Smokeless tobacco: Never   Substance Use Topics    Alcohol use: Not Currently    Drug use: Never        Allergies   Allergen Reactions    Filgrastim Shortness Of Breath     \"pounding heart\" per pt.    Tolerates Granix   ,   Past Medical History:   Diagnosis Date    Cancer ([facility] [address]     multiple myeloma    Chronic pain    ,   Past Surgical History:   Procedure Laterality Date    BACK SURGERY  2020    CT BONE MARROW BIOPSY  10/6/2022    CT BONE MARROW BIOPSY 10/6/2022 [facility] CT SCAN    IR REMOVAL TUNNELED CVC WO PORT PUMP  10/26/2020    IR TUNNELED CATHETER PLACEMENT GREATER THAN 5 YEARS  2020    IR TUNNELED CATHETER PLACEMENT GREATER THAN 5 YEARS  2020    IR TUNNELED CATHETER PLACEMENT GREATER THAN 5 YEARS 2020 [facility] SPECIALS    TUBAL LIGATION  2007   ,   Social History     Tobacco Use    Smoking status: Former     Packs/day: 0.50     Types: Cigarettes     Quit date: 2020     Years since quittin.5    Smokeless tobacco: Never   Substance Use Topics    Alcohol use: Not Currently    Drug use: Never   ,   Family History   Problem Relation Age of Onset    Osteoarthritis Mother     Diabetes Father     No Known Problems Brother     Hypertension Mother     Diabetes Sister     No Known Problems Sister    ,   Immunization History   Administered Date(s) Administered    DTaP (Infanrix) 10/06/2021, 2022    HIB PRP-T (ActHIB, Hiberix) 10/06/2021, 2022    Hepatitis B 10/06/2021, 2022    Meningococcal MCV4O (Menveo) 10/06/2021, 2022    Pneumococcal Conjugate 13-valent (Lourena Gloss) 10/06/2021, 2022    Polio IPV (IPOL) 10/06/2021, 2022   ,   Health Maintenance   Topic Date Due    COVID-19 Vaccine (1) Never done    HIV screen  Never done    Hepatitis C screen  Never done    Cervical cancer screen  Never done    Flu vaccine (1) Never done    Depression Screen  04/20/2023    Pneumococcal 0-64 years Vaccine (2 - PPSV23 or PCV20) 04/20/2023    Lipids  10/06/2027    DTaP/Tdap/Td vaccine (7 - Tdap) 04/20/2032    Hepatitis A vaccine  Aged Out    Hib vaccine  Aged Out    Meningococcal (ACWY) vaccine  Aged Out       PHYSICAL EXAMINATION:  [ INSTRUCTIONS:  \"[x]\" Indicates a positive item  \"[]\" Indicates a negative item  -- DELETE ALL ITEMS NOT EXAMINED]  Vital Signs: (As obtained by patient/caregiver or practitioner observation)    Blood pressure-  Heart rate-    Respiratory rate-    Temperature-  Pulse oximetry-     Constitutional: [x] Appears well-developed and well-nourished [x] No apparent distress      [] Abnormal-   Mental status  [x] Alert and awake  [x] Oriented to person/place/time [x]Able to follow commands      Eyes:  EOM    [x]  Normal  [] Abnormal-  Sclera  [x]  Normal  [] Abnormal -         Discharge [x]  None visible  [] Abnormal -    HENT:   [x] Normocephalic, atraumatic.   [] Abnormal   [x] Mouth/Throat: Mucous membranes are moist.     External Ears [] Normal  [] Abnormal-     Neck: [x] No visualized mass     Pulmonary/Chest: [x] Respiratory effort normal.  [x] No visualized signs of difficulty breathing or respiratory distress        [] Abnormal-      Musculoskeletal:   [x] Normal gait with no signs of ataxia         [x] Normal range of motion of neck        [] Abnormal-       Neurological:        [x] No Facial Asymmetry (Cranial nerve 7 motor function) (limited exam to video visit)          [x] No gaze palsy        [] Abnormal-         Skin:        [x] No significant exanthematous lesions or discoloration noted on facial skin         [] Abnormal-            Psychiatric:       [x] Normal Affect [x] No Hallucinations        [] Abnormal-     Other pertinent observable physical exam findings-        ASSESSMENT:  35 female, , smoker, history of hypertension, more recent diagnosis of IgG lambda multiple myeloma, under care of Dr. Franklin Bauman, now kindly referred to us for consideration of autologous stem cell transplant. Hematologic history is as follows:    - 03/20: Seen in ED with back pain. Imaging included CTA chest showing T7 compression fracture possibly pathologic, along with subtle abnormal lytic lesions of the spine, ribs, sternum and scapula. Referred to [facility] oncology. - 4/3/2020 T7 lesion biopsy confirming lambda light chain restricted plasma cell dyscrasia. Lab work revealed IgG 3000, lambda , SPEP with CAROLINE showing M spike of 2.6 g per DL. Beta-2 microglobulin 2.8, albumin 3.8, slightly elevated calcium at 10.8, mild anemia with hemoglobin in at 10 range. PET scan 4/16/2020 showed diffuse bone metastasis. Bone marrow biopsy 4/17/2020 showing 30-50% cellularity with lambda light chain restricted plasma cell neoplasm (invoving 40%). Cytogenetics complex (hyperdiploid karyotype) with +3, +5, 7, 9, 10, 15, 18, 19 and 21 as well as t(10;21). FISH revealing gain of chromosome 9, 15 (standard risk)   - 4/9/2020 kyphoplasty T7 for pain. - 4/21/2020: Started cycle 1 with Velcade/Dex with plans to add Revlimid once available through mail. Now presents for follow-up. Appears to be tolerating therapy well. Remains on prophylactic medications including acyclovir, as well as low-dose rivaroxaban at 10 mg daily (as patient on Revlimid.). Patient is felt to have ISS stage I disease with high risk cytogenetics. We discussed various options moving forward including continuation of RVD, maintenance therapy, allogeneic (briefly), as well as autologous stem cell transplant. Patient appears interested in autologous stem cell transplant, and would be a good candidate once in good remission.   Will work-up and manage as below:    7/21/2020: Reports doing well.  Recently completed cycle 4 RVD. Labs thereafter with [facility] now normal range at 17 (previously as high as 645). Discussed lab results consistent with remission, will look into mobilization and collection in the next few weeks. Will reach out to primary team Dr. Hemanth Coronado, for now continue Velcade/Dex. Navigation following will start pre-mold studies, as well as PET scan, skeletal survey and BM biopsy. Various risks, and side effects of high dose chemo w/ BEAM/ASCT discussed in detail including nausea, vomiting, diarrhea, life threatening infections, bleeding, anemia, end organ damage, sepsis, and potential death. Also discussed graft failure risk and mortality from procedure ( <5%). Delayed long term effects such as MDS, leukemia and secondary malignancies also discussed. Patient willing to proceed. 8/13/2020: Last bortezomib/Dex was on 7/24/2020. Some neuropathy soles of her feet with walking today. Denies any falls. Recent labs reviewed: LC normal, S CAROLINE CR, PET scan without evidence of significant active disease. BM biopsy with 30 to 40% cellularity without evidence of residual disease, flow cytometry negative, cytogenetics normal, off note FISH -ve. Premorbid studies reviewed: Infectious panel unremarkable, PFTs with low DLCO at 48%, will get pulmonology clearance prior to SCT. 2D echo with mild to moderate tricuspid regurg: May benefit from seeing cardiology down the line also. Notes some left eye conjunctivitis, last week her right eye had same: Will treat with erythromycin eye ointment, and refer to ophthalmology. Okay to proceed with mobilization, she will get tunneled catheter. Goal will be CD34 plus at 6 mil/kg. RTC prior to SCT.    9/8/2020: Patient seen after mobilization and collection. She collected well at CD34+ at 7.08 mil/kg. Has tunneled catheter in place. Denies any recent fevers or chills. Has seen pulmonology, and cleared for transplant.   Of note labs including screening UA positive for UTI, follow-up cultures, start Cipro 500 twice daily x5 days. Will need to defer HDT/ASCT by 7 to 10 days. Patient with continued bilateral upper eyelid inflammation/infection: Yet to see ophthalmology, per patient improved with erythromycin ointments which she self stopped about a week ago, advised to read start and see ophthalmology. Navigation following and will review recommendations. RTC in 7 to 10 days for likely [id number]/ACT. ADDENDUM: Addendum 9/10/2020:. Patient's case discussed with Marni Edmond at [facility]. Her eyelids are felt to be chronic styes, per discussion they are not felt to be infectious but rather filled w oil. Patient was not felt to need any antibiotic eyedrops or ointments but rather to continue with just warm compresses. She was cleared for proceeding with SCT.    9/15/2020: Seen in follow-up, denies any new complaints. Unfortunately repeat UA in spite of a course of Cipro remains consistent with ongoing UTI. Patient herself completely asymptomatic. No cultures available, will repeat today. We will also defer to ID as possibly colonization. Will prescribe course of nitrofurantoin, with repeat UA later in the week. Reschedule SCT in 1 to 2 weeks depending on results. We will also check SPEP/LC today. B/l upper eyelid stye's much improved. 10/26/20: D+33. Patient was hospitalized from 9/21/2020 till 10/6/2020 for in-house ASCT. Course complicated with C. difficile infection for which she completed a course of p.o. vancomycin with resolution of symptoms. Patient herself did excellently, and has not needed any blood transfusions through the transplant. Today reports minimal soft stools, once a day. Good p.o. intake. Denies any fevers or chills. Currently on acyclovir as well as Bactrim which was recently started. Counts adequate. Remains on KCl twice daily.   Her biggest complaint today is ongoing neuropathy, will increase her gabapentin to 600 mg every morning and in afternoon, and 900 mg nightly. Neck step would be to add duloxetine. Scheduled for tunneled catheter removal today through IR. Continue with weekly provider visits. RTC in 1 week with NP and in 2 weeks with MD with labs. 11/11/2020: D+49. Grieving from recent loss of family members however coping reasonably. Denies significant complaints, denies GI symptoms. Neuropathy at baseline: On Cymbalta 60 mg daily plus Neurontin 600 mg a.m. and afternoon, and 900 mg nightly. We will keep same. Tunneled catheter is out. Remains on KCl supplementation. Also on acyclovir and Bactrim prophylaxis. Recent labs reasonable, counts adequate. Will decrease frequency of visits to every other week. Will get restaging studies prior to day 100 (CD4 count, PFTs, 2D echo, SPEP, LC, skeletal survey, PET scan, BM biopsy with MRD testing). Plan for Revlimid therapy thereafter with transition of care back to Dr. Beny Duffy.      11/23/20 D+61. Reasonable p.o. intake. Denies any fevers or chills. Some abdominal achiness last week which she attributes to seafood, now better. Recent blood work unremarkable, counts adequate. Neuropathy stable, remains on Cymbalta and Neurontin. Potassium levels reasonable, okay to stop KCl supplementation. Continue every other week visit. Will start Revlimid maintenance around day 100. Does note some mild drug rash on her flanks and underneath breast crease, which started around the same time as Bactrim: Will give trial of holding Bactrim x 2 weeks to see if improves. RTC in 2 weeks with NP and in 4 weeks with MD with labs. 12/21/20: D+86. Reports doing well. Slowly improving. Skin rash has stayed away, as such we will stay off Bactrim and start dapsone 100 mg daily. Transaminases better with reducing Cymbalta, will simply switch to Effexor 75 mg daily, continue Neurontin at current dosing.   Restaging studies prior to next visit (D100 sweetieal) including skeletal survey, PET scan, BM biopsy. She will also start Revlimid maintenance at that point and return to Dr. Reji Vicente for further management. 1/4/2021: Neuropathy about the same. Did not take Effexor, will reorder. Also notes achiness in her arms and legs for which she has been taking NSAIDs, will prescribe tramadol as needed. SIFE with CR, LC staying low. BM biopsy results pending however prelim MRD flow negative. Will follow up results. PET scan scheduled for tomorrow and results will be followed. Recent 2D echo with EF slightly worse at 40 to 45% with grade 2 diastolic dysfunction (EF was borderline low at 50% prior to transplant), will refer to cardiology. PFT results reviewed, unchanged, patient has seen pulmonology in the past for reduced DLCO which was felt to be either emphysema or early ILD, will benefit from following with pulmonology locally down the line. Okay to start maintenance with single agent Revlimid 10 mg 3 out of 4 weeks (without Dex). We will see her back in 1 month's time with cardiology opinion. She was advised to follow-up with Dr. Reji Vicente as well. 2/2/2021: PET scan without active disease (CR). BM biopsy is normocellular for age without evidence of residual plasma cell myeloma, FISH negative, MRD flow negative. She has since seen cardiology, is felt to have slight drop in her systolic function with hopes to recovery over time. Given asymptomatic, she is now on Cozaar with plans to start a beta-blocker on her next cardiology visit. Labs today unremarkable, continue with Revlimid maintenance. She will follow-up with Dr. Reji Vicente locally for regular care here on. She can stop dapsone in 2 months (6 months from her transplant). Acyclovir to continue for 2 years after SCT. We will simply see her back in 8 months for her 1 year post transplant evaluation including repeat labs, restaging (including skeletal survey, PET scan and repeat bone marrow biopsy).   She will start her post transplant vaccinations at that point. 10/6/2021: Reports continuing to do well, remains on Revlimid maintenance. Tolerating reasonably, minimal loose stools. Neuropathy overall has improved. Routine blood work unremarkable, SPEP/LC pending from today and will be followed. Bone marrow biopsy with 25-35% cellularity without any evidence of residual disease, MRD flow negative. Skeletal survey without worsening disease. PFTs w mild reduced DLCO. PET scan without clear evidence of residual disease. Recent 2D echo with EF having improved to 55 to 60%. Regularly follows with cardiology. We will continue with her post transplant vaccinations today. Continue following locally with Dr. Taiwo Jimenez as needed. We will see her back in 6 months time with blood work. Repeat bone marrow and imaging in 1 year's time. 4/20/2022: Reports doing well. Tapering down on her neuropathy medications. Remains on Revlimid maintenance, regularly follows locally with Dr. Ella Guan.  Recent blood work under care everywhere reviewed, lambda LC staying low, SPEP negative. Recent PET scan without clear evidence of disease progression. Patient reassured, we will proceed with post transplant 14-month vaccinations today. Given responding disease, she will continue with Revlimid maintenance. We will see her back in 6 months with restaging studies as well as PFTs. 10/20/22: Patient seen over telemedicine. Reports doing reasonably. Reasonable p.o. intake and mobility. Remains on Revlimid maintenance which she is tolerating well. Neuropathy over time improved and now she is off gabapentin and Effexor. Recent blood work reviewed, lambda LC at 28, SPEP with low-level M spike which appears new at 0.5 g per DL. PFTs with mildly reduced DLCO, possibly early emphysema, CHF, anemia or pulmonary vascular disease. 2D echo not done.   Bone marrow biopsy reported as suboptimal with 20 to 30% cellularity with 10% involvement with plasma cells, with IHC failing to demonstrate monoclonality, felt likely due to sampling error. FISH negative. Flow with monoclonal population detected. Will clarify with hematopathology referral for repeat bone marrow. Suspicion is high for relapsing disease in which case Shahrzad-carfilzomib-Dex or Car-Pom-Dex will be reasonable options. We may choose to do a second SCT in such a case. 1. IgG lambda multiple myeloma, ISS stage I, standard risk disease, s/p RVD   2. H/o recent C diff  3. Naeuropathy    PLAN:  - As above. Probable progression. Will d/w hemepath. - S/p Dqq254/ASCT 09/20. On Revlimid maintenance (10 mg daily 21 of 28 days). Continue care locally w/ Dr Liam Jimenez.  - Prophylaxis w acyclovir. Possible rash w/ bactrim: dapsone 100 daily, now completed. Also on daily aspirin  - Reduced systolic EF: Seeing cardiology. Recent echo 9/21 with normal EF.  - Neuropathy: On gabapentin. Cymbalta w LFT elevation. On effexor 75 daily  - S/p RVD x 4. Labs w SIFE CR. Was on Bortezomib/Dex alone. - Collected CD34+ at 7.08 mil/kg. Received half back  - Bone directed therapy and prophylactic medications per primary hematology service. - Posttransplant vaccinations: Continue     RTC in 3 months w labs, SPEP, LC. Thank you Dr Liam Jimenez for allowing us to paticipate in care of this pleasant patient. Please call w/ ziyad dunn    Jatinvijayabrian Jluisaixa, was evaluated through a synchronous (real-time) audio-video encounter. The patient (or guardian if applicable) is aware that this is a billable service, which includes applicable co-pays. This Virtual Visit was conducted with patient's (and/or legal guardian's) consent. The visit was conducted pursuant to the emergency declaration under the [address] [facility], [address] [place] [address] waiver authority and the [employer] General Act.   Patient identification was verified, and a caregiver was

## 2022-10-20 NOTE — PATIENT INSTRUCTIONS
Patient Instructions from Today's Visit    Reason for Visit:  Follow up     Plan:  Unfortunately the lab work and bone marrow looks like your myeloma is trying to come back. We think it is time to change treatment. We will talk to Dr. Bar Aguila and come up with a new regimen. We will consider another stem cell transplant. Follow Up: Follow up in 3 months    Recent Lab Results:  No visits with results within 3 Day(s) from this visit.    Latest known visit with results is:   Hospital Outpatient Visit on 10/06/2022   Component Date Value Ref Range Status    WBC 10/06/2022 5.5  4.3 - 11.1 K/uL Final    RBC 10/06/2022 4.26  4.05 - 5.2 M/uL Final    Hemoglobin 10/06/2022 12.5  11.7 - 15.4 g/dL Final    Hematocrit 10/06/2022 39.0  35.8 - 46.3 % Final    MCV 10/06/2022 91.5  79.6 - 97.8 FL Final    MCH 10/06/2022 29.3  26.1 - 32.9 PG Final    MCHC 10/06/2022 32.1  31.4 - 35.0 g/dL Final    RDW 10/06/2022 14.1  11.9 - 14.6 % Final    Platelets 99/34/0177 251  150 - 450 K/uL Final    MPV 10/06/2022 9.7  9.4 - 12.3 FL Final    nRBC 10/06/2022 0.00  0.0 - 0.2 K/uL Final    **Note: Absolute NRBC parameter is now reported with Hemogram**    Differential Type 10/06/2022 AUTOMATED    Final    Seg Neutrophils 10/06/2022 46  43 - 78 % Final    Lymphocytes 10/06/2022 43  13 - 44 % Final    Monocytes 10/06/2022 8  4.0 - 12.0 % Final    Eosinophils % 10/06/2022 1  0.5 - 7.8 % Final    Basophils 10/06/2022 1  0.0 - 2.0 % Final    Immature Granulocytes 10/06/2022 1  0.0 - 5.0 % Final    Segs Absolute 10/06/2022 2.6  1.7 - 8.2 K/UL Final    Absolute Lymph # 10/06/2022 2.4  0.5 - 4.6 K/UL Final    Absolute Mono # 10/06/2022 0.4  0.1 - 1.3 K/UL Final    Absolute Eos # 10/06/2022 0.1  0.0 - 0.8 K/UL Final    Basophils Absolute 10/06/2022 0.1  0.0 - 0.2 K/UL Final    Absolute Immature Granulocyte 10/06/2022 0.1  0.0 - 0.5 K/UL Final    Test Ordered 10/06/2022 FLOW CYTOMETRY MS    Final    Source 10/06/2022 BONE MARROW    Final    FLOW CYTOMETRY RESULTS. 10/06/2022 Test results scanned into EPIC    Final    Bone Marrow Prep & Vermell Moynahan Stain 10/06/2022 FOR HEMATOLOGY SERVICES RENDERED    Final    SILVA GIEMSA STAIN        Treatment Summary has been discussed and given to patient: n/a        -------------------------------------------------------------------------------------------------------------------  Please call our office at [phone] if you have any  of the following symptoms:   Fever of 100.5 or greater  Chills  Shortness of breath  Swelling or pain in one leg    After office hours an answering service is available and will contact a provider for emergencies or if you are experiencing any of the above symptoms. Patient did express an interest in My Chart. My Chart log in information explained on the after visit summary printout at the [facility] desk.     Margaret Pulido RN

## 2023-04-25 ENCOUNTER — OFFICE VISIT (OUTPATIENT)
Dept: CARDIOLOGY CLINIC | Age: 43
End: 2023-04-25
Payer: MEDICARE

## 2023-04-25 VITALS
BODY MASS INDEX: 47.22 KG/M2 | WEIGHT: 256.6 LBS | TEMPERATURE: 125.6 F | HEIGHT: 62 IN | DIASTOLIC BLOOD PRESSURE: 104 MMHG | SYSTOLIC BLOOD PRESSURE: 156 MMHG

## 2023-04-25 DIAGNOSIS — I50.22 CHRONIC SYSTOLIC HEART FAILURE (HCC): Primary | ICD-10-CM

## 2023-04-25 PROCEDURE — G8427 DOCREV CUR MEDS BY ELIG CLIN: HCPCS | Performed by: INTERNAL MEDICINE

## 2023-04-25 PROCEDURE — 93000 ELECTROCARDIOGRAM COMPLETE: CPT | Performed by: INTERNAL MEDICINE

## 2023-04-25 PROCEDURE — 99213 OFFICE O/P EST LOW 20 MIN: CPT | Performed by: INTERNAL MEDICINE

## 2023-04-25 PROCEDURE — G8417 CALC BMI ABV UP PARAM F/U: HCPCS | Performed by: INTERNAL MEDICINE

## 2023-04-25 PROCEDURE — 1036F TOBACCO NON-USER: CPT | Performed by: INTERNAL MEDICINE

## 2023-04-25 RX ORDER — CARVEDILOL 3.12 MG/1
3.12 TABLET ORAL 2 TIMES DAILY WITH MEALS
Qty: 180 TABLET | Refills: 3 | Status: SHIPPED | OUTPATIENT
Start: 2023-04-25

## 2023-04-25 RX ORDER — LOSARTAN POTASSIUM 25 MG/1
25 TABLET ORAL DAILY
Qty: 90 TABLET | Refills: 3 | Status: SHIPPED | OUTPATIENT
Start: 2023-04-25

## 2023-04-25 ASSESSMENT — ENCOUNTER SYMPTOMS
RESPIRATORY NEGATIVE: 1
PHOTOPHOBIA: 0
ALLERGIC/IMMUNOLOGIC NEGATIVE: 1
SHORTNESS OF BREATH: 0
CHEST TIGHTNESS: 0
GASTROINTESTINAL NEGATIVE: 1
EYES NEGATIVE: 1
ABDOMINAL PAIN: 0
BACK PAIN: 0
EYE PAIN: 0

## 2023-04-25 ASSESSMENT — PATIENT HEALTH QUESTIONNAIRE - PHQ9
2. FEELING DOWN, DEPRESSED OR HOPELESS: 0
SUM OF ALL RESPONSES TO PHQ9 QUESTIONS 1 & 2: 0
SUM OF ALL RESPONSES TO PHQ QUESTIONS 1-9: 0
1. LITTLE INTEREST OR PLEASURE IN DOING THINGS: 0
SUM OF ALL RESPONSES TO PHQ QUESTIONS 1-9: 0

## 2023-04-25 NOTE — PROGRESS NOTES
[facility] CARDIOLOGY  [address] [place] [address], [address]  [place] [address]  PHONE: [phone]      23    NAME:  Liz Liu  : 1980  MRN: [id number]         SUBJECTIVE:   Liz Liu is a 42 y.o. female seen for follow up of:      Chief Complaint   Patient presents with    Congestive Heart Failure     Last seen          Cardiac Hx (Reviewed and summarized by me):  1) systolic heart failure  Echo 20 - LVEF 50% with diastolic dysfunction  Echo 20 - LVEF 40-45% with diastolic dysfunction   (Reviewed both of these echocardiograms and agree that there has been a small decrease in the systolic ejection fraction)  Echo 21 - LVEF 40-45%  Echo 21 - LVEF 55-60% (mildly dilated LV) mild mod MR  Echo 22 - LVEF 40-45% mod MR  2) Multiple Myloma - getting chemo via [facility]  3) Lipids   10/6/22 - HDL 67, LDL 84.6, Trig 142    ECG: Sinus bradycardia with negative inferior and lateral T wave. (Independent review/interpretation by me)      HPI:  Not seen in some time, has not been taking either carvedilol or Cozaar.  Had an echo last  that showed reduction in her ejection fraction 40 to 45%.  Is feeling okay but blood pressure is elevated today.    Past Medical History, Past Surgical History, Family history, Social History, and Medications were all reviewed with the patient today and updated as necessary.       Current Outpatient Medications:     carvedilol (COREG) 3.125 MG tablet, Take 1 tablet by mouth 2 times daily (with meals), Disp: 180 tablet, Rfl: 3    losartan (COZAAR) 25 MG tablet, Take 1 tablet by mouth daily, Disp: 90 tablet, Rfl: 3    acetaminophen (TYLENOL) 500 MG tablet, Take by mouth every 6 hours as needed, Disp: , Rfl:     aspirin 81 MG EC tablet, Take 1 tablet by mouth daily, Disp: , Rfl:     DULoxetine (CYMBALTA) 30 MG extended release capsule, Take 1 capsule by mouth daily, Disp: , Rfl:     HYDROcodone-acetaminophen (NORCO)

## 2023-05-31 ENCOUNTER — OFFICE VISIT (OUTPATIENT)
Age: 43
End: 2023-05-31
Payer: MEDICARE

## 2023-05-31 VITALS
BODY MASS INDEX: 45.07 KG/M2 | DIASTOLIC BLOOD PRESSURE: 100 MMHG | SYSTOLIC BLOOD PRESSURE: 140 MMHG | WEIGHT: 264 LBS | HEART RATE: 75 BPM | HEIGHT: 64 IN

## 2023-05-31 DIAGNOSIS — E66.01 OBESITY, MORBID (HCC): ICD-10-CM

## 2023-05-31 DIAGNOSIS — I50.22 CHRONIC SYSTOLIC HEART FAILURE (HCC): Primary | ICD-10-CM

## 2023-05-31 PROCEDURE — G8427 DOCREV CUR MEDS BY ELIG CLIN: HCPCS | Performed by: INTERNAL MEDICINE

## 2023-05-31 PROCEDURE — G8417 CALC BMI ABV UP PARAM F/U: HCPCS | Performed by: INTERNAL MEDICINE

## 2023-05-31 PROCEDURE — 99213 OFFICE O/P EST LOW 20 MIN: CPT | Performed by: INTERNAL MEDICINE

## 2023-05-31 PROCEDURE — 1036F TOBACCO NON-USER: CPT | Performed by: INTERNAL MEDICINE

## 2023-05-31 RX ORDER — CYCLOBENZAPRINE HCL 10 MG
10 TABLET ORAL 3 TIMES DAILY PRN
COMMUNITY

## 2023-05-31 RX ORDER — VALACYCLOVIR HYDROCHLORIDE 500 MG/1
500 TABLET, FILM COATED ORAL 2 TIMES DAILY
COMMUNITY

## 2023-05-31 RX ORDER — BUTALBITAL, ACETAMINOPHEN, CAFFEINE AND CODEINE PHOSPHATE 50; 325; 40; 30 MG/1; MG/1; MG/1; MG/1
1 CAPSULE ORAL EVERY 4 HOURS PRN
COMMUNITY

## 2023-05-31 RX ORDER — GABAPENTIN 300 MG/1
300 CAPSULE ORAL 2 TIMES DAILY
COMMUNITY
End: 2023-05-31 | Stop reason: CLARIF

## 2023-05-31 RX ORDER — LOSARTAN POTASSIUM 50 MG/1
50 TABLET ORAL DAILY
Qty: 30 TABLET | Refills: 11 | Status: SHIPPED | OUTPATIENT
Start: 2023-05-31

## 2023-05-31 RX ORDER — PROMETHAZINE HYDROCHLORIDE 25 MG/1
25 TABLET ORAL EVERY 6 HOURS PRN
COMMUNITY

## 2023-05-31 ASSESSMENT — ENCOUNTER SYMPTOMS
EYE PAIN: 0
BACK PAIN: 0
PHOTOPHOBIA: 0
GASTROINTESTINAL NEGATIVE: 1
ALLERGIC/IMMUNOLOGIC NEGATIVE: 1
ABDOMINAL PAIN: 0
SHORTNESS OF BREATH: 1
CHEST TIGHTNESS: 0
EYES NEGATIVE: 1

## 2023-05-31 NOTE — PROGRESS NOTES
[facility] CARDIOLOGY  [address], [address] [facility], [address] [place] [address]  PHONE: [phone]      23    NAME:  Inge Mercer  : 1980  MRN: [id number]         SUBJECTIVE:   Inge Mercer is a 36 y.o. female seen for follow up of:      Chief Complaint   Patient presents with    Congestive Heart Failure        Cardiac Hx (Reviewed and summarized by me):  1) systolic heart failure  Echo 20 - LVEF 64% with diastolic dysfunction  Echo 20 - LVEF 25-47% with diastolic dysfunction   (Reviewed both of these echocardiograms and agree that there has been a small decrease in the systolic ejection fraction)  Echo 21 - LVEF 40-45%  Echo 21 - LVEF 55-60% (mildly dilated LV) mild mod MR  Echo 22 - LVEF 40-45% mod MR  2) Multiple Myloma - getting chemo via [facility]  3) Lipids              10/6/22 - HDL 67, LDL 84.6, Trig 142      HPI:  Has been doing well. Blood pressure has come down but not to goal yet. Still has some moments of heavy breathing. Continues to get therapy for recurrence of multiple myeloma. Past Medical History, Past Surgical History, Family history, Social History, and Medications were all reviewed with the patient today and updated as necessary. Current Outpatient Medications:     valACYclovir (VALTREX) 500 MG tablet, Take 1 tablet by mouth 2 times daily, Disp: , Rfl:     promethazine (PHENERGAN) 25 MG tablet, Take 1 tablet by mouth every 6 hours as needed for Nausea, Disp: , Rfl:     cyclobenzaprine (FLEXERIL) 10 MG tablet, Take 1 tablet by mouth 3 times daily as needed for Muscle spasms, Disp: , Rfl:     butalbital-acetaminophen-caffeine-codeine (FIORICET WITH CODEINE) -65-30 MG per capsule, Take 1 capsule by mouth every 4 hours as needed for Headaches.  Max Daily Amount: 6 capsules, Disp: , Rfl:     losartan (COZAAR) 50 MG tablet, Take 1 tablet by mouth daily, Disp: 30 tablet, Rfl: 11    carvedilol (COREG) 3.125 MG tablet, Take

## 2023-06-02 NOTE — PROGRESS NOTES
Bedside report received from Kassy Winters, [facility]. Pt resting quietly, NAD. Removed pt from bipap in AM to 3L nasal cannula.  WOB was WNL and patient did not complain of SOB.  Around noon saw patient for scheduled nebulizer, pt more SOB, RR 22-28, has increased WOB.  Placed pt back on bipap, will try to wean again later.  Nebulizers given pt states they do help.  Bilateral wheeze pre and post neb. Pt comfortable at this time on bipap.     Gabby Leahy, RT on 6/2/2023 at 1:27 PM

## 2023-07-28 NOTE — PRE SEDATION
Sedation Pre-Procedure Note    Patient Name: Jane Gutiérrez   YOB: 1980  Room/Bed: Room/bed info not found  Medical Record Number: [id number]  Date: 10/6/2022   Time: 2:34 PM       Indication: Multiple myeloma    Consent: I have discussed with the patient and/or the patient representative the indication, alternatives, and the possible risks and/or complications of the planned procedure and the anesthesia methods. The patient and/or patient representative appear to understand and agree to proceed. Vital Signs:   Vitals:    10/06/22 1353   BP: 123/87   Pulse: 68   Resp: 16   Temp: 97.8 °F (36.6 °C)   SpO2: 99%       Past Medical History:   has a past medical history of Cancer (HCC) and Chronic pain. Past Surgical History:   has a past surgical history that includes Tubal ligation (08/2007); back surgery (04/2020); IR TUNNELED CVC PLACE WO SQ PORT/PUMP > 5 YEARS (8/17/2020); IR REMOVE TUNNELED CVAD WO SQ PORT/PUMP (10/26/2020); and IR TUNNELED CVC PLACE WO SQ PORT/PUMP > 5 YEARS (8/17/2020). Medications:   Scheduled Meds:   Continuous Infusions:   PRN Meds:   Home Meds:   Prior to Admission medications    Medication Sig Start Date End Date Taking? Authorizing Provider   carvedilol (COREG) 3.125 MG tablet Take 1 tablet by mouth 2 times daily (with meals) 6/17/22   Erasmo Gallagher MD   losartan (COZAAR) 25 MG tablet Take 1 tablet by mouth daily 6/17/22   Erasmo Gallagher MD   acetaminophen (TYLENOL) 500 MG tablet Take by mouth every 6 hours as needed    Ar Automatic Reconciliation   aspirin 81 MG EC tablet Take 81 mg by mouth daily 1/29/21   Ar Automatic Reconciliation   DULoxetine (CYMBALTA) 30 MG extended release capsule Take 30 mg by mouth daily 12/7/20   Ar Automatic Reconciliation   HYDROcodone-acetaminophen (NORCO)  MG per tablet Take 1 tablet by mouth every 6 hours as needed.     Ar Automatic Reconciliation   lenalidomide (REVLIMID) 10 MG chemo capsule Take 10 mg by mouth daily Ar Automatic Reconciliation         Pre-Sedation Documentation and Exam:   I have personally completed a history, physical exam & review of systems for this patient (see notes).     Mallampati Airway Assessment:  normal, dentition not prohibitive, Mallampati Class II - (soft palate, fauces & uvula are visible)    Prior History of Anesthesia Complications:   none    ASA Classification:  Class 3 - A patient with severe systemic disease that limits activity but is not incapacitating    Sedation/ Anesthesia Plan:   intravenous sedation    Medications Planned:   midazolam (Versed) intravenously and fentanyl intravenously    Patient is an appropriate candidate for plan of sedation: yes    Electronically signed by ARGENTINA Galarza on 10/6/2022 at 2:34 PM Female

## 2023-08-25 ENCOUNTER — OFFICE VISIT (OUTPATIENT)
Age: 43
End: 2023-08-25
Payer: MEDICARE

## 2023-08-25 VITALS
SYSTOLIC BLOOD PRESSURE: 120 MMHG | HEIGHT: 64 IN | BODY MASS INDEX: 46.26 KG/M2 | HEART RATE: 64 BPM | DIASTOLIC BLOOD PRESSURE: 90 MMHG | WEIGHT: 271 LBS

## 2023-08-25 DIAGNOSIS — I50.22 CHRONIC SYSTOLIC HEART FAILURE (HCC): Primary | ICD-10-CM

## 2023-08-25 PROCEDURE — 1036F TOBACCO NON-USER: CPT | Performed by: INTERNAL MEDICINE

## 2023-08-25 PROCEDURE — G8427 DOCREV CUR MEDS BY ELIG CLIN: HCPCS | Performed by: INTERNAL MEDICINE

## 2023-08-25 PROCEDURE — G8417 CALC BMI ABV UP PARAM F/U: HCPCS | Performed by: INTERNAL MEDICINE

## 2023-08-25 PROCEDURE — 99213 OFFICE O/P EST LOW 20 MIN: CPT | Performed by: INTERNAL MEDICINE

## 2023-08-25 ASSESSMENT — ENCOUNTER SYMPTOMS
EYE PAIN: 0
PHOTOPHOBIA: 0
SHORTNESS OF BREATH: 0
ALLERGIC/IMMUNOLOGIC NEGATIVE: 1
RESPIRATORY NEGATIVE: 1
EYES NEGATIVE: 1
CHEST TIGHTNESS: 0
ABDOMINAL PAIN: 0
GASTROINTESTINAL NEGATIVE: 1
BACK PAIN: 0

## 2023-08-25 NOTE — PROGRESS NOTES
[facility] CARDIOLOGY  [address] [facility], [facility] [address]  PHONE: [phone]      23    NAME:  Pedro Briscoe  : 1980  MRN: [id number]         SUBJECTIVE:   Pedro Briscoe is a 36 y.o. female seen for follow up of:      Chief Complaint   Patient presents with    Follow-up     3 months    Congestive Heart Failure        Cardiac Hx (Reviewed and summarized by me):  1) systolic heart failure  Echo 20 - LVEF 78% with diastolic dysfunction  Echo 20 - LVEF 20-87% with diastolic dysfunction   (Reviewed both of these echocardiograms and agree that there has been a small decrease in the systolic ejection fraction)  Echo 21 - LVEF 40-45%  Echo 21 - LVEF 55-60% (mildly dilated LV) mild mod MR  Echo 22 - LVEF 40-45% mod MR  2) Multiple Myloma - getting chemo via [facility]  3) Lipids              10/6/22 - HDL 67, LDL 84.6, Trig 142      HPI:  Doing well. Denies any heart failure symptoms. Still getting chemo and will continue until November. Compliant with medications. Blood pressures come down nicely since we made changes 3 months ago. Past Medical History, Past Surgical History, Family history, Social History, and Medications were all reviewed with the patient today and updated as necessary.        Current Outpatient Medications:     valACYclovir (VALTREX) 500 MG tablet, Take 1 tablet by mouth 2 times daily, Disp: , Rfl:     cyclobenzaprine (FLEXERIL) 10 MG tablet, Take 1 tablet by mouth 3 times daily as needed for Muscle spasms, Disp: , Rfl:     butalbital-acetaminophen-caffeine-codeine (FIORICET WITH CODEINE) -25-30 MG per capsule, Take 1 capsule by mouth every 4 hours as needed for Headaches., Disp: , Rfl:     losartan (COZAAR) 50 MG tablet, Take 1 tablet by mouth daily, Disp: 30 tablet, Rfl: 11    carvedilol (COREG) 3.125 MG tablet, Take 1 tablet by mouth 2 times daily (with meals), Disp: 180 tablet, Rfl: 3    acetaminophen

## 2023-08-29 ENCOUNTER — TELEPHONE (OUTPATIENT)
Dept: ONCOLOGY | Age: 43
End: 2023-08-29

## 2023-08-29 DIAGNOSIS — C90.00 MULTIPLE MYELOMA NOT HAVING ACHIEVED REMISSION (HCC): Primary | ICD-10-CM

## 2023-08-29 DIAGNOSIS — R79.1 ABNORMAL COAGULATION PROFILE: ICD-10-CM

## 2023-08-29 NOTE — TELEPHONE ENCOUNTER
Dr. Emir Kumar spoke to Dr. Nikunj Hess. Will get patient set up for restaging BM bx and PET scan with labs and then follow up with Dr. Emir Kumar two weeks later. Patient aware we will contact her once appts are made.

## 2023-09-28 ENCOUNTER — HOSPITAL ENCOUNTER (OUTPATIENT)
Dept: LAB | Age: 43
Discharge: HOME | End: 2023-09-28
Payer: MEDICARE

## 2023-09-28 ENCOUNTER — HOSPITAL ENCOUNTER (OUTPATIENT)
Dept: PET IMAGING | Age: 43
Discharge: HOME | End: 2023-09-28
Payer: MEDICARE

## 2023-09-28 ENCOUNTER — HOSPITAL ENCOUNTER (OUTPATIENT)
Dept: CT IMAGING | Age: 43
Discharge: HOME | End: 2023-09-28
Attending: INTERNAL MEDICINE
Payer: MEDICARE

## 2023-09-28 VITALS
WEIGHT: 271 LBS | SYSTOLIC BLOOD PRESSURE: 162 MMHG | TEMPERATURE: 97.3 F | DIASTOLIC BLOOD PRESSURE: 80 MMHG | HEIGHT: 64 IN | OXYGEN SATURATION: 100 % | BODY MASS INDEX: 46.26 KG/M2 | HEART RATE: 62 BPM | RESPIRATION RATE: 16 BRPM

## 2023-09-28 DIAGNOSIS — C90.00 MULTIPLE MYELOMA NOT HAVING ACHIEVED REMISSION (HCC): ICD-10-CM

## 2023-09-28 DIAGNOSIS — R79.1 ABNORMAL COAGULATION PROFILE: ICD-10-CM

## 2023-09-28 LAB
ALBUMIN SERPL-MCNC: 3.4 G/DL (ref 3.5–5)
ALBUMIN/GLOB SERPL: 1 (ref 0.4–1.6)
ALP SERPL-CCNC: 139 U/L (ref 50–136)
ALT SERPL-CCNC: 45 U/L (ref 12–65)
ANION GAP SERPL CALC-SCNC: 6 MMOL/L (ref 2–11)
APTT PPP: 25.2 SEC (ref 24.5–34.2)
AST SERPL-CCNC: 20 U/L (ref 15–37)
BASOPHILS # BLD: 0 K/UL (ref 0–0.2)
BASOPHILS # BLD: 0 K/UL (ref 0–0.2)
BASOPHILS NFR BLD: 0 % (ref 0–2)
BASOPHILS NFR BLD: 0 % (ref 0–2)
BILIRUB SERPL-MCNC: 0.5 MG/DL (ref 0.2–1.1)
BONE MARROW PREP & WRIGHT STAIN: NORMAL
BUN SERPL-MCNC: 11 MG/DL (ref 6–23)
CALCIUM SERPL-MCNC: 8.8 MG/DL (ref 8.3–10.4)
CHLORIDE SERPL-SCNC: 109 MMOL/L (ref 101–110)
CO2 SERPL-SCNC: 27 MMOL/L (ref 21–32)
CREAT SERPL-MCNC: 0.7 MG/DL (ref 0.6–1)
DIFFERENTIAL METHOD BLD: ABNORMAL
DIFFERENTIAL METHOD BLD: ABNORMAL
EOSINOPHIL # BLD: 0.1 K/UL (ref 0–0.8)
EOSINOPHIL # BLD: 0.1 K/UL (ref 0–0.8)
EOSINOPHIL NFR BLD: 1 % (ref 0.5–7.8)
EOSINOPHIL NFR BLD: 1 % (ref 0.5–7.8)
ERYTHROCYTE [DISTWIDTH] IN BLOOD BY AUTOMATED COUNT: 13.2 % (ref 11.9–14.6)
ERYTHROCYTE [DISTWIDTH] IN BLOOD BY AUTOMATED COUNT: 13.3 % (ref 11.9–14.6)
GLOBULIN SER CALC-MCNC: 3.5 G/DL (ref 2.8–4.5)
GLUCOSE BLD STRIP.AUTO-MCNC: 108 MG/DL (ref 65–100)
GLUCOSE SERPL-MCNC: 100 MG/DL (ref 65–100)
HCT VFR BLD AUTO: 36 % (ref 35.8–46.3)
HCT VFR BLD AUTO: 36.5 % (ref 35.8–46.3)
HGB BLD-MCNC: 11.6 G/DL (ref 11.7–15.4)
HGB BLD-MCNC: 11.7 G/DL (ref 11.7–15.4)
IMM GRANULOCYTES # BLD AUTO: 0 K/UL (ref 0–0.5)
IMM GRANULOCYTES # BLD AUTO: 0 K/UL (ref 0–0.5)
IMM GRANULOCYTES NFR BLD AUTO: 0 % (ref 0–5)
IMM GRANULOCYTES NFR BLD AUTO: 0 % (ref 0–5)
INR PPP: 0.9
LYMPHOCYTES # BLD: 2.3 K/UL (ref 0.5–4.6)
LYMPHOCYTES # BLD: 2.7 K/UL (ref 0.5–4.6)
LYMPHOCYTES NFR BLD: 29 % (ref 13–44)
LYMPHOCYTES NFR BLD: 34 % (ref 13–44)
MCH RBC QN AUTO: 30.4 PG (ref 26.1–32.9)
MCH RBC QN AUTO: 30.7 PG (ref 26.1–32.9)
MCHC RBC AUTO-ENTMCNC: 32.1 G/DL (ref 31.4–35)
MCHC RBC AUTO-ENTMCNC: 32.2 G/DL (ref 31.4–35)
MCV RBC AUTO: 94.8 FL (ref 82–102)
MCV RBC AUTO: 95.2 FL (ref 82–102)
MONOCYTES # BLD: 0.6 K/UL (ref 0.1–1.3)
MONOCYTES # BLD: 0.6 K/UL (ref 0.1–1.3)
MONOCYTES NFR BLD: 7 % (ref 4–12)
MONOCYTES NFR BLD: 8 % (ref 4–12)
NEUTS SEG # BLD: 4.5 K/UL (ref 1.7–8.2)
NEUTS SEG # BLD: 4.9 K/UL (ref 1.7–8.2)
NEUTS SEG NFR BLD: 57 % (ref 43–78)
NEUTS SEG NFR BLD: 62 % (ref 43–78)
NRBC # BLD: 0 K/UL (ref 0–0.2)
NRBC # BLD: 0 K/UL (ref 0–0.2)
PLATELET # BLD AUTO: 231 K/UL (ref 150–450)
PLATELET # BLD AUTO: 241 K/UL (ref 150–450)
PMV BLD AUTO: 10.3 FL (ref 9.4–12.3)
PMV BLD AUTO: 10.4 FL (ref 9.4–12.3)
POTASSIUM SERPL-SCNC: 3.5 MMOL/L (ref 3.5–5.1)
PROT SERPL-MCNC: 6.9 G/DL (ref 6.3–8.2)
PROTHROMBIN TIME: 12.9 SEC (ref 12.6–14.3)
RBC # BLD AUTO: 3.78 M/UL (ref 4.05–5.2)
RBC # BLD AUTO: 3.85 M/UL (ref 4.05–5.2)
SERVICE CMNT-IMP: ABNORMAL
SODIUM SERPL-SCNC: 142 MMOL/L (ref 133–143)
WBC # BLD AUTO: 7.8 K/UL (ref 4.3–11.1)
WBC # BLD AUTO: 7.9 K/UL (ref 4.3–11.1)

## 2023-09-28 PROCEDURE — 85730 THROMBOPLASTIN TIME PARTIAL: CPT

## 2023-09-28 PROCEDURE — 88311 DECALCIFY TISSUE: CPT

## 2023-09-28 PROCEDURE — 82962 GLUCOSE BLOOD TEST: CPT

## 2023-09-28 PROCEDURE — 78815 PET IMAGE W/CT SKULL-THIGH: CPT

## 2023-09-28 PROCEDURE — 6360000002 HC RX W HCPCS: Performed by: RADIOLOGY

## 2023-09-28 PROCEDURE — 77012 CT SCAN FOR NEEDLE BIOPSY: CPT

## 2023-09-28 PROCEDURE — 82784 ASSAY IGA/IGD/IGG/IGM EACH: CPT

## 2023-09-28 PROCEDURE — A9552 F18 FDG: HCPCS | Performed by: INTERNAL MEDICINE

## 2023-09-28 PROCEDURE — 36415 COLL VENOUS BLD VENIPUNCTURE: CPT

## 2023-09-28 PROCEDURE — 2580000003 HC RX 258: Performed by: INTERNAL MEDICINE

## 2023-09-28 PROCEDURE — 88305 TISSUE EXAM BY PATHOLOGIST: CPT

## 2023-09-28 PROCEDURE — 85610 PROTHROMBIN TIME: CPT

## 2023-09-28 PROCEDURE — 85025 COMPLETE CBC W/AUTO DIFF WBC: CPT

## 2023-09-28 PROCEDURE — 80053 COMPREHEN METABOLIC PANEL: CPT

## 2023-09-28 PROCEDURE — 2500000003 HC RX 250 WO HCPCS: Performed by: PHYSICIAN ASSISTANT

## 2023-09-28 PROCEDURE — 2580000003 HC RX 258: Performed by: PHYSICIAN ASSISTANT

## 2023-09-28 PROCEDURE — 6360000004 HC RX CONTRAST MEDICATION: Performed by: INTERNAL MEDICINE

## 2023-09-28 PROCEDURE — 88313 SPECIAL STAINS GROUP 2: CPT

## 2023-09-28 PROCEDURE — 84165 PROTEIN E-PHORESIS SERUM: CPT

## 2023-09-28 PROCEDURE — 83521 IG LIGHT CHAINS FREE EACH: CPT

## 2023-09-28 PROCEDURE — 3430000000 HC RX DIAGNOSTIC RADIOPHARMACEUTICAL: Performed by: INTERNAL MEDICINE

## 2023-09-28 PROCEDURE — 6360000002 HC RX W HCPCS: Performed by: PHYSICIAN ASSISTANT

## 2023-09-28 PROCEDURE — 86334 IMMUNOFIX E-PHORESIS SERUM: CPT

## 2023-09-28 RX ORDER — SODIUM CHLORIDE 0.9 % (FLUSH) 0.9 %
20 SYRINGE (ML) INJECTION AS NEEDED
Status: DISCONTINUED | OUTPATIENT
Start: 2023-09-28 | End: 2023-10-02 | Stop reason: HOSPADM

## 2023-09-28 RX ORDER — LIDOCAINE HYDROCHLORIDE 20 MG/ML
INJECTION, SOLUTION INFILTRATION; PERINEURAL PRN
Status: COMPLETED | OUTPATIENT
Start: 2023-09-28 | End: 2023-09-28

## 2023-09-28 RX ORDER — MIDAZOLAM HYDROCHLORIDE 2 MG/2ML
INJECTION, SOLUTION INTRAMUSCULAR; INTRAVENOUS PRN
Status: COMPLETED | OUTPATIENT
Start: 2023-09-28 | End: 2023-09-28

## 2023-09-28 RX ORDER — DIPHENHYDRAMINE HYDROCHLORIDE 50 MG/ML
INJECTION INTRAMUSCULAR; INTRAVENOUS PRN
Status: COMPLETED | OUTPATIENT
Start: 2023-09-28 | End: 2023-09-28

## 2023-09-28 RX ORDER — FLUDEOXYGLUCOSE F 18 200 MCI/ML
12.33 INJECTION, SOLUTION INTRAVENOUS
Status: COMPLETED | OUTPATIENT
Start: 2023-09-28 | End: 2023-09-28

## 2023-09-28 RX ORDER — FENTANYL CITRATE 50 UG/ML
INJECTION, SOLUTION INTRAMUSCULAR; INTRAVENOUS PRN
Status: COMPLETED | OUTPATIENT
Start: 2023-09-28 | End: 2023-09-28

## 2023-09-28 RX ORDER — SODIUM CHLORIDE 9 MG/ML
INJECTION, SOLUTION INTRAVENOUS CONTINUOUS PRN
Status: COMPLETED | OUTPATIENT
Start: 2023-09-28 | End: 2023-09-28

## 2023-09-28 RX ADMIN — SODIUM CHLORIDE 75 ML/HR: 9 INJECTION, SOLUTION INTRAVENOUS at 14:52

## 2023-09-28 RX ADMIN — MIDAZOLAM HYDROCHLORIDE 1 MG: 1 INJECTION, SOLUTION INTRAMUSCULAR; INTRAVENOUS at 14:52

## 2023-09-28 RX ADMIN — DIPHENHYDRAMINE HYDROCHLORIDE 25 MG: 50 INJECTION, SOLUTION INTRAMUSCULAR; INTRAVENOUS at 14:57

## 2023-09-28 RX ADMIN — FENTANYL CITRATE 50 MCG: 50 INJECTION, SOLUTION INTRAMUSCULAR; INTRAVENOUS at 14:52

## 2023-09-28 RX ADMIN — FENTANYL CITRATE 50 MCG: 50 INJECTION, SOLUTION INTRAMUSCULAR; INTRAVENOUS at 15:05

## 2023-09-28 RX ADMIN — MIDAZOLAM HYDROCHLORIDE 1 MG: 1 INJECTION, SOLUTION INTRAMUSCULAR; INTRAVENOUS at 15:01

## 2023-09-28 RX ADMIN — DIATRIZOATE MEGLUMINE AND DIATRIZOATE SODIUM 10 ML: 660; 100 LIQUID ORAL; RECTAL at 10:30

## 2023-09-28 RX ADMIN — FENTANYL CITRATE 50 MCG: 50 INJECTION, SOLUTION INTRAMUSCULAR; INTRAVENOUS at 15:01

## 2023-09-28 RX ADMIN — LIDOCAINE HYDROCHLORIDE 10 ML: 20 INJECTION, SOLUTION INFILTRATION; PERINEURAL at 15:02

## 2023-09-28 RX ADMIN — MIDAZOLAM HYDROCHLORIDE 1 MG: 1 INJECTION, SOLUTION INTRAMUSCULAR; INTRAVENOUS at 15:05

## 2023-09-28 RX ADMIN — SODIUM CHLORIDE, PRESERVATIVE FREE 20 ML: 5 INJECTION INTRAVENOUS at 10:30

## 2023-09-28 RX ADMIN — FLUDEOXYGLUCOSE F 18 12.33 MILLICURIE: 200 INJECTION, SOLUTION INTRAVENOUS at 10:30

## 2023-09-28 NOTE — BRIEF OP NOTE
Department of Interventional Radiology  [phone]        Interventional Radiology Brief Procedure Note    Patient: Jaya Cook MRN: [id number]  SSN: [id number]    YOB: 1980  Age: 39 y.o. Sex: female      Date of Procedure: 9/28/2023    Pre-Procedure Diagnosis: Multiple myeloma    Post-Procedure Diagnosis: SAME    Procedure(s): Image Guided Biopsy    Brief Description of Procedure: CT-guided bone marrow aspiration and core bone biopsy performed on the right posterior iliac bone without complication. Performed By: ARGENTINA Jacome     Assistants: None    Anesthesia:Moderate Sedation was administered to supervision during this review. Time 27 minutes. IV Versed, fentanyl, and Benadryl were given    Estimated Blood Loss: Less than 10ml    Specimens:  Cytology    Implants:  None    Findings: Unremarkable CT-guided bone biopsy aspiration. No postprocedural    Complications: None    Recommendations: Bedrest for minimum 30 minutes.   Leave bandage on for 2 days    Follow Up: prn    Signed By: ARGENTINA Jacome     September 28, 2023

## 2023-09-28 NOTE — H&P
Department of Interventional Radiology  [phone]    History and Physical    Patient:  Jaya Cook MRN:  [id number]  SSN:  [id number]    YOB: 1980  Age:  39 y.o. Sex:  female      Primary Care Provider:  JOSE RAFAEL Bowman NP  Referring Physician:  Juancarlos Benítez MD    Subjective:     Chief Complaint: Multiple myeloma    History of the Present Illness: The patient is a 39 y.o. female who presents CT-guided bone marrow aspiration and core bone biopsy under moderate sedation. Patient has a history of multiple myeloma and continues to get chemotherapy. Patient just had a PET scan today which shows no evidence of interval disease progression. Patient is n.p.o. today and denies taking blood thinners      Past Medical History:   Diagnosis Date    Cancer [facility])     multiple myeloma    Chronic pain      Past Surgical History:   Procedure Laterality Date    BACK SURGERY  04/2020    CT BONE MARROW BIOPSY  10/6/2022    CT BONE MARROW BIOPSY 10/6/2022 [facility] CT SCAN    IR REMOVAL TUNNELED CVC WO PORT PUMP  10/26/2020    IR TUNNELED CATHETER PLACEMENT GREATER THAN 5 YEARS  8/17/2020    IR TUNNELED CATHETER PLACEMENT GREATER THAN 5 YEARS  8/17/2020    IR TUNNELED CATHETER PLACEMENT GREATER THAN 5 YEARS 8/17/2020 [facility] RADIOLOGY SPECIALS    TUBAL LIGATION  08/2007        Review of Systems:    Pertinent items are noted in HPI. Prior to Admission medications    Medication Sig Start Date End Date Taking?  Authorizing Provider   valACYclovir (VALTREX) 500 MG tablet Take 1 tablet by mouth 2 times daily    Historical Provider, MD   promethazine (PHENERGAN) 25 MG tablet Take 1 tablet by mouth every 6 hours as needed for Nausea  Patient not taking: Reported on 8/25/2023    Historical Provider, MD   cyclobenzaprine (FLEXERIL) 10 MG tablet Take 1 tablet by mouth 3 times daily as needed for Muscle spasms    Historical Provider, MD   butalbital-acetaminophen-caffeine-codeine (FIORICET AM    K 3.7 10/06/2022 11:18 AM     09/28/2023 10:13 AM     10/06/2022 11:18 AM    CO2 27 09/28/2023 10:13 AM    CO2 27 10/06/2022 11:18 AM    BUN 11 09/28/2023 10:13 AM    BUN 9 10/06/2022 11:18 AM    GFRAA >60 04/20/2022 10:19 AM    GFRAA >60 10/06/2021 02:22 PM    MG 2.2 10/06/2022 11:18 AM    MG 1.9 10/06/2021 02:22 PM    PHOS 4.4 11/02/2020 08:45 AM    PHOS 3.7 10/26/2020 10:16 AM    GLOB 3.5 09/28/2023 10:13 AM    GLOB 4.2 10/06/2022 11:18 AM    GLOB 3.5 10/06/2022 11:18 AM    ALT 45 09/28/2023 10:13 AM     10/06/2022 11:18 AM     Lab Results   Component Value Date/Time    WBC 7.9 09/28/2023 01:26 PM    WBC 7.8 09/28/2023 10:13 AM    HGB 11.6 09/28/2023 01:26 PM    HGB 11.7 09/28/2023 10:13 AM    HCT 36.0 09/28/2023 01:26 PM    HCT 36.5 09/28/2023 10:13 AM     09/28/2023 01:26 PM     09/28/2023 10:13 AM     Lab Results   Component Value Date/Time    APTT 25.2 09/28/2023 10:13 AM    APTT 23.6 08/16/2020 07:45 AM    INR 0.9 09/28/2023 10:13 AM    INR 1.1 08/16/2020 07:45 AM       Assessment:     41-year-old female with history of multiple myeloma        Plan:     Planned Procedure: CT-guided bone marrow aspiration and core bone biopsy moderate sedation    Risks, benefits, and alternatives reviewed with patient and she agrees to proceed with the procedure.       Signed By: ARGENTINA Allen     September 28, 2023

## 2023-09-29 LAB
KAPPA LC FREE SER-MCNC: 8.7 MG/L (ref 3.3–19.4)
KAPPA LC FREE/LAMBDA FREE SER: 0.85 (ref 0.26–1.65)
LAMBDA LC FREE SERPL-MCNC: 10.2 MG/L (ref 5.7–26.3)

## 2023-10-03 LAB
ALBUMIN SERPL ELPH-MCNC: 3.5 G/DL (ref 2.9–4.4)
ALBUMIN/GLOB SERPL: 1.3 (ref 0.7–1.7)
ALPHA1 GLOB SERPL ELPH-MCNC: 0.2 G/DL (ref 0–0.4)
ALPHA2 GLOB SERPL ELPH-MCNC: 0.8 G/DL (ref 0.4–1)
B-GLOBULIN SERPL ELPH-MCNC: 1.1 G/DL (ref 0.7–1.3)
GAMMA GLOB SERPL ELPH-MCNC: 0.7 G/DL (ref 0.4–1.8)
GLOBULIN SER-MCNC: 2.8 G/DL (ref 2.2–3.9)
IGA SERPL-MCNC: 21 MG/DL (ref 87–352)
IGG SERPL-MCNC: 708 MG/DL (ref 586–1602)
IGM SERPL-MCNC: 17 MG/DL (ref 26–217)
INTERPRETATION SERPL IEP-IMP: ABNORMAL
M PROTEIN SERPL ELPH-MCNC: 0.2 G/DL
PROT SERPL-MCNC: 6.3 G/DL (ref 6–8.5)

## 2023-10-05 LAB
FLOW CYTOMETRY RESULTS: NORMAL
SPECIMEN SOURCE: NORMAL
TEST ORDERED: NORMAL

## 2023-10-11 ENCOUNTER — OFFICE VISIT (OUTPATIENT)
Dept: ONCOLOGY | Age: 43
End: 2023-10-11
Payer: MEDICARE

## 2023-10-11 VITALS
OXYGEN SATURATION: 100 % | RESPIRATION RATE: 16 BRPM | SYSTOLIC BLOOD PRESSURE: 144 MMHG | BODY MASS INDEX: 46.81 KG/M2 | HEIGHT: 64 IN | TEMPERATURE: 98 F | WEIGHT: 274.2 LBS | HEART RATE: 81 BPM | DIASTOLIC BLOOD PRESSURE: 88 MMHG

## 2023-10-11 DIAGNOSIS — C90.00 MULTIPLE MYELOMA NOT HAVING ACHIEVED REMISSION (HCC): Primary | ICD-10-CM

## 2023-10-11 DIAGNOSIS — D84.9 IMMUNOCOMPROMISED (HCC): ICD-10-CM

## 2023-10-11 DIAGNOSIS — Z79.899 HIGH RISK MEDICATION USE: ICD-10-CM

## 2023-10-11 PROCEDURE — G8428 CUR MEDS NOT DOCUMENT: HCPCS | Performed by: INTERNAL MEDICINE

## 2023-10-11 PROCEDURE — 1036F TOBACCO NON-USER: CPT | Performed by: INTERNAL MEDICINE

## 2023-10-11 PROCEDURE — G8484 FLU IMMUNIZE NO ADMIN: HCPCS | Performed by: INTERNAL MEDICINE

## 2023-10-11 PROCEDURE — 99215 OFFICE O/P EST HI 40 MIN: CPT | Performed by: INTERNAL MEDICINE

## 2023-10-11 PROCEDURE — G8417 CALC BMI ABV UP PARAM F/U: HCPCS | Performed by: INTERNAL MEDICINE

## 2023-10-11 ASSESSMENT — PATIENT HEALTH QUESTIONNAIRE - PHQ9
SUM OF ALL RESPONSES TO PHQ QUESTIONS 1-9: 0
SUM OF ALL RESPONSES TO PHQ9 QUESTIONS 1 & 2: 0
2. FEELING DOWN, DEPRESSED OR HOPELESS: 0
SUM OF ALL RESPONSES TO PHQ QUESTIONS 1-9: 0
1. LITTLE INTEREST OR PLEASURE IN DOING THINGS: 0
SUM OF ALL RESPONSES TO PHQ QUESTIONS 1-9: 0
SUM OF ALL RESPONSES TO PHQ QUESTIONS 1-9: 0

## 2023-10-11 NOTE — PROGRESS NOTES
Data Source: Patient, [facility] record. 10/11/2023    2:56 PM    Abdias Torres [id number]    39 y.o. Patient Encounter: [address] Visit    Heme Diagnosis:  MM  Heme History (Copied from prior):   35 female, , smoker, history of hypertension, more recent diagnosis of IgG lambda multiple myeloma, under care of Dr. Jacqueline Mcginnis, now kindly referred to us for consideration of autologous stem cell transplant. Hematologic history is as follows:    - 03/20: Seen in ED with back pain. Imaging included CTA chest showing T7 compression fracture possibly pathologic, along with subtle abnormal lytic lesions of the spine, ribs, sternum and scapula. Referred to [facility] oncology. - 4/3/2020 T7 lesion biopsy confirming lambda light chain restricted plasma cell dyscrasia. Lab work revealed IgG 3000, lambda , SPEP with CAROLINE showing M spike of 2.6 g per DL. Beta-2 microglobulin 2.8, albumin 3.8, slightly elevated calcium at 10.8, mild anemia with hemoglobin in at 10 range. PET scan 4/16/2020 showed diffuse bone metastasis. Bone marrow biopsy 4/17/2020 showing 30-50% cellularity with lambda light chain restricted plasma cell neoplasm (invoving 40%). Cytogenetics complex (hyperdiploid karyotype) with +3, +5, 7, 9, 10, 15, 18, 19 and 21 as well as t(10;21). FISH revealing gain of chromosome 9, 15 (standard risk)   - 4/9/2020 kyphoplasty T7 for pain. - 4/21/2020: Started cycle 1 with Velcade/Dex with plans to add Revlimid once available through mail. Now presents for follow-up. Appears to be tolerating therapy well. Remains on prophylactic medications including acyclovir, as well as low-dose rivaroxaban at 10 mg daily (as patient on Revlimid.). Patient is felt to have ISS stage I disease with high risk cytogenetics.  We discussed various options moving forward including continuation of RVD, maintenance therapy, allogeneic (briefly), as well as autologous stem cell

## 2023-10-11 NOTE — PATIENT INSTRUCTIONS
K/uL Final    MPV 09/28/2023 10.3  9.4 - 12.3 FL Final    nRBC 09/28/2023 0.00  0.0 - 0.2 K/uL Final    **Note: Absolute NRBC parameter is now reported with Hemogram**    Differential Type 09/28/2023 AUTOMATED    Final    Neutrophils % 09/28/2023 57  43 - 78 % Final    Lymphocytes % 09/28/2023 34  13 - 44 % Final    Monocytes % 09/28/2023 8  4.0 - 12.0 % Final    Eosinophils % 09/28/2023 1  0.5 - 7.8 % Final    Basophils % 09/28/2023 0  0.0 - 2.0 % Final    Immature Granulocytes 09/28/2023 0  0.0 - 5.0 % Final    Neutrophils Absolute 09/28/2023 4.5  1.7 - 8.2 K/UL Final    Lymphocytes Absolute 09/28/2023 2.7  0.5 - 4.6 K/UL Final    Monocytes Absolute 09/28/2023 0.6  0.1 - 1.3 K/UL Final    Eosinophils Absolute 09/28/2023 0.1  0.0 - 0.8 K/UL Final    Basophils Absolute 09/28/2023 0.0  0.0 - 0.2 K/UL Final    Absolute Immature Granulocyte 09/28/2023 0.0  0.0 - 0.5 K/UL Final    Bone Marrow Prep & Zuleika [place] Stain 09/28/2023 FOR HEMATOLOGY SERVICES RENDERED    Final    SILVA GIEMSA STAIN    Test Ordered 09/28/2023 FLOW CYTOMETRY MS    Final    Source 09/28/2023 BONE MARROW    Final    FLOW CYTOMETRY RESULTS. 09/28/2023 Test results scanned into EPIC    Final        Treatment Summary has been discussed and given to patient: n/a        -------------------------------------------------------------------------------------------------------------------  Please call our office at [phone] if you have any  of the following symptoms:   Fever of 100.5 or greater  Chills  Shortness of breath  Swelling or pain in one leg    After office hours an answering service is available and will contact a provider for emergencies or if you are experiencing any of the above symptoms. Patient did express an interest in My Chart. My Chart log in information explained on the after visit summary printout at the [email or web address] desk.     Luis Mcghee RN

## 2024-04-04 ENCOUNTER — HOSPITAL ENCOUNTER (OUTPATIENT)
Dept: CT IMAGING | Age: 44
End: 2024-04-04
Attending: INTERNAL MEDICINE
Payer: MEDICARE

## 2024-04-04 ENCOUNTER — HOSPITAL ENCOUNTER (OUTPATIENT)
Dept: PET IMAGING | Age: 44
Discharge: HOME | End: 2024-04-04
Payer: MEDICARE

## 2024-04-04 ENCOUNTER — HOSPITAL ENCOUNTER (OUTPATIENT)
Dept: LAB | Age: 44
Discharge: HOME | End: 2024-04-04
Payer: MEDICARE

## 2024-04-04 VITALS
DIASTOLIC BLOOD PRESSURE: 79 MMHG | HEART RATE: 81 BPM | RESPIRATION RATE: 16 BRPM | TEMPERATURE: 97.7 F | SYSTOLIC BLOOD PRESSURE: 146 MMHG | OXYGEN SATURATION: 100 %

## 2024-04-04 DIAGNOSIS — C90.00 MULTIPLE MYELOMA NOT HAVING ACHIEVED REMISSION (HCC): ICD-10-CM

## 2024-04-04 LAB
ALBUMIN SERPL-MCNC: 3.8 G/DL (ref 3.5–5)
ALBUMIN/GLOB SERPL: 1.1 (ref 0.4–1.6)
ALP SERPL-CCNC: 117 U/L (ref 50–136)
ALT SERPL-CCNC: 31 U/L (ref 12–65)
ANION GAP SERPL CALC-SCNC: 7 MMOL/L (ref 2–11)
AST SERPL-CCNC: 26 U/L (ref 15–37)
BASOPHILS # BLD: 0 K/UL (ref 0–0.2)
BASOPHILS # BLD: 0 K/UL (ref 0–0.2)
BASOPHILS NFR BLD: 0 % (ref 0–2)
BASOPHILS NFR BLD: 1 % (ref 0–2)
BILIRUB SERPL-MCNC: 0.4 MG/DL (ref 0.2–1.1)
BONE MARROW PREP & WRIGHT STAIN: NORMAL
BUN SERPL-MCNC: 9 MG/DL (ref 6–23)
CALCIUM SERPL-MCNC: 9.4 MG/DL (ref 8.3–10.4)
CHLORIDE SERPL-SCNC: 107 MMOL/L (ref 103–113)
CO2 SERPL-SCNC: 26 MMOL/L (ref 21–32)
CREAT SERPL-MCNC: 0.8 MG/DL (ref 0.6–1)
DIFFERENTIAL METHOD BLD: NORMAL
DIFFERENTIAL METHOD BLD: NORMAL
EOSINOPHIL # BLD: 0.1 K/UL (ref 0–0.8)
EOSINOPHIL # BLD: 0.1 K/UL (ref 0–0.8)
EOSINOPHIL NFR BLD: 1 % (ref 0.5–7.8)
EOSINOPHIL NFR BLD: 1 % (ref 0.5–7.8)
ERYTHROCYTE [DISTWIDTH] IN BLOOD BY AUTOMATED COUNT: 13.8 % (ref 11.9–14.6)
ERYTHROCYTE [DISTWIDTH] IN BLOOD BY AUTOMATED COUNT: 13.9 % (ref 11.9–14.6)
GLOBULIN SER CALC-MCNC: 3.5 G/DL (ref 2.8–4.5)
GLUCOSE BLD STRIP.AUTO-MCNC: 97 MG/DL (ref 65–100)
GLUCOSE SERPL-MCNC: 95 MG/DL (ref 65–100)
HCT VFR BLD AUTO: 38.7 % (ref 35.8–46.3)
HCT VFR BLD AUTO: 39.7 % (ref 35.8–46.3)
HGB BLD-MCNC: 12.7 G/DL (ref 11.7–15.4)
HGB BLD-MCNC: 12.8 G/DL (ref 11.7–15.4)
IMM GRANULOCYTES # BLD AUTO: 0 K/UL (ref 0–0.5)
IMM GRANULOCYTES # BLD AUTO: 0 K/UL (ref 0–0.5)
IMM GRANULOCYTES NFR BLD AUTO: 0 % (ref 0–5)
IMM GRANULOCYTES NFR BLD AUTO: 0 % (ref 0–5)
INR PPP: 1
LYMPHOCYTES # BLD: 2.8 K/UL (ref 0.5–4.6)
LYMPHOCYTES # BLD: 3 K/UL (ref 0.5–4.6)
LYMPHOCYTES NFR BLD: 35 % (ref 13–44)
LYMPHOCYTES NFR BLD: 37 % (ref 13–44)
MCH RBC QN AUTO: 30 PG (ref 26.1–32.9)
MCH RBC QN AUTO: 30.1 PG (ref 26.1–32.9)
MCHC RBC AUTO-ENTMCNC: 32.2 G/DL (ref 31.4–35)
MCHC RBC AUTO-ENTMCNC: 32.8 G/DL (ref 31.4–35)
MCV RBC AUTO: 91.7 FL (ref 82–102)
MCV RBC AUTO: 93 FL (ref 82–102)
MONOCYTES # BLD: 0.6 K/UL (ref 0.1–1.3)
MONOCYTES # BLD: 0.6 K/UL (ref 0.1–1.3)
MONOCYTES NFR BLD: 7 % (ref 4–12)
MONOCYTES NFR BLD: 7 % (ref 4–12)
NEUTS SEG # BLD: 4.2 K/UL (ref 1.7–8.2)
NEUTS SEG # BLD: 4.8 K/UL (ref 1.7–8.2)
NEUTS SEG NFR BLD: 54 % (ref 43–78)
NEUTS SEG NFR BLD: 57 % (ref 43–78)
NRBC # BLD: 0 K/UL (ref 0–0.2)
NRBC # BLD: 0 K/UL (ref 0–0.2)
PLATELET # BLD AUTO: 237 K/UL (ref 150–450)
PLATELET # BLD AUTO: 254 K/UL (ref 150–450)
PMV BLD AUTO: 10.1 FL (ref 9.4–12.3)
PMV BLD AUTO: 10.2 FL (ref 9.4–12.3)
POTASSIUM SERPL-SCNC: 4.2 MMOL/L (ref 3.5–5.1)
PROT SERPL-MCNC: 7.3 G/DL (ref 6.3–8.2)
PROTHROMBIN TIME: 12.7 SEC (ref 11.3–14.9)
RBC # BLD AUTO: 4.22 M/UL (ref 4.05–5.2)
RBC # BLD AUTO: 4.27 M/UL (ref 4.05–5.2)
SERVICE CMNT-IMP: NORMAL
SODIUM SERPL-SCNC: 140 MMOL/L (ref 136–146)
WBC # BLD AUTO: 7.7 K/UL (ref 4.3–11.1)
WBC # BLD AUTO: 8.5 K/UL (ref 4.3–11.1)

## 2024-04-04 PROCEDURE — 85025 COMPLETE CBC W/AUTO DIFF WBC: CPT

## 2024-04-04 PROCEDURE — 88311 DECALCIFY TISSUE: CPT

## 2024-04-04 PROCEDURE — 6360000004 HC RX CONTRAST MEDICATION: Performed by: INTERNAL MEDICINE

## 2024-04-04 PROCEDURE — 77012 CT SCAN FOR NEEDLE BIOPSY: CPT | Performed by: RADIOLOGY

## 2024-04-04 PROCEDURE — 2580000003 HC RX 258: Performed by: INTERNAL MEDICINE

## 2024-04-04 PROCEDURE — 3430000000 HC RX DIAGNOSTIC RADIOPHARMACEUTICAL: Performed by: INTERNAL MEDICINE

## 2024-04-04 PROCEDURE — 6360000002 HC RX W HCPCS: Performed by: RADIOLOGY

## 2024-04-04 PROCEDURE — 78815 PET IMAGE W/CT SKULL-THIGH: CPT

## 2024-04-04 PROCEDURE — 2580000003 HC RX 258: Performed by: RADIOLOGY

## 2024-04-04 PROCEDURE — 83521 IG LIGHT CHAINS FREE EACH: CPT

## 2024-04-04 PROCEDURE — 84165 PROTEIN E-PHORESIS SERUM: CPT

## 2024-04-04 PROCEDURE — A9609 HC RX DIAGNOSTIC RADIOPHARMACEUTICAL: HCPCS | Performed by: INTERNAL MEDICINE

## 2024-04-04 PROCEDURE — C1830 POWER BONE MARROW BX NEEDLE: HCPCS

## 2024-04-04 PROCEDURE — 2500000003 HC RX 250 WO HCPCS: Performed by: PHYSICIAN ASSISTANT

## 2024-04-04 PROCEDURE — 36415 COLL VENOUS BLD VENIPUNCTURE: CPT

## 2024-04-04 PROCEDURE — 82784 ASSAY IGA/IGD/IGG/IGM EACH: CPT

## 2024-04-04 PROCEDURE — 82962 GLUCOSE BLOOD TEST: CPT

## 2024-04-04 PROCEDURE — 88305 TISSUE EXAM BY PATHOLOGIST: CPT

## 2024-04-04 PROCEDURE — 86334 IMMUNOFIX E-PHORESIS SERUM: CPT

## 2024-04-04 PROCEDURE — 38222 DX BONE MARROW BX & ASPIR: CPT | Performed by: RADIOLOGY

## 2024-04-04 PROCEDURE — 80053 COMPREHEN METABOLIC PANEL: CPT

## 2024-04-04 PROCEDURE — 88313 SPECIAL STAINS GROUP 2: CPT

## 2024-04-04 PROCEDURE — 85610 PROTHROMBIN TIME: CPT

## 2024-04-04 RX ORDER — FLUDEOXYGLUCOSE F 18 200 MCI/ML
11.76 INJECTION, SOLUTION INTRAVENOUS
Status: COMPLETED | OUTPATIENT
Start: 2024-04-04 | End: 2024-04-04

## 2024-04-04 RX ORDER — LIDOCAINE HYDROCHLORIDE 20 MG/ML
INJECTION, SOLUTION INFILTRATION; PERINEURAL PRN
Status: COMPLETED | OUTPATIENT
Start: 2024-04-04 | End: 2024-04-04

## 2024-04-04 RX ORDER — MIDAZOLAM HYDROCHLORIDE 2 MG/2ML
INJECTION, SOLUTION INTRAMUSCULAR; INTRAVENOUS PRN
Status: COMPLETED | OUTPATIENT
Start: 2024-04-04 | End: 2024-04-04

## 2024-04-04 RX ORDER — SODIUM CHLORIDE 0.9 % (FLUSH) 0.9 %
20 SYRINGE (ML) INJECTION AS NEEDED
Status: ACTIVE | OUTPATIENT
Start: 2024-04-04

## 2024-04-04 RX ORDER — SODIUM CHLORIDE 9 MG/ML
INJECTION, SOLUTION INTRAVENOUS CONTINUOUS PRN
Status: COMPLETED | OUTPATIENT
Start: 2024-04-04 | End: 2024-04-04

## 2024-04-04 RX ORDER — FENTANYL CITRATE 50 UG/ML
INJECTION, SOLUTION INTRAMUSCULAR; INTRAVENOUS PRN
Status: COMPLETED | OUTPATIENT
Start: 2024-04-04 | End: 2024-04-04

## 2024-04-04 RX ADMIN — DIATRIZOATE MEGLUMINE AND DIATRIZOATE SODIUM 10 ML: 660; 100 LIQUID ORAL; RECTAL at 09:04

## 2024-04-04 RX ADMIN — FENTANYL CITRATE 50 MCG: 50 INJECTION, SOLUTION INTRAMUSCULAR; INTRAVENOUS at 13:17

## 2024-04-04 RX ADMIN — SODIUM CHLORIDE 75 ML/HR: 9 INJECTION, SOLUTION INTRAVENOUS at 13:15

## 2024-04-04 RX ADMIN — FLUDEOXYGLUCOSE F 18 11.76 MILLICURIE: 200 INJECTION, SOLUTION INTRAVENOUS at 09:04

## 2024-04-04 RX ADMIN — MIDAZOLAM HYDROCHLORIDE 1 MG: 1 INJECTION, SOLUTION INTRAMUSCULAR; INTRAVENOUS at 13:28

## 2024-04-04 RX ADMIN — FENTANYL CITRATE 50 MCG: 50 INJECTION, SOLUTION INTRAMUSCULAR; INTRAVENOUS at 13:31

## 2024-04-04 RX ADMIN — FENTANYL CITRATE 50 MCG: 50 INJECTION, SOLUTION INTRAMUSCULAR; INTRAVENOUS at 13:28

## 2024-04-04 RX ADMIN — MIDAZOLAM HYDROCHLORIDE 1 MG: 1 INJECTION, SOLUTION INTRAMUSCULAR; INTRAVENOUS at 13:17

## 2024-04-04 RX ADMIN — LIDOCAINE HYDROCHLORIDE 15 ML: 20 INJECTION, SOLUTION INFILTRATION; PERINEURAL at 13:30

## 2024-04-04 RX ADMIN — MIDAZOLAM HYDROCHLORIDE 1 MG: 1 INJECTION, SOLUTION INTRAMUSCULAR; INTRAVENOUS at 13:31

## 2024-04-04 RX ADMIN — SODIUM CHLORIDE, PRESERVATIVE FREE 20 ML: 5 INJECTION INTRAVENOUS at 09:04

## 2024-04-04 ASSESSMENT — PAIN SCALES - GENERAL: PAINLEVEL_OUTOF10: 5

## 2024-04-04 ASSESSMENT — PAIN DESCRIPTION - DESCRIPTORS: DESCRIPTORS: ACHING

## 2024-04-04 ASSESSMENT — PAIN DESCRIPTION - LOCATION: LOCATION: BACK

## 2024-04-04 ASSESSMENT — PAIN DESCRIPTION - DIRECTION: RADIATING_TOWARDS: LEGS

## 2024-04-04 ASSESSMENT — PAIN DESCRIPTION - PAIN TYPE: TYPE: CHRONIC PAIN

## 2024-04-04 NOTE — H&P
[facility]  Department of Interventional Radiology  [phone]    History and Physical    Patient:  Liz Liu MRN:  [id number]  SSN:  [id number]    YOB: 1980  Age:  43 y.o.  Sex:  female      Primary Care Provider:  Inge Smith APRN - NP  Referring Physician:  Monica Baca MD    Subjective:     Chief Complaint: multiple myeloma    History of the Present Illness:  The patient is a 43 y.o. female with multiple myeloma who presents for BMBX.  NPO.        Past Medical History:   Diagnosis Date    Cancer (HCC)     multiple myeloma    Chronic pain      Past Surgical History:   Procedure Laterality Date    BACK SURGERY  04/2020    CT BONE MARROW BIOPSY  10/6/2022    CT BONE MARROW BIOPSY 10/6/2022 [facility] CT SCAN    IR REMOVAL TUNNELED CVC WO PORT PUMP  10/26/2020    IR TUNNELED CATHETER PLACEMENT GREATER THAN 5 YEARS  8/17/2020    IR TUNNELED CATHETER PLACEMENT GREATER THAN 5 YEARS  8/17/2020    IR TUNNELED CATHETER PLACEMENT GREATER THAN 5 YEARS 8/17/2020 [facility] RADIOLOG[facility] SPECIALS    TUBAL LIGATION  08/2007        Review of Systems:    Pertinent items are noted in HPI.    Prior to Admission medications    Medication Sig Start Date End Date Taking? Authorizing Provider   valACYclovir (VALTREX) 500 MG tablet Take 1 tablet by mouth 2 times daily    Vaughn Campbell MD   promethazine (PHENERGAN) 25 MG tablet Take 1 tablet by mouth every 6 hours as needed for Nausea  Patient not taking: Reported on 8/25/2023    Vaughn Campbell MD   cyclobenzaprine (FLEXERIL) 10 MG tablet Take 1 tablet by mouth 3 times daily as needed for Muscle spasms    Vaughn Campbell MD   butalbital-acetaminophen-caffeine-codeine (FIORICET WITH CODEINE) -70-30 MG per capsule Take 1 capsule by mouth every 4 hours as needed for Headaches.    Vaughn Campbell MD   losartan (COZAAR) 50 MG tablet Take 1 tablet by mouth daily 5/31/23   Jacob Laguerre MD

## 2024-04-04 NOTE — BRIEF OP NOTE
[facility]  Department of Interventional Radiology  [phone]        Interventional Radiology Brief Procedure Note    Patient: Liz Liu MRN: [id number]  SSN: [id number]    YOB: 1980  Age: 43 y.o.  Sex: female      Date of Procedure: 4/4/2024    Pre-Procedure Diagnosis: multiple myeloma    Post-Procedure Diagnosis: SAME    Procedure(s): Image Guided Biopsy    Brief Description of Procedure: CT guided BMBX    Performed By: Lizett Franco PA-C     Assistants: None    Anesthesia:Moderate Sedation FELISA Apodaca MD    Estimated Blood Loss: Less than 10ml    Specimens:   to lab    Implants:  none    Findings: no post bx bleeding    Complications: None    Recommendations: routine wound care     Follow Up: prn    Signed By: Lizett Franco PA-C     April 4, 2024

## 2024-04-04 NOTE — FLOWSHEET NOTE
Recovery period without difficulty. Pt alert and oriented and denies pain. Dressing is clean, dry, and intact. Reviewed discharge instructions with patient and mother, both verbalized understanding. [facility] Vital signs and Karolyn score completed.

## 2024-04-04 NOTE — OR NURSING
Prep complete. Patient ready for procedure. Blood obtained at this time and sent to lab for ordered tests.

## 2024-04-04 NOTE — PRE SEDATION
Sedation Pre-Procedure Note    Patient Name: Liz Liu   YOB: 1980  Room/Bed: Room/bed info not found  Medical Record Number: [id number]  Date: 4/4/2024   Time: 1:08 PM       Indication:  multiple myeloma    Consent: I have discussed with the patient and/or the patient representative the indication, alternatives, and the possible risks and/or complications of the planned procedure and the anesthesia methods. The patient and/or patient representative appear to understand and agree to proceed.    Vital Signs:   Vitals:    04/04/24 1245   BP: (!) 134/90   Pulse: 85   Resp: 18   Temp: 97.7 °F (36.5 °C)   SpO2: 100%       Past Medical History:   has a past medical history of Cancer (HCC) and Chronic pain.    Past Surgical History:   has a past surgical history that includes Tubal ligation (08/2007); back surgery (04/2020); IR TUNNELED CVC PLACE WO SQ PORT/PUMP > 5 YEARS (8/17/2020); IR REMOVE TUNNELED CVAD WO SQ PORT/PUMP (10/26/2020); IR TUNNELED CVC PLACE WO SQ PORT/PUMP > 5 YEARS (8/17/2020); and CT BIOPSY BONE MARROW (10/6/2022).    Medications:   Scheduled Meds:   Continuous Infusions:   PRN Meds:   Home Meds:   Prior to Admission medications    Medication Sig Start Date End Date Taking? Authorizing Provider   valACYclovir (VALTREX) 500 MG tablet Take 1 tablet by mouth 2 times daily    Vaughn Campbell MD   promethazine (PHENERGAN) 25 MG tablet Take 1 tablet by mouth every 6 hours as needed for Nausea  Patient not taking: Reported on 8/25/2023    Vaughn Campbell MD   cyclobenzaprine (FLEXERIL) 10 MG tablet Take 1 tablet by mouth 3 times daily as needed for Muscle spasms    Vaughn Campbell MD   butalbital-acetaminophen-caffeine-codeine (FIORICET WITH CODEINE) -31-30 MG per capsule Take 1 capsule by mouth every 4 hours as needed for Headaches.    Vaughn Campbell MD   losartan (COZAAR) 50 MG tablet Take 1 tablet by mouth daily 5/31/23   Jacob Laguerre MD    carvedilol (COREG) 3.125 MG tablet Take 1 tablet by mouth 2 times daily (with meals) 4/25/23   Jacob Laguerre MD   acetaminophen (TYLENOL) 500 MG tablet Take by mouth every 6 hours as needed    Automatic Reconciliation, Ar   aspirin 81 MG EC tablet Take 1 tablet by mouth daily 1/29/21   Automatic Reconciliation, Ar   HYDROcodone-acetaminophen (NORCO)  MG per tablet Take 1 tablet by mouth every 6 hours as needed.    Automatic Reconciliation, Ar       Pre-Sedation Documentation and Exam:   I have personally completed a history, physical exam & review of systems for this patient (see notes).    Mallampati Airway Assessment:  normal, dentition not prohibitive, normal neck range of motion, Mallampati Class I - (soft palate, fauces, uvula & anterior/posterior tonsillar pillars are visible)    Prior History of Anesthesia Complications:   none    ASA Classification:  Class 3 - A patient with severe systemic disease that limits activity but is not incapacitating    Sedation/ Anesthesia Plan:   intravenous sedation    Medications Planned:   midazolam (Versed) intravenously and fentanyl intravenously    Patient is an appropriate candidate for plan of sedation: yes    Electronically signed by Lizett Franco PA-C on 4/4/2024 at 1:08 PM

## 2024-04-04 NOTE — DISCHARGE INSTRUCTIONS
If you have any questions about your procedure, please call the Interventional Radiology department at [phone].     After business hours (5pm) and weekends, call the answering service at [phone] and ask for the Radiologist on call to be paged.         Si tiene Preguntas acerca del procedimiento, por favor llame al departamento de Radiología Intervencional al [phone].     Después de horas de oficina (5 pm) y los fines de semana, llamar al servicio de llamadas al [phone] y pregunte por el Radiologo de ariel.

## 2024-04-05 LAB
KAPPA LC FREE SER-MCNC: 9.8 MG/L (ref 3.3–19.4)
KAPPA LC FREE/LAMBDA FREE SER: 0.59 (ref 0.26–1.65)
LAMBDA LC FREE SERPL-MCNC: 16.6 MG/L (ref 5.7–26.3)

## 2024-04-08 LAB
ALBUMIN SERPL ELPH-MCNC: 4 G/DL (ref 2.9–4.4)
ALBUMIN/GLOB SERPL: 1.7 (ref 0.7–1.7)
ALPHA1 GLOB SERPL ELPH-MCNC: 0.2 G/DL (ref 0–0.4)
ALPHA2 GLOB SERPL ELPH-MCNC: 0.7 G/DL (ref 0.4–1)
B-GLOBULIN SERPL ELPH-MCNC: 1.1 G/DL (ref 0.7–1.3)
GAMMA GLOB SERPL ELPH-MCNC: 0.6 G/DL (ref 0.4–1.8)
GLOBULIN SER-MCNC: 2.5 G/DL (ref 2.2–3.9)
IGA SERPL-MCNC: 20 MG/DL (ref 87–352)
IGG SERPL-MCNC: 765 MG/DL (ref 586–1602)
IGM SERPL-MCNC: 25 MG/DL (ref 26–217)
INTERPRETATION SERPL IEP-IMP: ABNORMAL
M PROTEIN SERPL ELPH-MCNC: 0.2 G/DL
PROT SERPL-MCNC: 6.5 G/DL (ref 6–8.5)

## 2024-04-09 LAB
FLOW CYTOMETRY RESULTS: NORMAL
SPECIMEN SOURCE: NORMAL
TEST ORDERED: NORMAL

## 2024-04-18 ENCOUNTER — OFFICE VISIT (OUTPATIENT)
Dept: ONCOLOGY | Age: 44
End: 2024-04-18
Payer: MEDICARE

## 2024-04-18 VITALS
OXYGEN SATURATION: 100 % | DIASTOLIC BLOOD PRESSURE: 80 MMHG | SYSTOLIC BLOOD PRESSURE: 146 MMHG | WEIGHT: 283.3 LBS | RESPIRATION RATE: 18 BRPM | HEIGHT: 64 IN | HEART RATE: 76 BPM | TEMPERATURE: 98.1 F | BODY MASS INDEX: 48.36 KG/M2

## 2024-04-18 DIAGNOSIS — C90.00 MULTIPLE MYELOMA NOT HAVING ACHIEVED REMISSION (HCC): Primary | ICD-10-CM

## 2024-04-18 DIAGNOSIS — D84.9 IMMUNOCOMPROMISED (HCC): ICD-10-CM

## 2024-04-18 DIAGNOSIS — R53.83 OTHER FATIGUE: ICD-10-CM

## 2024-04-18 DIAGNOSIS — G62.9 POLYNEUROPATHY, UNSPECIFIED: ICD-10-CM

## 2024-04-18 DIAGNOSIS — Z79.899 HIGH RISK MEDICATION USE: ICD-10-CM

## 2024-04-18 PROCEDURE — G8428 CUR MEDS NOT DOCUMENT: HCPCS | Performed by: INTERNAL MEDICINE

## 2024-04-18 PROCEDURE — 99214 OFFICE O/P EST MOD 30 MIN: CPT | Performed by: INTERNAL MEDICINE

## 2024-04-18 PROCEDURE — G8417 CALC BMI ABV UP PARAM F/U: HCPCS | Performed by: INTERNAL MEDICINE

## 2024-04-18 PROCEDURE — 1036F TOBACCO NON-USER: CPT | Performed by: INTERNAL MEDICINE

## 2024-04-18 RX ORDER — OXYCODONE AND ACETAMINOPHEN 10; 325 MG/1; MG/1
1 TABLET ORAL 2 TIMES DAILY PRN
COMMUNITY
Start: 2024-02-22

## 2024-04-18 ASSESSMENT — PATIENT HEALTH QUESTIONNAIRE - PHQ9
SUM OF ALL RESPONSES TO PHQ QUESTIONS 1-9: 0
SUM OF ALL RESPONSES TO PHQ QUESTIONS 1-9: 0
SUM OF ALL RESPONSES TO PHQ9 QUESTIONS 1 & 2: 0
SUM OF ALL RESPONSES TO PHQ QUESTIONS 1-9: 0
1. LITTLE INTEREST OR PLEASURE IN DOING THINGS: NOT AT ALL
SUM OF ALL RESPONSES TO PHQ QUESTIONS 1-9: 0
2. FEELING DOWN, DEPRESSED OR HOPELESS: NOT AT ALL

## 2024-04-18 NOTE — PATIENT INSTRUCTIONS
Patient Information from Today's Visit    - PET and bone marrow biopsy reviewed.   - Dr. Baca will talk with Dr. Garza about potentially going up on the velcade dosing  - we will plan on doing another bone marrow and PET scan in 6 months  - call Dr. Laguerre's office to reschedule cardiology appt- 305-    Treatment Summary has been discussed and given to patient:No  Follow Up: in 6 months    Please refer to After Visit Summary or [facility] for upcoming appointment information. If you have any questions regarding your upcoming schedule please reach out to your care team through [employer] or call [phone].          -------------------------------------------------------------------------------------------------------------------  Please call our office at [phone] if you have any  of the following symptoms:   Fever of 100.5 or greater  Chills  Shortness of breath  Swelling or pain in one leg    After office hours an answering service is available and will contact a provider for emergencies or if you are experiencing any of the above symptoms.    Patient did express an interest in My Chart.  My Chart log in information explained on the after visit summary printout at the check-out desk.    Angie Gutierrez RN

## 2024-04-18 NOTE — PROGRESS NOTES
Repeat bone marrow and imaging in 1 year's time.    4/20/2022: Reports doing well.  Tapering down on her neuropathy medications.  Remains on Revlimid maintenance, regularly follows locally with Dr. Garza.  Recent blood work under care everywhere reviewed, lambda LC staying low, SPEP negative.  Recent PET scan without clear evidence of disease progression.  Patient reassured, we will proceed with post transplant 14-month vaccinations today.  Given responding disease, she will continue with Revlimid maintenance.  We will see her back in 6 months with restaging studies as well as PFTs.    10/20/22: Patient seen over telemedicine.  Reports doing reasonably.  Reasonable p.o. intake and mobility.  Remains on Revlimid maintenance which she is tolerating well.  Neuropathy over time improved and now she is off gabapentin and Effexor.  Recent blood work reviewed, lambda LC at 28, SPEP with low-level M spike which appears new at 0.5 g per DL.  PFTs with mildly reduced DLCO, possibly early emphysema, CHF, anemia or pulmonary vascular disease.  2D echo not done.  Bone marrow biopsy reported as suboptimal with 20 to 30% cellularity with 10% involvement with plasma cells, with IHC failing to demonstrate monoclonality, felt likely due to sampling error.  FISH negative.  Flow with monoclonal population detected.  Will clarify with hematopathology referral for repeat bone marrow.  Suspicion is high for relapsing disease in which case Shahrzad-carfilzomib-Dex or Car-Pom-Dex will be reasonable options.  We may choose to do a second SCT in such a case.     10/11/2023: Since last visit, patient on Shahrzad-RVD, and more recently on Shahrzad-VD (Velcade dose reduced every other week).  Herself reports doing well, overall her neuropathy has much improved and has stayed such.  Seeing cardiology regularly, every 6 weeks now for cardiomyopathy.  Recent labs with paraproteinemia staying low, PET scan negative, bone marrow biopsy with 40 to 50% cellularity

## 2024-10-02 ENCOUNTER — HOSPITAL ENCOUNTER (OUTPATIENT)
Dept: CT IMAGING | Age: 44
Discharge: HOME | End: 2024-10-05
Attending: INTERNAL MEDICINE
Payer: MEDICARE

## 2024-10-02 DIAGNOSIS — C90.00 MULTIPLE MYELOMA NOT HAVING ACHIEVED REMISSION (HCC): ICD-10-CM

## 2024-10-02 PROCEDURE — 99152 MOD SED SAME PHYS/QHP 5/>YRS: CPT

## 2024-10-02 PROCEDURE — 85025 COMPLETE CBC W/AUTO DIFF WBC: CPT

## 2024-10-02 PROCEDURE — 88311 DECALCIFY TISSUE: CPT

## 2024-10-02 PROCEDURE — 88305 TISSUE EXAM BY PATHOLOGIST: CPT

## 2024-10-02 PROCEDURE — 88313 SPECIAL STAINS GROUP 2: CPT

## 2024-10-04 LAB
BASOPHILS # BLD: 0 K/UL (ref 0–0.2)
BASOPHILS NFR BLD: 0 % (ref 0–2)
DIFFERENTIAL METHOD BLD: ABNORMAL
EOSINOPHIL # BLD: 0 K/UL (ref 0–0.8)
EOSINOPHIL NFR BLD: 0 % (ref 0.5–7.8)
ERYTHROCYTE [DISTWIDTH] IN BLOOD BY AUTOMATED COUNT: 14.5 % (ref 11.9–14.6)
HCT VFR BLD AUTO: 37.8 % (ref 35.8–46.3)
HGB BLD-MCNC: 11.9 G/DL (ref 11.7–15.4)
IMM GRANULOCYTES # BLD AUTO: 0 K/UL (ref 0–0.5)
IMM GRANULOCYTES NFR BLD AUTO: 0 % (ref 0–5)
LYMPHOCYTES # BLD: 2.3 K/UL (ref 0.5–4.6)
LYMPHOCYTES NFR BLD: 32 % (ref 13–44)
MCH RBC QN AUTO: 29.6 PG (ref 26.1–32.9)
MCHC RBC AUTO-ENTMCNC: 31.5 G/DL (ref 31.4–35)
MCV RBC AUTO: 94 FL (ref 82–102)
MONOCYTES # BLD: 0.5 K/UL (ref 0.1–1.3)
MONOCYTES NFR BLD: 7 % (ref 4–12)
NEUTS SEG # BLD: 4.4 K/UL (ref 1.7–8.2)
NEUTS SEG NFR BLD: 60 % (ref 43–78)
NRBC # BLD: 0 K/UL (ref 0–0.2)
PLATELET # BLD AUTO: 267 K/UL (ref 150–450)
PMV BLD AUTO: 10.5 FL (ref 9.4–12.3)
RBC # BLD AUTO: 4.02 M/UL (ref 4.05–5.2)
WBC # BLD AUTO: 7.3 K/UL (ref 4.3–11.1)

## 2024-10-14 ENCOUNTER — HOSPITAL ENCOUNTER (OUTPATIENT)
Dept: PET IMAGING | Age: 44
Discharge: HOME | End: 2024-10-17
Payer: MEDICARE

## 2024-10-14 DIAGNOSIS — C90.00 MULTIPLE MYELOMA NOT HAVING ACHIEVED REMISSION (HCC): ICD-10-CM

## 2024-10-14 LAB
GLUCOSE BLD STRIP.AUTO-MCNC: 95 MG/DL (ref 65–100)
SERVICE CMNT-IMP: NORMAL

## 2024-10-14 PROCEDURE — 6360000004 HC RX CONTRAST MEDICATION: Performed by: INTERNAL MEDICINE

## 2024-10-14 PROCEDURE — 2580000003 HC RX 258: Performed by: INTERNAL MEDICINE

## 2024-10-14 PROCEDURE — 78815 PET IMAGE W/CT SKULL-THIGH: CPT

## 2024-10-14 PROCEDURE — A9609 HC RX DIAGNOSTIC RADIOPHARMACEUTICAL: HCPCS | Performed by: INTERNAL MEDICINE

## 2024-10-14 PROCEDURE — 82962 GLUCOSE BLOOD TEST: CPT

## 2024-10-14 PROCEDURE — 3430000000 HC RX DIAGNOSTIC RADIOPHARMACEUTICAL: Performed by: INTERNAL MEDICINE

## 2024-10-14 RX ORDER — SODIUM CHLORIDE 0.9 % (FLUSH) 0.9 %
10 SYRINGE (ML) INJECTION ONCE AS NEEDED
Status: COMPLETED | OUTPATIENT
Start: 2024-10-14 | End: 2024-10-14

## 2024-10-14 RX ORDER — DIATRIZOATE MEGLUMINE AND DIATRIZOATE SODIUM 660; 100 MG/ML; MG/ML
10 SOLUTION ORAL; RECTAL
Status: DISCONTINUED | OUTPATIENT
Start: 2024-10-14 | End: 2024-10-18 | Stop reason: HOSPADM

## 2024-10-14 RX ORDER — FLUDEOXYGLUCOSE F 18 200 MCI/ML
13.18 INJECTION, SOLUTION INTRAVENOUS
Status: COMPLETED | OUTPATIENT
Start: 2024-10-14 | End: 2024-10-14

## 2024-10-14 RX ADMIN — DIATRIZOATE MEGLUMINE AND DIATRIZOATE SODIUM 10 ML: 660; 100 LIQUID ORAL; RECTAL at 11:22

## 2024-10-14 RX ADMIN — SODIUM CHLORIDE, PRESERVATIVE FREE 10 ML: 5 INJECTION INTRAVENOUS at 11:22

## 2024-10-14 RX ADMIN — FLUDEOXYGLUCOSE F 18 13.18 MILLICURIE: 200 INJECTION, SOLUTION INTRAVENOUS at 11:22

## 2024-10-24 ENCOUNTER — OFFICE VISIT (OUTPATIENT)
Dept: ONCOLOGY | Age: 44
End: 2024-10-24
Payer: MEDICARE

## 2024-10-24 VITALS
HEIGHT: 64 IN | WEIGHT: 277.8 LBS | BODY MASS INDEX: 47.43 KG/M2 | SYSTOLIC BLOOD PRESSURE: 153 MMHG | RESPIRATION RATE: 16 BRPM | OXYGEN SATURATION: 99 % | HEART RATE: 74 BPM | DIASTOLIC BLOOD PRESSURE: 97 MMHG | TEMPERATURE: 98.2 F

## 2024-10-24 DIAGNOSIS — D84.9 IMMUNOCOMPROMISED (HCC): ICD-10-CM

## 2024-10-24 DIAGNOSIS — R53.83 OTHER FATIGUE: ICD-10-CM

## 2024-10-24 DIAGNOSIS — Z79.899 HIGH RISK MEDICATION USE: ICD-10-CM

## 2024-10-24 DIAGNOSIS — C90.00 MULTIPLE MYELOMA NOT HAVING ACHIEVED REMISSION (HCC): Primary | ICD-10-CM

## 2024-10-24 PROCEDURE — 1036F TOBACCO NON-USER: CPT | Performed by: INTERNAL MEDICINE

## 2024-10-24 PROCEDURE — G8417 CALC BMI ABV UP PARAM F/U: HCPCS | Performed by: INTERNAL MEDICINE

## 2024-10-24 PROCEDURE — 99214 OFFICE O/P EST MOD 30 MIN: CPT | Performed by: INTERNAL MEDICINE

## 2024-10-24 PROCEDURE — G8427 DOCREV CUR MEDS BY ELIG CLIN: HCPCS | Performed by: INTERNAL MEDICINE

## 2024-10-24 PROCEDURE — G8484 FLU IMMUNIZE NO ADMIN: HCPCS | Performed by: INTERNAL MEDICINE

## 2024-10-24 ASSESSMENT — PATIENT HEALTH QUESTIONNAIRE - PHQ9
SUM OF ALL RESPONSES TO PHQ QUESTIONS 1-9: 0
SUM OF ALL RESPONSES TO PHQ9 QUESTIONS 1 & 2: 0
1. LITTLE INTEREST OR PLEASURE IN DOING THINGS: NOT AT ALL
SUM OF ALL RESPONSES TO PHQ QUESTIONS 1-9: 0
2. FEELING DOWN, DEPRESSED OR HOPELESS: NOT AT ALL

## 2024-10-24 NOTE — PATIENT INSTRUCTIONS
Patient Information from Today's Visit    The members of your Oncology Medical Home are listed below:    Physician Provider: Monica Baca Medical Oncologist  Advanced Practice Clinician: Alpa López NP  Registered Nurse: Angie PAT RN  Navigator: N/A  Medical Assistant: Kathleen PAT MA  : Tiffany DELCID   Supportive Care Services: Jacey ROBERTO [facility]    Diagnosis: MM    Follow Up Instructions:   - labs/PET/bone marrow biopsy reviewed  - continue treatment as you've been doing  - follow up in 6 months       Treatment Summary has been discussed and given to patient:No      Current Labs:              Please refer to After Visit Summary or [email or web address] for upcoming appointment information. Please call our office for rescheduling needs at least 24 hours before your scheduled appointment time.If you have any questions regarding your upcoming schedule please reach out to your care team through [employer]IQ or call [phone].     Please notify your assigned Nurse Navigator of any unplanned hospital admissions or Emergency Room visits within 24 hours of discharge.    -------------------------------------------------------------------------------------------------------------------  Please call our office at [phone] if you have any  of the following symptoms:   Fever of 100.5 or greater  Chills  Shortness of breath  Swelling or pain in one leg    After office hours an answering service is available and will contact a provider for emergencies or if you are experiencing any of the above symptoms.        Angie Gutierrez RN

## 2024-10-24 NOTE — PROGRESS NOTES
Data Source: Patient, [facility] record.    10/24/2024    3:10 PM    Liz Liu [id number]    44 y.o.      Patient Encounter: [facility] Visit    Heme Diagnosis:  MM  Heme History (Copied from prior):   39 female, , smoker, history of hypertension, more recent diagnosis of IgG lambda multiple myeloma, under care of Dr. Garza, now kindly referred to us for consideration of autologous stem cell transplant.  Hematologic history is as follows:    - 03/20: Seen in ED with back pain.  Imaging included CTA chest showing T7 compression fracture possibly pathologic, along with subtle abnormal lytic lesions of the spine, ribs, sternum and scapula.  Referred to [facility] oncology.  - 4/3/2020 T7 lesion biopsy confirming lambda light chain restricted plasma cell dyscrasia.  Lab work revealed IgG 3000, lambda , SPEP with CAROLINE showing M spike of 2.6 g per DL.  Beta-2 microglobulin 2.8, albumin 3.8, slightly elevated calcium at 10.8, mild anemia with hemoglobin in at 10 range.  PET scan 4/16/2020 showed diffuse bone metastasis.  Bone marrow biopsy 4/17/2020 showing 30-50% cellularity with lambda light chain restricted plasma cell neoplasm (invoving 40%). Cytogenetics complex (hyperdiploid karyotype) with +3, +5, 7, 9, 10, 15, 18, 19 and 21 as well as t(10;21). FISH revealing gain of chromosome 9, 15 (standard risk)   - 4/9/2020 kyphoplasty T7 for pain.   - 4/21/2020: Started cycle 1 with Velcade/Dex with plans to add Revlimid once available through mail.    Now presents for follow-up.  Appears to be tolerating therapy well.  Remains on prophylactic medications including acyclovir, as well as low-dose rivaroxaban at 10 mg daily (as patient on Revlimid.).  Patient is felt to have ISS stage I disease with high risk cytogenetics. We discussed various options moving forward including continuation of RVD, maintenance therapy, allogeneic (briefly), as well as autologous stem cell

## 2024-10-31 DIAGNOSIS — C90.00 MULTIPLE MYELOMA NOT HAVING ACHIEVED REMISSION (HCC): Primary | ICD-10-CM

## 2025-04-17 ENCOUNTER — TELEPHONE (OUTPATIENT)
Dept: INTERVENTIONAL RADIOLOGY/VASCULAR | Age: 45
End: 2025-04-17

## 2025-04-17 NOTE — TELEPHONE ENCOUNTER
[] Phone call to: Patient    [] Number used to reach this person: [phone]    [] Voicemail reached: N/A     [] Appointment date:  4/24/25    [] Arrival time:  1200    [] Location given: yes    [] AM Medicine with a small sip of water: Yes    [] Patient is NPO: Yes    [] Need for : Yes    [] Anesthetic Moderate Sedation    [] Blood thinners held: No    [] Education on Hold requirements prior to procedure: NA     [] Allergies: OTHER: see list    [] Reviewed    [] Latex Allergy: No    [] Lidocaine Allergy: No    [] CPAP at night: No    [] Any recent infections: No    [] Diabetes: No    [] Additional information:  NA      [] Time taken to answer all questions    [] Call back phone number of [phone] given

## 2025-04-24 ENCOUNTER — HOSPITAL ENCOUNTER (OUTPATIENT)
Dept: CT IMAGING | Age: 45
Discharge: HOME | End: 2025-04-26
Attending: INTERNAL MEDICINE
Payer: MEDICARE

## 2025-04-24 ENCOUNTER — HOSPITAL ENCOUNTER (OUTPATIENT)
Dept: PET IMAGING | Age: 45
Discharge: HOME | End: 2025-04-27
Payer: MEDICARE

## 2025-04-24 ENCOUNTER — HOSPITAL ENCOUNTER (OUTPATIENT)
Dept: LAB | Age: 45
Discharge: HOME | End: 2025-04-24
Payer: MEDICARE

## 2025-04-24 VITALS
RESPIRATION RATE: 16 BRPM | OXYGEN SATURATION: 96 % | DIASTOLIC BLOOD PRESSURE: 60 MMHG | HEART RATE: 63 BPM | WEIGHT: 272 LBS | TEMPERATURE: 98 F | SYSTOLIC BLOOD PRESSURE: 134 MMHG | BODY MASS INDEX: 46.44 KG/M2 | HEIGHT: 64 IN

## 2025-04-24 DIAGNOSIS — C90.00 MULTIPLE MYELOMA NOT HAVING ACHIEVED REMISSION (HCC): ICD-10-CM

## 2025-04-24 LAB
ALBUMIN SERPL-MCNC: 3.4 G/DL (ref 3.5–5)
ALBUMIN/GLOB SERPL: 1 (ref 1–1.9)
ALP SERPL-CCNC: 199 U/L (ref 35–104)
ALT SERPL-CCNC: 66 U/L (ref 8–45)
ANION GAP SERPL CALC-SCNC: 12 MMOL/L (ref 7–16)
AST SERPL-CCNC: 44 U/L (ref 15–37)
BASOPHILS # BLD: 0.01 K/UL (ref 0–0.2)
BASOPHILS # BLD: 0.01 K/UL (ref 0–0.2)
BASOPHILS NFR BLD: 0.1 % (ref 0–2)
BASOPHILS NFR BLD: 0.1 % (ref 0–2)
BILIRUB SERPL-MCNC: 0.4 MG/DL (ref 0–1.2)
BONE MARROW PREP & WRIGHT STAIN: NORMAL
BUN SERPL-MCNC: 12 MG/DL (ref 6–23)
CALCIUM SERPL-MCNC: 9.2 MG/DL (ref 8.8–10.2)
CHLORIDE SERPL-SCNC: 105 MMOL/L (ref 98–107)
CO2 SERPL-SCNC: 26 MMOL/L (ref 20–29)
CREAT SERPL-MCNC: 0.73 MG/DL (ref 0.6–1.1)
DIFFERENTIAL METHOD BLD: ABNORMAL
DIFFERENTIAL METHOD BLD: ABNORMAL
EOSINOPHIL # BLD: 0.03 K/UL (ref 0–0.8)
EOSINOPHIL # BLD: 0.04 K/UL (ref 0–0.8)
EOSINOPHIL NFR BLD: 0.4 % (ref 0.5–7.8)
EOSINOPHIL NFR BLD: 0.5 % (ref 0.5–7.8)
ERYTHROCYTE [DISTWIDTH] IN BLOOD BY AUTOMATED COUNT: 13.6 % (ref 11.9–14.6)
ERYTHROCYTE [DISTWIDTH] IN BLOOD BY AUTOMATED COUNT: 13.8 % (ref 11.9–14.6)
GLOBULIN SER CALC-MCNC: 3.4 G/DL (ref 2.3–3.5)
GLUCOSE BLD STRIP.AUTO-MCNC: 107 MG/DL (ref 65–100)
GLUCOSE SERPL-MCNC: 99 MG/DL (ref 70–99)
HCT VFR BLD AUTO: 33.8 % (ref 35.8–46.3)
HCT VFR BLD AUTO: 35.9 % (ref 35.8–46.3)
HGB BLD-MCNC: 11 G/DL (ref 11.7–15.4)
HGB BLD-MCNC: 11.5 G/DL (ref 11.7–15.4)
IMM GRANULOCYTES # BLD AUTO: 0.03 K/UL (ref 0–0.5)
IMM GRANULOCYTES # BLD AUTO: 0.03 K/UL (ref 0–0.5)
IMM GRANULOCYTES NFR BLD AUTO: 0.4 % (ref 0–5)
IMM GRANULOCYTES NFR BLD AUTO: 0.4 % (ref 0–5)
KAPPA LC FREE SER-MCNC: 11.2 MG/L (ref 2.4–20.7)
KAPPA LC FREE/LAMBDA FREE SER: 0 (ref 0.2–0.8)
LAMBDA LC FREE SERPL-MCNC: 336 MG/L (ref 4.2–27.7)
LYMPHOCYTES # BLD: 2.41 K/UL (ref 0.5–4.6)
LYMPHOCYTES # BLD: 2.79 K/UL (ref 0.5–4.6)
LYMPHOCYTES NFR BLD: 33.1 % (ref 13–44)
LYMPHOCYTES NFR BLD: 40.3 % (ref 13–44)
MCH RBC QN AUTO: 30.2 PG (ref 26.1–32.9)
MCH RBC QN AUTO: 30.4 PG (ref 26.1–32.9)
MCHC RBC AUTO-ENTMCNC: 32 G/DL (ref 31.4–35)
MCHC RBC AUTO-ENTMCNC: 32.5 G/DL (ref 31.4–35)
MCV RBC AUTO: 92.9 FL (ref 82–102)
MCV RBC AUTO: 95 FL (ref 82–102)
MONOCYTES # BLD: 0.45 K/UL (ref 0.1–1.3)
MONOCYTES # BLD: 0.49 K/UL (ref 0.1–1.3)
MONOCYTES NFR BLD: 6.2 % (ref 4–12)
MONOCYTES NFR BLD: 7.1 % (ref 4–12)
NEUTS SEG # BLD: 3.57 K/UL (ref 1.7–8.2)
NEUTS SEG # BLD: 4.35 K/UL (ref 1.7–8.2)
NEUTS SEG NFR BLD: 51.7 % (ref 43–78)
NEUTS SEG NFR BLD: 59.7 % (ref 43–78)
NRBC # BLD: 0 K/UL (ref 0–0.2)
NRBC # BLD: 0 K/UL (ref 0–0.2)
PLATELET # BLD AUTO: 280 K/UL (ref 150–450)
PLATELET # BLD AUTO: 284 K/UL (ref 150–450)
PMV BLD AUTO: 10.2 FL (ref 9.4–12.3)
PMV BLD AUTO: 9.4 FL (ref 9.4–12.3)
POTASSIUM SERPL-SCNC: 3.7 MMOL/L (ref 3.5–5.1)
PROT SERPL-MCNC: 6.8 G/DL (ref 6.3–8.2)
RBC # BLD AUTO: 3.64 M/UL (ref 4.05–5.2)
RBC # BLD AUTO: 3.78 M/UL (ref 4.05–5.2)
SERVICE CMNT-IMP: ABNORMAL
SODIUM SERPL-SCNC: 143 MMOL/L (ref 136–145)
WBC # BLD AUTO: 6.9 K/UL (ref 4.3–11.1)
WBC # BLD AUTO: 7.3 K/UL (ref 4.3–11.1)

## 2025-04-24 PROCEDURE — 85025 COMPLETE CBC W/AUTO DIFF WBC: CPT

## 2025-04-24 PROCEDURE — 80053 COMPREHEN METABOLIC PANEL: CPT

## 2025-04-24 PROCEDURE — 83521 IG LIGHT CHAINS FREE EACH: CPT

## 2025-04-24 PROCEDURE — 88305 TISSUE EXAM BY PATHOLOGIST: CPT

## 2025-04-24 PROCEDURE — 78815 PET IMAGE W/CT SKULL-THIGH: CPT

## 2025-04-24 PROCEDURE — 6360000004 HC RX CONTRAST MEDICATION: Performed by: INTERNAL MEDICINE

## 2025-04-24 PROCEDURE — 88313 SPECIAL STAINS GROUP 2: CPT

## 2025-04-24 PROCEDURE — 36415 COLL VENOUS BLD VENIPUNCTURE: CPT

## 2025-04-24 PROCEDURE — A9609 HC RX DIAGNOSTIC RADIOPHARMACEUTICAL: HCPCS | Performed by: INTERNAL MEDICINE

## 2025-04-24 PROCEDURE — 3430000000 HC RX DIAGNOSTIC RADIOPHARMACEUTICAL: Performed by: INTERNAL MEDICINE

## 2025-04-24 PROCEDURE — C1830 POWER BONE MARROW BX NEEDLE: HCPCS

## 2025-04-24 PROCEDURE — 0077U IG PARAPROTEIN QUAL BLD/UR: CPT

## 2025-04-24 PROCEDURE — 82784 ASSAY IGA/IGD/IGG/IGM EACH: CPT

## 2025-04-24 PROCEDURE — 99152 MOD SED SAME PHYS/QHP 5/>YRS: CPT

## 2025-04-24 PROCEDURE — 6360000002 HC RX W HCPCS: Performed by: RADIOLOGY

## 2025-04-24 PROCEDURE — 82962 GLUCOSE BLOOD TEST: CPT

## 2025-04-24 PROCEDURE — 88311 DECALCIFY TISSUE: CPT

## 2025-04-24 RX ORDER — DIATRIZOATE MEGLUMINE AND DIATRIZOATE SODIUM 660; 100 MG/ML; MG/ML
10 SOLUTION ORAL; RECTAL
Status: DISCONTINUED | OUTPATIENT
Start: 2025-04-24 | End: 2025-04-28 | Stop reason: HOSPADM

## 2025-04-24 RX ORDER — LIDOCAINE HYDROCHLORIDE 10 MG/ML
INJECTION, SOLUTION EPIDURAL; INFILTRATION; INTRACAUDAL; PERINEURAL PRN
Status: COMPLETED | OUTPATIENT
Start: 2025-04-24 | End: 2025-04-24

## 2025-04-24 RX ORDER — MIDAZOLAM HYDROCHLORIDE 2 MG/2ML
INJECTION, SOLUTION INTRAMUSCULAR; INTRAVENOUS PRN
Status: COMPLETED | OUTPATIENT
Start: 2025-04-24 | End: 2025-04-24

## 2025-04-24 RX ORDER — FENTANYL CITRATE 50 UG/ML
INJECTION, SOLUTION INTRAMUSCULAR; INTRAVENOUS PRN
Status: COMPLETED | OUTPATIENT
Start: 2025-04-24 | End: 2025-04-24

## 2025-04-24 RX ORDER — FLUDEOXYGLUCOSE F 18 200 MCI/ML
14.93 INJECTION, SOLUTION INTRAVENOUS
Status: COMPLETED | OUTPATIENT
Start: 2025-04-24 | End: 2025-04-24

## 2025-04-24 RX ADMIN — FLUDEOXYGLUCOSE F 18 14.93 MILLICURIE: 200 INJECTION, SOLUTION INTRAVENOUS at 09:25

## 2025-04-24 RX ADMIN — FENTANYL CITRATE 50 MCG: 50 INJECTION, SOLUTION INTRAMUSCULAR; INTRAVENOUS at 13:38

## 2025-04-24 RX ADMIN — MIDAZOLAM HYDROCHLORIDE 1 MG: 1 INJECTION, SOLUTION INTRAMUSCULAR; INTRAVENOUS at 13:44

## 2025-04-24 RX ADMIN — FENTANYL CITRATE 50 MCG: 50 INJECTION, SOLUTION INTRAMUSCULAR; INTRAVENOUS at 13:44

## 2025-04-24 RX ADMIN — MIDAZOLAM HYDROCHLORIDE 1 MG: 1 INJECTION, SOLUTION INTRAMUSCULAR; INTRAVENOUS at 13:49

## 2025-04-24 RX ADMIN — MIDAZOLAM HYDROCHLORIDE 1 MG: 1 INJECTION, SOLUTION INTRAMUSCULAR; INTRAVENOUS at 13:38

## 2025-04-24 RX ADMIN — LIDOCAINE HYDROCHLORIDE 5 ML: 10 INJECTION, SOLUTION EPIDURAL; INFILTRATION; INTRACAUDAL; PERINEURAL at 13:53

## 2025-04-24 RX ADMIN — FENTANYL CITRATE 50 MCG: 50 INJECTION, SOLUTION INTRAMUSCULAR; INTRAVENOUS at 13:49

## 2025-04-24 RX ADMIN — DIATRIZOATE MEGLUMINE AND DIATRIZOATE SODIUM 10 ML: 660; 100 LIQUID ORAL; RECTAL at 09:25

## 2025-04-24 NOTE — OR NURSING
TRANSFER - OUT REPORT:     Verbal report given to Jennifer SALINAS  on Liz Liu  being transferred to IR Recovery for routine post-op      Report consisted of patient’s Situation, Background, Assessment and Recommendations(SBAR).      Information from the following report(s) SBAR, Procedure Summary, and MAR was reviewed with the receiving nurse.     Opportunity for questions and clarification was provided.      Conscious Sedation:    150 Mcg of Fentanyl administered   3 Mg of Versed administered   0 Mg of Benadryl administered      Pt tolerated procedure well.      Mid Lumbar  bandaid-type dressing clean, dry, intact, and nontender    VITALS:  /79   Pulse 65   Temp 98 °F (36.7 °C) (Infrared)   Resp 18   Ht 1.626 m (5' 4\")   Wt 123.4 kg (272 lb)   SpO2 100%   BMI 46.69 kg/m²

## 2025-04-24 NOTE — BRIEF OP NOTE
[facility]  Department of Interventional Radiology  [phone]        Interventional Radiology Brief Procedure Note    Patient: Liz Liu MRN: [id number]  SSN: [id number]    YOB: 1980  Age: 44 y.o.  Sex: female      Date of Procedure: 4/24/2025    Pre-Procedure Diagnosis: multiple myeloma    Post-Procedure Diagnosis: SAME    Procedure(s): Image Guided Biopsy    Brief Description of Procedure: CT guided BMBX    Performed By: Lizett Franco PA-C     Assistants: None    Anesthesia:Moderate Sedation per KEVIN Pichardo MD    Estimated Blood Loss: Less than 10ml    Specimens:   to lab    Implants:  None    Findings: no post bx bleeding    Complications: None    Recommendations: routine wound care     Follow Up: prn    Signed By: Lizett Franco PA-C     April 24, 2025

## 2025-04-24 NOTE — PRE SEDATION
Sedation Pre-Procedure Note    Patient Name: Liz Liu   YOB: 1980  Room/Bed: Room/bed info not found  Medical Record Number: [id number]  Date: 4/24/2025   Time: 1:25 PM       Indication:  multiple myeloma    Consent: I have discussed with the patient and/or the patient representative the indication, alternatives, and the possible risks and/or complications of the planned procedure and the anesthesia methods. The patient and/or patient representative appear to understand and agree to proceed.    Vital Signs:   Vitals:    04/24/25 1238   BP:    Pulse:    Resp:    Temp:    SpO2: 100%       Past Medical History:   has a past medical history of Cancer (HCC) and Chronic pain.    Past Surgical History:   has a past surgical history that includes Tubal ligation (08/2007); back surgery (04/2020); IR TUNNELED CVC PLACE WO SQ PORT/PUMP > 5 YEARS (8/17/2020); IR REMOVE TUNNELED CVAD WO SQ PORT/PUMP (10/26/2020); IR TUNNELED CVC PLACE WO SQ PORT/PUMP > 5 YEARS (8/17/2020); and CT BIOPSY BONE MARROW (10/6/2022).    Medications:   Scheduled Meds:   Continuous Infusions:   PRN Meds:   Home Meds:   Prior to Admission medications    Medication Sig Start Date End Date Taking? Authorizing Provider   oxyCODONE-acetaminophen (PERCOCET)  MG per tablet Take 1 tablet by mouth 2 times daily as needed. 2/22/24   Vaughn Campbell MD   valACYclovir (VALTREX) 500 MG tablet Take 1 tablet by mouth 2 times daily    Vaughn Campbell MD   promethazine (PHENERGAN) 25 MG tablet Take 1 tablet by mouth every 6 hours as needed for Nausea    Vaughn Campbell MD   cyclobenzaprine (FLEXERIL) 10 MG tablet Take 1 tablet by mouth 3 times daily as needed for Muscle spasms    Vaughn Campbell MD   butalbital-acetaminophen-caffeine-codeine (FIORICET WITH CODEINE) -05-30 MG per capsule Take 1 capsule by mouth every 4 hours as needed for Headaches.    Vaughn Campbell MD   losartan (COZAAR) 50 MG tablet

## 2025-04-24 NOTE — DISCHARGE INSTRUCTIONS
If you have any questions about your procedure, please call the Interventional Radiology department at [phone].     After business hours (5pm) and weekends, call the answering service at [phone] and ask for the Interventional Radiologist on call to be paged.

## 2025-04-24 NOTE — OR NURSING
Recovery period without difficulty. Pt alert and oriented and denies pain. Dressing is clean, dry, and intact. Reviewed discharge instructions with patient, verbalized understanding. Pt escorted to lobby discharge area via wheelchair. Vital signs and Karolyn score completed.

## 2025-04-24 NOTE — H&P
[facility]  Department of Interventional Radiology  [phone]    History and Physical    Patient:  Liz Liu MRN:  [id number]  SSN:  [id number]    YOB: 1980  Age:  44 y.o.  Sex:  female      Primary Care Provider:  Inge Smith APRN - NP  Referring Physician:  Monica Baca MD    Subjective:     Chief Complaint: biopsy    History of the Present Illness:  The patient is a 44 y.o. female with multiple myeloma who presents for BMBX. NPO.        Past Medical History:   Diagnosis Date    Cancer (HCC)     multiple myeloma    Chronic pain      Past Surgical History:   Procedure Laterality Date    BACK SURGERY  04/2020    CT BIOPSY BONE MARROW  10/6/2022    CT BONE MARROW BIOPSY 10/6/2022 [facility] RADIOLOGY CT SCAN    IR REMOVE TUNNELED CVAD WO SQ PORT/PUMP  10/26/2020    IR TUNNELED CVC PLACE WO SQ PORT/PUMP > 5 YEARS  8/17/2020    IR TUNNELED CVC PLACE WO SQ PORT/PUMP > 5 YEARS  8/17/2020    IR TUNNELED CATHETER PLACEMENT GREATER THAN 5 YEARS 8/17/2020 [facility] RADIOLOGY SPECIALS    TUBAL LIGATION  08/2007        Review of Systems:    Pertinent items are noted in HPI.    Prior to Admission medications    Medication Sig Start Date End Date Taking? Authorizing Provider   oxyCODONE-acetaminophen (PERCOCET)  MG per tablet Take 1 tablet by mouth 2 times daily as needed. 2/22/24   Vaughn Campbell MD   valACYclovir (VALTREX) 500 MG tablet Take 1 tablet by mouth 2 times daily    Vaughn Campbell MD   promethazine (PHENERGAN) 25 MG tablet Take 1 tablet by mouth every 6 hours as needed for Nausea    Vaughn Campbell MD   cyclobenzaprine (FLEXERIL) 10 MG tablet Take 1 tablet by mouth 3 times daily as needed for Muscle spasms    Vaughn Campbell MD   butalbital-acetaminophen-caffeine-codeine (FIORICET WITH CODEINE) -56-30 MG per capsule Take 1 capsule by mouth every 4 hours as needed for Headaches.    Vaughn Campbell MD   losartan (COZAAR)

## 2025-04-28 LAB — IMMUNOLOGIST REVIEW: NORMAL

## 2025-04-30 LAB
FLOW CYTOMETRY RESULTS: NORMAL
SPECIMEN SOURCE: NORMAL
TEST ORDERED: NORMAL

## 2025-05-08 ENCOUNTER — OFFICE VISIT (OUTPATIENT)
Dept: ONCOLOGY | Age: 45
End: 2025-05-08
Payer: MEDICARE

## 2025-05-08 VITALS
HEIGHT: 64 IN | BODY MASS INDEX: 46.54 KG/M2 | TEMPERATURE: 98 F | SYSTOLIC BLOOD PRESSURE: 145 MMHG | HEART RATE: 72 BPM | RESPIRATION RATE: 17 BRPM | WEIGHT: 272.6 LBS | OXYGEN SATURATION: 100 % | DIASTOLIC BLOOD PRESSURE: 90 MMHG

## 2025-05-08 DIAGNOSIS — R53.83 OTHER FATIGUE: ICD-10-CM

## 2025-05-08 DIAGNOSIS — Z79.899 HIGH RISK MEDICATION USE: ICD-10-CM

## 2025-05-08 DIAGNOSIS — D84.9 IMMUNOCOMPROMISED: ICD-10-CM

## 2025-05-08 DIAGNOSIS — G62.9 POLYNEUROPATHY, UNSPECIFIED: ICD-10-CM

## 2025-05-08 DIAGNOSIS — C90.00 MULTIPLE MYELOMA NOT HAVING ACHIEVED REMISSION (HCC): Primary | ICD-10-CM

## 2025-05-08 PROCEDURE — 1036F TOBACCO NON-USER: CPT | Performed by: INTERNAL MEDICINE

## 2025-05-08 PROCEDURE — G8427 DOCREV CUR MEDS BY ELIG CLIN: HCPCS | Performed by: INTERNAL MEDICINE

## 2025-05-08 PROCEDURE — G8417 CALC BMI ABV UP PARAM F/U: HCPCS | Performed by: INTERNAL MEDICINE

## 2025-05-08 PROCEDURE — 99214 OFFICE O/P EST MOD 30 MIN: CPT | Performed by: INTERNAL MEDICINE

## 2025-05-08 RX ORDER — BUTALBITAL, ACETAMINOPHEN AND CAFFEINE 50; 325; 40 MG/1; MG/1; MG/1
1 TABLET ORAL EVERY 6 HOURS PRN
COMMUNITY
Start: 2025-01-07

## 2025-05-08 ASSESSMENT — PATIENT HEALTH QUESTIONNAIRE - PHQ9
2. FEELING DOWN, DEPRESSED OR HOPELESS: NOT AT ALL
SUM OF ALL RESPONSES TO PHQ QUESTIONS 1-9: 0
1. LITTLE INTEREST OR PLEASURE IN DOING THINGS: NOT AT ALL
SUM OF ALL RESPONSES TO PHQ QUESTIONS 1-9: 0

## 2025-05-08 NOTE — PROGRESS NOTES
biopsy 4/17/2020 showing 30-50% cellularity with lambda light chain restricted plasma cell neoplasm (invoving 40%). Cytogenetics complex (hyperdiploid karyotype) with +3, +5, 7, 9, 10, 15, 18, 19 and 21 as well as t(10;21). FISH revealing gain of chromosome 9, 15 (standard risk)   - 4/9/2020 kyphoplasty T7 for pain.   - 4/21/2020: Started cycle 1 with Velcade/Dex with plans to add Revlimid once available through mail.    Now presents for follow-up.  Appears to be tolerating therapy well.  Remains on prophylactic medications including acyclovir, as well as low-dose rivaroxaban at 10 mg daily (as patient on Revlimid.).  Patient is felt to have ISS stage I disease with high risk cytogenetics. We discussed various options moving forward including continuation of RVD, maintenance therapy, allogeneic (briefly), as well as autologous stem cell transplant.  Patient appears interested in autologous stem cell transplant, and would be a good candidate once in good remission.  Will work-up and manage as below:    7/21/2020: Reports doing well.  Recently completed cycle 4 RVD.  Labs thereafter with [facility] now normal range at 17 (previously as high as 645).  Discussed lab results consistent with remission, will look into mobilization and collection in the next few weeks.  Will reach out to primary team Dr. Graza, for now continue Velcade/Dex.  Navigation following will start pre-mold studies, as well as PET scan, skeletal survey and BM biopsy.  Various risks, and side effects of high dose chemo w/ BEAM/ASCT discussed in detail including nausea, vomiting, diarrhea, life threatening infections, bleeding, anemia, end organ damage, sepsis, and potential death. Also discussed graft failure risk and mortality from procedure ( <5%). Delayed long term effects such as MDS, leukemia and secondary malignancies also discussed. Patient willing to proceed.     8/13/2020: Last bortezomib/Dex was on 7/24/2020.  Some neuropathy soles of her

## 2025-05-08 NOTE — PATIENT INSTRUCTIONS
Patient Information from Today's Visit    The members of your Oncology Medical Home are listed below:    Physician Provider: Dr. Monica Baca   Advanced Practice Clinician: Alpa López   Registered Nurse: Angie PAT   Nurse Navigator:   Medical Assistant: Kathleen PAT   : Alpa \"Tiffany\" I.   Supportive Care Services: Darcieaaliyah CLAYTONSTEVEN    Diagnosis (Information Sheet Provided on Day of Diagnosis): MM    Follow Up Instructions:   - labs/PET/bone marrow biopsy reviewed  - unfortunately it looks like the myeloma is starting to wake up again and we recommend changing treatments. We will talk to Dr. Garza about our recommendaitons.  - plan to repeat PET scan and bone marrow biopsy about 3 months into new treatment.    Has Treatment Plan Been Finalized? No    Current Labs:   Hospital Outpatient Visit on 04/24/2025   Component Date Value Ref Range Status    POC Glucose 04/24/2025 107 (H)  65 - 100 mg/dL Final    Comment: 47 - 60 mg/dl Consistent with, but not fully diagnostic of hypoglycemia.  101 - 125 mg/dl Impaired fasting glucose/consistent with pre-diabetes mellitus  > 126 mg/dl Fasting glucose consistent with overt diabetes mellitus      Performed by: 04/24/2025 DayronGaryNMPETCT   Final   Hospital Outpatient Visit on 04/24/2025   Component Date Value Ref Range Status    WBC 04/24/2025 6.9  4.3 - 11.1 K/uL Final    RBC 04/24/2025 3.64 (L)  4.05 - 5.2 M/uL Final    Hemoglobin 04/24/2025 11.0 (L)  11.7 - 15.4 g/dL Final    Hematocrit 04/24/2025 33.8 (L)  35.8 - 46.3 % Final    MCV 04/24/2025 92.9  82 - 102 FL Final    MCH 04/24/2025 30.2  26.1 - 32.9 PG Final    MCHC 04/24/2025 32.5  31.4 - 35.0 g/dL Final    RDW 04/24/2025 13.8  11.9 - 14.6 % Final    Platelets 04/24/2025 284  150 - 450 K/uL Final    MPV 04/24/2025 10.2  9.4 - 12.3 FL Final    nRBC 04/24/2025 0.00  0.0 - 0.2 K/uL Final    **Note: Absolute NRBC parameter is now reported with Hemogram**    Differential Type 04/24/2025 AUTOMATED    Final

## 2025-05-14 ENCOUNTER — RESEARCH ENCOUNTER (OUTPATIENT)
Dept: RESEARCH | Age: 45
End: 2025-05-14

## 2025-05-14 ENCOUNTER — OFFICE VISIT (OUTPATIENT)
Dept: ONCOLOGY | Age: 45
End: 2025-05-14

## 2025-05-14 VITALS
HEIGHT: 64 IN | RESPIRATION RATE: 17 BRPM | TEMPERATURE: 98.6 F | SYSTOLIC BLOOD PRESSURE: 124 MMHG | DIASTOLIC BLOOD PRESSURE: 84 MMHG | WEIGHT: 271.3 LBS | OXYGEN SATURATION: 100 % | BODY MASS INDEX: 46.32 KG/M2 | HEART RATE: 86 BPM

## 2025-05-14 DIAGNOSIS — C90.00 MULTIPLE MYELOMA NOT HAVING ACHIEVED REMISSION (HCC): ICD-10-CM

## 2025-05-14 DIAGNOSIS — D84.9 IMMUNOCOMPROMISED: ICD-10-CM

## 2025-05-14 DIAGNOSIS — C90.00 MULTIPLE MYELOMA NOT HAVING ACHIEVED REMISSION (HCC): Primary | ICD-10-CM

## 2025-05-14 DIAGNOSIS — Z00.6 RESEARCH EXAM: Primary | ICD-10-CM

## 2025-05-14 PROCEDURE — 1036F TOBACCO NON-USER: CPT | Performed by: INTERNAL MEDICINE

## 2025-05-14 PROCEDURE — G8417 CALC BMI ABV UP PARAM F/U: HCPCS | Performed by: INTERNAL MEDICINE

## 2025-05-14 PROCEDURE — G8428 CUR MEDS NOT DOCUMENT: HCPCS | Performed by: INTERNAL MEDICINE

## 2025-05-14 PROCEDURE — 99214 OFFICE O/P EST MOD 30 MIN: CPT | Performed by: INTERNAL MEDICINE

## 2025-05-14 NOTE — PATIENT INSTRUCTIONS
Patient Information from Today's Visit    The members of your Oncology Medical Home are listed below:    Physician Provider: Monica Baca Medical Oncologist  Advanced Practice Clinician: Alpa López NP  Registered Nurse: Angie PAT RN  Nurse Navigator: Xander CAMPOS RN  Medical Assistant: Kathleen PAT MA  :Tiffany DELCID  Supportive Care Services: Crystal ARNOLD LMSW    Diagnosis (Information Sheet Provided on Day of Diagnosis): Multiple Myeloma    Follow Up Instructions:    -The clinical trial has 3 separate arms.    ARM A - Talvey and Pomalyst  ARM B - Talvey and Tecvayli  ARM C - Elotuzumab and Pomalyst      Has Treatment Plan Been Finalized? No    Current Labs:   No visits with results within 3 Day(s) from this visit.   Latest known visit with results is:   Hospital Outpatient Visit on 04/24/2025   Component Date Value Ref Range Status    POC Glucose 04/24/2025 107 (H)  65 - 100 mg/dL Final    Comment: 47 - 60 mg/dl Consistent with, but not fully diagnostic of hypoglycemia.  101 - 125 mg/dl Impaired fasting glucose/consistent with pre-diabetes mellitus  > 126 mg/dl Fasting glucose consistent with overt diabetes mellitus      Performed by: 04/24/2025 YanchusGaryNMPETCT   Final             Please refer to After Visit Summary or [employer] for upcoming appointment information. Please call our office for rescheduling needs at least 24 hours before your scheduled appointment time.If you have any questions regarding your upcoming schedule please reach out to your care team through [employer] or call [phone].     Please notify your assigned Nurse Navigator of any unplanned hospital admissions or Emergency Room visits within 24 hours of discharge.    -------------------------------------------------------------------------------------------------------------------  Please call our office at [phone] if you have any  of the following symptoms:   Fever of 100.5 or greater  Chills  Shortness of breath  Swelling or

## 2025-05-14 NOTE — PROGRESS NOTES
Data Source: Patient, [facility] record.    5/14/2025    2:43 PM    Liz Liu [id number]    44 y.o.      Patient Encounter: [facility] Visit    Heme Diagnosis:  Multiple myeloma  Heme History (Copied from prior):   39 female, , smoker, history of hypertension, more recent diagnosis of IgG lambda multiple myeloma, under care of Dr. Garza, now kindly referred to us for consideration of autologous stem cell transplant.  Hematologic history is as follows:    - 03/20: Seen in ED with back pain.  Imaging included CTA chest showing T7 compression fracture possibly pathologic, along with subtle abnormal lytic lesions of the spine, ribs, sternum and scapula.  Referred to [facility] oncology.  - 4/3/2020 T7 lesion biopsy confirming lambda light chain restricted plasma cell dyscrasia.  Lab work revealed IgG 3000, lambda , SPEP with CAROLINE showing M spike of 2.6 g per DL.  Beta-2 microglobulin 2.8, albumin 3.8, slightly elevated calcium at 10.8, mild anemia with hemoglobin in at 10 range.  PET scan 4/16/2020 showed diffuse bone metastasis.  Bone marrow biopsy 4/17/2020 showing 30-50% cellularity with lambda light chain restricted plasma cell neoplasm (invoving 40%). Cytogenetics complex (hyperdiploid karyotype) with +3, +5, 7, 9, 10, 15, 18, 19 and 21 as well as t(10;21). FISH revealing gain of chromosome 9, 15 (standard risk)   - 4/9/2020 kyphoplasty T7 for pain.   - 4/21/2020: Started cycle 1 with Velcade/Dex with plans to add Revlimid once available through mail.  Interval History:  5/14/2025: Denies new complaints since last visit.  Is here today to discuss enrollment in Bite study: Meeting research team for this.  Various questions answered in detail.  If enrolled, she will need repeat bone marrow aspirate/biopsy and PET scan for study, followed by randomization.  We will see her back in 2 to 3 weeks.  REVIEW OF SYSTEMS:  As mentioned above, all other systems were

## 2025-05-14 NOTE — RESEARCH
To med onc clinic, to meet potential participant for MonumenTAL-6.  This is \"A Phase 3 Randomized Study Comparing Talquetamab in Combination with Pomalidomide (Clay-P), Talquetamab in Combination with Teclistamab (Clay-Imer), and Investigator's Choice of Either Elotuzumab, Pomalidomide, and Dexamethasone (EPd) or Pomalidomide, Bortezomib, and Dexamethasone (PVd) in Participants with Relapsed or Refractory Myeloma who Have Received 1 to 4 Prior Lines of Therapy Including an Anti-CD38 Antibody and Lenalidomide.\" Patient appears to meet all inclusion/exclusion criteria for the trial at initial review. ECOG=0.  Pt accompanied by family member. The trial was thoroughly discussed with patient. The study's purpose, design, risks/benefits, financial aspects and alternatives were reviewed. Patient is aware that study participation is voluntary and that if she chooses to participate, she may withdraw consent at any time. She was given time to read the informed consent, and Research Nurse went through and reviewed consent with patient. All questions were answered to the best of Research Nurse ability.  Patient signed consent and was given a copy, along with contact information for the research coordinator. Patient verbalized understanding of the study and of her desire to participate. The patient is not of child-bearing potential as pt confirmed had Tubal Ligation in 2007. Methods of birth control were discussed as the treatment has potential for fetal toxicity. Pt Tubal Ligation in accordance with study requirements for birth control.     Research RN reviewed pt PMH and conmeds with pt.  Research RN performed ICE assessment tool, and EKG for Study completed.    Pt informed that BMBX and PET for study will be scheduled within the next couple of days.  Pt returning for labs when comes back for PET, as is too late to send out sample to [facility] per protocol.  Pt denies additional needs. Research will continue to follow.

## 2025-05-15 DIAGNOSIS — C90.00 MULTIPLE MYELOMA NOT HAVING ACHIEVED REMISSION (HCC): Primary | ICD-10-CM

## 2025-05-19 ENCOUNTER — HOSPITAL ENCOUNTER (OUTPATIENT)
Dept: LAB | Age: 45
Discharge: HOME | End: 2025-05-19
Payer: MEDICARE

## 2025-05-19 ENCOUNTER — TELEPHONE (OUTPATIENT)
Dept: INTERVENTIONAL RADIOLOGY/VASCULAR | Age: 45
End: 2025-05-19

## 2025-05-19 ENCOUNTER — HOSPITAL ENCOUNTER (OUTPATIENT)
Dept: PET IMAGING | Age: 45
Discharge: HOME | End: 2025-05-22
Payer: MEDICARE

## 2025-05-19 DIAGNOSIS — C90.00 MULTIPLE MYELOMA NOT HAVING ACHIEVED REMISSION (HCC): ICD-10-CM

## 2025-05-19 DIAGNOSIS — Z00.6 RESEARCH EXAM: ICD-10-CM

## 2025-05-19 DIAGNOSIS — Z79.899 HIGH RISK MEDICATION USE: ICD-10-CM

## 2025-05-19 DIAGNOSIS — D84.9 IMMUNOCOMPROMISED: ICD-10-CM

## 2025-05-19 LAB
ALBUMIN SERPL-MCNC: 3.9 G/DL (ref 3.5–5)
ALBUMIN/GLOB SERPL: 1.1 (ref 1–1.9)
ALP SERPL-CCNC: 140 U/L (ref 35–104)
ALT SERPL-CCNC: 23 U/L (ref 8–45)
AMYLASE SERPL-CCNC: 86 U/L (ref 25–115)
ANION GAP SERPL CALC-SCNC: 12 MMOL/L (ref 7–16)
APTT PPP: 23.1 SEC (ref 23.3–37.4)
AST SERPL-CCNC: 26 U/L (ref 15–37)
BASOPHILS # BLD: 0.02 K/UL (ref 0–0.2)
BASOPHILS NFR BLD: 0.3 % (ref 0–2)
BILIRUB SERPL-MCNC: 0.4 MG/DL (ref 0–1.2)
BUN SERPL-MCNC: 11 MG/DL (ref 6–23)
CALCIUM SERPL-MCNC: 9.2 MG/DL (ref 8.8–10.2)
CHLORIDE SERPL-SCNC: 104 MMOL/L (ref 98–107)
CO2 SERPL-SCNC: 24 MMOL/L (ref 20–29)
CREAT SERPL-MCNC: 0.66 MG/DL (ref 0.6–1.1)
DIFFERENTIAL METHOD BLD: ABNORMAL
EOSINOPHIL # BLD: 0.04 K/UL (ref 0–0.8)
EOSINOPHIL NFR BLD: 0.5 % (ref 0.5–7.8)
ERYTHROCYTE [DISTWIDTH] IN BLOOD BY AUTOMATED COUNT: 14.6 % (ref 11.9–14.6)
FIBRINOGEN PPP-MCNC: 557 MG/DL (ref 190–501)
GLOBULIN SER CALC-MCNC: 3.5 G/DL (ref 2.3–3.5)
GLUCOSE SERPL-MCNC: 89 MG/DL (ref 70–99)
HBV SURFACE AB SERPL IA-ACNC: <3.5 MIU/ML
HBV SURFACE AG SER QL: NONREACTIVE
HCT VFR BLD AUTO: 37.9 % (ref 35.8–46.3)
HCV AB SER QL: NONREACTIVE
HGB BLD-MCNC: 11.8 G/DL (ref 11.7–15.4)
IMM GRANULOCYTES # BLD AUTO: 0.02 K/UL (ref 0–0.5)
IMM GRANULOCYTES NFR BLD AUTO: 0.3 % (ref 0–5)
INR PPP: 1
LDH SERPL L TO P-CCNC: 217 U/L (ref 127–281)
LIPASE SERPL-CCNC: 57 U/L (ref 13–60)
LYMPHOCYTES # BLD: 2.9 K/UL (ref 0.5–4.6)
LYMPHOCYTES NFR BLD: 39 % (ref 13–44)
Lab: NORMAL
Lab: NORMAL
MCH RBC QN AUTO: 30.1 PG (ref 26.1–32.9)
MCHC RBC AUTO-ENTMCNC: 31.1 G/DL (ref 31.4–35)
MCV RBC AUTO: 96.7 FL (ref 82–102)
MONOCYTES # BLD: 0.39 K/UL (ref 0.1–1.3)
MONOCYTES NFR BLD: 5.2 % (ref 4–12)
NEUTS SEG # BLD: 4.07 K/UL (ref 1.7–8.2)
NEUTS SEG NFR BLD: 54.7 % (ref 43–78)
NRBC # BLD: 0 K/UL (ref 0–0.2)
PHOSPHATE SERPL-MCNC: 2.4 MG/DL (ref 2.5–4.5)
PLATELET # BLD AUTO: 256 K/UL (ref 150–450)
PMV BLD AUTO: 9.5 FL (ref 9.4–12.3)
POTASSIUM SERPL-SCNC: 4 MMOL/L (ref 3.5–5.1)
PROT SERPL-MCNC: 7.5 G/DL (ref 6.3–8.2)
PROTHROMBIN TIME: 13.3 SEC (ref 11.3–14.9)
RBC # BLD AUTO: 3.92 M/UL (ref 4.05–5.2)
REFERENCE LAB: NORMAL
SODIUM SERPL-SCNC: 140 MMOL/L (ref 136–145)
URATE SERPL-MCNC: 5.7 MG/DL (ref 2.5–7.1)
WBC # BLD AUTO: 7.4 K/UL (ref 4.3–11.1)

## 2025-05-19 PROCEDURE — 80053 COMPREHEN METABOLIC PANEL: CPT

## 2025-05-19 PROCEDURE — 87340 HEPATITIS B SURFACE AG IA: CPT

## 2025-05-19 PROCEDURE — 86704 HEP B CORE ANTIBODY TOTAL: CPT

## 2025-05-19 PROCEDURE — 85610 PROTHROMBIN TIME: CPT

## 2025-05-19 PROCEDURE — 78815 PET IMAGE W/CT SKULL-THIGH: CPT

## 2025-05-19 PROCEDURE — 86706 HEP B SURFACE ANTIBODY: CPT

## 2025-05-19 PROCEDURE — 85025 COMPLETE CBC W/AUTO DIFF WBC: CPT

## 2025-05-19 PROCEDURE — 83690 ASSAY OF LIPASE: CPT

## 2025-05-19 PROCEDURE — 83615 LACTATE (LD) (LDH) ENZYME: CPT

## 2025-05-19 PROCEDURE — 3430000000 HC RX DIAGNOSTIC RADIOPHARMACEUTICAL: Performed by: INTERNAL MEDICINE

## 2025-05-19 PROCEDURE — 6360000004 HC RX CONTRAST MEDICATION: Performed by: INTERNAL MEDICINE

## 2025-05-19 PROCEDURE — 86803 HEPATITIS C AB TEST: CPT

## 2025-05-19 PROCEDURE — 82150 ASSAY OF AMYLASE: CPT

## 2025-05-19 PROCEDURE — 84100 ASSAY OF PHOSPHORUS: CPT

## 2025-05-19 PROCEDURE — 84550 ASSAY OF BLOOD/URIC ACID: CPT

## 2025-05-19 PROCEDURE — 85384 FIBRINOGEN ACTIVITY: CPT

## 2025-05-19 PROCEDURE — 82962 GLUCOSE BLOOD TEST: CPT

## 2025-05-19 PROCEDURE — 85730 THROMBOPLASTIN TIME PARTIAL: CPT

## 2025-05-19 PROCEDURE — 36415 COLL VENOUS BLD VENIPUNCTURE: CPT

## 2025-05-19 PROCEDURE — 2500000003 HC RX 250 WO HCPCS: Performed by: INTERNAL MEDICINE

## 2025-05-19 PROCEDURE — A9609 HC RX DIAGNOSTIC RADIOPHARMACEUTICAL: HCPCS | Performed by: INTERNAL MEDICINE

## 2025-05-19 RX ORDER — FLUDEOXYGLUCOSE F 18 200 MCI/ML
11.18 INJECTION, SOLUTION INTRAVENOUS
Status: COMPLETED | OUTPATIENT
Start: 2025-05-19 | End: 2025-05-19

## 2025-05-19 RX ORDER — SODIUM CHLORIDE 0.9 % (FLUSH) 0.9 %
10 SYRINGE (ML) INJECTION AS NEEDED
Status: DISCONTINUED | OUTPATIENT
Start: 2025-05-19 | End: 2025-05-23 | Stop reason: HOSPADM

## 2025-05-19 RX ORDER — DIATRIZOATE MEGLUMINE AND DIATRIZOATE SODIUM 660; 100 MG/ML; MG/ML
10 SOLUTION ORAL; RECTAL
Status: DISCONTINUED | OUTPATIENT
Start: 2025-05-19 | End: 2025-05-23 | Stop reason: HOSPADM

## 2025-05-19 RX ADMIN — SODIUM CHLORIDE, PRESERVATIVE FREE 10 ML: 5 INJECTION INTRAVENOUS at 10:32

## 2025-05-19 RX ADMIN — FLUDEOXYGLUCOSE F 18 11.18 MILLICURIE: 200 INJECTION, SOLUTION INTRAVENOUS at 10:32

## 2025-05-19 RX ADMIN — DIATRIZOATE MEGLUMINE AND DIATRIZOATE SODIUM 10 ML: 660; 100 LIQUID ORAL; RECTAL at 10:32

## 2025-05-19 NOTE — TELEPHONE ENCOUNTER
[] Phone call to: Patient    [] Number used to reach this person: [phone]    [] Voicemail reached: N/A     [] Appointment date:  5/22/25    [] Arrival time:  1300    [] Location given: yes    [] AM Medicine with a small sip of water: Yes    [] Patient is NPO: Yes    [] Need for : Yes    [] Anesthetic Moderate Sedation    [] Blood thinners held: No    [] Education on Hold requirements prior to procedure: NA     [] Allergies: OTHER: filmgrastim    [] Reviewed    [] Latex Allergy: No    [] Lidocaine Allergy: No    [] CPAP at night: No    [] Any recent infections: No    [] Diabetes: No    [] Additional information:  NA      [] Time taken to answer all questions    [] Call back phone number of [phone] given

## 2025-05-20 LAB
GLUCOSE BLD STRIP.AUTO-MCNC: 100 MG/DL (ref 65–100)
HBV CORE AB SERPL QL IA: NEGATIVE
SERVICE CMNT-IMP: NORMAL

## 2025-05-21 ENCOUNTER — RESEARCH ENCOUNTER (OUTPATIENT)
Dept: RESEARCH | Age: 45
End: 2025-05-21

## 2025-05-21 DIAGNOSIS — Z00.6 RESEARCH EXAM: ICD-10-CM

## 2025-05-21 DIAGNOSIS — C90.02 MULTIPLE MYELOMA IN RELAPSE (HCC): Primary | ICD-10-CM

## 2025-05-22 ENCOUNTER — RESEARCH ENCOUNTER (OUTPATIENT)
Dept: RESEARCH | Age: 45
End: 2025-05-22

## 2025-05-22 ENCOUNTER — HOSPITAL ENCOUNTER (OUTPATIENT)
Dept: CT IMAGING | Age: 45
Discharge: HOME | End: 2025-05-24
Attending: INTERNAL MEDICINE
Payer: MEDICARE

## 2025-05-22 VITALS
HEART RATE: 67 BPM | TEMPERATURE: 98 F | DIASTOLIC BLOOD PRESSURE: 61 MMHG | OXYGEN SATURATION: 99 % | SYSTOLIC BLOOD PRESSURE: 133 MMHG | RESPIRATION RATE: 15 BRPM

## 2025-05-22 DIAGNOSIS — C90.00 MULTIPLE MYELOMA NOT HAVING ACHIEVED REMISSION (HCC): ICD-10-CM

## 2025-05-22 LAB
BASOPHILS # BLD: 0.02 K/UL (ref 0–0.2)
BASOPHILS NFR BLD: 0.3 % (ref 0–2)
BONE MARROW PREP & WRIGHT STAIN: NORMAL
DIFFERENTIAL METHOD BLD: ABNORMAL
EOSINOPHIL # BLD: 0.03 K/UL (ref 0–0.8)
EOSINOPHIL NFR BLD: 0.4 % (ref 0.5–7.8)
ERYTHROCYTE [DISTWIDTH] IN BLOOD BY AUTOMATED COUNT: 14.7 % (ref 11.9–14.6)
HCT VFR BLD AUTO: 35.6 % (ref 35.8–46.3)
HGB BLD-MCNC: 11.6 G/DL (ref 11.7–15.4)
IMM GRANULOCYTES # BLD AUTO: 0.02 K/UL (ref 0–0.5)
IMM GRANULOCYTES NFR BLD AUTO: 0.3 % (ref 0–5)
LYMPHOCYTES # BLD: 2.8 K/UL (ref 0.5–4.6)
LYMPHOCYTES NFR BLD: 39.3 % (ref 13–44)
MCH RBC QN AUTO: 31.1 PG (ref 26.1–32.9)
MCHC RBC AUTO-ENTMCNC: 32.6 G/DL (ref 31.4–35)
MCV RBC AUTO: 95.4 FL (ref 82–102)
MONOCYTES # BLD: 0.38 K/UL (ref 0.1–1.3)
MONOCYTES NFR BLD: 5.3 % (ref 4–12)
NEUTS SEG # BLD: 3.87 K/UL (ref 1.7–8.2)
NEUTS SEG NFR BLD: 54.4 % (ref 43–78)
NRBC # BLD: 0 K/UL (ref 0–0.2)
PLATELET # BLD AUTO: 254 K/UL (ref 150–450)
PMV BLD AUTO: 9.9 FL (ref 9.4–12.3)
RBC # BLD AUTO: 3.73 M/UL (ref 4.05–5.2)
WBC # BLD AUTO: 7.1 K/UL (ref 4.3–11.1)

## 2025-05-22 PROCEDURE — 85025 COMPLETE CBC W/AUTO DIFF WBC: CPT

## 2025-05-22 PROCEDURE — 99152 MOD SED SAME PHYS/QHP 5/>YRS: CPT

## 2025-05-22 PROCEDURE — C1830 POWER BONE MARROW BX NEEDLE: HCPCS

## 2025-05-22 PROCEDURE — 6360000002 HC RX W HCPCS: Performed by: PHYSICIAN ASSISTANT

## 2025-05-22 PROCEDURE — 6360000002 HC RX W HCPCS: Performed by: RADIOLOGY

## 2025-05-22 RX ORDER — MIDAZOLAM HYDROCHLORIDE 2 MG/2ML
INJECTION, SOLUTION INTRAMUSCULAR; INTRAVENOUS PRN
Status: COMPLETED | OUTPATIENT
Start: 2025-05-22 | End: 2025-05-22

## 2025-05-22 RX ORDER — FENTANYL CITRATE 50 UG/ML
INJECTION, SOLUTION INTRAMUSCULAR; INTRAVENOUS PRN
Status: COMPLETED | OUTPATIENT
Start: 2025-05-22 | End: 2025-05-22

## 2025-05-22 RX ORDER — LIDOCAINE HYDROCHLORIDE 10 MG/ML
INJECTION, SOLUTION EPIDURAL; INFILTRATION; INTRACAUDAL; PERINEURAL PRN
Status: COMPLETED | OUTPATIENT
Start: 2025-05-22 | End: 2025-05-22

## 2025-05-22 RX ADMIN — MIDAZOLAM HYDROCHLORIDE 1 MG: 1 INJECTION, SOLUTION INTRAMUSCULAR; INTRAVENOUS at 15:44

## 2025-05-22 RX ADMIN — FENTANYL CITRATE 50 MCG: 50 INJECTION, SOLUTION INTRAMUSCULAR; INTRAVENOUS at 15:44

## 2025-05-22 RX ADMIN — FENTANYL CITRATE 25 MCG: 50 INJECTION, SOLUTION INTRAMUSCULAR; INTRAVENOUS at 15:48

## 2025-05-22 RX ADMIN — MIDAZOLAM HYDROCHLORIDE 1 MG: 1 INJECTION, SOLUTION INTRAMUSCULAR; INTRAVENOUS at 15:40

## 2025-05-22 RX ADMIN — LIDOCAINE HYDROCHLORIDE 10 ML: 10 INJECTION, SOLUTION EPIDURAL; INFILTRATION; INTRACAUDAL; PERINEURAL at 15:44

## 2025-05-22 RX ADMIN — MIDAZOLAM HYDROCHLORIDE 0.5 MG: 1 INJECTION, SOLUTION INTRAMUSCULAR; INTRAVENOUS at 15:48

## 2025-05-22 RX ADMIN — FENTANYL CITRATE 50 MCG: 50 INJECTION, SOLUTION INTRAMUSCULAR; INTRAVENOUS at 15:40

## 2025-05-22 ASSESSMENT — PAIN - FUNCTIONAL ASSESSMENT
PAIN_FUNCTIONAL_ASSESSMENT: NONE - DENIES PAIN

## 2025-05-22 NOTE — OR NURSING
TRANSFER - OUT REPORT:     Verbal report given to Wang Brown RN on Liz Liu  being transferred to IR Recovery for routine progression of patient care.      Report consisted of patient’s Situation, Background, Assessment and Recommendations(SBAR).       Information from the following report(s) SBAR, Procedure Summary, and MAR was reviewed with the receiving nurse.     Opportunity for questions and clarification was provided.        Conscious Sedation:    125 Mcg of Fentanyl administered   2.5 Mg of Versed administered     Pt tolerated procedure Well.      Peripheral Intravenous Line:   Peripheral IV 05/22/25 Left Antecubital (Active)   Site Assessment Clean, dry & intact 05/22/25 1439   Line Status Blood return noted;Normal saline locked 05/22/25 1439   Phlebitis Assessment No symptoms 05/22/25 1439   Infiltration Assessment 0 05/22/25 1439   Alcohol Cap Used No 05/22/25 1439   Dressing Status New dressing applied 05/22/25 1439   Dressing Type Transparent 05/22/25 1439   Dressing Intervention New 05/22/25 1439     Right Buttock has a sterile and band-aid type dressing that is clean, dry, intact, and non-tender.    VITALS:  BP (!) 155/88   Pulse 57   Temp 98 °F (36.7 °C) (Oral)   Resp 16   SpO2 (!) 89% .

## 2025-05-22 NOTE — DISCHARGE INSTRUCTIONS
If you have any questions about your procedure, please call the Interventional Radiology department at [phone].   After business hours (5pm) and weekends, call the answering service at [phone] and ask for the Interventional Radiologist on call to be paged.

## 2025-05-22 NOTE — H&P
[facility]  Department of Interventional Radiology  [phone]    History and Physical    Patient:  Liz Liu MRN:  [id number]  SSN:  [id number]    YOB: 1980  Age:  44 y.o.  Sex:  female      Primary Care Provider:  Inge Smith APRN - NP  Referring Physician:  Monica Baca MD    Subjective:     Chief Complaint: biopsy    History of the Present Illness:  The patient is a 44 y.o. female with multiple myeloma who presents for BMBX. NPO.        Past Medical History:   Diagnosis Date    Cancer (HCC)     multiple myeloma    Chronic pain      Past Surgical History:   Procedure Laterality Date    BACK SURGERY  04/2020    CT BIOPSY BONE MARROW  10/6/2022    CT BONE MARROW BIOPSY 10/6/2022 [facility] CT SCAN    IR REMOVE TUNNELED CVAD WO SQ PORT/PUMP  10/26/2020    IR TUNNELED CVC PLACE WO SQ PORT/PUMP > 5 YEARS  8/17/2020    IR TUNNELED CVC PLACE WO SQ PORT/PUMP > 5 YEARS  8/17/2020    IR TUNNELED CATHETER PLACEMENT GREATER THAN 5 YEARS 8/17/2020 [facility] RADIOLOGY SPECIALS    TUBAL LIGATION  08/2007        Review of Systems:    Pertinent items are noted in HPI.    Prior to Admission medications    Medication Sig Start Date End Date Taking? Authorizing Provider   butalbital-acetaminophen-caffeine (FIORICET, ESGIC) -40 MG per tablet Take 1 tablet by mouth every 6 hours as needed 1/7/25   Vaughn Campbell MD   oxyCODONE-acetaminophen (PERCOCET)  MG per tablet Take 1 tablet by mouth 2 times daily as needed. 2/22/24   Vaughn Campbell MD   valACYclovir (VALTREX) 500 MG tablet Take 1 tablet by mouth 2 times daily    Vaughn Campbell MD   promethazine (PHENERGAN) 25 MG tablet Take 1 tablet by mouth every 6 hours as needed for Nausea    Vaughn Campbell MD   cyclobenzaprine (FLEXERIL) 10 MG tablet Take 1 tablet by mouth 3 times daily as needed for Muscle spasms    Vaughn Campbell MD butalbital-acetaminophen-caffeine-codeine

## 2025-05-22 NOTE — OR NURSING
Recovery period without difficulty. Patient alert and oriented and denies pain. Dressing is clean, dry, and intact. Reviewed discharge instructions with patient and mother, both verbalized understanding. [facility] Vital signs completed.

## 2025-05-22 NOTE — BRIEF OP NOTE
[facility]  Department of Interventional Radiology  [phone]        Interventional Radiology Brief Procedure Note    Patient: Liz Liu MRN: [id number]  SSN: [id number]    YOB: 1980  Age: 44 y.o.  Sex: female      Date of Procedure: 5/22/2025    Pre-Procedure Diagnosis: multiple myeloma    Post-Procedure Diagnosis: SAME    Procedure(s): Image Guided Biopsy    Brief Description of Procedure: CT bmbx    Performed By: Lizett Franco PA-C     Assistants: None    Anesthesia:Moderate Sedation per SCOTT Fregoso MD    Estimated Blood Loss: Less than 10ml    Specimens:   aspirate and core    Implants:  None    Findings: no post bx bleeding    Complications: None    Recommendations: routine wound care     Follow Up: prn    Signed By: Lizett Franco PA-C     May 22, 2025

## 2025-05-22 NOTE — PRE SEDATION
Sedation Pre-Procedure Note    Patient Name: Liz Liu   YOB: 1980  Room/Bed: Room/bed info not found  Medical Record Number: [id number]  Date: 5/22/2025   Time: 2:56 PM       Indication:  multiple myeloma    Consent: I have discussed with the patient and/or the patient representative the indication, alternatives, and the possible risks and/or complications of the planned procedure and the anesthesia methods. The patient and/or patient representative appear to understand and agree to proceed.    Vital Signs:   Vitals:    05/22/25 1430   BP: (!) 143/66   Pulse: 72   Resp: 18   Temp: 98 °F (36.7 °C)   SpO2: 100%       Past Medical History:   has a past medical history of Cancer (HCC) and Chronic pain.    Past Surgical History:   has a past surgical history that includes Tubal ligation (08/2007); back surgery (04/2020); IR TUNNELED CVC PLACE WO SQ PORT/PUMP > 5 YEARS (8/17/2020); IR REMOVE TUNNELED CVAD WO SQ PORT/PUMP (10/26/2020); IR TUNNELED CVC PLACE WO SQ PORT/PUMP > 5 YEARS (8/17/2020); and CT BIOPSY BONE MARROW (10/6/2022).    Medications:   Scheduled Meds:   Continuous Infusions:   PRN Meds:   Home Meds:   Prior to Admission medications    Medication Sig Start Date End Date Taking? Authorizing Provider   butalbital-acetaminophen-caffeine (FIORICET, ESGIC) -40 MG per tablet Take 1 tablet by mouth every 6 hours as needed 1/7/25   Vaughn Campbell MD   oxyCODONE-acetaminophen (PERCOCET)  MG per tablet Take 1 tablet by mouth 2 times daily as needed. 2/22/24   Vaughn Campbell MD   valACYclovir (VALTREX) 500 MG tablet Take 1 tablet by mouth 2 times daily    Vaughn Campbell MD   promethazine (PHENERGAN) 25 MG tablet Take 1 tablet by mouth every 6 hours as needed for Nausea    Vaughn Campbell MD   cyclobenzaprine (FLEXERIL) 10 MG tablet Take 1 tablet by mouth 3 times daily as needed for Muscle spasms    Vaughn Campbell MD

## 2025-05-23 NOTE — OP NOTE
Title: CT guided bone marrow aspiration and core biopsy.    History: 44year old female with multiple myeloma.      :  Lizett Franco PA-C    Supervising Physician: Ruslan Fregoso MD.  The physician attests to  supervising this procedure and agrees with the report as written.    Radiation dose reduction techniques were used for this study. [facility]  CT scanner use one or all of the following: Automated exposure control,  adjustment of the mA and/or kV according to patient size, iterative  reconstruction.    Consent: Informed written and oral consent was obtained from the patient after  explanation of benefits and risks (including, but not limited to:  Infection,  Hemorrhage, Visceral Injury).  The patient's questions were answered to their  satisfaction.  The patient stated understanding and requested that we proceed.      Procedure: Maximal sterile barrier technique was used.  With the patient prone a  preliminary CT scan through the pelvis was performed with radio-opaque markers  on the skin.    Following routine prep and drape of the right buttock, a local field block with  lidocaine was achieved.    Using intermittent CT technique, a marrow aspirate followed by a core biopsy was  obtained with the 11 gauge On Control biopsy device.    The needle was removed. Hemostasis was achieved with manual compression. A  bandage was applied.    Total Exam .26 mGy-cm    Complications: None.    Medications: 22 minutes face-to-face time for moderate sedation was provided  under the direction and supervision of Ruslan Fregoso MD using fentanyl and  Versed..  Continuous cardiorespiratory monitoring was provided by trained  independent observer present.    Findings: No post biopsy hemorrhage.   Impression:     Uncomplicated CT guided bone marrow aspiration and core biopsy.    Plan:  Bedrest observation for 30 minutes.

## 2025-05-23 NOTE — RESEARCH
To Interventional Radiology at [facility] to meet patient screening for MonumenTAL-6 Clinical Trial.  Pt arrived and safety labs obtained to review pt blood counts prior to procedure.  Research RN collected pt 24 hour urine jug and gave patient a new one to use for collection for C1D1.  Instructed pt that Research RN will update patient on when to start urine collection after randomization with expectation patient will be eligible and enroll onto study.  Pt verbalized understanding.  Research RN also clarified the following Inclusion/Exclusion criteria with the patient:     Subject denies History of HIV, Hepatitis, active systemic viral, fungal, or bacterial infection or any Autoimmune Disease.  Pt does not take any immunosuppressive therapy including any corticosteroids  Subject denies history of Stroke, TIA, Seizures, Thrombotic Events, or any Acute cardiac history that includes MI, unstable angina, CABG, Arrythmias or Severe Cardiomyopathy.  No history of any of these events is noted in subject EMR  Pt has History of CHF, but states does not cause any symptoms with Normal activities, thus pt is NYHA Class I.  Pt denies History of any Pulmonary Disease.  Subject denies any significant traumatic injuries or surgeries within the last 2 weeks  Subjects states she does not have any history of any psychiatric disorders or substance abuse  Subject denies History of any other diagnosis of malignancy other than MM, and denies any diagnosis of PC leukemia, Waldenstroms macroglobulinemia, POEMS syndrome, or amyloidosis  Subject confirms that only treatment she has received previously was Bortezomib/Dex/Revlimid followed by Autologous SCT in 9/2020.  Was on Revlimid maintenance until progression noted in 12/2022.  Shahrzad was added and pt eventually switched to Shahrzad-Velcade when was not responding well enough to Shahrzad/Rev.  Last treatment of Shahrzad-VD was 4/29/2025.  No additional treatment has been received, including radiation

## 2025-05-28 LAB
FLOW CYTOMETRY RESULTS: NORMAL
SPECIMEN SOURCE: NORMAL
TEST ORDERED: NORMAL

## 2025-05-29 DIAGNOSIS — R94.31 ABNORMAL EKG: Primary | ICD-10-CM

## 2025-06-03 ENCOUNTER — RESEARCH ENCOUNTER (OUTPATIENT)
Dept: RESEARCH | Age: 45
End: 2025-06-03

## 2025-06-03 DIAGNOSIS — C90.02 MULTIPLE MYELOMA IN RELAPSE (HCC): Primary | ICD-10-CM

## 2025-06-03 DIAGNOSIS — Z00.6 RESEARCH EXAM: ICD-10-CM

## 2025-06-04 ENCOUNTER — RESEARCH ENCOUNTER (OUTPATIENT)
Dept: RESEARCH | Age: 45
End: 2025-06-04

## 2025-06-04 ENCOUNTER — HOSPITAL ENCOUNTER (OUTPATIENT)
Dept: LAB | Age: 45
Discharge: HOME | End: 2025-06-04
Payer: MEDICARE

## 2025-06-04 ENCOUNTER — OFFICE VISIT (OUTPATIENT)
Age: 45
End: 2025-06-04

## 2025-06-04 VITALS
WEIGHT: 274.4 LBS | SYSTOLIC BLOOD PRESSURE: 122 MMHG | HEART RATE: 70 BPM | HEIGHT: 64 IN | BODY MASS INDEX: 46.85 KG/M2 | DIASTOLIC BLOOD PRESSURE: 82 MMHG

## 2025-06-04 DIAGNOSIS — I50.22 CHRONIC SYSTOLIC HEART FAILURE (HCC): Primary | ICD-10-CM

## 2025-06-04 DIAGNOSIS — C90.02 MULTIPLE MYELOMA IN RELAPSE (HCC): ICD-10-CM

## 2025-06-04 DIAGNOSIS — Z00.6 RESEARCH EXAM: ICD-10-CM

## 2025-06-04 LAB — HCG SERPL-ACNC: <1 MIU/ML

## 2025-06-04 PROCEDURE — 36415 COLL VENOUS BLD VENIPUNCTURE: CPT

## 2025-06-04 PROCEDURE — 84702 CHORIONIC GONADOTROPIN TEST: CPT

## 2025-06-04 RX ORDER — LOSARTAN POTASSIUM 25 MG/1
25 TABLET ORAL DAILY
Qty: 90 TABLET | Refills: 3 | Status: SHIPPED | OUTPATIENT
Start: 2025-06-04

## 2025-06-04 RX ORDER — CARVEDILOL 3.12 MG/1
3.12 TABLET ORAL 2 TIMES DAILY WITH MEALS
Qty: 180 TABLET | Refills: 3 | Status: SHIPPED | OUTPATIENT
Start: 2025-06-04

## 2025-06-04 ASSESSMENT — ENCOUNTER SYMPTOMS
RESPIRATORY NEGATIVE: 1
EYE PAIN: 0
PHOTOPHOBIA: 0
SHORTNESS OF BREATH: 0
ABDOMINAL PAIN: 0
BACK PAIN: 0
CHEST TIGHTNESS: 0
ALLERGIC/IMMUNOLOGIC NEGATIVE: 1
GASTROINTESTINAL NEGATIVE: 1
EYES NEGATIVE: 1

## 2025-06-04 NOTE — PROGRESS NOTES
[facility] CARDIOLOGY  [address] [place] [address], [address]  [place] [address]  PHONE: [phone]      25    NAME:  Liz Liu  : 1980  MRN: [id number]         SUBJECTIVE:   Liz Liu is a 44 y.o. female seen for follow up of:      Chief Complaint   Patient presents with    Chronic systolic heart failure        Cardiac Hx (Reviewed and summarized by me):  Last seen by me 23  1) systolic heart failure  Echo 20 - LVEF 50% with diastolic dysfunction  Echo 20 - LVEF 40-45% with diastolic dysfunction   (Reviewed both of these echocardiograms and agree that there has been a small decrease in the systolic ejection fraction)  Echo 21 - LVEF 40-45%  Echo 21 - LVEF 55-60% (mildly dilated LV) mild mod MR  Echo 22 - LVEF 40-45% mod MR  2) Multiple Myloma    Now with relapse participating in a study  3) Lipids              10/6/22 - HDL 67, LDL 84.6, Trig 142    ECG: SR with poor rwave progression and NS twave changes    HPI:  Not seen since .  Unfortunately in the meantime she has had a relapse of her multiple myeloma.  She is off her heart failure medications because she was not able to get refills because it has been so long since we seen her.  She denies heart failure symptoms.  Her exam is benign.    Past Medical History, Past Surgical History, Family history, Social History, and Medications were all reviewed with the patient today and updated as necessary.       Current Outpatient Medications:     carvedilol (COREG) 3.125 MG tablet, Take 1 tablet by mouth 2 times daily (with meals), Disp: 180 tablet, Rfl: 3    losartan (COZAAR) 25 MG tablet, Take 1 tablet by mouth daily, Disp: 90 tablet, Rfl: 3    butalbital-acetaminophen-caffeine (FIORICET, ESGIC) -40 MG per tablet, Take 1 tablet by mouth every 6 hours as needed, Disp: , Rfl:     oxyCODONE-acetaminophen (PERCOCET)  MG per tablet, Take 1 tablet by mouth 2 times daily as needed., Disp: ,

## 2025-06-04 NOTE — RESEARCH
Pt screening for MonumenTAL Clinical Trial seen [facility] after pt went to lab to have Serum Pregnancy test. Last pregnancy test performed was a negative Urine Pregnancy Test on 5/13/25.  An updated Serum Pregnancy Test required for Study. Pt reaffirmed that she is not Pregnant, and states that in addition to Tubal Ligation, she agrees to use condoms to prevent occurrence of Pregnancy.  Pt saw Cardiologist prior to coming to Cancer Center as ordered by Dr. Baca.  Pt Cardiologist, Dr. Laguerre ordered ECHO for patient to be done prior to patient starting new MM treatment.  Research RN informed pt will call to have ECHO scheduled ASAP.  Pt verbalized understanding.  Dr. Laguerre also sent prescriptions to pt pharmacy for:    -Coreg 3.125 mg PO BID  -Losartan 25 mg PO Daily    Pt informed of potential schedule for treatment in June should pt be randomized to ARMs A or B, and Research RN provided pt with sample schedule.    Research RN also repeated ICE Tool assessment in order to have pt written sentence documented on ICE Tool Appendix for pt source documents.  Pt consents to continue screening for Trial.  Research will continue to follow.

## 2025-06-09 ENCOUNTER — TELEPHONE (OUTPATIENT)
Age: 45
End: 2025-06-09

## 2025-06-09 NOTE — TELEPHONE ENCOUNTER
Physician provider: Dr. Baca  Reason for today's call (Please detail here patients chief complaint): plan of care  Last office visit:n/a  Patient Callback Number: [phone]  Was callback number verified?: Yes  Preferred pharmacy (If refill request): n/a  Veriified that patient confirmed no refills left at pharmacy? :No  Calls to office within the last 48 hours?:No    Warm Transfer to (For Red Words): none    Patient notified that their information will be routed to the [facility] clinical triage team for review. Patient is advised that they will receive a phone call from the triage department. If symptom related and symptoms worsen before receiving a call back, the patient has been advised to proceed to the nearest ED.              Dr. West from [facility] would like to know the plan for patient's therapy.    [phone]

## 2025-06-11 ENCOUNTER — RESEARCH ENCOUNTER (OUTPATIENT)
Dept: RESEARCH | Age: 45
End: 2025-06-11

## 2025-06-12 ENCOUNTER — RESEARCH ENCOUNTER (OUTPATIENT)
Dept: RESEARCH | Age: 45
End: 2025-06-12

## 2025-06-13 ENCOUNTER — RESEARCH ENCOUNTER (OUTPATIENT)
Dept: RESEARCH | Age: 45
End: 2025-06-13

## 2025-06-16 ENCOUNTER — RESEARCH ENCOUNTER (OUTPATIENT)
Dept: RESEARCH | Age: 45
End: 2025-06-16

## 2025-06-16 NOTE — RESEARCH
Research RN called pt to review Withdrawal Consent from [facility] Clinical Trial.  Pt declines to return to clinic at this time to sign the consent in person. Research RN and Maribel Johnson RN read and discussed the line items of the Withdrawal Consent. Pt agreed to all terms of Withdrawal Consent via telephone (see Note to File in pt Source) and will be mailed a copy to sign and return to Research RN.  Pt verbalized understanding. Research RN also informed pt that Dr. Baca had been notified and agreed to contact Dr. Jerome Garza regarding pt treatment continuation in [place]. Pt stated that [facility] had not yet contacted her regarding next steps.  Research RN instructed pt to call the clinic in [place] to update them and schedule visits to initiate treatment.  Pt verbalized understanding.  Research signing off.

## 2025-06-16 NOTE — RESEARCH
Research RN called pt to inform that Sponsor for [facility] clinical trial approved pt for Randomization to enroll on study.  Randomization was performed and pt was assigned to ARM C: Epd.  Informed pt that this meant she would not be receiving any Study drug, or require any hospitalizations, but would need to come to Cancer Center weekly for the first few months on study.  Pt stated that she would like to discuss this information with her family.  Pt stated that she would call Research RN back later today or tomorrow morning.  Research will continue to follow.

## 2025-06-16 NOTE — RESEARCH
Research RN left message with pt to call today to review Withdrawal Consent. Research will continue to follow.

## 2025-06-16 NOTE — RESEARCH
Pt called Research RN with daughter on phone and requested Research RN to explain the Randomization results for MonumenTAL Clinical Trial to her daughter.  Research RN explained that pt had been assigned to SOC treatment ARM C: EPd (Elotuzumab, Pomalidomide, and Dexamethasone). Research RN re-iterated that pt will not receive Study supplied BiTe therapies, nor require hospitalization.  Research RN reviewed Epd treatment schedule with pt and daughter informing them that treatment would require weekly treatment visits to the cancer center for 9 weeks and then change to every 4 weeks.  Pt verbalized understanding.  Pt then stated she wants to withdraw from Study Participation and continue her treatment for MM in the [facility] near her home.  Research RN informed pt that she would notify Dr. Baca, and agreed to pt request to ask Dr. Baca to update Dr. Jerome Garza that she will resume treatment with him.  Research RN informed pt that will need to review Withdrawal Consent for MonumenTAL Clinical Trial with pt, and will follow-up soon to complete this.  Pt verbalized understanding. Research will continue to follow.

## 2025-06-18 ENCOUNTER — RESULTS FOLLOW-UP (OUTPATIENT)
Dept: CARDIOLOGY CLINIC | Age: 45
End: 2025-06-18

## 2025-06-18 NOTE — TELEPHONE ENCOUNTER
----- Message from Dr. Jacob Laguerre MD sent at 6/18/2025 11:12 AM EDT -----  Heart function is worse when compared to prior echo, continue the medications we discussed will see her back at next appt.    Jacob Laguerre MD

## 2025-06-18 NOTE — RESULT ENCOUNTER NOTE
Heart function is worse when compared to prior echo, continue the medications we discussed will see her back at next appt.    Jacob Laguerre MD

## 2025-06-18 NOTE — TELEPHONE ENCOUNTER
I called the patient and informed her of test results and Dr. Laguerre's advice.  Patient verbalized understanding.          6/18/25 11:12 AM  Note     Heart function is worse when compared to prior echo, continue the medications we discussed will see her back at next appt.     Jacob Laguerre MD         Echo (TTE) complete (PRN contrast/bubble/strain/3D)